# Patient Record
Sex: FEMALE | Race: BLACK OR AFRICAN AMERICAN | NOT HISPANIC OR LATINO | Employment: UNEMPLOYED | URBAN - METROPOLITAN AREA
[De-identification: names, ages, dates, MRNs, and addresses within clinical notes are randomized per-mention and may not be internally consistent; named-entity substitution may affect disease eponyms.]

---

## 2018-03-28 ENCOUNTER — TRANSCRIBE ORDERS (OUTPATIENT)
Dept: ADMINISTRATIVE | Facility: HOSPITAL | Age: 48
End: 2018-03-28

## 2018-03-28 DIAGNOSIS — E03.9 HYPOTHYROIDISM, ADULT: Primary | ICD-10-CM

## 2018-04-17 ENCOUNTER — HOSPITAL ENCOUNTER (OUTPATIENT)
Dept: GENERAL RADIOLOGY | Facility: HOSPITAL | Age: 48
End: 2018-04-17

## 2018-04-17 ENCOUNTER — TRANSCRIBE ORDERS (OUTPATIENT)
Dept: GENERAL RADIOLOGY | Facility: HOSPITAL | Age: 48
End: 2018-04-17

## 2018-04-17 ENCOUNTER — HOSPITAL ENCOUNTER (OUTPATIENT)
Dept: GENERAL RADIOLOGY | Facility: HOSPITAL | Age: 48
Discharge: HOME | End: 2018-04-17

## 2018-04-17 ENCOUNTER — HOSPITAL ENCOUNTER (OUTPATIENT)
Dept: GENERAL RADIOLOGY | Facility: HOSPITAL | Age: 48
Discharge: HOME | End: 2018-04-17
Admitting: NURSE PRACTITIONER

## 2018-04-17 DIAGNOSIS — R22.1 NECK NODULE: Primary | ICD-10-CM

## 2018-04-17 DIAGNOSIS — G89.29 CHRONIC RADICULAR PAIN OF LOWER BACK: Primary | ICD-10-CM

## 2018-04-17 DIAGNOSIS — M54.9 CHRONIC BILATERAL BACK PAIN, UNSPECIFIED BACK LOCATION: Primary | ICD-10-CM

## 2018-04-17 DIAGNOSIS — M25.562 BILATERAL CHRONIC KNEE PAIN: Primary | ICD-10-CM

## 2018-04-17 DIAGNOSIS — M25.561 BILATERAL CHRONIC KNEE PAIN: Primary | ICD-10-CM

## 2018-04-17 DIAGNOSIS — G89.29 CHRONIC BILATERAL BACK PAIN, UNSPECIFIED BACK LOCATION: Primary | ICD-10-CM

## 2018-04-17 DIAGNOSIS — G89.29 BILATERAL CHRONIC KNEE PAIN: Primary | ICD-10-CM

## 2018-04-17 DIAGNOSIS — M54.16 CHRONIC RADICULAR PAIN OF LOWER BACK: Primary | ICD-10-CM

## 2018-04-17 PROCEDURE — 72070 X-RAY EXAM THORAC SPINE 2VWS: CPT

## 2018-04-17 PROCEDURE — 72110 X-RAY EXAM L-2 SPINE 4/>VWS: CPT

## 2018-04-17 PROCEDURE — 73562 X-RAY EXAM OF KNEE 3: CPT

## 2018-04-18 ENCOUNTER — APPOINTMENT (OUTPATIENT)
Dept: ULTRASOUND IMAGING | Facility: HOSPITAL | Age: 48
End: 2018-04-18

## 2018-06-25 ENCOUNTER — HOSPITAL ENCOUNTER (EMERGENCY)
Facility: HOSPITAL | Age: 48
Discharge: HOME | End: 2018-06-25
Attending: EMERGENCY MEDICINE | Admitting: EMERGENCY MEDICINE

## 2018-06-25 ENCOUNTER — APPOINTMENT (OUTPATIENT)
Dept: CT IMAGING | Facility: HOSPITAL | Age: 48
End: 2018-06-25

## 2018-06-25 VITALS
TEMPERATURE: 98.9 F | OXYGEN SATURATION: 93 % | RESPIRATION RATE: 16 BRPM | HEIGHT: 64 IN | HEART RATE: 92 BPM | SYSTOLIC BLOOD PRESSURE: 128 MMHG | DIASTOLIC BLOOD PRESSURE: 93 MMHG | BODY MASS INDEX: 48.65 KG/M2 | WEIGHT: 285 LBS

## 2018-06-25 DIAGNOSIS — S09.90XA INJURY OF HEAD, INITIAL ENCOUNTER: ICD-10-CM

## 2018-06-25 DIAGNOSIS — M54.50 LOW BACK PAIN WITHOUT SCIATICA, UNSPECIFIED BACK PAIN LATERALITY, UNSPECIFIED CHRONICITY: ICD-10-CM

## 2018-06-25 DIAGNOSIS — M54.9 UPPER BACK PAIN: Primary | ICD-10-CM
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
## 2018-06-25 PROCEDURE — 96372 THER/PROPH/DIAG INJ SC/IM: CPT

## 2018-06-25 PROCEDURE — 72131 CT LUMBAR SPINE W/O DYE: CPT

## 2018-06-25 PROCEDURE — 72128 CT CHEST SPINE W/O DYE: CPT

## 2018-06-25 PROCEDURE — 70450 CT HEAD/BRAIN W/O DYE: CPT

## 2018-06-25 PROCEDURE — 99283 EMERGENCY DEPT VISIT LOW MDM: CPT

## 2018-06-25 PROCEDURE — 25010000002 KETOROLAC TROMETHAMINE PER 15 MG: Performed by: EMERGENCY MEDICINE

## 2018-06-25 RX ORDER — KETOROLAC TROMETHAMINE 30 MG/ML
30 INJECTION, SOLUTION INTRAMUSCULAR; INTRAVENOUS ONCE
Status: COMPLETED | OUTPATIENT
Start: 2018-06-25 | End: 2018-06-25

## 2018-06-25 RX ORDER — NAPROXEN 500 MG/1
500 TABLET ORAL 2 TIMES DAILY PRN
Qty: 14 TABLET | Refills: 0 | Status: SHIPPED | OUTPATIENT
Start: 2018-06-25 | End: 2019-04-19 | Stop reason: HOSPADM

## 2018-06-25 RX ORDER — LOSARTAN POTASSIUM AND HYDROCHLOROTHIAZIDE 12.5; 5 MG/1; MG/1
1 TABLET ORAL DAILY
COMMUNITY
End: 2019-04-19 | Stop reason: HOSPADM

## 2018-06-25 RX ORDER — LEVOTHYROXINE SODIUM 0.03 MG/1
25 TABLET ORAL DAILY
COMMUNITY
End: 2019-04-22

## 2018-06-25 RX ORDER — AMLODIPINE BESYLATE 10 MG/1
10 TABLET ORAL DAILY
COMMUNITY
End: 2019-07-24 | Stop reason: ALTCHOICE

## 2018-06-25 RX ORDER — ATORVASTATIN CALCIUM 40 MG/1
40 TABLET, FILM COATED ORAL DAILY
COMMUNITY
End: 2019-10-25 | Stop reason: SDUPTHER

## 2018-06-25 RX ORDER — ASPIRIN 325 MG
325 TABLET ORAL DAILY
COMMUNITY
End: 2020-07-10

## 2018-06-25 RX ORDER — CYCLOBENZAPRINE HCL 10 MG
10 TABLET ORAL 3 TIMES DAILY PRN
Qty: 15 TABLET | Refills: 0 | Status: SHIPPED | OUTPATIENT
Start: 2018-06-25 | End: 2019-04-19 | Stop reason: HOSPADM

## 2018-06-25 RX ADMIN — KETOROLAC TROMETHAMINE 30 MG: 30 INJECTION, SOLUTION INTRAMUSCULAR at 12:54

## 2018-08-09 ENCOUNTER — OFFICE VISIT (OUTPATIENT)
Dept: ORTHOPEDIC SURGERY | Facility: CLINIC | Age: 48
End: 2018-08-09

## 2018-08-09 VITALS — WEIGHT: 274 LBS | OXYGEN SATURATION: 98 % | HEIGHT: 64 IN | BODY MASS INDEX: 46.78 KG/M2 | HEART RATE: 105 BPM

## 2018-08-09 DIAGNOSIS — M22.2X1 PATELLOFEMORAL PAIN SYNDROME OF BOTH KNEES: ICD-10-CM

## 2018-08-09 DIAGNOSIS — M22.2X2 PATELLOFEMORAL PAIN SYNDROME OF BOTH KNEES: ICD-10-CM

## 2018-08-09 DIAGNOSIS — M17.0 PRIMARY OSTEOARTHRITIS OF BOTH KNEES: Primary | ICD-10-CM

## 2018-08-09 DIAGNOSIS — E66.01 MORBID OBESITY WITH BMI OF 45.0-49.9, ADULT (HCC): ICD-10-CM

## 2018-08-09 PROCEDURE — 20610 DRAIN/INJ JOINT/BURSA W/O US: CPT | Performed by: ORTHOPAEDIC SURGERY

## 2018-08-09 PROCEDURE — 99244 OFF/OP CNSLTJ NEW/EST MOD 40: CPT | Performed by: ORTHOPAEDIC SURGERY

## 2018-08-09 RX ORDER — BUPIVACAINE HYDROCHLORIDE 2.5 MG/ML
3 INJECTION, SOLUTION INFILTRATION; PERINEURAL
Status: COMPLETED | OUTPATIENT
Start: 2018-08-09 | End: 2018-08-09

## 2018-08-09 RX ORDER — TRIAMCINOLONE ACETONIDE 40 MG/ML
80 INJECTION, SUSPENSION INTRA-ARTICULAR; INTRAMUSCULAR
Status: COMPLETED | OUTPATIENT
Start: 2018-08-09 | End: 2018-08-09

## 2018-08-09 RX ORDER — LIDOCAINE HYDROCHLORIDE 10 MG/ML
3 INJECTION, SOLUTION INFILTRATION; PERINEURAL
Status: COMPLETED | OUTPATIENT
Start: 2018-08-09 | End: 2018-08-09

## 2018-08-09 RX ADMIN — BUPIVACAINE HYDROCHLORIDE 3 ML: 2.5 INJECTION, SOLUTION INFILTRATION; PERINEURAL at 10:39

## 2018-08-09 RX ADMIN — LIDOCAINE HYDROCHLORIDE 3 ML: 10 INJECTION, SOLUTION INFILTRATION; PERINEURAL at 10:39

## 2018-08-09 RX ADMIN — LIDOCAINE HYDROCHLORIDE 3 ML: 10 INJECTION, SOLUTION INFILTRATION; PERINEURAL at 10:38

## 2018-08-09 RX ADMIN — TRIAMCINOLONE ACETONIDE 80 MG: 40 INJECTION, SUSPENSION INTRA-ARTICULAR; INTRAMUSCULAR at 10:39

## 2018-08-09 RX ADMIN — TRIAMCINOLONE ACETONIDE 80 MG: 40 INJECTION, SUSPENSION INTRA-ARTICULAR; INTRAMUSCULAR at 10:38

## 2018-08-09 RX ADMIN — BUPIVACAINE HYDROCHLORIDE 3 ML: 2.5 INJECTION, SOLUTION INFILTRATION; PERINEURAL at 10:38

## 2018-08-09 NOTE — PROGRESS NOTES
Orthopaedic Clinic Note: Knee New Patient    Chief Complaint   Patient presents with   • Right Knee - Pain   • Left Knee - Pain        HPI  Consult from: LUCIA Morales JOSIANE Martinez is a 48 y.o. female who presents with bilateral knee pain for 7 month(s).  Her symptoms have actually been ongoing for longer than that but her pain has gotten more severe over the past 7 months.  Walking, sitting, lying down increase her pain.  Sitting and resting improves her pain.  She rates her pain 8/10 on the pain scale.  Previous treatments have included use of a cane and occasional anti-inflammatory.  Despite these interventions, her pain is persisting and limiting her daily activities.  She localizes her pain bilaterally to the anterior aspects of both knees.     Past Medical History:   Diagnosis Date   • Asthma    • Diabetes mellitus (CMS/HCC)    • Disease of thyroid gland    • Hypertension    • Sleep apnea       Past Surgical History:   Procedure Laterality Date   • TUBAL ABDOMINAL LIGATION        Family History   Problem Relation Age of Onset   • Cancer Mother    • Diabetes Father      Social History     Social History   • Marital status: Single     Spouse name: N/A   • Number of children: N/A   • Years of education: N/A     Occupational History   • Not on file.     Social History Main Topics   • Smoking status: Former Smoker     Packs/day: 1.00     Types: Cigarettes   • Smokeless tobacco: Never Used   • Alcohol use Yes      Comment: occ   • Drug use: No   • Sexual activity: Defer     Other Topics Concern   • Not on file     Social History Narrative   • No narrative on file      Current Outpatient Prescriptions on File Prior to Visit   Medication Sig Dispense Refill   • amLODIPine (NORVASC) 10 MG tablet Take 10 mg by mouth Daily.     • aspirin 325 MG tablet Take 325 mg by mouth Daily.     • atorvastatin (LIPITOR) 40 MG tablet Take 40 mg by mouth Daily.     • cyclobenzaprine (FLEXERIL) 10 MG tablet Take 1 tablet by  "mouth 3 (Three) Times a Day As Needed for Muscle Spasms. 15 tablet 0   • Ergocalciferol (VITAMIN D2 PO) Take 50,000 Units by mouth 1 (One) Time Per Week.     • GLIPIZIDE PO Take 2.5 mg by mouth Daily.     • levothyroxine (SYNTHROID, LEVOTHROID) 25 MCG tablet Take 25 mcg by mouth Daily.     • losartan-hydrochlorothiazide (HYZAAR) 50-12.5 MG per tablet Take 1 tablet by mouth Daily.     • naproxen (EC NAPROSYN) 500 MG EC tablet Take 1 tablet by mouth 2 (Two) Times a Day As Needed (PAIN). 14 tablet 0     No current facility-administered medications on file prior to visit.       Allergies   Allergen Reactions   • Lisinopril Swelling        Review of Systems   Constitutional: Positive for fatigue.   Eyes: Negative.    Respiratory: Positive for apnea, shortness of breath and wheezing.    Cardiovascular: Positive for palpitations.   Gastrointestinal: Negative.    Endocrine: Negative.    Genitourinary: Negative.    Musculoskeletal: Positive for back pain and joint swelling.   Skin: Negative.    Allergic/Immunologic: Negative.    Neurological: Positive for headaches.   Hematological: Negative.    Psychiatric/Behavioral: Positive for confusion, decreased concentration, sleep disturbance and suicidal ideas. The patient is nervous/anxious.         The following portions of the patient's history were reviewed and updated as appropriate: allergies, current medications, past family history, past medical history, past social history, past surgical history and problem list.    Physical Exam  Pulse 105, height 162.6 cm (64.02\"), weight 124 kg (274 lb), SpO2 98 %.    Body mass index is 47.01 kg/m².    GENERAL APPEARANCE: awake, alert & oriented x 3, in no acute distress, well developed, well nourished and obese  PSYCH: normal affect  LUNGS:  breathing nonlabored  EYES: sclera anicteric  CARDIOVASCULAR: palpable dorsalis pedis, palpable posterior tibial bilaterally. Capillary refill less than 2 seconds  EXTREMITIES: no clubbing, " cyanosis  GAIT:  Antalgic            Right Lower Extremity Exam:   ----------  Hip Exam  ----------  FLEXION CONTRACTURE: None  FLEXION: 110 degrees  INTERNAL ROTATION: 20 degrees at 90 degrees of flexion   EXTERNAL ROTATION: 40 degrees at 90 degrees of flexion    PAIN WITH HIP MOTION: no  ----------  Knee Exam  ----------  ALIGNMENT: neutral, no varus or valgus deformity     RANGE OF MOTION:  Normal (0-130 degrees) with no extensor lag or flexion contracture  LIGAMENTOUS STABILITY:   stable to varus and valgus stress at 0 and 30 degrees without any evidence of laxity     STRENGTH:  4/5 knee flexion, extension. 5/5 ankle dorsiflexion and plantarflexion.     PAIN WITH PALPATION: Tender to palpation about medial, lateral joint line, anterior knee, patellar tendon   KNEE EFFUSION: Trace  PAIN WITH KNEE ROM: no  PATELLAR CREPITUS: yes, painful and symptomatic  SPECIAL EXAM FINDINGS:  Painful patellar compression    REFLEXES:  PATELLAR 2+/4  ACHILLES 2+/4    CLONUS: negative  STRAIGHT LEG TEST:   negative    SENSATION TO LIGHT TOUCH:  DEEP PERONEAL/SUPERFICIAL PERONEAL/SURAL/SAPHENOUS/TIBIAL:   intact    EDEMA:  no  ERYTHEMA:  no  WOUNDS/INCISIONS: none, no overlying skin problems.      Left Lower Extremity Exam:   ----------  Hip Exam  ----------  FLEXION CONTRACTURE: None  FLEXION: 110 degrees  INTERNAL ROTATION: 20 degrees at 90 degrees of flexion   EXTERNAL ROTATION: 40 degrees at 90 degrees of flexion    PAIN WITH HIP MOTION: no  ----------  Knee Exam  ----------  ALIGNMENT: neutral, no varus or valgus deformity     RANGE OF MOTION:  Normal (0-130 degrees) with no extensor lag or flexion contracture  LIGAMENTOUS STABILITY:   stable to varus and valgus stress at 0 and 30 degrees without any evidence of laxity     STRENGTH:  4/5 knee flexion, extension. 5/5 ankle dorsiflexion and plantarflexion.     PAIN WITH PALPATION: Tender to palpation about medial, lateral joint line, anterior knee, patellar tendon   KNEE  EFFUSION: Trace  PAIN WITH KNEE ROM: no  PATELLAR CREPITUS: yes, painful and symptomatic  SPECIAL EXAM FINDINGS:  Painful patellar compression    REFLEXES:  PATELLAR 2+/4  ACHILLES 2+/4    CLONUS: negative  STRAIGHT LEG TEST:   negative    SENSATION TO LIGHT TOUCH:  DEEP PERONEAL/SUPERFICIAL PERONEAL/SURAL/SAPHENOUS/TIBIAL:   intact    EDEMA:  no  ERYTHEMA:  no  WOUNDS/INCISIONS: none, no overlying skin problems.      ______________________________________________________________________  ______________________________________________________________________    RADIOGRAPHIC FINDINGS:   Indication: Bilateral knee pain    Comparison: Todays xrays were compared to previous xrays from 4/17/18     Bilateral knee(s) 4 views: Radiographs demonstrate mild to moderate tricompartmental osteoarthritis with slight medial compartment joint space narrowing.  Small osteophytes are seen in the sunrise view.  Periarticular osteophytes are primarily seen in sunrise view.  No acute bony injury or fracture.  No significant changes compared to prior radiographs despite the most recent set being weightbearing versus nonweightbearing.      Assessment/Plan:   Diagnosis Plan   1. Primary osteoarthritis of both knees  XR Knee 4+ View Bilateral    Large Joint Arthrocentesis    Large Joint Arthrocentesis    Ambulatory Referral to Physical Therapy Evaluate and treat   2. Patellofemoral pain syndrome of both knees  Ambulatory Referral to Physical Therapy Evaluate and treat   3. Morbid obesity with BMI of 45.0-49.9, adult (CMS/HCC)       Patient's symptoms appear to be related to arthritis and patellofemoral pain syndrome.  I recommended a referral to physical therapy, cortisone injections, continued by mouth anti-inflammatories.  I'll place a referral for therapy as well as cortisone injections bilateral knees today.  She'll follow up in 3 months.    Procedure Note:  I discussed with the patient the potential benefits of performing a therapeutic  injections of the bilateral knees as well as potential risks including but not limited to infection, swelling, pain, bleeding, bruising, nerve/vessel damage, skin color changes, transient elevation in blood glucose levels, and fat atrophy. After informed consent and after the areas were prepped with alcohol, ethyl chloride was used to numb the skin. Via the superolateral approach, 3cc of 1% lidocaine, 3cc of 0.25% marcaine and 2 cc of 40mg/ml of Kenalog were each injected into the bilateral knees. The patient tolerated the procedures well. There were no complications. A sterile dressing was placed over each injection site.    Sanjeev Campbell MD  08/09/18  10:43 AM

## 2018-08-09 NOTE — PROGRESS NOTES
Procedure   Large Joint Arthrocentesis  Date/Time: 8/9/2018 10:38 AM  Consent given by: patient  Site marked: site marked  Timeout: Immediately prior to procedure a time out was called to verify the correct patient, procedure, equipment, support staff and site/side marked as required   Supporting Documentation  Indications: pain   Procedure Details  Location: knee - R knee  Preparation: Patient was prepped and draped in the usual sterile fashion  Needle size: 22 G  Approach: anterolateral  Medications administered: 3 mL lidocaine 1 %; 80 mg triamcinolone acetonide 40 MG/ML; 3 mL bupivacaine 0.25 %  Patient tolerance: patient tolerated the procedure well with no immediate complications    Large Joint Arthrocentesis  Date/Time: 8/9/2018 10:39 AM  Consent given by: patient  Site marked: site marked  Timeout: Immediately prior to procedure a time out was called to verify the correct patient, procedure, equipment, support staff and site/side marked as required   Supporting Documentation  Indications: pain   Procedure Details  Location: knee - L knee  Preparation: Patient was prepped and draped in the usual sterile fashion  Needle size: 22 G  Approach: anterolateral  Medications administered: 3 mL lidocaine 1 %; 80 mg triamcinolone acetonide 40 MG/ML; 3 mL bupivacaine 0.25 %  Patient tolerance: patient tolerated the procedure well with no immediate complications

## 2018-08-16 ENCOUNTER — HOSPITAL ENCOUNTER (OUTPATIENT)
Dept: PHYSICAL THERAPY | Facility: HOSPITAL | Age: 48
Setting detail: THERAPIES SERIES
Discharge: HOME | End: 2018-08-16

## 2018-08-16 DIAGNOSIS — G89.29 CHRONIC PAIN OF BOTH KNEES: Primary | ICD-10-CM

## 2018-08-16 DIAGNOSIS — M25.562 CHRONIC PAIN OF BOTH KNEES: Primary | ICD-10-CM

## 2018-08-16 DIAGNOSIS — M25.561 CHRONIC PAIN OF BOTH KNEES: Primary | ICD-10-CM

## 2018-08-16 PROCEDURE — 97161 PT EVAL LOW COMPLEX 20 MIN: CPT

## 2018-08-16 NOTE — THERAPY EVALUATION
Outpatient Physical Therapy Ortho Initial Evaluation  [facility]     Patient Name: Freida Martinez  : 1970  MRN: [id number]  Today's Date: 2018      Visit Date: 2018      Past Medical History:   Diagnosis Date   • Asthma    • Diabetes mellitus (CMS/HCC)    • Disease of thyroid gland    • Hypertension    • Sleep apnea         Past Surgical History:   Procedure Laterality Date   • TUBAL ABDOMINAL LIGATION         Visit Dx:     ICD-10-CM ICD-9-CM   1. Chronic pain of both knees M25.561 719.46    M25.562 338.29    G89.29              Patient History     Row Name 18 1351             History    Chief Complaint Difficulty Walking;Difficulty with daily activities;Joint stiffness;Joint swelling;Muscle tenderness;Muscle weakness;Pain  -MM      Type of Pain Knee pain   bilateral  -MM      Brief Description of Current Complaint client presents with severe bilateral knee pain. she has had severe pain for several years. She recently recieved a cortizone injection in both knees 18. Symptoms improved some after the injection and overall pain has been less severe. she was also able to move her knees better after the injection.  she reports that she also struggles with severe back pain at 9/10.   -MM      Patient/Caregiver Goals Relieve pain;Return to prior level of function;Improve mobility;Improve strength  -MM      What clinical tests have you had for this problem? X-ray  -MM      Results of Clinical Tests mild to moderate OA with small osteophytes  -MM      Are you or can you be pregnant No  -MM         Pain     Pain Location Knee  -MM      Pain at Present 0  -MM      Pain at Best 0  -MM      Pain at Worst 10  -MM      Pain Frequency Intermittent   daily  -MM      Pain Description Throbbing;Shooting;Stabbing  -MM      Pain Comments Pain improves sitting and resting. she has stiffness after sitting too long. she has pain with transfers and WB activity.  -MM      Tolerance Time- Standing 5 min   -MM      Tolerance Time- Sitting 20 min  -MM      Tolerance Time- Walking usually just walk around the house  -MM      Is your sleep disturbed? --   sometimes  -MM      Difficulties with ADL's? walking, standing, transfers, stairs, squatting  -MM         Fall Risk Assessment    Any falls in the past year: Yes  -MM      Number of falls reported in the last 12 months 1  -MM      Factors that contributed to the fall: Slippery surface  -MM      Does patient have a fear of falling Yes (comment)   Client walks with a cane now  -MM         Daily Activities    Primary Language English  -MM      Are you able to read Yes  -MM      Are you able to write Yes  -MM      How does patient learn best? Reading;Listening;Demonstration  -MM      Barriers to learning None  -MM      Pt Participated in POC and Goals Yes  -MM         Safety    Are you being hurt, hit, or frightened by anyone at home or in your life? No  -MM      Are you being neglected by a caregiver No  -MM        User Key  (r) = Recorded By, (t) = Taken By, (c) = Cosigned By    Initials Name Provider Type    MM Sanjeev Serrato, PT Physical Therapist                PT Ortho     Row Name 08/16/18 1400       Posture/Observations    Posture/Observations Comments client walks with a quad cane in R hand (corrected and patient started using in Left hand). marked difficulty with sit to stand transfers. bilateral lateral tilt of each patella. Client noted and demonstrated that her right patella subluxes very easily laterally. Palpation: moderate tenderness around each patella and medial/lateral tibiofemoral joint line bilateral. Crepitus noted at right lateral patella with passive knee flexion/extension.   -MM       Knee Special Tests    Anterior drawer (ACL lesion) Bilateral:;Negative  -MM    Lachman’s (ACL lesion) Bilateral:;Negative  -MM    Posterior drawer (PCL lesion) Bilateral:;Negative  -MM    Valgus stress (MCL lesion) Bilateral:;Negative  -MM    Varus stress (LCL  lesion) Bilateral:;Negative  -MM    Patellar grind test (chondromalacia patella) --   not tested. crepitus noted R > L.  -MM       Right Lower Ext    RT Lower Extremity Comments Knee AROM: flexion: 137°; extension: 0°  -MM       Left Lower Ext    LT Lower Extremity Comments Knee AROM: Flexion: 135° with pain; extension: 0°  -MM       Left Hip (Manual Muscle Testing)    MMT, Left Hip: Manual Muscle Testing (MMT) Flex: 4-/5; abduction: 4-/5; adduction: 4-/5.  -MM       Right Hip (Manual Muscle Testing)    MMT, Right Hip: Manual Muscle Testing (MMT) Flex: 4-/5; abduction: 4-/5; adduction: 4-/5.  -MM       Left Knee (Manual Muscle Testing)    Comment, Left Knee: Manual Muscle Testing (MMT) flexion: 4-/5; extension: 4-/5  -MM       Right Knee (Manual Muscle Testing)    Comment, Right Knee: Manual Muscle Testing (MMT) flexion: 4-/5; extension: 4-/5.  -MM       Transfers    Comment (Transfers) Marked difficulty with sit to stand transfers.  -MM       Gait/Stairs Assessment/Training    Comment (Gait/Stairs) Decreased maximus and mild antalgic gait due to right knee pain.  Client walks with quad cane.  Client demonstrated use of quad cane in left hand to reduce weightbearing stress on the right lower extremity.  -MM      User Key  (r) = Recorded By, (t) = Taken By, (c) = Cosigned By    Initials Name Provider Type    MM Sanjeev Serrato, PT Physical Therapist        Therapy Education  Education Details: Provided and reviewed home program.  Home exercises included: Very small range squats with support, straight leg raise flexion, straight leg raise abduction, straight leg raise extension, standing leg curl, hamstring stretch, single knee to chest.           PT OP Goals     Row Name 08/16/18 1400          PT Short Term Goals    STG Date to Achieve 09/06/18  -MM     STG 1 Client will report 50% improvement bilateral knee pain.  -MM     STG 1 Progress New  -MM     STG 2 Client will demonstrate full knee flexion without pain.   -MM     STG 2 Progress New  -MM     STG 3 Client will be independent with home program to minimize pain and improve overall strength and function.  -MM     STG 3 Progress New  -MM     STG 4 Bilateral knee strength will improve to 4+/5 throughout.  -MM     STG 4 Progress New  -MM        Long Term Goals    LTG Date to Achieve 09/13/18  -MM     LTG 1 LEF S score will improve to 60% or better.  -MM     LTG 1 Progress New  -MM     LTG 2 Client will demonstrate normal walking pattern while using a cane.  -MM     LTG 2 Progress New  -MM     LTG 3 Bilateral hip strength will improve to 4+/5 throughout.  -MM     LTG 3 Progress New  -MM     LTG 4 Client will improve to transferring sit to stand without difficulty.  -MM     LTG 4 Progress New  -MM        Time Calculation    PT Goal Re-Cert Due Date 11/14/18  -MM       User Key  (r) = Recorded By, (t) = Taken By, (c) = Cosigned By    Initials Name Provider Type    MM Sanjeev Serrato, PT Physical Therapist                PT Assessment/Plan     Row Name 08/16/18 1400          PT Assessment    Functional Limitations Impaired gait;Impaired locomotion;Limitation in home management;Limitations in community activities;Limitations in functional capacity and performance;Performance in leisure activities;Performance in self-care ADL  -MM     Impairments Pain;Muscle strength;Range of motion;Gait;Endurance  -MM     Assessment Comments Client presents with evolving symptoms of low complexity.  Signs and symptoms are consistent with bilateral knee pain related to osteoarthritis.  Client also demonstrates some instability of right patella with increased lateral gliding.  Skilled intervention is required to improve pain and strength.  -MM     Please refer to paper survey for additional self-reported information Yes  -MM     Rehab Potential Good  -MM     Patient/caregiver participated in establishment of treatment plan and goals Yes  -MM     Patient would benefit from skilled therapy  intervention Yes  -MM        PT Plan    PT Frequency 2x/week  -MM     Predicted Duration of Therapy Intervention (Therapy Eval) 8-10 visits  -MM     Planned CPT's? PT EVAL LOW COMPLEXITY: 79923;PT THER PROC EA 15 MIN: 33407;PT NEUROMUSC RE-EDUCATION EA 15 MIN: 69548;PT MANUAL THERAPY EA 15 MIN: 78046;PT GAIT TRAINING EA 15 MIN: 97004;PT ELECTRICAL STIM UNATTEND: ;PT HOT/COLD PACK WC NONMCARE: 32479;PT ULTRASOUND EA 15 MIN: 65553  -MM     PT Plan Comments Continue per plan of treatment with manual therapy and exercises.  Limit weightbearing activity as needed.  -MM       User Key  (r) = Recorded By, (t) = Taken By, (c) = Cosigned By    Initials Name Provider Type    Sanjeev Green, PT Physical Therapist           Outcome Measure Options: Lower Extremity Functional Scale (LEFS) (10%)  Lower Extremity Functional Index  Any of your usual work, housework or school activities: Extreme difficulty or unable to perform activity  Your usual hobbies, recreational or sporting activities: Extreme difficulty or unable to perform activity  Getting into or out of the bath: Quite a bit of difficulty  Walking between rooms: Quite a bit of difficulty  Putting on your shoes or socks: Quite a bit of difficulty  Squatting: Extreme difficulty or unable to perform activity  Lifting an object, like a bag of groceries from the floor: Extreme difficulty or unable to perform activity  Performing light activities around your home: Quite a bit of difficulty  Performing heavy activities around your home: Extreme difficulty or unable to perform activity  Getting into or out of a car: Quite a bit of difficulty  Walking 2 blocks: Extreme difficulty or unable to perform activity  Walking a mile: Extreme difficulty or unable to perform activity  Going up or down 10 stairs (about 1 flight of stairs): Quite a bit of difficulty  Standing for 1 hour: Extreme difficulty or unable to perform activity  Sitting for 1 hour: Extreme difficulty or  unable to perform activity  Running on even ground: Extreme difficulty or unable to perform activity  Running on uneven ground: Extreme difficulty or unable to perform activity  Making sharp turns while running fast: Extreme difficulty or unable to perform activity  Hopping: Extreme difficulty or unable to perform activity  Rolling over in bed: Moderate difficulty  Total: 8      Start Time: 1400     Therapy Charges for Today     Code Description Service Date Service Provider Modifiers Qty    95968358666 HC PT EVAL LOW COMPLEXITY 4 8/16/2018 Sanjeev Serrato, PT GP 1          PT G-Codes  Outcome Measure Options: Lower Extremity Functional Scale (LEFS) (10%)         Sanjeev Serrato, PT  8/16/2018

## 2018-08-22 ENCOUNTER — HOSPITAL ENCOUNTER (OUTPATIENT)
Dept: PHYSICAL THERAPY | Facility: HOSPITAL | Age: 48
Setting detail: THERAPIES SERIES
Discharge: HOME | End: 2018-08-22

## 2018-08-22 DIAGNOSIS — G89.29 CHRONIC PAIN OF BOTH KNEES: Primary | ICD-10-CM

## 2018-08-22 DIAGNOSIS — M25.562 CHRONIC PAIN OF BOTH KNEES: Primary | ICD-10-CM

## 2018-08-22 DIAGNOSIS — M25.561 CHRONIC PAIN OF BOTH KNEES: Primary | ICD-10-CM

## 2018-08-22 PROCEDURE — 97110 THERAPEUTIC EXERCISES: CPT

## 2018-08-22 PROCEDURE — 97140 MANUAL THERAPY 1/> REGIONS: CPT

## 2018-08-27 ENCOUNTER — HOSPITAL ENCOUNTER (OUTPATIENT)
Dept: PHYSICAL THERAPY | Facility: HOSPITAL | Age: 48
Setting detail: THERAPIES SERIES
Discharge: HOME | End: 2018-08-27

## 2018-08-27 DIAGNOSIS — M25.561 CHRONIC PAIN OF BOTH KNEES: Primary | ICD-10-CM

## 2018-08-27 DIAGNOSIS — G89.29 CHRONIC PAIN OF BOTH KNEES: Primary | ICD-10-CM

## 2018-08-27 DIAGNOSIS — M25.562 CHRONIC PAIN OF BOTH KNEES: Primary | ICD-10-CM

## 2018-08-27 PROCEDURE — 97140 MANUAL THERAPY 1/> REGIONS: CPT

## 2018-08-27 PROCEDURE — 97110 THERAPEUTIC EXERCISES: CPT

## 2018-08-27 NOTE — THERAPY TREATMENT NOTE
Outpatient Physical Therapy Ortho Treatment Note  [facility]     Patient Name: Freida Martinez  : 1970  MRN: [id number]  Today's Date: 2018      Visit Date: 2018    Visit Dx:    ICD-10-CM ICD-9-CM   1. Chronic pain of both knees M25.561 719.46    M25.562 338.29    G89.29         Past Medical History:   Diagnosis Date   • Asthma    • Diabetes mellitus (CMS/HCC)    • Disease of thyroid gland    • Hypertension    • Sleep apnea         Past Surgical History:   Procedure Laterality Date   • TUBAL ABDOMINAL LIGATION                               PT Assessment/Plan     Row Name 18 0900          PT Assessment    Assessment Comments Client had mild pain with exercise today. She reported difficulty transferring sit to stand and catching discomfort. She had difficulty performing SLR flexion for the right leg.   -MM        PT Plan    PT Plan Comments Continue per plan of treatment with exercises and manual therapy.   -MM       User Key  (r) = Recorded By, (t) = Taken By, (c) = Cosigned By    Initials Name Provider Type    Sanjeev Green, PT Physical Therapist                    Exercises     Row Name 18 0900             Subjective Comments    Subjective Comments Client reports no pain at rest, but pain at 2/10 with activity.   -MM         Subjective Pain    Able to rate subjective pain? yes  -MM      Pre-Treatment Pain Level 2  -MM      Post-Treatment Pain Level 2  -MM         Total Minutes    62186 - PT Therapeutic Exercise Minutes 15  -MM      94174 - PT Manual Therapy Minutes 15  -MM         Exercise 1    Exercise Name 1 Included exercises per flow sheet.   -MM      Cueing 1 Verbal  -MM      Time 1 15  -MM      Additional Comments ther ex  -MM        User Key  (r) = Recorded By, (t) = Taken By, (c) = Cosigned By    Initials Name Provider Type    Sanjeev Green, PT Physical Therapist                        Manual Rx (last 36 hours)      Manual Treatments     Row Name 18  0900             Total Minutes    20126 - PT Manual Therapy Minutes 15  -MM         Manual Rx 1    Manual Rx 1 Location anterior knees bilateral  -MM      Manual Rx 1 Type Provided soft tissue mobilization using ASTYM technique.  Client was positioned supine with knees bent.  Moderate pressure was used with ASTYM.  -MM      Manual Rx 1 Duration 15  -MM        User Key  (r) = Recorded By, (t) = Taken By, (c) = Cosigned By    Initials Name Provider Type    Sanjeev Green, PT Physical Therapist                             Time Calculation:   Start Time: 0900  Therapy Suggested Charges     Code   Minutes Charges    83723 (CPT®) Hc Pt Neuromusc Re Education Ea 15 Min      31296 (CPT®) Hc Pt Ther Proc Ea 15 Min 15 1    12318 (CPT®) Hc Gait Training Ea 15 Min      19094 (CPT®) Hc Pt Therapeutic Act Ea 15 Min      88113 (CPT®) Hc Pt Manual Therapy Ea 15 Min 15 1    66517 (CPT®) Hc Pt Ther Massage- Per 15 Min      06875 (CPT®) Hc Pt Iontophoresis Ea 15 Min      77917 (CPT®) Hc Pt Elec Stim Ea-Per 15 Min      67027 (CPT®) Hc Pt Ultrasound Ea 15 Min      63403 (CPT®) Hc Pt Self Care/Mgmt/Train Ea 15 Min      47648 (CPT®) Hc Pt Prosthetic (S) Train Initial Encounter, Each 15 Min      47425 (CPT®) Hc Orthotic(S) Mgmt/Train Initial Encounter, Each 15min      11726 (CPT®) Hc Pt Aquatic Therapy Ea 15 Min      67793 (CPT®) Hc Pt Orthotic(S)/Prosthetic(S) Encounter, Each 15 Min      Total  30 2        Therapy Charges for Today     Code Description Service Date Service Provider Modifiers Qty    72076338911 HC PT THER PROC EA 15 MIN 8/27/2018 Sanjeev Serrato, PT GP 1    75745105766 HC PT MANUAL THERAPY EA 15 MIN 8/27/2018 Sanjeev Serrato, PT GP 1                    Sanjeev Serrato, PT  8/27/2018

## 2018-08-30 ENCOUNTER — HOSPITAL ENCOUNTER (OUTPATIENT)
Dept: PHYSICAL THERAPY | Facility: HOSPITAL | Age: 48
Setting detail: THERAPIES SERIES
Discharge: HOME | End: 2018-08-30

## 2018-08-30 DIAGNOSIS — G89.29 CHRONIC PAIN OF BOTH KNEES: Primary | ICD-10-CM

## 2018-08-30 DIAGNOSIS — M25.562 CHRONIC PAIN OF BOTH KNEES: Primary | ICD-10-CM

## 2018-08-30 DIAGNOSIS — M25.561 CHRONIC PAIN OF BOTH KNEES: Primary | ICD-10-CM

## 2018-08-30 PROCEDURE — 97140 MANUAL THERAPY 1/> REGIONS: CPT

## 2018-09-04 ENCOUNTER — HOSPITAL ENCOUNTER (OUTPATIENT)
Dept: PHYSICAL THERAPY | Facility: HOSPITAL | Age: 48
Setting detail: THERAPIES SERIES
Discharge: HOME | End: 2018-09-04

## 2018-09-04 DIAGNOSIS — M25.561 CHRONIC PAIN OF BOTH KNEES: Primary | ICD-10-CM

## 2018-09-04 DIAGNOSIS — M25.562 CHRONIC PAIN OF BOTH KNEES: Primary | ICD-10-CM

## 2018-09-04 DIAGNOSIS — G89.29 CHRONIC PAIN OF BOTH KNEES: Primary | ICD-10-CM

## 2018-09-04 PROCEDURE — 97110 THERAPEUTIC EXERCISES: CPT

## 2018-09-04 PROCEDURE — 97140 MANUAL THERAPY 1/> REGIONS: CPT

## 2018-09-04 NOTE — THERAPY TREATMENT NOTE
Outpatient Physical Therapy Ortho Treatment Note  [facility]     Patient Name: Freida Martinez  : 1970  MRN: [id number]  Today's Date: 2018      Visit Date: 2018    Visit Dx:    ICD-10-CM ICD-9-CM   1. Chronic pain of both knees M25.561 719.46    M25.562 338.29    G89.29        There is no problem list on file for this patient.       Past Medical History:   Diagnosis Date   • Asthma    • Diabetes mellitus (CMS/HCC)    • Disease of thyroid gland    • Hypertension    • Sleep apnea         Past Surgical History:   Procedure Laterality Date   • TUBAL ABDOMINAL LIGATION                               PT Assessment/Plan     Row Name 18 1300          PT Assessment    Assessment Comments Pain has been mild but she notices quite a bit of weakness.  -MM        PT Plan    PT Plan Comments Continue per plan of treatment with exercises and manual therapy as needed.  -MM       User Key  (r) = Recorded By, (t) = Taken By, (c) = Cosigned By    Initials Name Provider Type    Sanjeev Green, PT Physical Therapist                    Exercises     Row Name 18 1300             Subjective Comments    Subjective Comments Client reports mild right knee pain today. She reports feeling a lot of LE weakness.   -MM         Subjective Pain    Able to rate subjective pain? yes  -MM      Pre-Treatment Pain Level 1  -MM      Post-Treatment Pain Level 1  -MM         Total Minutes    42891 - PT Therapeutic Exercise Minutes 20  -MM      33232 - PT Manual Therapy Minutes 10  -MM         Exercise 1    Exercise Name 1 Continued exercises in clinical setting per flow sheet.   -MM      Cueing 1 Verbal  -MM      Time 1 15  -MM      Additional Comments ther ex  -MM        User Key  (r) = Recorded By, (t) = Taken By, (c) = Cosigned By    Initials Name Provider Type    Sanjeev Green, PT Physical Therapist                        Manual Rx (last 36 hours)      Manual Treatments     Row Name 18 1300              Total Minutes    08273 - PT Manual Therapy Minutes 10  -MM         Manual Rx 1    Manual Rx 1 Location Right knee  -MM      Manual Rx 1 Type Provided soft tissue mobilization using ASTYM technique.  Client was positioned supine with knees bent.  Moderate pressure was used with ASTYM.  -MM      Manual Rx 1 Duration 10  -MM        User Key  (r) = Recorded By, (t) = Taken By, (c) = Cosigned By    Initials Name Provider Type    Sanjeev Green, PT Physical Therapist                             Time Calculation:   Start Time: 1300  Therapy Suggested Charges     Code   Minutes Charges    94293 (CPT®) Hc Pt Neuromusc Re Education Ea 15 Min      79391 (CPT®) Hc Pt Ther Proc Ea 15 Min 20 1    83091 (CPT®) Hc Gait Training Ea 15 Min      54571 (CPT®) Hc Pt Therapeutic Act Ea 15 Min      09106 (CPT®) Hc Pt Manual Therapy Ea 15 Min 10 1    17814 (CPT®) Hc Pt Ther Massage- Per 15 Min      88598 (CPT®) Hc Pt Iontophoresis Ea 15 Min      98653 (CPT®) Hc Pt Elec Stim Ea-Per 15 Min      49429 (CPT®) Hc Pt Ultrasound Ea 15 Min      08059 (CPT®) Hc Pt Self Care/Mgmt/Train Ea 15 Min      56118 (CPT®) Hc Pt Prosthetic (S) Train Initial Encounter, Each 15 Min      64899 (CPT®) Hc Orthotic(S) Mgmt/Train Initial Encounter, Each 15min      22015 (CPT®) Hc Pt Aquatic Therapy Ea 15 Min      48612 (CPT®) Hc Pt Orthotic(S)/Prosthetic(S) Encounter, Each 15 Min      Total  30 2        Therapy Charges for Today     Code Description Service Date Service Provider Modifiers Qty    75452054139 HC PT THER PROC EA 15 MIN 9/4/2018 Sanjeev Serrato, PT GP 1    25715025857 HC PT MANUAL THERAPY EA 15 MIN 9/4/2018 Sanjeev Serrato, PT GP 1                    aSnjeev Serrato, PT  9/4/2018

## 2018-09-17 ENCOUNTER — DOCUMENTATION (OUTPATIENT)
Dept: PHYSICAL THERAPY | Facility: HOSPITAL | Age: 48
End: 2018-09-17

## 2018-09-17 DIAGNOSIS — M25.561 CHRONIC PAIN OF BOTH KNEES: Primary | ICD-10-CM

## 2018-09-17 DIAGNOSIS — G89.29 CHRONIC PAIN OF BOTH KNEES: Primary | ICD-10-CM

## 2018-09-17 DIAGNOSIS — M25.562 CHRONIC PAIN OF BOTH KNEES: Primary | ICD-10-CM

## 2018-09-17 NOTE — THERAPY DISCHARGE NOTE
Outpatient Physical Therapy Discharge Summary         Patient Name: Freida Martinez  : 1970  MRN: [id number]    Today's Date: 2018    Visit Dx:    ICD-10-CM ICD-9-CM   1. Chronic pain of both knees M25.561 719.46    M25.562 338.29    G89.29              PT OP Goals     Row Name 18 1200          PT Short Term Goals    STG Date to Achieve 18  -MM     STG 1 Client will report 50% improvement bilateral knee pain.  -MM     STG 1 Progress Not Met  -MM     STG 2 Client will demonstrate full knee flexion without pain.  -MM     STG 2 Progress Not Met  -MM     STG 3 Client will be independent with home program to minimize pain and improve overall strength and function.  -MM     STG 3 Progress Not Met  -MM     STG 4 Bilateral knee strength will improve to 4+/5 throughout.  -MM     STG 4 Progress Not Met  -MM        Long Term Goals    LTG Date to Achieve 18  -MM     LTG 1 LEF S score will improve to 60% or better.  -MM     LTG 1 Progress Not Met  -MM     LTG 2 Client will demonstrate normal walking pattern while using a cane.  -MM     LTG 2 Progress Not Met  -MM     LTG 3 Bilateral hip strength will improve to 4+/5 throughout.  -MM     LTG 3 Progress Not Met  -MM     LTG 4 Client will improve to transferring sit to stand without difficulty.  -MM     LTG 4 Progress Not Met  -MM       User Key  (r) = Recorded By, (t) = Taken By, (c) = Cosigned By    Initials Name Provider Type    Sanjeev Green, PT Physical Therapist          OP PT Discharge Summary  Date of Discharge: 18  Reason for Discharge: Non-compliant (no show x3)  Outcomes Achieved: Refer to plan of care for updates on goals achieved  Discharge Destination: Home with home program              Sanjeev Serrato, PT  2018

## 2018-09-20 ENCOUNTER — APPOINTMENT (OUTPATIENT)
Dept: GENERAL RADIOLOGY | Facility: HOSPITAL | Age: 48
End: 2018-09-20

## 2018-09-20 ENCOUNTER — HOSPITAL ENCOUNTER (EMERGENCY)
Facility: HOSPITAL | Age: 48
Discharge: HOME | End: 2018-09-20
Attending: EMERGENCY MEDICINE | Admitting: EMERGENCY MEDICINE

## 2018-09-20 VITALS
RESPIRATION RATE: 20 BRPM | SYSTOLIC BLOOD PRESSURE: 122 MMHG | TEMPERATURE: 99.2 F | WEIGHT: 293 LBS | HEIGHT: 64 IN | HEART RATE: 101 BPM | OXYGEN SATURATION: 95 % | BODY MASS INDEX: 50.02 KG/M2 | DIASTOLIC BLOOD PRESSURE: 73 MMHG

## 2018-09-20 DIAGNOSIS — J45.901 MODERATE ASTHMA WITH EXACERBATION, UNSPECIFIED WHETHER PERSISTENT: ICD-10-CM

## 2018-09-20 DIAGNOSIS — J20.8 VIRAL BRONCHITIS: Primary | ICD-10-CM

## 2018-09-20 LAB
ALBUMIN SERPL-MCNC: 4.34 G/DL (ref 3.2–4.8)
ALBUMIN/GLOB SERPL: 1.6 G/DL (ref 1.5–2.5)
ALP SERPL-CCNC: 88 U/L (ref 25–100)
ALT SERPL W P-5'-P-CCNC: 21 U/L (ref 7–40)
ANION GAP SERPL CALCULATED.3IONS-SCNC: 7 MMOL/L (ref 3–11)
AST SERPL-CCNC: 34 U/L (ref 0–33)
BASOPHILS # BLD AUTO: 0.05 10*3/MM3 (ref 0–0.2)
BASOPHILS NFR BLD AUTO: 0.4 % (ref 0–1)
BILIRUB SERPL-MCNC: 0.8 MG/DL (ref 0.3–1.2)
BNP SERPL-MCNC: 25 PG/ML (ref 0–100)
BUN BLD-MCNC: 13 MG/DL (ref 9–23)
BUN/CREAT SERPL: 9.1 (ref 7–25)
CALCIUM SPEC-SCNC: 9.5 MG/DL (ref 8.7–10.4)
CHLORIDE SERPL-SCNC: 104 MMOL/L (ref 99–109)
CO2 SERPL-SCNC: 23 MMOL/L (ref 20–31)
CREAT BLD-MCNC: 1.43 MG/DL (ref 0.6–1.3)
DEPRECATED RDW RBC AUTO: 41.4 FL (ref 37–54)
EOSINOPHIL # BLD AUTO: 0.36 10*3/MM3 (ref 0–0.3)
EOSINOPHIL NFR BLD AUTO: 2.7 % (ref 0–3)
ERYTHROCYTE [DISTWIDTH] IN BLOOD BY AUTOMATED COUNT: 13.7 % (ref 11.3–14.5)
FLUAV AG NPH QL: NEGATIVE
FLUBV AG NPH QL IA: NEGATIVE
GFR SERPL CREATININE-BSD FRML MDRD: 39 ML/MIN/1.73
GLOBULIN UR ELPH-MCNC: 2.8 GM/DL
GLUCOSE BLD-MCNC: 169 MG/DL (ref 70–100)
HCT VFR BLD AUTO: 39.4 % (ref 34.5–44)
HGB BLD-MCNC: 12.8 G/DL (ref 11.5–15.5)
HOLD SPECIMEN: NORMAL
HOLD SPECIMEN: NORMAL
IMM GRANULOCYTES # BLD: 0.16 10*3/MM3 (ref 0–0.03)
IMM GRANULOCYTES NFR BLD: 1.2 % (ref 0–0.6)
LIPASE SERPL-CCNC: 27 U/L (ref 6–51)
LYMPHOCYTES # BLD AUTO: 2.89 10*3/MM3 (ref 0.6–4.8)
LYMPHOCYTES NFR BLD AUTO: 22.1 % (ref 24–44)
MCH RBC QN AUTO: 27.1 PG (ref 27–31)
MCHC RBC AUTO-ENTMCNC: 32.5 G/DL (ref 32–36)
MCV RBC AUTO: 83.3 FL (ref 80–99)
MONOCYTES # BLD AUTO: 0.58 10*3/MM3 (ref 0–1)
MONOCYTES NFR BLD AUTO: 4.4 % (ref 0–12)
NEUTROPHILS # BLD AUTO: 9.22 10*3/MM3 (ref 1.5–8.3)
NEUTROPHILS NFR BLD AUTO: 70.4 % (ref 41–71)
PLATELET # BLD AUTO: 348 10*3/MM3 (ref 150–450)
PMV BLD AUTO: 11.2 FL (ref 6–12)
POTASSIUM BLD-SCNC: 5.2 MMOL/L (ref 3.5–5.5)
PROT SERPL-MCNC: 7.1 G/DL (ref 5.7–8.2)
RBC # BLD AUTO: 4.73 10*6/MM3 (ref 3.89–5.14)
SODIUM BLD-SCNC: 134 MMOL/L (ref 132–146)
TROPONIN I SERPL-MCNC: 0 NG/ML (ref 0–0.07)
WBC NRBC COR # BLD: 13.1 10*3/MM3 (ref 3.5–10.8)
WHOLE BLOOD HOLD SPECIMEN: NORMAL
WHOLE BLOOD HOLD SPECIMEN: NORMAL

## 2018-09-20 PROCEDURE — 80053 COMPREHEN METABOLIC PANEL: CPT | Performed by: EMERGENCY MEDICINE

## 2018-09-20 PROCEDURE — 85025 COMPLETE CBC W/AUTO DIFF WBC: CPT | Performed by: EMERGENCY MEDICINE

## 2018-09-20 PROCEDURE — 83690 ASSAY OF LIPASE: CPT | Performed by: EMERGENCY MEDICINE

## 2018-09-20 PROCEDURE — 93005 ELECTROCARDIOGRAM TRACING: CPT | Performed by: EMERGENCY MEDICINE

## 2018-09-20 PROCEDURE — 94640 AIRWAY INHALATION TREATMENT: CPT

## 2018-09-20 PROCEDURE — 84484 ASSAY OF TROPONIN QUANT: CPT

## 2018-09-20 PROCEDURE — 71045 X-RAY EXAM CHEST 1 VIEW: CPT

## 2018-09-20 PROCEDURE — 96374 THER/PROPH/DIAG INJ IV PUSH: CPT

## 2018-09-20 PROCEDURE — 25010000002 METHYLPREDNISOLONE PER 125 MG: Performed by: PHYSICIAN ASSISTANT

## 2018-09-20 PROCEDURE — 94760 N-INVAS EAR/PLS OXIMETRY 1: CPT

## 2018-09-20 PROCEDURE — 83880 ASSAY OF NATRIURETIC PEPTIDE: CPT | Performed by: EMERGENCY MEDICINE

## 2018-09-20 PROCEDURE — 93005 ELECTROCARDIOGRAM TRACING: CPT

## 2018-09-20 PROCEDURE — 99284 EMERGENCY DEPT VISIT MOD MDM: CPT

## 2018-09-20 PROCEDURE — 87804 INFLUENZA ASSAY W/OPTIC: CPT | Performed by: PHYSICIAN ASSISTANT

## 2018-09-20 RX ORDER — METHYLPREDNISOLONE SODIUM SUCCINATE 125 MG/2ML
125 INJECTION, POWDER, LYOPHILIZED, FOR SOLUTION INTRAMUSCULAR; INTRAVENOUS ONCE
Status: COMPLETED | OUTPATIENT
Start: 2018-09-20 | End: 2018-09-20

## 2018-09-20 RX ORDER — SODIUM CHLORIDE 0.9 % (FLUSH) 0.9 %
10 SYRINGE (ML) INJECTION AS NEEDED
Status: DISCONTINUED | OUTPATIENT
Start: 2018-09-20 | End: 2018-09-20 | Stop reason: HOSPADM

## 2018-09-20 RX ORDER — PREDNISONE 10 MG/1
TABLET ORAL
Qty: 21 TABLET | Refills: 0 | Status: SHIPPED | OUTPATIENT
Start: 2018-09-20 | End: 2019-04-19 | Stop reason: HOSPADM

## 2018-09-20 RX ORDER — IPRATROPIUM BROMIDE AND ALBUTEROL SULFATE 2.5; .5 MG/3ML; MG/3ML
3 SOLUTION RESPIRATORY (INHALATION) ONCE
Status: COMPLETED | OUTPATIENT
Start: 2018-09-20 | End: 2018-09-20

## 2018-09-20 RX ORDER — ASPIRIN 81 MG/1
324 TABLET, CHEWABLE ORAL ONCE
Status: DISCONTINUED | OUTPATIENT
Start: 2018-09-20 | End: 2018-09-20

## 2018-09-20 RX ADMIN — IPRATROPIUM BROMIDE AND ALBUTEROL SULFATE 3 ML: 2.5; .5 SOLUTION RESPIRATORY (INHALATION) at 14:58

## 2018-09-20 RX ADMIN — METHYLPREDNISOLONE SODIUM SUCCINATE 125 MG: 125 INJECTION, POWDER, FOR SOLUTION INTRAMUSCULAR; INTRAVENOUS at 15:30

## 2018-09-20 NOTE — DISCHARGE INSTRUCTIONS
Rest.  Plenty of fluids.  Use your albuterol as prescribed.  Prednisone as prescribed.  Follow up with your PCP or return to ER if worse.

## 2018-09-20 NOTE — ED PROVIDER NOTES
Subjective   48-year-old female presents to the emergency department with complaints of cough, shortness of breath and muscle aches.  The symptoms of been present for about 4 days.  She states that the symptoms started with sore throat and head congestion and then went into her lungs.  The patient has a history of asthma.  She has had no relief with albuterol neb treatments at home.  The patient is a former smoker but quit 6 months ago.  No alcohol or drug use.  She also has a history of non-insulin-dependent diabetes type 2, chronic kidney disease stage III, hypothyroidism and arthritis.  Her PCP is Erica Estrella.            Review of Systems   Constitutional: Negative for chills and fever.   HENT: Negative for ear pain and sore throat.    Eyes: Negative for visual disturbance.   Respiratory: Positive for cough, shortness of breath and wheezing.    Cardiovascular: Negative for chest pain, palpitations and leg swelling.   Gastrointestinal: Negative for abdominal pain, diarrhea, nausea and vomiting.   Endocrine: Negative for polydipsia, polyphagia and polyuria.   Genitourinary: Negative for dysuria.   Musculoskeletal: Negative for back pain.   Skin: Negative for rash.   Allergic/Immunologic: Negative for immunocompromised state.   Neurological: Positive for weakness (generalized). Negative for light-headedness and headaches.   Hematological: Negative.    Psychiatric/Behavioral: Negative.        Past Medical History:   Diagnosis Date   • Asthma    • Diabetes mellitus (CMS/HCC)    • Disease of thyroid gland    • Hypertension    • Sleep apnea        Allergies   Allergen Reactions   • Lisinopril Swelling       Past Surgical History:   Procedure Laterality Date   • TUBAL ABDOMINAL LIGATION         Family History   Problem Relation Age of Onset   • Cancer Mother    • Diabetes Father        Social History     Social History   • Marital status: Single     Social History Main Topics   • Smoking status: Former Smoker      Packs/day: 1.00     Types: Cigarettes   • Smokeless tobacco: Never Used   • Alcohol use Yes      Comment: occ   • Drug use: No   • Sexual activity: Defer     Other Topics Concern   • Not on file           Objective   Physical Exam   Constitutional: She is oriented to person, place, and time. She appears well-developed and well-nourished. She appears distressed (mild to moderate distress with wheezing noted).   HENT:   Head: Normocephalic.   Nose: Nose normal.   Mouth/Throat: Oropharynx is clear and moist.   Eyes: Pupils are equal, round, and reactive to light. Conjunctivae are normal.   Neck: Normal range of motion. Neck supple.   Cardiovascular: Normal rate, regular rhythm, normal heart sounds and intact distal pulses.    No murmur heard.  Pulmonary/Chest:   Mild to moderate respiratory distress with mild wheezes on expiration   Musculoskeletal: Normal range of motion. She exhibits no edema or tenderness.   Neurological: She is alert and oriented to person, place, and time.   Skin: Skin is warm and dry.   Psychiatric: She has a normal mood and affect.       Procedures           ED Course      4:29 PM  Pt states she feels some better after steroids and neb tx.  CXR is neative.  No concerning labs other than her creatinine is up some at 1.43.  Influenza screen is negative.  Will d/c home to use her nebulizer and will give rx for prednisone taper.  Pt advised to drink plenty of fluids and f/u with her PCP.  Recent Results (from the past 24 hour(s))   Comprehensive Metabolic Panel    Collection Time: 09/20/18  2:41 PM   Result Value Ref Range    Glucose 169 (H) 70 - 100 mg/dL    BUN 13 9 - 23 mg/dL    Creatinine 1.43 (H) 0.60 - 1.30 mg/dL    Sodium 134 132 - 146 mmol/L    Potassium 5.2 3.5 - 5.5 mmol/L    Chloride 104 99 - 109 mmol/L    CO2 23.0 20.0 - 31.0 mmol/L    Calcium 9.5 8.7 - 10.4 mg/dL    Total Protein 7.1 5.7 - 8.2 g/dL    Albumin 4.34 3.20 - 4.80 g/dL    ALT (SGPT) 21 7 - 40 U/L    AST (SGOT) 34 (H) 0 - 33  U/L    Alkaline Phosphatase 88 25 - 100 U/L    Total Bilirubin 0.8 0.3 - 1.2 mg/dL    eGFR Non African Amer 39 (L) >60 mL/min/1.73    Globulin 2.8 gm/dL    A/G Ratio 1.6 1.5 - 2.5 g/dL    BUN/Creatinine Ratio 9.1 7.0 - 25.0    Anion Gap 7.0 3.0 - 11.0 mmol/L   Lipase    Collection Time: 09/20/18  2:41 PM   Result Value Ref Range    Lipase 27 6 - 51 U/L   BNP    Collection Time: 09/20/18  2:41 PM   Result Value Ref Range    BNP 25.0 0.0 - 100.0 pg/mL   Light Blue Top    Collection Time: 09/20/18  2:41 PM   Result Value Ref Range    Extra Tube hold for add-on    Green Top (Gel)    Collection Time: 09/20/18  2:41 PM   Result Value Ref Range    Extra Tube Hold for add-ons.    Lavender Top    Collection Time: 09/20/18  2:41 PM   Result Value Ref Range    Extra Tube hold for add-on    Gold Top - SST    Collection Time: 09/20/18  2:41 PM   Result Value Ref Range    Extra Tube Hold for add-ons.    CBC Auto Differential    Collection Time: 09/20/18  2:41 PM   Result Value Ref Range    WBC 13.10 (H) 3.50 - 10.80 10*3/mm3    RBC 4.73 3.89 - 5.14 10*6/mm3    Hemoglobin 12.8 11.5 - 15.5 g/dL    Hematocrit 39.4 34.5 - 44.0 %    MCV 83.3 80.0 - 99.0 fL    MCH 27.1 27.0 - 31.0 pg    MCHC 32.5 32.0 - 36.0 g/dL    RDW 13.7 11.3 - 14.5 %    RDW-SD 41.4 37.0 - 54.0 fl    MPV 11.2 6.0 - 12.0 fL    Platelets 348 150 - 450 10*3/mm3    Neutrophil % 70.4 41.0 - 71.0 %    Lymphocyte % 22.1 (L) 24.0 - 44.0 %    Monocyte % 4.4 0.0 - 12.0 %    Eosinophil % 2.7 0.0 - 3.0 %    Basophil % 0.4 0.0 - 1.0 %    Immature Grans % 1.2 (H) 0.0 - 0.6 %    Neutrophils, Absolute 9.22 (H) 1.50 - 8.30 10*3/mm3    Lymphocytes, Absolute 2.89 0.60 - 4.80 10*3/mm3    Monocytes, Absolute 0.58 0.00 - 1.00 10*3/mm3    Eosinophils, Absolute 0.36 (H) 0.00 - 0.30 10*3/mm3    Basophils, Absolute 0.05 0.00 - 0.20 10*3/mm3    Immature Grans, Absolute 0.16 (H) 0.00 - 0.03 10*3/mm3   POC Troponin, Rapid    Collection Time: 09/20/18  2:55 PM   Result Value Ref Range     "Troponin I 0.00 0.00 - 0.07 ng/mL   Influenza Antigen, Rapid - Swab, Nasopharynx    Collection Time: 09/20/18  3:34 PM   Result Value Ref Range    Influenza A Ag, EIA Negative Negative    Influenza B Ag, EIA Negative Negative     Note: In addition to lab results from this visit, the labs listed above may include labs taken at another facility or during a different encounter within the last 24 hours. Please correlate lab times with ED admission and discharge times for further clarification of the services performed during this visit.    XR Chest 1 View   Final Result   Chronic change; no acute disease.       D:  09/20/2018   E:  09/20/2018       This report was finalized on 9/20/2018 3:58 PM by Dr. Austin Louie MD.            Vitals:    09/20/18 1406 09/20/18 1407 09/20/18 1458 09/20/18 1518   BP:  163/89  121/85   BP Location:  Left arm     Patient Position:  Sitting     Pulse: 108  114 104   Resp: 20  20    Temp: 99.2 °F (37.3 °C)      TempSrc: Oral      SpO2: 91%  97% 96%   Weight: (!) 147 kg (325 lb)      Height: 162.6 cm (64\")        Medications   sodium chloride 0.9 % flush 10 mL (not administered)   ipratropium-albuterol (DUO-NEB) nebulizer solution 3 mL (3 mL Nebulization Given 9/20/18 1458)   methylPREDNISolone sodium succinate (SOLU-Medrol) injection 125 mg (125 mg Intravenous Given 9/20/18 1530)     ECG/EMG Results (last 24 hours)     Procedure Component Value Units Date/Time    ECG 12 Lead [[id number]] Collected:  09/20/18 1429     Updated:  09/20/18 1430                     MDM      Final diagnoses:   Viral bronchitis   Moderate asthma with exacerbation, unspecified whether persistent            Jaguar Ledbetter, PA  09/20/18 1629    "

## 2018-11-21 ENCOUNTER — APPOINTMENT (OUTPATIENT)
Dept: GENERAL RADIOLOGY | Facility: HOSPITAL | Age: 48
End: 2018-11-21

## 2018-11-21 ENCOUNTER — HOSPITAL ENCOUNTER (EMERGENCY)
Facility: HOSPITAL | Age: 48
Discharge: HOME | End: 2018-11-21
Attending: EMERGENCY MEDICINE | Admitting: EMERGENCY MEDICINE

## 2018-11-21 ENCOUNTER — APPOINTMENT (OUTPATIENT)
Dept: CT IMAGING | Facility: HOSPITAL | Age: 48
End: 2018-11-21

## 2018-11-21 VITALS
OXYGEN SATURATION: 96 % | DIASTOLIC BLOOD PRESSURE: 74 MMHG | BODY MASS INDEX: 48.65 KG/M2 | WEIGHT: 285 LBS | HEART RATE: 93 BPM | RESPIRATION RATE: 18 BRPM | TEMPERATURE: 98 F | SYSTOLIC BLOOD PRESSURE: 110 MMHG | HEIGHT: 64 IN

## 2018-11-21 DIAGNOSIS — R10.2 SUPRAPUBIC PAIN: ICD-10-CM

## 2018-11-21 DIAGNOSIS — N39.0 ACUTE UTI: Primary | ICD-10-CM

## 2018-11-21 LAB
ALBUMIN SERPL-MCNC: 4.23 G/DL (ref 3.2–4.8)
ALBUMIN/GLOB SERPL: 1.4 G/DL (ref 1.5–2.5)
ALP SERPL-CCNC: 97 U/L (ref 25–100)
ALT SERPL W P-5'-P-CCNC: 16 U/L (ref 7–40)
ANION GAP SERPL CALCULATED.3IONS-SCNC: 10 MMOL/L (ref 3–11)
AST SERPL-CCNC: 11 U/L (ref 0–33)
B-HCG UR QL: NEGATIVE
BACTERIA UR QL AUTO: ABNORMAL /HPF
BASOPHILS # BLD AUTO: 0.05 10*3/MM3 (ref 0–0.2)
BASOPHILS NFR BLD AUTO: 0.4 % (ref 0–1)
BILIRUB SERPL-MCNC: 0.6 MG/DL (ref 0.3–1.2)
BILIRUB UR QL STRIP: NEGATIVE
BUN BLD-MCNC: 22 MG/DL (ref 9–23)
BUN/CREAT SERPL: 12.6 (ref 7–25)
CALCIUM SPEC-SCNC: 9.9 MG/DL (ref 8.7–10.4)
CHLORIDE SERPL-SCNC: 101 MMOL/L (ref 99–109)
CLARITY UR: ABNORMAL
CO2 SERPL-SCNC: 26 MMOL/L (ref 20–31)
COLOR UR: ABNORMAL
CREAT BLD-MCNC: 1.75 MG/DL (ref 0.6–1.3)
DEPRECATED RDW RBC AUTO: 43.3 FL (ref 37–54)
EOSINOPHIL # BLD AUTO: 0.2 10*3/MM3 (ref 0–0.3)
EOSINOPHIL NFR BLD AUTO: 1.6 % (ref 0–3)
ERYTHROCYTE [DISTWIDTH] IN BLOOD BY AUTOMATED COUNT: 14.6 % (ref 11.3–14.5)
GFR SERPL CREATININE-BSD FRML MDRD: 31 ML/MIN/1.73
GLOBULIN UR ELPH-MCNC: 3 GM/DL
GLUCOSE BLD-MCNC: 212 MG/DL (ref 70–100)
GLUCOSE BLDC GLUCOMTR-MCNC: 230 MG/DL (ref 70–130)
GLUCOSE UR STRIP-MCNC: ABNORMAL MG/DL
HCT VFR BLD AUTO: 39.5 % (ref 34.5–44)
HGB BLD-MCNC: 13.2 G/DL (ref 11.5–15.5)
HGB UR QL STRIP.AUTO: NEGATIVE
HOLD SPECIMEN: NORMAL
HOLD SPECIMEN: NORMAL
HYALINE CASTS UR QL AUTO: ABNORMAL /LPF
IMM GRANULOCYTES # BLD: 0.16 10*3/MM3 (ref 0–0.03)
IMM GRANULOCYTES NFR BLD: 1.2 % (ref 0–0.6)
INTERNAL NEGATIVE CONTROL: NEGATIVE
INTERNAL POSITIVE CONTROL: POSITIVE
KETONES UR QL STRIP: ABNORMAL
LEUKOCYTE ESTERASE UR QL STRIP.AUTO: ABNORMAL
LIPASE SERPL-CCNC: 37 U/L (ref 6–51)
LYMPHOCYTES # BLD AUTO: 2.7 10*3/MM3 (ref 0.6–4.8)
LYMPHOCYTES NFR BLD AUTO: 21 % (ref 24–44)
Lab: NORMAL
MCH RBC QN AUTO: 27.4 PG (ref 27–31)
MCHC RBC AUTO-ENTMCNC: 33.4 G/DL (ref 32–36)
MCV RBC AUTO: 82.1 FL (ref 80–99)
MONOCYTES # BLD AUTO: 0.61 10*3/MM3 (ref 0–1)
MONOCYTES NFR BLD AUTO: 4.8 % (ref 0–12)
MUCOUS THREADS URNS QL MICRO: ABNORMAL /HPF
NEUTROPHILS # BLD AUTO: 9.12 10*3/MM3 (ref 1.5–8.3)
NEUTROPHILS NFR BLD AUTO: 71 % (ref 41–71)
NITRITE UR QL STRIP: NEGATIVE
PH UR STRIP.AUTO: <=5 [PH] (ref 5–8)
PLATELET # BLD AUTO: 330 10*3/MM3 (ref 150–450)
PMV BLD AUTO: 10.7 FL (ref 6–12)
POTASSIUM BLD-SCNC: 3.8 MMOL/L (ref 3.5–5.5)
PROT SERPL-MCNC: 7.2 G/DL (ref 5.7–8.2)
PROT UR QL STRIP: ABNORMAL
RBC # BLD AUTO: 4.81 10*6/MM3 (ref 3.89–5.14)
RBC # UR: ABNORMAL /HPF
REF LAB TEST METHOD: ABNORMAL
SODIUM BLD-SCNC: 137 MMOL/L (ref 132–146)
SP GR UR STRIP: 1.02 (ref 1–1.03)
SQUAMOUS #/AREA URNS HPF: ABNORMAL /HPF
UROBILINOGEN UR QL STRIP: ABNORMAL
WBC NRBC COR # BLD: 12.84 10*3/MM3 (ref 3.5–10.8)
WBC UR QL AUTO: ABNORMAL /HPF
WHOLE BLOOD HOLD SPECIMEN: NORMAL
WHOLE BLOOD HOLD SPECIMEN: NORMAL

## 2018-11-21 PROCEDURE — 82962 GLUCOSE BLOOD TEST: CPT

## 2018-11-21 PROCEDURE — 25010000002 CEFTRIAXONE PER 250 MG: Performed by: PHYSICIAN ASSISTANT

## 2018-11-21 PROCEDURE — 25010000002 ONDANSETRON PER 1 MG: Performed by: EMERGENCY MEDICINE

## 2018-11-21 PROCEDURE — 71045 X-RAY EXAM CHEST 1 VIEW: CPT

## 2018-11-21 PROCEDURE — 83690 ASSAY OF LIPASE: CPT | Performed by: EMERGENCY MEDICINE

## 2018-11-21 PROCEDURE — 81001 URINALYSIS AUTO W/SCOPE: CPT | Performed by: EMERGENCY MEDICINE

## 2018-11-21 PROCEDURE — 96375 TX/PRO/DX INJ NEW DRUG ADDON: CPT

## 2018-11-21 PROCEDURE — 96365 THER/PROPH/DIAG IV INF INIT: CPT

## 2018-11-21 PROCEDURE — 93005 ELECTROCARDIOGRAM TRACING: CPT | Performed by: EMERGENCY MEDICINE

## 2018-11-21 PROCEDURE — 87086 URINE CULTURE/COLONY COUNT: CPT | Performed by: PHYSICIAN ASSISTANT

## 2018-11-21 PROCEDURE — 99284 EMERGENCY DEPT VISIT MOD MDM: CPT

## 2018-11-21 PROCEDURE — 85025 COMPLETE CBC W/AUTO DIFF WBC: CPT | Performed by: EMERGENCY MEDICINE

## 2018-11-21 PROCEDURE — 25010000002 HYDROMORPHONE PER 4 MG: Performed by: EMERGENCY MEDICINE

## 2018-11-21 PROCEDURE — 81025 URINE PREGNANCY TEST: CPT | Performed by: EMERGENCY MEDICINE

## 2018-11-21 PROCEDURE — 80053 COMPREHEN METABOLIC PANEL: CPT | Performed by: EMERGENCY MEDICINE

## 2018-11-21 PROCEDURE — 74176 CT ABD & PELVIS W/O CONTRAST: CPT

## 2018-11-21 RX ORDER — CEFTRIAXONE SODIUM 1 G/50ML
1 INJECTION, SOLUTION INTRAVENOUS ONCE
Status: COMPLETED | OUTPATIENT
Start: 2018-11-21 | End: 2018-11-21

## 2018-11-21 RX ORDER — PHENAZOPYRIDINE HYDROCHLORIDE 200 MG/1
200 TABLET, FILM COATED ORAL 3 TIMES DAILY PRN
Qty: 6 TABLET | Refills: 0 | Status: SHIPPED | OUTPATIENT
Start: 2018-11-21 | End: 2019-04-22

## 2018-11-21 RX ORDER — HYDROMORPHONE HYDROCHLORIDE 1 MG/ML
0.5 INJECTION, SOLUTION INTRAMUSCULAR; INTRAVENOUS; SUBCUTANEOUS ONCE
Status: COMPLETED | OUTPATIENT
Start: 2018-11-21 | End: 2018-11-21

## 2018-11-21 RX ORDER — SODIUM CHLORIDE 0.9 % (FLUSH) 0.9 %
10 SYRINGE (ML) INJECTION AS NEEDED
Status: DISCONTINUED | OUTPATIENT
Start: 2018-11-21 | End: 2018-11-21 | Stop reason: HOSPADM

## 2018-11-21 RX ORDER — CEFDINIR 300 MG/1
300 CAPSULE ORAL 2 TIMES DAILY
Qty: 14 CAPSULE | Refills: 0 | Status: SHIPPED | OUTPATIENT
Start: 2018-11-21 | End: 2019-04-19 | Stop reason: HOSPADM

## 2018-11-21 RX ORDER — ONDANSETRON 2 MG/ML
4 INJECTION INTRAMUSCULAR; INTRAVENOUS ONCE
Status: COMPLETED | OUTPATIENT
Start: 2018-11-21 | End: 2018-11-21

## 2018-11-21 RX ADMIN — ONDANSETRON HYDROCHLORIDE 4 MG: 2 INJECTION, SOLUTION INTRAMUSCULAR; INTRAVENOUS at 15:23

## 2018-11-21 RX ADMIN — CEFTRIAXONE SODIUM 1 G: 1 INJECTION, SOLUTION INTRAVENOUS at 16:32

## 2018-11-21 RX ADMIN — HYDROMORPHONE HYDROCHLORIDE 0.5 MG: 1 INJECTION, SOLUTION INTRAMUSCULAR; INTRAVENOUS; SUBCUTANEOUS at 15:22

## 2018-11-21 NOTE — ED PROVIDER NOTES
Subjective   Patient comes to the emergency part today complaining of having an episode of hypoglycemia.  She takes glipizide did not eat a meal today.  Patient states that that she's been having abdominal discomfort since this episode.  She's had no dysuria frequency urgency or hematuria.  Denies melena hematochezia or hematemesis.  Patient's had no previous abdominal surgeries other than a tubal ligation.  Patient denies fevers chills or rigors.  Patient reports the pain is more colicky in nature.  Nothing seems to exacerbate or alleviate it.  Patient had normal bowel habits.  Patient did eat and drink orange juice to alleviate hypoglycemia feels much better.  Hypoglycemic episode involved agitation diaphoresis extreme hunger.        History provided by:  Patient   used: No    Abdominal Pain   Pain location:  RLQ and LLQ  Pain quality: cramping and dull    Pain radiates to:  Does not radiate  Pain severity:  Moderate  Onset quality:  Gradual  Timing:  Intermittent  Progression:  Waxing and waning  Chronicity:  New  Context: not awakening from sleep, not diet changes, not eating, not laxative use, not previous surgeries, not recent sexual activity, not recent travel, not sick contacts, not suspicious food intake and not trauma    Relieved by:  Nothing  Worsened by:  Nothing  Ineffective treatments:  None tried  Associated symptoms: no anorexia, no chest pain, no constipation, no cough, no diarrhea, no dysuria, no fatigue, no fever, no hematemesis, no hematochezia, no hematuria, no melena, no nausea, no shortness of breath and no vaginal bleeding    Risk factors: no alcohol abuse, has not had multiple surgeries, no NSAID use, not pregnant and no recent hospitalization        Review of Systems   Constitutional: Negative for fatigue and fever.   Respiratory: Negative for cough and shortness of breath.    Cardiovascular: Negative for chest pain.   Gastrointestinal: Positive for abdominal pain.  Negative for anorexia, constipation, diarrhea, hematemesis, hematochezia, melena and nausea.   Genitourinary: Negative for dysuria, frequency, hematuria and vaginal bleeding.   Musculoskeletal: Negative for back pain.   Skin: Negative for pallor and rash.   Neurological: Negative for dizziness, weakness and headaches.   Hematological: Negative for adenopathy.   Psychiatric/Behavioral: Negative.    All other systems reviewed and are negative.      Past Medical History:   Diagnosis Date   • Asthma    • Diabetes mellitus (CMS/HCC)    • Disease of thyroid gland    • Hypertension    • Sleep apnea        Allergies   Allergen Reactions   • Lisinopril Swelling       Past Surgical History:   Procedure Laterality Date   • TUBAL ABDOMINAL LIGATION         Family History   Problem Relation Age of Onset   • Cancer Mother    • Diabetes Father        Social History     Socioeconomic History   • Marital status: Single     Spouse name: Not on file   • Number of children: Not on file   • Years of education: Not on file   • Highest education level: Not on file   Tobacco Use   • Smoking status: Former Smoker     Packs/day: 1.00     Types: Cigarettes   • Smokeless tobacco: Never Used   Substance and Sexual Activity   • Alcohol use: Yes     Comment: occ   • Drug use: No   • Sexual activity: Defer           Objective   Physical Exam   Constitutional: She is oriented to person, place, and time. She appears well-developed and well-nourished.   HENT:   Head: Normocephalic and atraumatic.   Right Ear: External ear normal.   Left Ear: External ear normal.   Nose: Nose normal.   Mouth/Throat: Oropharynx is clear and moist.   Eyes: Conjunctivae and EOM are normal. Pupils are equal, round, and reactive to light. No scleral icterus.   Neck: Normal range of motion. No thyromegaly present.   Cardiovascular: Normal rate, regular rhythm and normal heart sounds. Exam reveals no friction rub.   No murmur heard.  Pulmonary/Chest: Effort normal and breath  sounds normal. No respiratory distress. She has no wheezes. She has no rales. She exhibits no tenderness.   Abdominal: Soft. Bowel sounds are normal. She exhibits no distension and no ascites. There is no hepatosplenomegaly, splenomegaly or hepatomegaly. There is tenderness in the suprapubic area. There is no rigidity, no rebound, no guarding, no CVA tenderness, no tenderness at McBurney's point and negative Cardona's sign. No hernia. Hernia confirmed negative in the ventral area, confirmed negative in the right inguinal area and confirmed negative in the left inguinal area.   Musculoskeletal: Normal range of motion.   Lymphadenopathy:     She has no cervical adenopathy.   Neurological: She is alert and oriented to person, place, and time. She has normal reflexes. She displays normal reflexes. No cranial nerve deficit. Coordination normal.   Skin: Skin is warm and dry. Capillary refill takes less than 2 seconds.   Psychiatric: She has a normal mood and affect. Her behavior is normal. Judgment and thought content normal.   Nursing note and vitals reviewed.      Procedures           ED Course  ED Course as of Nov 25 1104 Wed Nov 21, 2018   1641 Discussed findings with the patient and her family.  [YANDEL]      ED Course User Index  [YANDEL] Patricio Escoto PA        No results found for this or any previous visit (from the past 24 hour(s)).  Note: In addition to lab results from this visit, the labs listed above may include labs taken at another facility or during a different encounter within the last 24 hours. Please correlate lab times with ED admission and discharge times for further clarification of the services performed during this visit.    CT Abdomen Pelvis Without Contrast   Final Result   No acute finding in the abdomen or pelvis.       DICTATED:   11/21/2018   EDITED/ls :   11/21/2018        This report was finalized on 11/21/2018 4:25 PM by Edmund Zuñiga.          XR Chest 1 View   Final Result   Opacity at  "the left lung base might be artifactual. A   repeat PA and lateral chest radiograph is recommended as artifact is   favored.       D:  11/21/2018   E:  11/21/2018       This report was finalized on 11/21/2018 3:11 PM by Edmund Zuñiga.            Vitals:    11/21/18 1346 11/21/18 1430 11/21/18 1559 11/21/18 1718   BP: 118/91 114/79 103/84 110/74   BP Location:    Left arm   Patient Position:    Lying   Pulse: 111 104  93   Resp: 18   18   Temp: 98 °F (36.7 °C)      TempSrc: Oral      SpO2: 95% 92%  96%   Weight: 129 kg (285 lb)      Height: 162.6 cm (64\")        Medications   HYDROmorphone (DILAUDID) injection 0.5 mg (0.5 mg Intravenous Given 11/21/18 1522)   ondansetron (ZOFRAN) injection 4 mg (4 mg Intravenous Given 11/21/18 1523)   cefTRIAXone (ROCEPHIN) IVPB 1 g (0 g Intravenous Stopped 11/21/18 1700)     ECG/EMG Results (last 24 hours)     Procedure Component Value Units Date/Time    ECG 12 Lead [[id number]] Collected:  11/21/18 1356     Updated:  11/21/18 1429                  MDM  Number of Diagnoses or Management Options  Acute UTI: new and requires workup  Suprapubic pain: new and requires workup     Amount and/or Complexity of Data Reviewed  Clinical lab tests: reviewed and ordered  Tests in the radiology section of CPT®: ordered and reviewed  Tests in the medicine section of CPT®: ordered and reviewed  Discuss the patient with other providers: yes    Patient Progress  Patient progress: stable        Final diagnoses:   Acute UTI   Suprapubic pain            Patricio Escoto PA  11/25/18 1105    "

## 2018-11-23 LAB — BACTERIA SPEC AEROBE CULT: NORMAL

## 2018-12-30 ENCOUNTER — HOSPITAL ENCOUNTER (EMERGENCY)
Facility: HOSPITAL | Age: 48
Discharge: HOME | End: 2018-12-30
Attending: EMERGENCY MEDICINE | Admitting: EMERGENCY MEDICINE

## 2018-12-30 ENCOUNTER — APPOINTMENT (OUTPATIENT)
Dept: GENERAL RADIOLOGY | Facility: HOSPITAL | Age: 48
End: 2018-12-30

## 2018-12-30 ENCOUNTER — APPOINTMENT (OUTPATIENT)
Dept: CARDIOLOGY | Facility: HOSPITAL | Age: 48
End: 2018-12-30
Attending: EMERGENCY MEDICINE

## 2018-12-30 VITALS
HEIGHT: 64 IN | BODY MASS INDEX: 48.65 KG/M2 | HEART RATE: 88 BPM | TEMPERATURE: 98.5 F | WEIGHT: 285 LBS | OXYGEN SATURATION: 95 % | SYSTOLIC BLOOD PRESSURE: 132 MMHG | RESPIRATION RATE: 18 BRPM | DIASTOLIC BLOOD PRESSURE: 107 MMHG

## 2018-12-30 DIAGNOSIS — M79.602 PAIN OF LEFT UPPER EXTREMITY: Primary | ICD-10-CM

## 2018-12-30 DIAGNOSIS — R60.9 PERIPHERAL EDEMA: ICD-10-CM

## 2018-12-30 PROCEDURE — 93971 EXTREMITY STUDY: CPT

## 2018-12-30 PROCEDURE — 73030 X-RAY EXAM OF SHOULDER: CPT

## 2018-12-30 PROCEDURE — 93005 ELECTROCARDIOGRAM TRACING: CPT | Performed by: EMERGENCY MEDICINE

## 2018-12-30 PROCEDURE — 93971 EXTREMITY STUDY: CPT | Performed by: INTERNAL MEDICINE

## 2018-12-30 PROCEDURE — 73130 X-RAY EXAM OF HAND: CPT

## 2018-12-30 PROCEDURE — 99283 EMERGENCY DEPT VISIT LOW MDM: CPT

## 2018-12-30 RX ORDER — HYDROCODONE BITARTRATE AND ACETAMINOPHEN 5; 325 MG/1; MG/1
1 TABLET ORAL ONCE
Status: COMPLETED | OUTPATIENT
Start: 2018-12-30 | End: 2018-12-30

## 2018-12-30 RX ADMIN — HYDROCODONE BITARTRATE AND ACETAMINOPHEN 1 TABLET: 5; 325 TABLET ORAL at 17:56

## 2018-12-31 LAB
BH CV ECHO MEAS - BSA(HAYCOCK): 2.5 M^2
BH CV ECHO MEAS - BSA: 2.3 M^2
BH CV ECHO MEAS - BZI_BMI: 48.9 KILOGRAMS/M^2
BH CV ECHO MEAS - BZI_METRIC_HEIGHT: 162.6 CM
BH CV ECHO MEAS - BZI_METRIC_WEIGHT: 129.3 KG
BH CV UPPER VENOUS LEFT AXILLARY AUGMENT: NORMAL
BH CV UPPER VENOUS LEFT AXILLARY COMPETENT: NORMAL
BH CV UPPER VENOUS LEFT AXILLARY COMPRESS: NORMAL
BH CV UPPER VENOUS LEFT AXILLARY PHASIC: NORMAL
BH CV UPPER VENOUS LEFT AXILLARY SPONT: NORMAL
BH CV UPPER VENOUS LEFT BASILIC FOREARM COMPRESS: NORMAL
BH CV UPPER VENOUS LEFT BASILIC UPPER COMPRESS: NORMAL
BH CV UPPER VENOUS LEFT BRACHIAL COMPRESS: NORMAL
BH CV UPPER VENOUS LEFT CEPHALIC FOREARM COMPRESS: NORMAL
BH CV UPPER VENOUS LEFT CEPHALIC UPPER COMPRESS: NORMAL
BH CV UPPER VENOUS LEFT INTERNAL JUGULAR AUGMENT: NORMAL
BH CV UPPER VENOUS LEFT INTERNAL JUGULAR COMPETENT: NORMAL
BH CV UPPER VENOUS LEFT INTERNAL JUGULAR COMPRESS: NORMAL
BH CV UPPER VENOUS LEFT INTERNAL JUGULAR PHASIC: NORMAL
BH CV UPPER VENOUS LEFT INTERNAL JUGULAR SPONT: NORMAL
BH CV UPPER VENOUS LEFT RADIAL COMPRESS: NORMAL
BH CV UPPER VENOUS LEFT SUBCLAVIAN AUGMENT: NORMAL
BH CV UPPER VENOUS LEFT SUBCLAVIAN COMPETENT: NORMAL
BH CV UPPER VENOUS LEFT SUBCLAVIAN COMPRESS: NORMAL
BH CV UPPER VENOUS LEFT SUBCLAVIAN PHASIC: NORMAL
BH CV UPPER VENOUS LEFT SUBCLAVIAN SPONT: NORMAL
BH CV UPPER VENOUS LEFT ULNAR COMPRESS: NORMAL
BH CV UPPER VENOUS RIGHT INTERNAL JUGULAR AUGMENT: NORMAL
BH CV UPPER VENOUS RIGHT INTERNAL JUGULAR COMPETENT: NORMAL
BH CV UPPER VENOUS RIGHT INTERNAL JUGULAR COMPRESS: NORMAL
BH CV UPPER VENOUS RIGHT INTERNAL JUGULAR PHASIC: NORMAL
BH CV UPPER VENOUS RIGHT INTERNAL JUGULAR SPONT: NORMAL

## 2019-04-05 ENCOUNTER — HOSPITAL ENCOUNTER (EMERGENCY)
Facility: HOSPITAL | Age: 49
Discharge: HOME | End: 2019-04-05
Attending: EMERGENCY MEDICINE | Admitting: EMERGENCY MEDICINE

## 2019-04-05 ENCOUNTER — APPOINTMENT (OUTPATIENT)
Dept: CT IMAGING | Facility: HOSPITAL | Age: 49
End: 2019-04-05

## 2019-04-05 ENCOUNTER — APPOINTMENT (OUTPATIENT)
Dept: GENERAL RADIOLOGY | Facility: HOSPITAL | Age: 49
End: 2019-04-05

## 2019-04-05 VITALS
HEART RATE: 97 BPM | SYSTOLIC BLOOD PRESSURE: 156 MMHG | BODY MASS INDEX: 48.65 KG/M2 | OXYGEN SATURATION: 93 % | WEIGHT: 285 LBS | DIASTOLIC BLOOD PRESSURE: 80 MMHG | TEMPERATURE: 98.4 F | RESPIRATION RATE: 20 BRPM | HEIGHT: 64 IN

## 2019-04-05 DIAGNOSIS — B34.9 VIRAL SYNDROME: ICD-10-CM

## 2019-04-05 DIAGNOSIS — R53.1 GENERALIZED WEAKNESS: Primary | ICD-10-CM

## 2019-04-05 DIAGNOSIS — N28.9 RENAL INSUFFICIENCY: ICD-10-CM

## 2019-04-05 LAB
ALBUMIN SERPL-MCNC: 4.22 G/DL (ref 3.2–4.8)
ALBUMIN/GLOB SERPL: 1.5 G/DL (ref 1.5–2.5)
ALP SERPL-CCNC: 87 U/L (ref 25–100)
ALT SERPL W P-5'-P-CCNC: 16 U/L (ref 7–40)
ANION GAP SERPL CALCULATED.3IONS-SCNC: 9 MMOL/L (ref 3–11)
AST SERPL-CCNC: 13 U/L (ref 0–33)
B-HCG UR QL: NEGATIVE
BACTERIA UR QL AUTO: ABNORMAL /HPF
BASOPHILS # BLD AUTO: 0.03 10*3/MM3 (ref 0–0.2)
BASOPHILS NFR BLD AUTO: 0.3 % (ref 0–1)
BILIRUB SERPL-MCNC: 0.4 MG/DL (ref 0.3–1.2)
BILIRUB UR QL STRIP: NEGATIVE
BUN BLD-MCNC: 20 MG/DL (ref 9–23)
BUN/CREAT SERPL: 13 (ref 7–25)
CALCIUM SPEC-SCNC: 9.8 MG/DL (ref 8.7–10.4)
CHLORIDE SERPL-SCNC: 104 MMOL/L (ref 99–109)
CLARITY UR: CLEAR
CO2 SERPL-SCNC: 30 MMOL/L (ref 20–31)
COLOR UR: YELLOW
CREAT BLD-MCNC: 1.54 MG/DL (ref 0.6–1.3)
DEPRECATED RDW RBC AUTO: 47.9 FL (ref 37–54)
EOSINOPHIL # BLD AUTO: 0.19 10*3/MM3 (ref 0–0.3)
EOSINOPHIL NFR BLD AUTO: 1.8 % (ref 0–3)
ERYTHROCYTE [DISTWIDTH] IN BLOOD BY AUTOMATED COUNT: 15.6 % (ref 11.3–14.5)
FLUAV AG NPH QL: NEGATIVE
FLUBV AG NPH QL IA: NEGATIVE
GFR SERPL CREATININE-BSD FRML MDRD: 36 ML/MIN/1.73
GLOBULIN UR ELPH-MCNC: 2.8 GM/DL
GLUCOSE BLD-MCNC: 95 MG/DL (ref 70–100)
GLUCOSE UR STRIP-MCNC: ABNORMAL MG/DL
HCT VFR BLD AUTO: 43.8 % (ref 34.5–44)
HGB BLD-MCNC: 13.9 G/DL (ref 11.5–15.5)
HGB UR QL STRIP.AUTO: ABNORMAL
HYALINE CASTS UR QL AUTO: ABNORMAL /LPF
IMM GRANULOCYTES # BLD AUTO: 0.09 10*3/MM3 (ref 0–0.05)
IMM GRANULOCYTES NFR BLD AUTO: 0.9 % (ref 0–0.6)
KETONES UR QL STRIP: NEGATIVE
LEUKOCYTE ESTERASE UR QL STRIP.AUTO: NEGATIVE
LYMPHOCYTES # BLD AUTO: 2.24 10*3/MM3 (ref 0.6–4.8)
LYMPHOCYTES NFR BLD AUTO: 21.6 % (ref 24–44)
MAGNESIUM SERPL-MCNC: 2.1 MG/DL (ref 1.3–2.7)
MCH RBC QN AUTO: 27 PG (ref 27–31)
MCHC RBC AUTO-ENTMCNC: 31.7 G/DL (ref 32–36)
MCV RBC AUTO: 85.2 FL (ref 80–99)
MONOCYTES # BLD AUTO: 0.58 10*3/MM3 (ref 0–1)
MONOCYTES NFR BLD AUTO: 5.6 % (ref 0–12)
NEUTROPHILS # BLD AUTO: 7.32 10*3/MM3 (ref 1.5–8.3)
NEUTROPHILS NFR BLD AUTO: 70.7 % (ref 41–71)
NITRITE UR QL STRIP: NEGATIVE
PH UR STRIP.AUTO: 6.5 [PH] (ref 5–8)
PLATELET # BLD AUTO: 282 10*3/MM3 (ref 150–450)
PMV BLD AUTO: 11.5 FL (ref 6–12)
POTASSIUM BLD-SCNC: 3.6 MMOL/L (ref 3.5–5.5)
PROT SERPL-MCNC: 7 G/DL (ref 5.7–8.2)
PROT UR QL STRIP: ABNORMAL
RBC # BLD AUTO: 5.14 10*6/MM3 (ref 3.89–5.14)
RBC # UR: ABNORMAL /HPF
REF LAB TEST METHOD: ABNORMAL
SODIUM BLD-SCNC: 143 MMOL/L (ref 132–146)
SP GR UR STRIP: 1.01 (ref 1–1.03)
SQUAMOUS #/AREA URNS HPF: ABNORMAL /HPF
TROPONIN I SERPL-MCNC: 0 NG/ML (ref 0–0.07)
TROPONIN I SERPL-MCNC: 0 NG/ML (ref 0–0.07)
UROBILINOGEN UR QL STRIP: ABNORMAL
WBC NRBC COR # BLD: 10.36 10*3/MM3 (ref 3.5–10.8)
WBC UR QL AUTO: ABNORMAL /HPF

## 2019-04-05 PROCEDURE — 80053 COMPREHEN METABOLIC PANEL: CPT | Performed by: EMERGENCY MEDICINE

## 2019-04-05 PROCEDURE — 85025 COMPLETE CBC W/AUTO DIFF WBC: CPT | Performed by: EMERGENCY MEDICINE

## 2019-04-05 PROCEDURE — 87804 INFLUENZA ASSAY W/OPTIC: CPT | Performed by: EMERGENCY MEDICINE

## 2019-04-05 PROCEDURE — 74176 CT ABD & PELVIS W/O CONTRAST: CPT

## 2019-04-05 PROCEDURE — 83735 ASSAY OF MAGNESIUM: CPT | Performed by: EMERGENCY MEDICINE

## 2019-04-05 PROCEDURE — 84484 ASSAY OF TROPONIN QUANT: CPT

## 2019-04-05 PROCEDURE — 93005 ELECTROCARDIOGRAM TRACING: CPT | Performed by: EMERGENCY MEDICINE

## 2019-04-05 PROCEDURE — 81025 URINE PREGNANCY TEST: CPT | Performed by: EMERGENCY MEDICINE

## 2019-04-05 PROCEDURE — 99284 EMERGENCY DEPT VISIT MOD MDM: CPT

## 2019-04-05 PROCEDURE — 71045 X-RAY EXAM CHEST 1 VIEW: CPT

## 2019-04-05 PROCEDURE — 81001 URINALYSIS AUTO W/SCOPE: CPT | Performed by: EMERGENCY MEDICINE

## 2019-04-05 RX ORDER — FLUOXETINE HYDROCHLORIDE 40 MG/1
40 CAPSULE ORAL DAILY
COMMUNITY
End: 2019-10-25 | Stop reason: SDUPTHER

## 2019-04-05 RX ADMIN — SODIUM CHLORIDE 1000 ML: 9 INJECTION, SOLUTION INTRAVENOUS at 13:01

## 2019-04-05 NOTE — ED PROVIDER NOTES
"Subjective   49-year-old female presents with multiple complaints.  The patient states that she felt well during the day yesterday but yesterday evening began experiencing a myriad of symptoms that have persisted since that time.  She states that she has been experiencing generalized weakness when compared to baseline as well as fatigue, chills, abdominal pain, cough, congestion, and nausea.  She states that she feels \"weak all over.\"  She denies any fevers.  No recent foreign travel.  She works at [employer].         History provided by:  Patient  Illness   Location:  Generalized  Quality:  Weakness, fatigue, chills  Severity:  Moderate  Onset quality:  Sudden  Duration:  1 day  Timing:  Constant  Progression:  Worsening  Chronicity:  New  Associated symptoms: abdominal pain, congestion, cough, fatigue and nausea        Review of Systems   Constitutional: Positive for chills and fatigue.   HENT: Positive for congestion.    Respiratory: Positive for cough.    Gastrointestinal: Positive for abdominal pain and nausea.   Genitourinary: Negative for difficulty urinating and dysuria.   Neurological: Positive for weakness.   All other systems reviewed and are negative.      Past Medical History:   Diagnosis Date   • Asthma    • Diabetes mellitus (CMS/HCC)    • Disease of thyroid gland    • Hypertension    • Sleep apnea        Allergies   Allergen Reactions   • Lisinopril Swelling       Past Surgical History:   Procedure Laterality Date   • TUBAL ABDOMINAL LIGATION         Family History   Problem Relation Age of Onset   • Cancer Mother    • Diabetes Father        Social History     Socioeconomic History   • Marital status: Single     Spouse name: Not on file   • Number of children: Not on file   • Years of education: Not on file   • Highest education level: Not on file   Tobacco Use   • Smoking status: Former Smoker     Packs/day: 1.00     Types: Cigarettes   • Smokeless tobacco: Never Used   Substance and Sexual Activity   • " Alcohol use: Yes     Comment: occ   • Drug use: No   • Sexual activity: Defer         Objective   Physical Exam   Constitutional: She is oriented to person, place, and time. She appears well-developed and well-nourished. No distress.   Well-appearing female in no acute distress   HENT:   Head: Normocephalic and atraumatic.   Mouth/Throat: Oropharynx is clear and moist.   Neck: Normal range of motion. Neck supple.   Cardiovascular: Normal rate, regular rhythm and normal heart sounds. Exam reveals no gallop and no friction rub.   No murmur heard.  Pulmonary/Chest: Effort normal and breath sounds normal. No respiratory distress. She has no wheezes. She has no rales.   Abdominal: Soft. Bowel sounds are normal. She exhibits no distension and no mass. There is tenderness. There is no rebound and no guarding.   Mild, nonfocal abdominal tenderness without peritoneal signs   Musculoskeletal: Normal range of motion.   Neurological: She is alert and oriented to person, place, and time.   Skin: Skin is warm and dry. No rash noted. She is not diaphoretic. No erythema.   Psychiatric: She has a normal mood and affect. Judgment and thought content normal.   Nursing note and vitals reviewed.      Procedures         ED Course  ED Course as of Apr 05 2204 Fri Apr 05, 2019   1246 49-year-old female presents with a myriad of symptoms since last night including generalized weakness, fatigue, nausea, cough, abdominal pain, and an overall sensation of not feeling well.  She was at work at [employer] today when her symptoms became bothersome to the point that she called EMS and was brought to our facility for further evaluation and treatment.  On arrival, patient nontoxic-appearing.  Unremarkable exam.  Initial EKG revealed normal sinus rhythm with a heart rate of 93 and no ST segments suggestive of are concerning for ischemia.  We will obtain labs and imaging and will reassess following initial interventions.  [DD]   1343 Labs unrevealing.   "[DD]   1355 Chest x-ray negative for acute emergent cardiothoracic process.  [DD]   1506 CT of abdomen/pelvis negative.  [DD]   1532 Labs unrevealing.  Repeat troponin/EKG negative/unchanged.  Upon reevaluation, patient felt improved.  Doubt emergent process at this time.  Reassured and counseled regarding symptomatic management of likely viral process.  The patient will follow up with her primary care physician within 1 week.  Agreeable with plan and given appropriate return precautions.  [DD]      ED Course User Index  [DD] Jason Emmanuel MD         No results found for this or any previous visit (from the past 24 hour(s)).  Note: In addition to lab results from this visit, the labs listed above may include labs taken at another facility or during a different encounter within the last 24 hours. Please correlate lab times with ED admission and discharge times for further clarification of the services performed during this visit.    No orders to display     Vitals:    04/05/19 1223   BP: (!) 174/119   Pulse: 94   Resp: 20   Temp: 98.4 °F (36.9 °C)   SpO2: 93%   Weight: 129 kg (285 lb)   Height: 162.6 cm (64\")     Medications - No data to display  ECG/EMG Results (last 24 hours)     ** No results found for the last 24 hours. **        No orders to display               Recent Results (from the past 24 hour(s))   Comprehensive Metabolic Panel    Collection Time: 04/05/19  1:00 PM   Result Value Ref Range    Glucose 95 70 - 100 mg/dL    BUN 20 9 - 23 mg/dL    Creatinine 1.54 (H) 0.60 - 1.30 mg/dL    Sodium 143 132 - 146 mmol/L    Potassium 3.6 3.5 - 5.5 mmol/L    Chloride 104 99 - 109 mmol/L    CO2 30.0 20.0 - 31.0 mmol/L    Calcium 9.8 8.7 - 10.4 mg/dL    Total Protein 7.0 5.7 - 8.2 g/dL    Albumin 4.22 3.20 - 4.80 g/dL    ALT (SGPT) 16 7 - 40 U/L    AST (SGOT) 13 0 - 33 U/L    Alkaline Phosphatase 87 25 - 100 U/L    Total Bilirubin 0.4 0.3 - 1.2 mg/dL    eGFR Non African Amer 36 (L) >60 mL/min/1.73    Globulin " 2.8 gm/dL    A/G Ratio 1.5 1.5 - 2.5 g/dL    BUN/Creatinine Ratio 13.0 7.0 - 25.0    Anion Gap 9.0 3.0 - 11.0 mmol/L   Magnesium    Collection Time: 04/05/19  1:00 PM   Result Value Ref Range    Magnesium 2.1 1.3 - 2.7 mg/dL   CBC Auto Differential    Collection Time: 04/05/19  1:00 PM   Result Value Ref Range    WBC 10.36 3.50 - 10.80 10*3/mm3    RBC 5.14 3.89 - 5.14 10*6/mm3    Hemoglobin 13.9 11.5 - 15.5 g/dL    Hematocrit 43.8 34.5 - 44.0 %    MCV 85.2 80.0 - 99.0 fL    MCH 27.0 27.0 - 31.0 pg    MCHC 31.7 (L) 32.0 - 36.0 g/dL    RDW 15.6 (H) 11.3 - 14.5 %    RDW-SD 47.9 37.0 - 54.0 fl    MPV 11.5 6.0 - 12.0 fL    Platelets 282 150 - 450 10*3/mm3    Neutrophil % 70.7 41.0 - 71.0 %    Lymphocyte % 21.6 (L) 24.0 - 44.0 %    Monocyte % 5.6 0.0 - 12.0 %    Eosinophil % 1.8 0.0 - 3.0 %    Basophil % 0.3 0.0 - 1.0 %    Immature Grans % 0.9 (H) 0.0 - 0.6 %    Neutrophils, Absolute 7.32 1.50 - 8.30 10*3/mm3    Lymphocytes, Absolute 2.24 0.60 - 4.80 10*3/mm3    Monocytes, Absolute 0.58 0.00 - 1.00 10*3/mm3    Eosinophils, Absolute 0.19 0.00 - 0.30 10*3/mm3    Basophils, Absolute 0.03 0.00 - 0.20 10*3/mm3    Immature Grans, Absolute 0.09 (H) 0.00 - 0.05 10*3/mm3   Influenza Antigen, Rapid - Swab, Nasopharynx    Collection Time: 04/05/19  1:01 PM   Result Value Ref Range    Influenza A Ag, EIA Negative Negative    Influenza B Ag, EIA Negative Negative   POC Troponin, Rapid    Collection Time: 04/05/19  1:05 PM   Result Value Ref Range    Troponin I 0.00 0.00 - 0.07 ng/mL   Pregnancy, Urine - Urine, Clean Catch    Collection Time: 04/05/19  2:51 PM   Result Value Ref Range    HCG, Urine QL Negative Negative   Urinalysis With Microscopic If Indicated (No Culture) - Urine, Clean Catch    Collection Time: 04/05/19  2:51 PM   Result Value Ref Range    Color, UA Yellow Yellow, Straw    Appearance, UA Clear Clear    pH, UA 6.5 5.0 - 8.0    Specific Gravity, UA 1.009 1.001 - 1.030    Glucose,  mg/dL (Trace) (A) Negative     "Ketones, UA Negative Negative    Bilirubin, UA Negative Negative    Blood, UA Moderate (2+) (A) Negative    Protein, UA Trace (A) Negative    Leuk Esterase, UA Negative Negative    Nitrite, UA Negative Negative    Urobilinogen, UA 0.2 E.U./dL 0.2 - 1.0 E.U./dL   Urinalysis, Microscopic Only - Urine, Clean Catch    Collection Time: 04/05/19  2:51 PM   Result Value Ref Range    RBC, UA 3-6 (A) None Seen, 0-2 /HPF    WBC, UA 0-2 None Seen, 0-2 /HPF    Bacteria, UA None Seen None Seen, Trace /HPF    Squamous Epithelial Cells, UA 3-6 (A) None Seen, 0-2 /HPF    Hyaline Casts, UA 0-6 0 - 6 /LPF    Methodology Automated Microscopy    POC Troponin, Rapid    Collection Time: 04/05/19  3:19 PM   Result Value Ref Range    Troponin I 0.00 0.00 - 0.07 ng/mL     Note: In addition to lab results from this visit, the labs listed above may include labs taken at another facility or during a different encounter within the last 24 hours. Please correlate lab times with ED admission and discharge times for further clarification of the services performed during this visit.    CT Abdomen Pelvis Without Contrast   Final Result   There are no acute findings. Diverticulosis is noted without   diverticulitis.       D:  04/05/2019   E:  04/05/2019               This report was finalized on 4/5/2019 3:09 PM by Dr. Austin Louie MD.          XR Chest 1 View   Final Result   Mild cardiomegaly, compensated.       D:  04/05/2019   E:  04/05/2019       This report was finalized on 4/5/2019 2:11 PM by Dr. Austin Louie MD.            Vitals:    04/05/19 1223 04/05/19 1329 04/05/19 1429 04/05/19 1544   BP: (!) 174/119   156/80   BP Location:    Right arm   Pulse: 94 87  97   Resp: 20      Temp: 98.4 °F (36.9 °C)      SpO2: 93% 97% 95% 93%   Weight: 129 kg (285 lb)      Height: 162.6 cm (64\")        Medications   sodium chloride 0.9 % bolus 1,000 mL (0 mL Intravenous Stopped 4/5/19 1447)     ECG/EMG Results (last 24 hours)     Procedure Component Value " Units Date/Time    ECG 12 Lead [[id number]] Collected:  04/05/19 1241     Updated:  04/05/19 1253        ECG 12 Lead         ECG 12 Lead                 MDM    Final diagnoses:   Generalized weakness   Renal insufficiency   Viral syndrome       Documentation assistance provided by lachelle Batres.  Information recorded by the scribe was done at my direction and has been verified and validated by me.     Vinny Batres  04/05/19 1229       Jason Emmanuel MD  04/05/19 5915

## 2019-04-05 NOTE — DISCHARGE INSTRUCTIONS
Follow up with your PCP, Erica Beatty, in one week for reevaluation.     Immediately return to the ED for worsening or new concerning symptoms.

## 2019-04-12 ENCOUNTER — APPOINTMENT (OUTPATIENT)
Dept: CT IMAGING | Facility: HOSPITAL | Age: 49
End: 2019-04-12

## 2019-04-12 ENCOUNTER — HOSPITAL ENCOUNTER (OUTPATIENT)
Facility: HOSPITAL | Age: 49
Discharge: HOME | End: 2019-04-19
Attending: EMERGENCY MEDICINE | Admitting: INTERNAL MEDICINE

## 2019-04-12 ENCOUNTER — APPOINTMENT (OUTPATIENT)
Dept: GENERAL RADIOLOGY | Facility: HOSPITAL | Age: 49
End: 2019-04-12

## 2019-04-12 DIAGNOSIS — R10.13 EPIGASTRIC PAIN: ICD-10-CM

## 2019-04-12 DIAGNOSIS — E86.9 VOLUME DEPLETION: Primary | ICD-10-CM

## 2019-04-12 DIAGNOSIS — D72.829 LEUKOCYTOSIS, UNSPECIFIED TYPE: ICD-10-CM

## 2019-04-12 DIAGNOSIS — N17.9 AKI (ACUTE KIDNEY INJURY) (HCC): ICD-10-CM

## 2019-04-12 PROBLEM — Z72.0 TOBACCO ABUSE: Status: ACTIVE | Noted: 2019-04-12

## 2019-04-12 PROBLEM — E03.9 HYPOTHYROIDISM: Status: ACTIVE | Noted: 2019-04-12

## 2019-04-12 PROBLEM — E11.9 TYPE 2 DIABETES MELLITUS: Status: ACTIVE | Noted: 2019-04-12

## 2019-04-12 PROBLEM — N18.30 CKD (CHRONIC KIDNEY DISEASE) STAGE 3, GFR 30-59 ML/MIN (HCC): Status: ACTIVE | Noted: 2019-04-12

## 2019-04-12 PROBLEM — I10 ESSENTIAL HYPERTENSION: Status: ACTIVE | Noted: 2019-04-12

## 2019-04-12 PROBLEM — R10.9 ABDOMINAL PAIN: Status: ACTIVE | Noted: 2019-04-12

## 2019-04-12 LAB
ALBUMIN SERPL-MCNC: 4.1 G/DL (ref 3.5–5.2)
ALBUMIN/GLOB SERPL: 1 G/DL
ALP SERPL-CCNC: 81 U/L (ref 39–117)
ALT SERPL W P-5'-P-CCNC: 9 U/L (ref 1–33)
ANION GAP SERPL CALCULATED.3IONS-SCNC: 15 MMOL/L
AST SERPL-CCNC: 11 U/L (ref 1–32)
B-HCG UR QL: NEGATIVE
BACTERIA UR QL AUTO: ABNORMAL /HPF
BASOPHILS # BLD AUTO: 0.05 10*3/MM3 (ref 0–0.2)
BASOPHILS NFR BLD AUTO: 0.3 % (ref 0–1.5)
BILIRUB SERPL-MCNC: 0.4 MG/DL (ref 0.2–1.2)
BILIRUB UR QL STRIP: NEGATIVE
BUN BLD-MCNC: 27 MG/DL (ref 6–20)
BUN/CREAT SERPL: 13.2 (ref 7–25)
CALCIUM SPEC-SCNC: 9.8 MG/DL (ref 8.6–10.5)
CHLORIDE SERPL-SCNC: 97 MMOL/L (ref 98–107)
CK SERPL-CCNC: 96 U/L (ref 20–180)
CLARITY UR: CLEAR
CO2 SERPL-SCNC: 26 MMOL/L (ref 22–29)
COLOR UR: YELLOW
CREAT BLD-MCNC: 2.04 MG/DL (ref 0.57–1)
CRP SERPL-MCNC: 2.34 MG/DL (ref 0–0.5)
DEPRECATED RDW RBC AUTO: 46.5 FL (ref 37–54)
EOSINOPHIL # BLD AUTO: 0.25 10*3/MM3 (ref 0–0.4)
EOSINOPHIL NFR BLD AUTO: 1.7 % (ref 0.3–6.2)
ERYTHROCYTE [DISTWIDTH] IN BLOOD BY AUTOMATED COUNT: 15.1 % (ref 12.3–15.4)
GFR SERPL CREATININE-BSD FRML MDRD: 26 ML/MIN/1.73
GLOBULIN UR ELPH-MCNC: 4 GM/DL
GLUCOSE BLD-MCNC: 78 MG/DL (ref 65–99)
GLUCOSE BLDC GLUCOMTR-MCNC: 69 MG/DL (ref 70–130)
GLUCOSE UR STRIP-MCNC: NEGATIVE MG/DL
HCT VFR BLD AUTO: 44.9 % (ref 34–46.6)
HGB BLD-MCNC: 14.4 G/DL (ref 12–15.9)
HGB UR QL STRIP.AUTO: ABNORMAL
HOLD SPECIMEN: NORMAL
HOLD SPECIMEN: NORMAL
HYALINE CASTS UR QL AUTO: ABNORMAL /LPF
IMM GRANULOCYTES # BLD AUTO: 0.1 10*3/MM3 (ref 0–0.05)
IMM GRANULOCYTES NFR BLD AUTO: 0.7 % (ref 0–0.5)
INTERNAL NEGATIVE CONTROL: NEGATIVE
INTERNAL POSITIVE CONTROL: POSITIVE
KETONES UR QL STRIP: NEGATIVE
LEUKOCYTE ESTERASE UR QL STRIP.AUTO: NEGATIVE
LYMPHOCYTES # BLD AUTO: 5.03 10*3/MM3 (ref 0.7–3.1)
LYMPHOCYTES NFR BLD AUTO: 34.1 % (ref 19.6–45.3)
Lab: NORMAL
MAGNESIUM SERPL-MCNC: 2.2 MG/DL (ref 1.6–2.6)
MCH RBC QN AUTO: 27.2 PG (ref 26.6–33)
MCHC RBC AUTO-ENTMCNC: 32.1 G/DL (ref 31.5–35.7)
MCV RBC AUTO: 84.9 FL (ref 79–97)
MONOCYTES # BLD AUTO: 0.86 10*3/MM3 (ref 0.1–0.9)
MONOCYTES NFR BLD AUTO: 5.8 % (ref 5–12)
NEUTROPHILS # BLD AUTO: 8.57 10*3/MM3 (ref 1.4–7)
NEUTROPHILS NFR BLD AUTO: 58.1 % (ref 42.7–76)
NITRITE UR QL STRIP: NEGATIVE
PH UR STRIP.AUTO: 6 [PH] (ref 5–8)
PLATELET # BLD AUTO: 370 10*3/MM3 (ref 140–450)
PMV BLD AUTO: 11.1 FL (ref 6–12)
POTASSIUM BLD-SCNC: 3.9 MMOL/L (ref 3.5–5.2)
PROT SERPL-MCNC: 8.1 G/DL (ref 6–8.5)
PROT UR QL STRIP: ABNORMAL
RBC # BLD AUTO: 5.29 10*6/MM3 (ref 3.77–5.28)
RBC # UR: ABNORMAL /HPF
REF LAB TEST METHOD: ABNORMAL
SODIUM BLD-SCNC: 138 MMOL/L (ref 136–145)
SP GR UR STRIP: 1.01 (ref 1–1.03)
SQUAMOUS #/AREA URNS HPF: ABNORMAL /HPF
TROPONIN I SERPL-MCNC: 0 NG/ML (ref 0–0.07)
UROBILINOGEN UR QL STRIP: ABNORMAL
WBC NRBC COR # BLD: 14.76 10*3/MM3 (ref 3.4–10.8)
WBC UR QL AUTO: ABNORMAL /HPF
WHOLE BLOOD HOLD SPECIMEN: NORMAL
WHOLE BLOOD HOLD SPECIMEN: NORMAL

## 2019-04-12 PROCEDURE — 99220 PR INITIAL OBSERVATION CARE/DAY 70 MINUTES: CPT | Performed by: INTERNAL MEDICINE

## 2019-04-12 PROCEDURE — 82550 ASSAY OF CK (CPK): CPT | Performed by: NURSE PRACTITIONER

## 2019-04-12 PROCEDURE — 81001 URINALYSIS AUTO W/SCOPE: CPT | Performed by: EMERGENCY MEDICINE

## 2019-04-12 PROCEDURE — 71250 CT THORAX DX C-: CPT

## 2019-04-12 PROCEDURE — G0378 HOSPITAL OBSERVATION PER HR: HCPCS

## 2019-04-12 PROCEDURE — 96374 THER/PROPH/DIAG INJ IV PUSH: CPT

## 2019-04-12 PROCEDURE — 80053 COMPREHEN METABOLIC PANEL: CPT | Performed by: EMERGENCY MEDICINE

## 2019-04-12 PROCEDURE — 82962 GLUCOSE BLOOD TEST: CPT

## 2019-04-12 PROCEDURE — 99285 EMERGENCY DEPT VISIT HI MDM: CPT

## 2019-04-12 PROCEDURE — 71045 X-RAY EXAM CHEST 1 VIEW: CPT

## 2019-04-12 PROCEDURE — 86140 C-REACTIVE PROTEIN: CPT | Performed by: INTERNAL MEDICINE

## 2019-04-12 PROCEDURE — 25010000002 ONDANSETRON PER 1 MG: Performed by: NURSE PRACTITIONER

## 2019-04-12 PROCEDURE — 81025 URINE PREGNANCY TEST: CPT | Performed by: EMERGENCY MEDICINE

## 2019-04-12 PROCEDURE — 93005 ELECTROCARDIOGRAM TRACING: CPT | Performed by: EMERGENCY MEDICINE

## 2019-04-12 PROCEDURE — 85025 COMPLETE CBC W/AUTO DIFF WBC: CPT | Performed by: EMERGENCY MEDICINE

## 2019-04-12 PROCEDURE — 74176 CT ABD & PELVIS W/O CONTRAST: CPT

## 2019-04-12 PROCEDURE — 94640 AIRWAY INHALATION TREATMENT: CPT

## 2019-04-12 PROCEDURE — 96361 HYDRATE IV INFUSION ADD-ON: CPT

## 2019-04-12 PROCEDURE — 84484 ASSAY OF TROPONIN QUANT: CPT

## 2019-04-12 PROCEDURE — 83735 ASSAY OF MAGNESIUM: CPT | Performed by: EMERGENCY MEDICINE

## 2019-04-12 RX ORDER — SODIUM CHLORIDE 0.9 % (FLUSH) 0.9 %
3 SYRINGE (ML) INJECTION EVERY 12 HOURS SCHEDULED
Status: DISCONTINUED | OUTPATIENT
Start: 2019-04-12 | End: 2019-04-19 | Stop reason: HOSPADM

## 2019-04-12 RX ORDER — SODIUM CHLORIDE 0.9 % (FLUSH) 0.9 %
3-10 SYRINGE (ML) INJECTION AS NEEDED
Status: DISCONTINUED | OUTPATIENT
Start: 2019-04-12 | End: 2019-04-19 | Stop reason: HOSPADM

## 2019-04-12 RX ORDER — ONDANSETRON 2 MG/ML
4 INJECTION INTRAMUSCULAR; INTRAVENOUS ONCE
Status: COMPLETED | OUTPATIENT
Start: 2019-04-12 | End: 2019-04-12

## 2019-04-12 RX ORDER — ONDANSETRON 4 MG/1
4 TABLET, FILM COATED ORAL EVERY 6 HOURS PRN
Status: DISCONTINUED | OUTPATIENT
Start: 2019-04-12 | End: 2019-04-19 | Stop reason: HOSPADM

## 2019-04-12 RX ORDER — SODIUM CHLORIDE 9 MG/ML
100 INJECTION, SOLUTION INTRAVENOUS CONTINUOUS
Status: DISCONTINUED | OUTPATIENT
Start: 2019-04-12 | End: 2019-04-15

## 2019-04-12 RX ORDER — ACETAMINOPHEN 325 MG/1
650 TABLET ORAL EVERY 4 HOURS PRN
Status: DISCONTINUED | OUTPATIENT
Start: 2019-04-12 | End: 2019-04-19 | Stop reason: HOSPADM

## 2019-04-12 RX ORDER — LEVOTHYROXINE SODIUM 0.03 MG/1
25 TABLET ORAL DAILY
Status: DISCONTINUED | OUTPATIENT
Start: 2019-04-13 | End: 2019-04-19 | Stop reason: HOSPADM

## 2019-04-12 RX ORDER — CHOLECALCIFEROL (VITAMIN D3) 125 MCG
5 CAPSULE ORAL NIGHTLY PRN
Status: DISCONTINUED | OUTPATIENT
Start: 2019-04-12 | End: 2019-04-19 | Stop reason: HOSPADM

## 2019-04-12 RX ORDER — FLUOXETINE HYDROCHLORIDE 20 MG/1
40 CAPSULE ORAL DAILY
Status: DISCONTINUED | OUTPATIENT
Start: 2019-04-13 | End: 2019-04-19 | Stop reason: HOSPADM

## 2019-04-12 RX ORDER — AMLODIPINE BESYLATE 10 MG/1
10 TABLET ORAL DAILY
Status: DISCONTINUED | OUTPATIENT
Start: 2019-04-13 | End: 2019-04-19 | Stop reason: HOSPADM

## 2019-04-12 RX ORDER — SODIUM CHLORIDE 0.9 % (FLUSH) 0.9 %
10 SYRINGE (ML) INJECTION AS NEEDED
Status: DISCONTINUED | OUTPATIENT
Start: 2019-04-12 | End: 2019-04-19 | Stop reason: HOSPADM

## 2019-04-12 RX ORDER — HEPARIN SODIUM 5000 [USP'U]/ML
5000 INJECTION, SOLUTION INTRAVENOUS; SUBCUTANEOUS EVERY 8 HOURS SCHEDULED
Status: DISCONTINUED | OUTPATIENT
Start: 2019-04-12 | End: 2019-04-19 | Stop reason: HOSPADM

## 2019-04-12 RX ORDER — ATORVASTATIN CALCIUM 40 MG/1
40 TABLET, FILM COATED ORAL DAILY
Status: DISCONTINUED | OUTPATIENT
Start: 2019-04-13 | End: 2019-04-19 | Stop reason: HOSPADM

## 2019-04-12 RX ORDER — IPRATROPIUM BROMIDE AND ALBUTEROL SULFATE 2.5; .5 MG/3ML; MG/3ML
3 SOLUTION RESPIRATORY (INHALATION) ONCE
Status: COMPLETED | OUTPATIENT
Start: 2019-04-12 | End: 2019-04-12

## 2019-04-12 RX ADMIN — MELATONIN TAB 5 MG 5 MG: 5 TAB at 22:46

## 2019-04-12 RX ADMIN — ONDANSETRON HYDROCHLORIDE 4 MG: 4 TABLET, FILM COATED ORAL at 22:46

## 2019-04-12 RX ADMIN — SODIUM CHLORIDE 1000 ML: 9 INJECTION, SOLUTION INTRAVENOUS at 20:03

## 2019-04-12 RX ADMIN — SODIUM CHLORIDE, PRESERVATIVE FREE 3 ML: 5 INJECTION INTRAVENOUS at 22:46

## 2019-04-12 RX ADMIN — IPRATROPIUM BROMIDE AND ALBUTEROL SULFATE 3 ML: 2.5; .5 SOLUTION RESPIRATORY (INHALATION) at 19:06

## 2019-04-12 RX ADMIN — SODIUM CHLORIDE 1000 ML: 9 INJECTION, SOLUTION INTRAVENOUS at 18:57

## 2019-04-12 RX ADMIN — ONDANSETRON 4 MG: 2 INJECTION INTRAMUSCULAR; INTRAVENOUS at 18:58

## 2019-04-12 RX ADMIN — SODIUM CHLORIDE 100 ML/HR: 9 INJECTION, SOLUTION INTRAVENOUS at 22:48

## 2019-04-12 NOTE — ED PROVIDER NOTES
Subjective   Freida Martinez is a 49 y.o.female who presents to the ED with complaints of generalized weakness. The patient reports her generalized weakness has been constant for the past week. She was evaluated in the emergency department for her presenting symptoms one week ago. During this time, she was diagnosed with renal insufficiency and discharged home. She followed up with her primary care provider today. After examination, her primary care provider sent her here for another evaluation because her condition has not improved over the past week. She also complains of nausea, rhinorrhea, chills, decreased appetite, cough, abdominal pain, and a fever, but she denies any vomiting or shortness of breath. Moreover, she has a history of tobacco abuse. There are no other complaints at this time.         History provided by:  Patient  Weakness - Generalized   Severity:  Moderate  Onset quality:  Sudden  Duration:  1 week  Timing:  Constant  Progression:  Unchanged  Chronicity:  New  Relieved by:  None tried  Worsened by:  Nothing  Ineffective treatments:  None tried  Associated symptoms: abdominal pain, cough, fever and nausea    Associated symptoms: no shortness of breath and no vomiting        Review of Systems   Constitutional: Positive for appetite change, chills and fever.   HENT: Positive for rhinorrhea.    Respiratory: Positive for cough. Negative for shortness of breath.    Gastrointestinal: Positive for abdominal pain and nausea. Negative for vomiting.   Neurological: Positive for weakness (generalized).   All other systems reviewed and are negative.      Past Medical History:   Diagnosis Date   • Asthma    • Diabetes mellitus (CMS/HCC)    • Disease of thyroid gland    • Hypertension    • Sleep apnea        Allergies   Allergen Reactions   • Lisinopril Swelling       Past Surgical History:   Procedure Laterality Date   • TUBAL ABDOMINAL LIGATION         Family History   Problem Relation Age of Onset   •  Cancer Mother    • Diabetes Father        Social History     Socioeconomic History   • Marital status: Single     Spouse name: Not on file   • Number of children: Not on file   • Years of education: Not on file   • Highest education level: Not on file   Tobacco Use   • Smoking status: Former Smoker     Packs/day: 1.00     Types: Cigarettes   • Smokeless tobacco: Never Used   Substance and Sexual Activity   • Alcohol use: Yes     Comment: occ   • Drug use: No   • Sexual activity: Defer         Objective   Physical Exam   Constitutional: She is oriented to person, place, and time. She appears well-developed and well-nourished. No distress.   HENT:   Head: Normocephalic and atraumatic.   Nose: Nose normal.   Mouth/Throat: Oropharynx is clear and moist.   Maxillary sinus tenderness.    Eyes: Conjunctivae are normal. No scleral icterus.   Neck: Normal range of motion. Neck supple.   Cardiovascular: Normal rate, regular rhythm, normal heart sounds and intact distal pulses.   No murmur heard.  Pulmonary/Chest: Effort normal and breath sounds normal. No respiratory distress.   Abdominal: Soft. Bowel sounds are normal. There is generalized tenderness.   Musculoskeletal: Normal range of motion. She exhibits no edema.   Neurological: She is alert and oriented to person, place, and time.   Skin: Skin is warm and dry.   Psychiatric: She has a normal mood and affect. Her behavior is normal.   Nursing note and vitals reviewed.      Procedures         ED Course  ED Course as of Apr 12 2033 Fri Apr 12, 2019   2006 Hospitalist paged to discuss admission.  Her CAT scans are reassuring.  Her white blood cell count is elevated at 14 and her creatinine is 2.04 which is about the highest it has been.  Patient also reports decreased oral intake.  And has been sick for several weeks.  Was sent here by primary care for evaluation.  We will be giving her a second liter of fluid and I have added on a CK.  And have paged the hospitalist to  discuss admission to see if there is any clear benefit on admission.  [JM]   2032 Renato with Dr. Iniguez and she will admit.  Patient reports she has been taking all of her medications including her blood pressure medication but has not been taking any NSAIDs or Motrin.  [JM]      ED Course User Index  [JM] Patricio Flores, LUCIA     Recent Results (from the past 24 hour(s))   Comprehensive Metabolic Panel    Collection Time: 04/12/19  5:58 PM   Result Value Ref Range    Glucose 78 65 - 99 mg/dL    BUN 27 (H) 6 - 20 mg/dL    Creatinine 2.04 (H) 0.57 - 1.00 mg/dL    Sodium 138 136 - 145 mmol/L    Potassium 3.9 3.5 - 5.2 mmol/L    Chloride 97 (L) 98 - 107 mmol/L    CO2 26.0 22.0 - 29.0 mmol/L    Calcium 9.8 8.6 - 10.5 mg/dL    Total Protein 8.1 6.0 - 8.5 g/dL    Albumin 4.10 3.50 - 5.20 g/dL    ALT (SGPT) 9 1 - 33 U/L    AST (SGOT) 11 1 - 32 U/L    Alkaline Phosphatase 81 39 - 117 U/L    Total Bilirubin 0.4 0.2 - 1.2 mg/dL    eGFR Non African Amer 26 (L) >60 mL/min/1.73    Globulin 4.0 gm/dL    A/G Ratio 1.0 g/dL    BUN/Creatinine Ratio 13.2 7.0 - 25.0    Anion Gap 15.0 mmol/L   Magnesium    Collection Time: 04/12/19  5:58 PM   Result Value Ref Range    Magnesium 2.2 1.6 - 2.6 mg/dL   Light Blue Top    Collection Time: 04/12/19  5:58 PM   Result Value Ref Range    Extra Tube hold for add-on    Green Top (Gel)    Collection Time: 04/12/19  5:58 PM   Result Value Ref Range    Extra Tube Hold for add-ons.    Lavender Top    Collection Time: 04/12/19  5:58 PM   Result Value Ref Range    Extra Tube hold for add-on    Gold Top - SST    Collection Time: 04/12/19  5:58 PM   Result Value Ref Range    Extra Tube Hold for add-ons.    CBC Auto Differential    Collection Time: 04/12/19  5:58 PM   Result Value Ref Range    WBC 14.76 (H) 3.40 - 10.80 10*3/mm3    RBC 5.29 (H) 3.77 - 5.28 10*6/mm3    Hemoglobin 14.4 12.0 - 15.9 g/dL    Hematocrit 44.9 34.0 - 46.6 %    MCV 84.9 79.0 - 97.0 fL    MCH 27.2 26.6 - 33.0 pg    MCHC 32.1 31.5 -  35.7 g/dL    RDW 15.1 12.3 - 15.4 %    RDW-SD 46.5 37.0 - 54.0 fl    MPV 11.1 6.0 - 12.0 fL    Platelets 370 140 - 450 10*3/mm3    Neutrophil % 58.1 42.7 - 76.0 %    Lymphocyte % 34.1 19.6 - 45.3 %    Monocyte % 5.8 5.0 - 12.0 %    Eosinophil % 1.7 0.3 - 6.2 %    Basophil % 0.3 0.0 - 1.5 %    Immature Grans % 0.7 (H) 0.0 - 0.5 %    Neutrophils, Absolute 8.57 (H) 1.40 - 7.00 10*3/mm3    Lymphocytes, Absolute 5.03 (H) 0.70 - 3.10 10*3/mm3    Monocytes, Absolute 0.86 0.10 - 0.90 10*3/mm3    Eosinophils, Absolute 0.25 0.00 - 0.40 10*3/mm3    Basophils, Absolute 0.05 0.00 - 0.20 10*3/mm3    Immature Grans, Absolute 0.10 (H) 0.00 - 0.05 10*3/mm3   CK    Collection Time: 04/12/19  5:58 PM   Result Value Ref Range    Creatine Kinase 96 20 - 180 U/L   Urinalysis With Microscopic If Indicated (No Culture) - Urine, Clean Catch    Collection Time: 04/12/19  6:04 PM   Result Value Ref Range    Color, UA Yellow Yellow, Straw    Appearance, UA Clear Clear    pH, UA 6.0 5.0 - 8.0    Specific Gravity, UA 1.013 1.001 - 1.030    Glucose, UA Negative Negative    Ketones, UA Negative Negative    Bilirubin, UA Negative Negative    Blood, UA Trace (A) Negative    Protein, UA Trace (A) Negative    Leuk Esterase, UA Negative Negative    Nitrite, UA Negative Negative    Urobilinogen, UA 1.0 E.U./dL 0.2 - 1.0 E.U./dL   Urinalysis, Microscopic Only - Urine, Clean Catch    Collection Time: 04/12/19  6:04 PM   Result Value Ref Range    RBC, UA 0-2 None Seen, 0-2 /HPF    WBC, UA 0-2 None Seen, 0-2 /HPF    Bacteria, UA Trace None Seen, Trace /HPF    Squamous Epithelial Cells, UA 3-6 (A) None Seen, 0-2 /HPF    Hyaline Casts, UA 0-6 0 - 6 /LPF    Methodology Automated Microscopy    POCT pregnancy, urine    Collection Time: 04/12/19  6:07 PM   Result Value Ref Range    HCG, Urine, QL Negative Negative    Lot Number [id number]     Internal Positive Control Positive     Internal Negative Control Negative    POC Troponin, Rapid    Collection Time:  "04/12/19  6:07 PM   Result Value Ref Range    Troponin I 0.00 0.00 - 0.07 ng/mL     Note: In addition to lab results from this visit, the labs listed above may include labs taken at another facility or during a different encounter within the last 24 hours. Please correlate lab times with ED admission and discharge times for further clarification of the services performed during this visit.    XR Chest 1 View   Preliminary Result   Mild increased markings left lung base with small left   pleural effusion.       DICTATED:   04/12/2019   EDITED/ls :   04/12/2019               CT Abdomen Pelvis Without Contrast    (Results Pending)   CT Chest Without Contrast    (Results Pending)     Vitals:    04/12/19 1715 04/12/19 1739 04/12/19 1906   BP: 107/74 102/78    BP Location: Left arm Right arm    Patient Position: Sitting Lying    Pulse: 93 89 81   Resp: 18 18 18   Temp: 98.5 °F (36.9 °C) 98.4 °F (36.9 °C)    TempSrc: Oral Oral    SpO2: 94% 93% 97%   Weight: 113 kg (249 lb)     Height: 162.6 cm (64\")       Medications   sodium chloride 0.9 % flush 10 mL (not administered)   sodium chloride 0.9 % bolus 1,000 mL (0 mL Intravenous Stopped 4/12/19 2000)   ondansetron (ZOFRAN) injection 4 mg (4 mg Intravenous Given 4/12/19 1858)   ipratropium-albuterol (DUO-NEB) nebulizer solution 3 mL (3 mL Nebulization Given 4/12/19 1906)   sodium chloride 0.9 % bolus 1,000 mL (1,000 mL Intravenous New Bag 4/12/19 2003)     ECG/EMG Results (last 24 hours)     ** No results found for the last 24 hours. **        ECG 12 Lead                 XR Chest 1 View   Preliminary Result   Mild increased markings left lung base with small left   pleural effusion.       DICTATED:   04/12/2019   EDITED/ls :   04/12/2019               CT Abdomen Pelvis Without Contrast    (Results Pending)   CT Chest Without Contrast    (Results Pending)                 MDM    Final diagnoses:   Volume depletion   JACKIE (acute kidney injury) (CMS/HCC)   Leukocytosis, " unspecified type       Documentation assistance provided by lachelle Parks.  Information recorded by the scribe was done at my direction and has been verified and validated by me.     Shyam Parks  04/12/19 1824       Patricio Flores APRN  04/12/19 2033

## 2019-04-13 ENCOUNTER — APPOINTMENT (OUTPATIENT)
Dept: ULTRASOUND IMAGING | Facility: HOSPITAL | Age: 49
End: 2019-04-13

## 2019-04-13 PROBLEM — R94.31 LONG QT INTERVAL: Status: ACTIVE | Noted: 2019-04-13

## 2019-04-13 LAB
ANION GAP SERPL CALCULATED.3IONS-SCNC: 11 MMOL/L
BASOPHILS # BLD AUTO: 0.05 10*3/MM3 (ref 0–0.2)
BASOPHILS NFR BLD AUTO: 0.5 % (ref 0–1.5)
BUN BLD-MCNC: 27 MG/DL (ref 6–20)
BUN/CREAT SERPL: 13.6 (ref 7–25)
CALCIUM SPEC-SCNC: 8.9 MG/DL (ref 8.6–10.5)
CHLORIDE SERPL-SCNC: 101 MMOL/L (ref 98–107)
CO2 SERPL-SCNC: 26 MMOL/L (ref 22–29)
CREAT BLD-MCNC: 1.99 MG/DL (ref 0.57–1)
CREAT UR-MCNC: 70.1 MG/DL
D-LACTATE SERPL-SCNC: 0.9 MMOL/L (ref 0.5–2)
DEPRECATED RDW RBC AUTO: 47.6 FL (ref 37–54)
EOSINOPHIL # BLD AUTO: 0.16 10*3/MM3 (ref 0–0.4)
EOSINOPHIL NFR BLD AUTO: 1.5 % (ref 0.3–6.2)
ERYTHROCYTE [DISTWIDTH] IN BLOOD BY AUTOMATED COUNT: 15.1 % (ref 12.3–15.4)
ERYTHROCYTE [SEDIMENTATION RATE] IN BLOOD: 34 MM/HR (ref 0–20)
GFR SERPL CREATININE-BSD FRML MDRD: 27 ML/MIN/1.73
GLUCOSE BLD-MCNC: 96 MG/DL (ref 65–99)
GLUCOSE BLDC GLUCOMTR-MCNC: 104 MG/DL (ref 70–130)
GLUCOSE BLDC GLUCOMTR-MCNC: 112 MG/DL (ref 70–130)
GLUCOSE BLDC GLUCOMTR-MCNC: 131 MG/DL (ref 70–130)
GLUCOSE BLDC GLUCOMTR-MCNC: 132 MG/DL (ref 70–130)
HBA1C MFR BLD: 9.2 % (ref 4.8–5.6)
HCT VFR BLD AUTO: 40.4 % (ref 34–46.6)
HGB BLD-MCNC: 12.6 G/DL (ref 12–15.9)
IMM GRANULOCYTES # BLD AUTO: 0.05 10*3/MM3 (ref 0–0.05)
IMM GRANULOCYTES NFR BLD AUTO: 0.5 % (ref 0–0.5)
LIPASE SERPL-CCNC: 25 U/L (ref 13–60)
LYMPHOCYTES # BLD AUTO: 2.12 10*3/MM3 (ref 0.7–3.1)
LYMPHOCYTES NFR BLD AUTO: 20.4 % (ref 19.6–45.3)
MCH RBC QN AUTO: 26.8 PG (ref 26.6–33)
MCHC RBC AUTO-ENTMCNC: 31.2 G/DL (ref 31.5–35.7)
MCV RBC AUTO: 85.8 FL (ref 79–97)
MONOCYTES # BLD AUTO: 0.54 10*3/MM3 (ref 0.1–0.9)
MONOCYTES NFR BLD AUTO: 5.2 % (ref 5–12)
NEUTROPHILS # BLD AUTO: 7.5 10*3/MM3 (ref 1.4–7)
NEUTROPHILS NFR BLD AUTO: 72.4 % (ref 42.7–76)
PLATELET # BLD AUTO: 329 10*3/MM3 (ref 140–450)
PMV BLD AUTO: 11.7 FL (ref 6–12)
POTASSIUM BLD-SCNC: 4 MMOL/L (ref 3.5–5.2)
PROT UR-MCNC: 16.8 MG/DL
RBC # BLD AUTO: 4.71 10*6/MM3 (ref 3.77–5.28)
SODIUM BLD-SCNC: 138 MMOL/L (ref 136–145)
SODIUM UR-SCNC: 104 MMOL/L
WBC NRBC COR # BLD: 10.37 10*3/MM3 (ref 3.4–10.8)

## 2019-04-13 PROCEDURE — 96376 TX/PRO/DX INJ SAME DRUG ADON: CPT

## 2019-04-13 PROCEDURE — 83690 ASSAY OF LIPASE: CPT | Performed by: HOSPITALIST

## 2019-04-13 PROCEDURE — 96375 TX/PRO/DX INJ NEW DRUG ADDON: CPT

## 2019-04-13 PROCEDURE — 63710000001 INSULIN LISPRO (HUMAN) PER 5 UNITS: Performed by: INTERNAL MEDICINE

## 2019-04-13 PROCEDURE — 82962 GLUCOSE BLOOD TEST: CPT

## 2019-04-13 PROCEDURE — 84300 ASSAY OF URINE SODIUM: CPT | Performed by: INTERNAL MEDICINE

## 2019-04-13 PROCEDURE — 84156 ASSAY OF PROTEIN URINE: CPT | Performed by: INTERNAL MEDICINE

## 2019-04-13 PROCEDURE — 96361 HYDRATE IV INFUSION ADD-ON: CPT

## 2019-04-13 PROCEDURE — 76775 US EXAM ABDO BACK WALL LIM: CPT

## 2019-04-13 PROCEDURE — 99226 PR SBSQ OBSERVATION CARE/DAY 35 MINUTES: CPT | Performed by: HOSPITALIST

## 2019-04-13 PROCEDURE — G0378 HOSPITAL OBSERVATION PER HR: HCPCS

## 2019-04-13 PROCEDURE — 82570 ASSAY OF URINE CREATININE: CPT | Performed by: INTERNAL MEDICINE

## 2019-04-13 PROCEDURE — 83605 ASSAY OF LACTIC ACID: CPT | Performed by: INTERNAL MEDICINE

## 2019-04-13 PROCEDURE — 83036 HEMOGLOBIN GLYCOSYLATED A1C: CPT | Performed by: NURSE PRACTITIONER

## 2019-04-13 PROCEDURE — 99254 IP/OBS CNSLTJ NEW/EST MOD 60: CPT | Performed by: INTERNAL MEDICINE

## 2019-04-13 PROCEDURE — 85025 COMPLETE CBC W/AUTO DIFF WBC: CPT | Performed by: NURSE PRACTITIONER

## 2019-04-13 PROCEDURE — 80048 BASIC METABOLIC PNL TOTAL CA: CPT | Performed by: NURSE PRACTITIONER

## 2019-04-13 RX ORDER — NICOTINE POLACRILEX 4 MG
15 LOZENGE BUCCAL
Status: DISCONTINUED | OUTPATIENT
Start: 2019-04-13 | End: 2019-04-19 | Stop reason: HOSPADM

## 2019-04-13 RX ORDER — SODIUM CHLORIDE 9 MG/ML
100 INJECTION, SOLUTION INTRAVENOUS CONTINUOUS
Status: ACTIVE | OUTPATIENT
Start: 2019-04-13 | End: 2019-04-14

## 2019-04-13 RX ORDER — LOSARTAN POTASSIUM 50 MG/1
50 TABLET ORAL
Status: DISCONTINUED | OUTPATIENT
Start: 2019-04-13 | End: 2019-04-13

## 2019-04-13 RX ORDER — DEXTROSE MONOHYDRATE 25 G/50ML
25 INJECTION, SOLUTION INTRAVENOUS
Status: DISCONTINUED | OUTPATIENT
Start: 2019-04-13 | End: 2019-04-19 | Stop reason: HOSPADM

## 2019-04-13 RX ORDER — NICOTINE 21 MG/24HR
1 PATCH, TRANSDERMAL 24 HOURS TRANSDERMAL
Status: DISCONTINUED | OUTPATIENT
Start: 2019-04-13 | End: 2019-04-17

## 2019-04-13 RX ORDER — PANTOPRAZOLE SODIUM 40 MG/10ML
40 INJECTION, POWDER, LYOPHILIZED, FOR SOLUTION INTRAVENOUS EVERY 12 HOURS SCHEDULED
Status: DISCONTINUED | OUTPATIENT
Start: 2019-04-13 | End: 2019-04-16

## 2019-04-13 RX ADMIN — ACETAMINOPHEN 650 MG: 325 TABLET, FILM COATED ORAL at 04:52

## 2019-04-13 RX ADMIN — MELATONIN TAB 5 MG 5 MG: 5 TAB at 20:37

## 2019-04-13 RX ADMIN — SODIUM CHLORIDE 100 ML/HR: 9 INJECTION, SOLUTION INTRAVENOUS at 08:35

## 2019-04-13 RX ADMIN — PANTOPRAZOLE SODIUM 40 MG: 40 INJECTION, POWDER, FOR SOLUTION INTRAVENOUS at 20:37

## 2019-04-13 RX ADMIN — FLUOXETINE HYDROCHLORIDE 40 MG: 20 CAPSULE ORAL at 08:35

## 2019-04-13 RX ADMIN — SODIUM CHLORIDE, PRESERVATIVE FREE 3 ML: 5 INJECTION INTRAVENOUS at 08:36

## 2019-04-13 RX ADMIN — ATORVASTATIN CALCIUM 40 MG: 40 TABLET, FILM COATED ORAL at 08:35

## 2019-04-13 RX ADMIN — PANTOPRAZOLE SODIUM 40 MG: 40 INJECTION, POWDER, FOR SOLUTION INTRAVENOUS at 08:35

## 2019-04-13 RX ADMIN — AMLODIPINE BESYLATE 10 MG: 10 TABLET ORAL at 08:35

## 2019-04-13 RX ADMIN — LEVOTHYROXINE SODIUM 25 MCG: 25 TABLET ORAL at 04:52

## 2019-04-13 NOTE — PLAN OF CARE
Problem: Patient Care Overview  Goal: Plan of Care Review  Outcome: Ongoing (interventions implemented as appropriate)   04/13/19 0409   Coping/Psychosocial   Plan of Care Reviewed With patient   Plan of Care Review   Progress no change   OTHER   Outcome Summary Pt with no acute distress overnight. Refuses to wear o2 at night for sleep apnea.        Problem: Renal Failure/Kidney Injury, Acute (Adult)  Goal: Signs and Symptoms of Listed Potential Problems Will be Absent, Minimized or Managed (Renal Failure/Kidney Injury, Acute)  Outcome: Ongoing (interventions implemented as appropriate)   04/13/19 0409   Goal/Outcome Evaluation   Problems Assessed (Acute Renal Failure/Kidney Injury) all   Problems Present (ARF/Kidney Injury) other (see comments)  (lab values)

## 2019-04-13 NOTE — CONSULTS
Referring Provider: Anel Iniguez  Reason for Consultation: Acute on chronic renal failure    Subjective     Chief complaint nausea poor oral intake, acute renal failure    History of present illness:    49-year-old morbidly obese -American female with history of CKD stage III diagnosed a couple years ago has not followed in the office.  History of diabetes, hypertension.  Patient presented with generalized weakness, nausea, poor oral intake, abdominal pain and chills for a week.  Patient was seen a couple weeks ago in the ER where she was discharged.  At that time creatinine around 1.54.  Patient return to the hospital with a creatinine 2.04 with poor oral intake.  And has been started on IV fluids at this time creatinine down to 1.  9 9.  UA is positive for protein.  Ultrasound of the kidney shows normal size kidney.  CT of the abdomen was negative.  Patient denies any epistaxis, hemoptysis, recent rash, kidney stones, or nonsteroidal use but her medication list does have naproxen as listed.  He has a strong family history of paternal kidney disease with her grandfather was on dialysis.  She has been consulted at this time.  She continues to have some nausea at this time, with generalized weakness    History  Past Medical History:   Diagnosis Date   • Asthma    • Diabetes mellitus (CMS/HCC)    • Disease of thyroid gland    • Hypertension    • Sleep apnea    ,   Past Surgical History:   Procedure Laterality Date   • TUBAL ABDOMINAL LIGATION     ,   Family History   Problem Relation Age of Onset   • Cancer Mother    • Diabetes Father    ,   Social History     Tobacco Use   • Smoking status: Current Every Day Smoker     Packs/day: 1.00     Types: Cigarettes   • Smokeless tobacco: Never Used   Substance Use Topics   • Alcohol use: No     Frequency: Never   • Drug use: No   ,   Medications Prior to Admission   Medication Sig Dispense Refill Last Dose   • amLODIPine (NORVASC) 10 MG tablet Take 10 mg by mouth  Daily.      • atorvastatin (LIPITOR) 40 MG tablet Take 40 mg by mouth Daily.      • FLUoxetine (PROZAC) 40 MG capsule Take 40 mg by mouth Daily.      • GLIPIZIDE PO Take 5 mg by mouth Daily.      • levothyroxine (SYNTHROID, LEVOTHROID) 25 MCG tablet Take 25 mcg by mouth Daily.      • losartan-hydrochlorothiazide (HYZAAR) 50-12.5 MG per tablet Take 1 tablet by mouth Daily.      • metFORMIN (GLUCOPHAGE) 500 MG tablet Take 500 mg by mouth 2 (Two) Times a Day With Meals.      • aspirin 325 MG tablet Take 325 mg by mouth Daily.      • cefdinir (OMNICEF) 300 MG capsule Take 1 capsule by mouth 2 (Two) Times a Day. (Patient not taking: Reported on 4/12/2019) 14 capsule 0 Not Taking at Unknown time   • cyclobenzaprine (FLEXERIL) 10 MG tablet Take 1 tablet by mouth 3 (Three) Times a Day As Needed for Muscle Spasms. (Patient not taking: Reported on 4/12/2019) 15 tablet 0 Not Taking at Unknown time   • Ergocalciferol (VITAMIN D2 PO) Take 50,000 Units by mouth 1 (One) Time Per Week.      • naproxen (EC NAPROSYN) 500 MG EC tablet Take 1 tablet by mouth 2 (Two) Times a Day As Needed (PAIN). (Patient not taking: Reported on 4/12/2019) 14 tablet 0 Not Taking at Unknown time   • phenazopyridine (PYRIDIUM) 200 MG tablet Take 1 tablet by mouth 3 (Three) Times a Day As Needed for bladder spasms. (Patient not taking: Reported on 4/12/2019) 6 tablet 0 Not Taking at Unknown time   • PredniSONE (DELTASONE) 10 MG (21) tablet pack Use as directed on package (Patient not taking: Reported on 4/12/2019) 21 tablet 0 Not Taking at Unknown time   , Scheduled Meds:    amLODIPine 10 mg Oral Daily   atorvastatin 40 mg Oral Daily   FLUoxetine 40 mg Oral Daily   heparin (porcine) 5,000 Units Subcutaneous Q8H   insulin lispro 0-7 Units Subcutaneous 4x Daily With Meals & Nightly   levothyroxine 25 mcg Oral Daily   nicotine 1 patch Transdermal Q24H   pantoprazole 40 mg Intravenous Q12H   sodium chloride 3 mL Intravenous Q12H   , Continuous Infusions:     sodium chloride 100 mL/hr Last Rate: 100 mL/hr (04/13/19 0835)   , PRN Meds:  •  acetaminophen  •  dextrose  •  dextrose  •  glucagon (human recombinant)  •  melatonin  •  ondansetron  •  sodium chloride  •  sodium chloride and Allergies:  Lisinopril    Review of Systems  Pertinent items are noted in HPI, all other systems reviewed and negative    Objective     Vital Signs  Temp:  [98 °F (36.7 °C)-98.5 °F (36.9 °C)] 98.4 °F (36.9 °C)  Heart Rate:  [] 80  Resp:  [16-20] 20  BP: ()/(57-87) 124/87    I/O this shift:  In: 1046 [P.O.:60; I.V.:986]  Out: -   I/O last 3 completed shifts:  In: 1120 [P.O.:120; IV Piggyback:1000]  Out: 1700 [Urine:1700]    Physical Exam:     General Appearance:    Alert, cooperative, in no acute distress morbidly obese   Head:    Normocephalic, without obvious abnormality, atraumatic   Eyes:            Conjunctivae and sclerae normal, no   icterus, no pallor, corneas clear, PERRLA   Ears:    Ears appear intact with no abnormalities noted   Throat:    oral mucosa moist   Neck:   No adenopathy, supple, trachea midline, no thyromegaly, no     carotid bruit, no JVD   Back:     No kyphosis present, no scoliosis present,    Lungs:     Clear to auscultation,respirations regular, even and                   unlabored    Heart:    Regular rhythm and normal rate, normal S1 and S2, no            murmur, no gallop, no rub, no click   Breast Exam:    Deferred   Abdomen:     Normal bowel sounds, no masses, no organomegaly, soft        non-tender, non-distended, no guarding, no rebound                 tenderness, obesity   Genitalia:    Deferred   Extremities:   Moves all extremities well, no edema, no cyanosis, no              redness   Pulses:   Pulses palpable and equal bilaterally   Skin:   No bleeding, bruising or rash   Lymph nodes:   No palpable adenopathy   Neurologic:  Awake alert oriented no focal deficit.       Results Review:   I reviewed the patient's new clinical results.    WBC  WBC   Date Value Ref Range Status   04/13/2019 10.37 3.40 - 10.80 10*3/mm3 Final   04/12/2019 14.76 (H) 3.40 - 10.80 10*3/mm3 Final      HGB Hemoglobin   Date Value Ref Range Status   04/13/2019 12.6 12.0 - 15.9 g/dL Final   04/12/2019 14.4 12.0 - 15.9 g/dL Final      HCT Hematocrit   Date Value Ref Range Status   04/13/2019 40.4 34.0 - 46.6 % Final   04/12/2019 44.9 34.0 - 46.6 % Final      Platlets No results found for: LABPLAT   MCV MCV   Date Value Ref Range Status   04/13/2019 85.8 79.0 - 97.0 fL Final   04/12/2019 84.9 79.0 - 97.0 fL Final          Sodium Sodium   Date Value Ref Range Status   04/13/2019 138 136 - 145 mmol/L Final   04/12/2019 138 136 - 145 mmol/L Final      Potassium Potassium   Date Value Ref Range Status   04/13/2019 4.0 3.5 - 5.2 mmol/L Final   04/12/2019 3.9 3.5 - 5.2 mmol/L Final      Chloride Chloride   Date Value Ref Range Status   04/13/2019 101 98 - 107 mmol/L Final   04/12/2019 97 (L) 98 - 107 mmol/L Final      CO2 CO2   Date Value Ref Range Status   04/13/2019 26.0 22.0 - 29.0 mmol/L Final   04/12/2019 26.0 22.0 - 29.0 mmol/L Final      BUN BUN   Date Value Ref Range Status   04/13/2019 27 (H) 6 - 20 mg/dL Final   04/12/2019 27 (H) 6 - 20 mg/dL Final      Creatinine Creatinine   Date Value Ref Range Status   04/13/2019 1.99 (H) 0.57 - 1.00 mg/dL Final   04/12/2019 2.04 (H) 0.57 - 1.00 mg/dL Final      Calcium Calcium   Date Value Ref Range Status   04/13/2019 8.9 8.6 - 10.5 mg/dL Final   04/12/2019 9.8 8.6 - 10.5 mg/dL Final      PO4 No results found for: CAPO4   Albumin Albumin   Date Value Ref Range Status   04/12/2019 4.10 3.50 - 5.20 g/dL Final      Magnesium Magnesium   Date Value Ref Range Status   04/12/2019 2.2 1.6 - 2.6 mg/dL Final      Uric Acid No results found for: URICACID           amLODIPine 10 mg Oral Daily   atorvastatin 40 mg Oral Daily   FLUoxetine 40 mg Oral Daily   heparin (porcine) 5,000 Units Subcutaneous Q8H   insulin lispro 0-7 Units Subcutaneous 4x Daily  With Meals & Nightly   levothyroxine 25 mcg Oral Daily   nicotine 1 patch Transdermal Q24H   pantoprazole 40 mg Intravenous Q12H   sodium chloride 3 mL Intravenous Q12H       sodium chloride 100 mL/hr Last Rate: 100 mL/hr (04/13/19 0835)       Assessment/Plan       JACKIE (acute kidney injury) (CMS/[facility])    Essential hypertension    Hypothyroidism    Type 2 diabetes mellitus (CMS/[facility])    Tobacco abuse    CKD (chronic kidney disease) stage 3, GFR 30-59 ml/min (CMS/[facility])    Abdominal pain    Long QT interval    1.  Acute on chronic renal failure with a creatinine on presentation of 2.04.  Previous creatinine generally 1.54.  Patient has history of CKD stage III diagnosed couple years ago but declined to follow-up with the nephrology.  Recent increase in creatinine most likely due to poor poor oral intake with nausea.  The sound of the kidney normal size kidney of 11.6 cm right small 2 cm cyst, left kidney 12.2 cm.  No hydronephrosis or nephrolithiasis.  Bladder was grossly normal.  UA has shown some proteinuria.  2.  CKD stage III   3.  Proteinuria: Likely related to diabetes, hypertension and morbid obesity.  Plan:  IV normal saline ordered.  Agree with holding diuretics for now.  Check protein creatinine ratio.  Avoid nephrotoxic medications.  Keep systolic blood pressure greater than 100.  Check volume status.  Check labs in the morning.  Daily evaluation for renal replacement therapy will be done.  Adjust medication for the new GFR.  Case discussed with the medical staff taking care of the patient.    I discussed the patients findings and my recommendations with patient    Shree Acosta MD  04/13/19  @NOW

## 2019-04-13 NOTE — PROGRESS NOTES
[facility] Medicine Services  PROGRESS NOTE    Patient Name: Freida Martinez  : 1970  MRN: [id number]    Date of Admission: 2019  Length of Stay: 0  Primary Care Physician: Erica Beatty APRN    Subjective   Subjective     CC:  Nausea/abdominal pain    HPI:  Continues with abdominal pain. Nauseated, no emesis. Hurts when she eats or drinks. Denies dysphagia/odynophagia. Unsure if increased bloating. Denies diarrhea or change in bowel habits. Denies dysuria. No f/c. No dyspnea.    Review of Systems    Otherwise ROS is negative except as mentioned in the HPI.    Objective   Objective     Vital Signs:   Temp:  [98 °F (36.7 °C)-98.5 °F (36.9 °C)] 98.4 °F (36.9 °C)  Heart Rate:  [] 90  Resp:  [16-20] 18  BP: ()/(57-82) 132/82        Physical Exam:  NAD, alert and oriented  OP clear, dry MM  Neck supple  No LAD  Tachy  CTAB  +BS, TTP epigastric area, mild distention/bloating  No c/c/e  No rashes  Normal affect  PAIGE    Results Reviewed:  I have personally reviewed current lab, radiology, and data and agree.    Results from last 7 days   Lab Units 19  0525 19  1758   WBC 10*3/mm3 10.37 14.76*   HEMOGLOBIN g/dL 12.6 14.4   HEMATOCRIT % 40.4 44.9   PLATELETS 10*3/mm3 329 370     Results from last 7 days   Lab Units 19  0403 19  1807 19  1758   SODIUM mmol/L 138  --  138   POTASSIUM mmol/L 4.0  --  3.9   CHLORIDE mmol/L 101  --  97*   CO2 mmol/L 26.0  --  26.0   BUN mg/dL 27*  --  27*   CREATININE mg/dL 1.99*  --  2.04*   GLUCOSE mg/dL 96  --  78   CALCIUM mg/dL 8.9  --  9.8   ALT (SGPT) U/L  --   --  9   AST (SGOT) U/L  --   --  11   TROPONIN I ng/mL  --  0.00  --      Estimated Creatinine Clearance: 40.4 mL/min (A) (by C-G formula based on SCr of 1.99 mg/dL (H)).    No results found for: BNP    Microbiology Results Abnormal     None          Imaging Results (last 24 hours)     Procedure Component Value Units Date/Time    CT Abdomen Pelvis  Without Contrast [[id number]] Collected:  04/12/19 2110     Updated:  04/12/19 2110    Narrative:       EXAMINATION: CT ABDOMEN/PELVIS WO CONTRAST, CT CHEST WO CONTRAST -  04/12/2019      INDICATION: Abdominal pain.     TECHNIQUE: Imaging is obtained of the chest, abdomen and pelvis without  the administration of oral and intravenous contrast.     The radiation dose reduction device was turned on for each scan per the  ALARA (As Low as Reasonably Achievable) protocol.     COMPARISON: NONE     FINDINGS:      CHEST: The thyroid is homogeneous in appearance. No mediastinal mass or  adenopathy. The cardiac chambers are within normal limits. There is no  pericardial effusion. No bulky hilar or axillary lymphadenopathy. The  lung parenchyma is grossly clear. No parenchymal consolidation,  pulmonary mass or nodule identified. Minimal scarring identified at the  right lung base.     ABDOMEN: The liver is homogeneous. Spleen is unremarkable. Kidneys and  adrenal glands are within normal limits. There are no stones seen in the  gallbladder. Pancreas is homogeneous. No abdominal or retroperitoneal  lymphadenopathy. Diverticulosis of the colon without evidence of  diverticulitis. There is a cyst seen within the right kidney.     PELVIS: The pelvic organs are unremarkable. The pelvic portion of the  gastrointestinal tract is within normal limits. Small periumbilical  hernia containing mesenteric fat only. Diverticulosis with no evidence  of diverticulitis. No free fluid or free air. No pelvic adenopathy. The  pelvic organs are unremarkable. No abnormal mass or fluid collections  identified. The bony structures reveal no evidence of osseous  abnormality. Degenerative change is seen within the spine and pelvis.       Impression:       No CT evidence of acute intrathoracic abnormality. No acute  intra-abdominal or pelvic abnormality.     DICTATED:   04/12/2019  EDITED/ls :   04/12/2019           CT Chest Without Contrast [[id number]]  Collected:  04/12/19 2110     Updated:  04/12/19 2110    Narrative:       EXAMINATION: CT ABDOMEN/PELVIS WO CONTRAST, CT CHEST WO CONTRAST -  04/12/2019      INDICATION: Abdominal pain.     TECHNIQUE: Imaging is obtained of the chest, abdomen and pelvis without  the administration of oral and intravenous contrast.     The radiation dose reduction device was turned on for each scan per the  ALARA (As Low as Reasonably Achievable) protocol.     COMPARISON: NONE     FINDINGS:      CHEST: The thyroid is homogeneous in appearance. No mediastinal mass or  adenopathy. The cardiac chambers are within normal limits. There is no  pericardial effusion. No bulky hilar or axillary lymphadenopathy. The  lung parenchyma is grossly clear. No parenchymal consolidation,  pulmonary mass or nodule identified. Minimal scarring identified at the  right lung base.     ABDOMEN: The liver is homogeneous. Spleen is unremarkable. Kidneys and  adrenal glands are within normal limits. There are no stones seen in the  gallbladder. Pancreas is homogeneous. No abdominal or retroperitoneal  lymphadenopathy. Diverticulosis of the colon without evidence of  diverticulitis. There is a cyst seen within the right kidney.     PELVIS: The pelvic organs are unremarkable. The pelvic portion of the  gastrointestinal tract is within normal limits. Small periumbilical  hernia containing mesenteric fat only. Diverticulosis with no evidence  of diverticulitis. No free fluid or free air. No pelvic adenopathy. The  pelvic organs are unremarkable. No abnormal mass or fluid collections  identified. The bony structures reveal no evidence of osseous  abnormality. Degenerative change is seen within the spine and pelvis.       Impression:       No CT evidence of acute intrathoracic abnormality. No acute  intra-abdominal or pelvic abnormality.     DICTATED:   04/12/2019  EDITED/ls :   04/12/2019           XR Chest 1 View [[id number]] Collected:  04/12/19 2031     Updated:   04/12/19 2031    Narrative:          EXAMINATION: XR CHEST 1 VW - 04/12/2019     INDICATION: Weakness, dizziness, altered mental status.     COMPARISON: 04/05/2019     FINDINGS: Portable chest reveals the heart to be borderline enlarged.  Ill-defined opacification at the left lung base with small left pleural  effusion. Lung fields are grossly clear. Degenerative change is seen  within the spine. Pulmonary vascularity is within normal limits.           Impression:       Mild increased markings left lung base with small left  pleural effusion.     DICTATED:   04/12/2019  EDITED/ls :   04/12/2019                      I have reviewed the medications:    Current Facility-Administered Medications:   •  acetaminophen (TYLENOL) tablet 650 mg, 650 mg, Oral, Q4H PRN, Nancy Talley, APRN, 650 mg at 04/13/19 0452  •  amLODIPine (NORVASC) tablet 10 mg, 10 mg, Oral, Daily, Nancy Talley, APRN, 10 mg at 04/13/19 0835  •  atorvastatin (LIPITOR) tablet 40 mg, 40 mg, Oral, Daily, Nancy Talley, APRN, 40 mg at 04/13/19 0835  •  dextrose (D50W) 25 g/ 50mL Intravenous Solution 25 g, 25 g, Intravenous, Q15 Min PRN, Anel Burrows G, DO  •  dextrose (GLUTOSE) oral gel 15 g, 15 g, Oral, Q15 Min PRN, Anel Burrows G, DO  •  FLUoxetine (PROzac) capsule 40 mg, 40 mg, Oral, Daily, Nancy Talley, APRN, 40 mg at 04/13/19 0835  •  glucagon (human recombinant) (GLUCAGEN DIAGNOSTIC) injection 1 mg, 1 mg, Subcutaneous, PRN, Anel Burrows G, DO  •  heparin (porcine) 5000 UNIT/ML injection 5,000 Units, 5,000 Units, Subcutaneous, Q8H, Nancy Talley, APRN  •  insulin lispro (humaLOG) injection 0-7 Units, 0-7 Units, Subcutaneous, 4x Daily With Meals & Nightly, Anel Burrows G, DO  •  levothyroxine (SYNTHROID, LEVOTHROID) tablet 25 mcg, 25 mcg, Oral, Daily, Nancy Talley, APRN, 25 mcg at 04/13/19 0452  •  melatonin tablet 5 mg, 5 mg, Oral, Nightly PRN, Nancy Talley, APRN, 5 mg at 04/12/19 0112  •  nicotine (NICODERM CQ) 21 MG/24HR patch 1  patch, 1 patch, Transdermal, Q24H, Stormy, Anel G, DO  •  ondansetron (ZOFRAN) tablet 4 mg, 4 mg, Oral, Q6H PRN, Nancy Talley, APRN, 4 mg at 04/12/19 2246  •  pantoprazole (PROTONIX) injection 40 mg, 40 mg, Intravenous, Q12H, StormyNolaAnel G, DO, 40 mg at 04/13/19 0835  •  sodium chloride 0.9 % flush 10 mL, 10 mL, Intravenous, PRN, Erick Dupont MD  •  sodium chloride 0.9 % flush 3 mL, 3 mL, Intravenous, Q12H, Nancy Talley, APRN, 3 mL at 04/13/19 0836  •  sodium chloride 0.9 % flush 3-10 mL, 3-10 mL, Intravenous, PRN, Nancy Talley, APRN  •  sodium chloride 0.9 % infusion, 100 mL/hr, Intravenous, Continuous, Nancy Talley, APRN, Last Rate: 100 mL/hr at 04/13/19 0835, 100 mL/hr at 04/13/19 0835      Assessment/Plan   Assessment / Plan     Active Hospital Problems    Diagnosis POA   • **JACKIE (acute kidney injury) (CMS/[facility]) [N17.9] Yes   • Long QT interval [R94.31] Unknown   • Essential hypertension [I10] Unknown   • Hypothyroidism [E03.9] Unknown   • Type 2 diabetes mellitus (CMS/[facility]) [E11.9] Unknown   • Tobacco abuse [Z72.0] Unknown   • CKD (chronic kidney disease) stage 3, GFR 30-59 ml/min (CMS/[facility]) [N18.3] Unknown   • Abdominal pain [R10.9] Unknown          Brief Hospital Course to date:  Freida Martinez is a 49 y.o. female here with nausea and abdominal pain, with anorexia, and JACKIE.    Nausea/abdominal pain  --CT with no pancreatitis/cholecystitis/enteritis/diverticulitis, normal lactic acid  --check lipase  --unremarkable LFTs  --no change in stool, no diarrhea  --no dysphagia/odynophagia  --consult GI, may benefit from EGD  JACKIE  --s/p 2 L IVF, with little improvement  --consult nephrology  Hx of HTN  Hypothyroidism  DM  Tobacco abuse    DVT Prophylaxis:  BARTOLO    Disposition: I expect the patient to be discharged in 1-2 days.    CODE STATUS:   Code Status and Medical Interventions:   Ordered at: 04/12/19 2114     Level Of Support Discussed With:    Patient     Code Status:    CPR     Medical  Interventions (Level of Support Prior to Arrest):    Full         Electronically signed by Helio Santamaria MD, 04/13/19, 8:45 AM.

## 2019-04-13 NOTE — CONSULTS
[facility]    Referring Provider: No ref. provider found    Primary Care Provider: Erica Beatty APRN    Reason for Consultation: Abdominal pain nausea vomiting    Chief complaint abdominal pain nausea vomiting    History of present illness:  Freida Martinez is a 49 y.o. female who is admitted with abdominal pain nausea and dehydration.  She was in ER last week with complaints of generalized weakness.  She states since that time she is increased epigastric abdominal pain.  Worse after eating.  Has had some nausea but no vomiting.  She denies aspirins and NSAIDs that she has renal insufficiency.  No diarrhea constipation.  No prior history peptic ulcer disease.  No prior colonoscopy.  She still has her gallbladder.  She has CT scan abdomen pelvis last week which was unrevealing and repeat yesterday which was normal  Allergies:  Lisinopril    Scheduled Meds:    amLODIPine 10 mg Oral Daily   atorvastatin 40 mg Oral Daily   FLUoxetine 40 mg Oral Daily   heparin (porcine) 5,000 Units Subcutaneous Q8H   insulin lispro 0-7 Units Subcutaneous 4x Daily With Meals & Nightly   levothyroxine 25 mcg Oral Daily   nicotine 1 patch Transdermal Q24H   pantoprazole 40 mg Intravenous Q12H   sodium chloride 3 mL Intravenous Q12H        Infusions:    sodium chloride 100 mL/hr Last Rate: 100 mL/hr (04/13/19 0595)       PRN Meds:  •  acetaminophen  •  dextrose  •  dextrose  •  glucagon (human recombinant)  •  melatonin  •  ondansetron  •  sodium chloride  •  sodium chloride    Home Meds:  Medications Prior to Admission   Medication Sig Dispense Refill Last Dose   • amLODIPine (NORVASC) 10 MG tablet Take 10 mg by mouth Daily.      • atorvastatin (LIPITOR) 40 MG tablet Take 40 mg by mouth Daily.      • FLUoxetine (PROZAC) 40 MG capsule Take 40 mg by mouth Daily.      • GLIPIZIDE PO Take 5 mg by mouth Daily.      • levothyroxine (SYNTHROID, LEVOTHROID) 25 MCG tablet Take 25 mcg by mouth Daily.      •  losartan-hydrochlorothiazide (HYZAAR) 50-12.5 MG per tablet Take 1 tablet by mouth Daily.      • metFORMIN (GLUCOPHAGE) 500 MG tablet Take 500 mg by mouth 2 (Two) Times a Day With Meals.      • aspirin 325 MG tablet Take 325 mg by mouth Daily.      • cefdinir (OMNICEF) 300 MG capsule Take 1 capsule by mouth 2 (Two) Times a Day. (Patient not taking: Reported on 4/12/2019) 14 capsule 0 Not Taking at Unknown time   • cyclobenzaprine (FLEXERIL) 10 MG tablet Take 1 tablet by mouth 3 (Three) Times a Day As Needed for Muscle Spasms. (Patient not taking: Reported on 4/12/2019) 15 tablet 0 Not Taking at Unknown time   • Ergocalciferol (VITAMIN D2 PO) Take 50,000 Units by mouth 1 (One) Time Per Week.      • naproxen (EC NAPROSYN) 500 MG EC tablet Take 1 tablet by mouth 2 (Two) Times a Day As Needed (PAIN). (Patient not taking: Reported on 4/12/2019) 14 tablet 0 Not Taking at Unknown time   • phenazopyridine (PYRIDIUM) 200 MG tablet Take 1 tablet by mouth 3 (Three) Times a Day As Needed for bladder spasms. (Patient not taking: Reported on 4/12/2019) 6 tablet 0 Not Taking at Unknown time   • PredniSONE (DELTASONE) 10 MG (21) tablet pack Use as directed on package (Patient not taking: Reported on 4/12/2019) 21 tablet 0 Not Taking at Unknown time       ROS: Review of Systems   Constitutional: Positive for unexpected weight change.   HENT: Negative for trouble swallowing.    Respiratory: Positive for shortness of breath.    Cardiovascular: Negative for chest pain.   Gastrointestinal: Positive for abdominal pain. Negative for constipation and diarrhea.   Genitourinary: Negative for difficulty urinating and dysuria.   Neurological: Positive for weakness.   All other systems reviewed and are negative.      PAST MED HX: Pt  has a past medical history of Asthma, Diabetes mellitus (CMS/HCC), Disease of thyroid gland, Hypertension, and Sleep apnea.  PAST SURG HX: Pt  has a past surgical history that includes Tubal ligation.  FAM HX:  "family history includes Cancer in her mother; Diabetes in her father.  SOC HX: Pt  reports that she has been smoking cigarettes.  She has been smoking about 1.00 pack per day. She has never used smokeless tobacco. She reports that she does not drink alcohol or use drugs.    /82 (BP Location: Left arm, Patient Position: Lying)   Pulse 90   Temp 98.4 °F (36.9 °C) (Oral)   Resp 18   Ht 162.6 cm (64.02\")   Wt 105 kg (232 lb 6.4 oz)   SpO2 93%   BMI 39.87 kg/m²     Physical Exam  Wt Readings from Last 3 Encounters:   04/12/19 105 kg (232 lb 6.4 oz)   04/05/19 129 kg (285 lb)   12/30/18 129 kg (285 lb)   ,body mass index is 39.87 kg/m².    General Well developed; well nourished; no acute distress.   ENT Good dentition.  Oral mucosa pink & moist without thrush or lesions.    Neck Neck supple; trachea midline. No thyromegaly  Resp CTA; no rhonchi, rales, or wheezes.  Respiration effort normal  CV RRR; ; no M/R/G. No lower extremity edema  GI Abd soft, NT, ND, normal active bowel sounds.  No HSM.  No abd hernia  Skin No rash; no lesions; no bruises.  Skin turgor normal  Musc No clubbing; no cyanosis.    Psych Oriented to time, place, and person.  Appropriate affect      Results Review:   I reviewed the patient's new clinical results.  I reviewed the patient's new imaging results and agree with the interpretation.    Lab Results   Component Value Date    WBC 10.37 04/13/2019    HGB 12.6 04/13/2019    HCT 40.4 04/13/2019    MCV 85.8 04/13/2019     04/13/2019       Lab Results   Component Value Date    GLUCOSE 96 04/13/2019    BUN 27 (H) 04/13/2019    CREATININE 1.99 (H) 04/13/2019    EGFRIFNONA 27 (L) 04/13/2019    BCR 13.6 04/13/2019    CO2 26.0 04/13/2019    CALCIUM 8.9 04/13/2019    ALBUMIN 4.10 04/12/2019    AST 11 04/12/2019    ALT 9 04/12/2019       ASSESSMENTS/PLANS      #1 epigastric abdominal pain  2.  Nausea vomiting    Symptoms sound peptic in nature.  She denies NSAIDs or aspirin.  Continue IV " PPI.  Will plan for EGD on Monday.    LFTs normal.  The pain does not sound biliary in nature.    Suggested that she undergo screening colonoscopy after discharge.    Also suggested that she stop smoking again.  She did not seem receptive to that idea      I discussed the patients findings and my recommendations with patient    Jude Clinton MD  04/13/19  11:03 AM

## 2019-04-13 NOTE — H&P
[facility] Medicine Services  HISTORY AND PHYSICAL    Patient Name: Freida Martinez  : 1970  MRN: [id number]  Primary Care Physician: Erica Beatty APRN  Date of admission: 2019      Subjective   Subjective     Chief Complaint:      HPI:  Freida Martinez is a 49 y.o. female with past medical history significant for type 2 diabetes, hypothyroidism, sleep apnea, and hypertension.  She presents to ED with complaints of generalized weakness, nausea, abdominal pain, and chills for the past week.  She was evaluated in the ED for her presenting symptoms about a week ago.  She was diagnosed with renal insufficiency and discharged home.  She had follow-up with her primary care provider today.  After examination, her primary care provider sent her here for another evaluation because her symptoms had not improved.  Patient states that she has had decreased appetite and p.o. intake due to increased abdominal pain with oral intake.  She reports persistent nausea.  She denies vomiting, chest pain, shortness of breath, diarrhea, constipation, or other acute symptoms.  She denies alcohol or NSAID use.  In ED, WBC 14.76, BUN 27, creatinine 2.04 (creatinine was 1.54 a week ago).  CT of abdomen/pelvis and chest were unremarkable.  Patient will be admitted to hospital medicine service for further evaluation and management.    Review of Systems   Constitutional: Positive for appetite change and chills. Negative for fever.   HENT: Negative.    Respiratory: Negative for cough, shortness of breath and wheezing.    Cardiovascular: Negative for chest pain, palpitations and leg swelling.   Gastrointestinal: Positive for abdominal pain and nausea. Negative for constipation, diarrhea and vomiting.   Genitourinary: Negative for difficulty urinating and dysuria.   Musculoskeletal: Negative.    Skin: Negative.    Neurological: Positive for dizziness and weakness.   Psychiatric/Behavioral: Negative.        Otherwise complete ROS reviewed and is negative except as mentioned in the HPI.    Personal History     Past Medical History:   Diagnosis Date   • Asthma    • Diabetes mellitus (CMS/HCC)    • Disease of thyroid gland    • Hypertension    • Sleep apnea        Past Surgical History:   Procedure Laterality Date   • TUBAL ABDOMINAL LIGATION         Family History: family history includes Cancer in her mother; Diabetes in her father. Otherwise pertinent FHx was reviewed and unremarkable.     Social History:  reports that she has been smoking cigarettes.  She has been smoking about 1.00 pack per day. She has never used smokeless tobacco. She reports that she does not drink alcohol or use drugs.  Social History     Social History Narrative   • Not on file       Medications:    Available home medication information reviewed.  Medications Prior to Admission   Medication Sig Dispense Refill Last Dose   • amLODIPine (NORVASC) 10 MG tablet Take 10 mg by mouth Daily.      • atorvastatin (LIPITOR) 40 MG tablet Take 40 mg by mouth Daily.      • FLUoxetine (PROZAC) 40 MG capsule Take 40 mg by mouth Daily.      • GLIPIZIDE PO Take 5 mg by mouth Daily.      • levothyroxine (SYNTHROID, LEVOTHROID) 25 MCG tablet Take 25 mcg by mouth Daily.      • losartan-hydrochlorothiazide (HYZAAR) 50-12.5 MG per tablet Take 1 tablet by mouth Daily.      • metFORMIN (GLUCOPHAGE) 500 MG tablet Take 500 mg by mouth 2 (Two) Times a Day With Meals.      • aspirin 325 MG tablet Take 325 mg by mouth Daily.      • cefdinir (OMNICEF) 300 MG capsule Take 1 capsule by mouth 2 (Two) Times a Day. (Patient not taking: Reported on 4/12/2019) 14 capsule 0 Not Taking at Unknown time   • cyclobenzaprine (FLEXERIL) 10 MG tablet Take 1 tablet by mouth 3 (Three) Times a Day As Needed for Muscle Spasms. (Patient not taking: Reported on 4/12/2019) 15 tablet 0 Not Taking at Unknown time   • Ergocalciferol (VITAMIN D2 PO) Take 50,000 Units by mouth 1 (One) Time Per Week.       • naproxen (EC NAPROSYN) 500 MG EC tablet Take 1 tablet by mouth 2 (Two) Times a Day As Needed (PAIN). (Patient not taking: Reported on 4/12/2019) 14 tablet 0 Not Taking at Unknown time   • phenazopyridine (PYRIDIUM) 200 MG tablet Take 1 tablet by mouth 3 (Three) Times a Day As Needed for bladder spasms. (Patient not taking: Reported on 4/12/2019) 6 tablet 0 Not Taking at Unknown time   • PredniSONE (DELTASONE) 10 MG (21) tablet pack Use as directed on package (Patient not taking: Reported on 4/12/2019) 21 tablet 0 Not Taking at Unknown time       Allergies   Allergen Reactions   • Lisinopril Swelling       Objective   Objective     Vital Signs:   Temp:  [98.4 °F (36.9 °C)-98.5 °F (36.9 °C)] 98.4 °F (36.9 °C)  Heart Rate:  [81-93] 81  Resp:  [18] 18  BP: (102-107)/(74-78) 102/78        Physical Exam   Constitutional: Awake, alert, morbidly obese  Eyes: Sclerae anicteric, no conjunctival injection  HENT: NCAT, mucous membranes dry  Neck: Supple, no thyromegaly, no lymphadenopathy, trachea midline  Respiratory: Clear to auscultation bilaterally, nonlabored respirations on room air  Cardiovascular: RRR, no murmurs, rubs, or gallops, palpable pedal pulses bilaterally  Gastrointestinal: Positive bowel sounds, soft, generalized tenderness to palpation  Musculoskeletal: No bilateral ankle edema, no clubbing or cyanosis to extremities  Psychiatric: Appropriate affect, cooperative  Neurologic: Oriented x 3, strength symmetric in all extremities, Cranial Nerves grossly intact to confrontation, speech clear  Skin: No rashes    Results Reviewed:  I have personally reviewed current lab, radiology, and data and agree.    Results from last 7 days   Lab Units 04/12/19  1758   WBC 10*3/mm3 14.76*   HEMOGLOBIN g/dL 14.4   HEMATOCRIT % 44.9   PLATELETS 10*3/mm3 370     Results from last 7 days   Lab Units 04/12/19  1807 04/12/19  1758   SODIUM mmol/L  --  138   POTASSIUM mmol/L  --  3.9   CHLORIDE mmol/L  --  97*   CO2 mmol/L  --   26.0   BUN mg/dL  --  27*   CREATININE mg/dL  --  2.04*   GLUCOSE mg/dL  --  78   CALCIUM mg/dL  --  9.8   ALT (SGPT) U/L  --  9   AST (SGOT) U/L  --  11   TROPONIN I ng/mL 0.00  --      Estimated Creatinine Clearance: 41.1 mL/min (A) (by C-G formula based on SCr of 2.04 mg/dL (H)).  Brief Urine Lab Results  (Last result in the past 365 days)      Color   Clarity   Blood   Leuk Est   Nitrite   Protein   CREAT   Urine HCG        04/12/19 1807               Negative         No results found for: BNP  Imaging Results (last 24 hours)     Procedure Component Value Units Date/Time    CT Abdomen Pelvis Without Contrast [[id number]] Collected:  04/12/19 2110     Updated:  04/12/19 2110    Narrative:       EXAMINATION: CT ABDOMEN/PELVIS WO CONTRAST, CT CHEST WO CONTRAST -  04/12/2019      INDICATION: Abdominal pain.     TECHNIQUE: Imaging is obtained of the chest, abdomen and pelvis without  the administration of oral and intravenous contrast.     The radiation dose reduction device was turned on for each scan per the  ALARA (As Low as Reasonably Achievable) protocol.     COMPARISON: NONE     FINDINGS:      CHEST: The thyroid is homogeneous in appearance. No mediastinal mass or  adenopathy. The cardiac chambers are within normal limits. There is no  pericardial effusion. No bulky hilar or axillary lymphadenopathy. The  lung parenchyma is grossly clear. No parenchymal consolidation,  pulmonary mass or nodule identified. Minimal scarring identified at the  right lung base.     ABDOMEN: The liver is homogeneous. Spleen is unremarkable. Kidneys and  adrenal glands are within normal limits. There are no stones seen in the  gallbladder. Pancreas is homogeneous. No abdominal or retroperitoneal  lymphadenopathy. Diverticulosis of the colon without evidence of  diverticulitis. There is a cyst seen within the right kidney.     PELVIS: The pelvic organs are unremarkable. The pelvic portion of the  gastrointestinal tract is within normal  limits. Small periumbilical  hernia containing mesenteric fat only. Diverticulosis with no evidence  of diverticulitis. No free fluid or free air. No pelvic adenopathy. The  pelvic organs are unremarkable. No abnormal mass or fluid collections  identified. The bony structures reveal no evidence of osseous  abnormality. Degenerative change is seen within the spine and pelvis.       Impression:       No CT evidence of acute intrathoracic abnormality. No acute  intra-abdominal or pelvic abnormality.     DICTATED:   04/12/2019  EDITED/ls :   04/12/2019           CT Chest Without Contrast [[id number]] Collected:  04/12/19 2110     Updated:  04/12/19 2110    Narrative:       EXAMINATION: CT ABDOMEN/PELVIS WO CONTRAST, CT CHEST WO CONTRAST -  04/12/2019      INDICATION: Abdominal pain.     TECHNIQUE: Imaging is obtained of the chest, abdomen and pelvis without  the administration of oral and intravenous contrast.     The radiation dose reduction device was turned on for each scan per the  ALARA (As Low as Reasonably Achievable) protocol.     COMPARISON: NONE     FINDINGS:      CHEST: The thyroid is homogeneous in appearance. No mediastinal mass or  adenopathy. The cardiac chambers are within normal limits. There is no  pericardial effusion. No bulky hilar or axillary lymphadenopathy. The  lung parenchyma is grossly clear. No parenchymal consolidation,  pulmonary mass or nodule identified. Minimal scarring identified at the  right lung base.     ABDOMEN: The liver is homogeneous. Spleen is unremarkable. Kidneys and  adrenal glands are within normal limits. There are no stones seen in the  gallbladder. Pancreas is homogeneous. No abdominal or retroperitoneal  lymphadenopathy. Diverticulosis of the colon without evidence of  diverticulitis. There is a cyst seen within the right kidney.     PELVIS: The pelvic organs are unremarkable. The pelvic portion of the  gastrointestinal tract is within normal limits. Small  periumbilical  hernia containing mesenteric fat only. Diverticulosis with no evidence  of diverticulitis. No free fluid or free air. No pelvic adenopathy. The  pelvic organs are unremarkable. No abnormal mass or fluid collections  identified. The bony structures reveal no evidence of osseous  abnormality. Degenerative change is seen within the spine and pelvis.       Impression:       No CT evidence of acute intrathoracic abnormality. No acute  intra-abdominal or pelvic abnormality.     DICTATED:   04/12/2019  EDITED/ls :   04/12/2019           XR Chest 1 View [[id number]] Collected:  04/12/19 2031     Updated:  04/12/19 2031    Narrative:          EXAMINATION: XR CHEST 1 VW - 04/12/2019     INDICATION: Weakness, dizziness, altered mental status.     COMPARISON: 04/05/2019     FINDINGS: Portable chest reveals the heart to be borderline enlarged.  Ill-defined opacification at the left lung base with small left pleural  effusion. Lung fields are grossly clear. Degenerative change is seen  within the spine. Pulmonary vascularity is within normal limits.           Impression:       Mild increased markings left lung base with small left  pleural effusion.     DICTATED:   04/12/2019  EDITED/ls :   04/12/2019                    Assessment/Plan   Assessment / Plan     Active Hospital Problems    Diagnosis POA   • **JACKIE (acute kidney injury) (CMS/[facility]) [N17.9] Yes   • Essential hypertension [I10] Unknown   • Hypothyroidism [E03.9] Unknown   • Type 2 diabetes mellitus (CMS/[facility]) [E11.9] Unknown   • Tobacco abuse [Z72.0] Unknown   • CKD (chronic kidney disease) stage 3, GFR 30-59 ml/min (CMS/[facility]) [N18.3] Unknown   • Abdominal pain [R10.9] Unknown         JACKIE on CKD stage III  --creatinine 2.04  --received 2 liters NS in ED  --IV fluids overnight  --avoid nephrotoxic agents and hold home lisinopril, metformin, HCTZ NSAIDs  --check labs in AM  --Creatinine 1.540 1/5/19  --Renal ultrasound  --Consider nephrology consult.  Was seen  outpatient by nephrology around 1 year ago and did not keep her follow-up appointment.    Abdominal pain with leukocytosis  --WBC 14.76  --no source of infection noted   --Sed rate, CRP, lactic acid pending.    --Contrast CT WNL.  Consider CT angiogram to rule out mesenteric ischemia  --Protonix 40 mg IV twice daily.  No history of NSAIDs or PUD.  -Consider gastroenterology consult    Hypertension  --continue home meds   --hold HCTZ    Hypothyroidism  --continue levothyroxine    Type 2 diabetes, uncontrolled  --noted A1c 9.0 on record from PCP, repeat in AM  --diabetes educator consulted  --hold metformin and glipizide  --FSBG w/ssi    Tobacco abuse  --smokes 1 PPD, declines nicotine patch  --smoking cessation education at discharge    DVT prophylaxis:  heparin    CODE STATUS:    Code Status and Medical Interventions:   Ordered at: 04/12/19 2114     Level Of Support Discussed With:    Patient     Code Status:    CPR     Medical Interventions (Level of Support Prior to Arrest):    Full       Admission Status:  I believe this patient meets OBSERVATION status, however if further evaluation or treatment plans warrant, status may change.  Based upon current information, I predict patient's care encounter to be less than or equal to 2 midnights.      Electronically signed by LUCIA Quinteros, 04/12/19, 8:58 PM.      Brief Attending Admission Attestation     I have seen and examined the patient, performing an independent face-to-face diagnostic evaluation with plan of care reviewed and developed with the advanced practice clinician (APC).      Brief Summary Statement:   Freida Martinez is a 49 y.o. female with a past medical history significant for CKD stage III, hypertension, diabetes mellitus who presents to the ED due to abdominal pain and nausea.  Patient reports onset of abdominal pain 1 week ago.  She was initially seen in the ED with a negative workup and discharged home.  She reports continued abdominal pain  which is worse with oral intake.  She reports persistent nausea and intermittent chills over the past 1 week.  Was seen by her PCP today and encouraged to return to the ED for further evaluation and care.    Remainder of detailed HPI is as noted above and has been reviewed and/or edited by me for completeness.      Attending Physical Exam:  Constitutional: Awake, alert  Eyes: PERRLA, sclerae anicteric, no conjunctival injection  HENT: NCAT, mucous membranes moist  Neck: Supple, no thyromegaly, no lymphadenopathy, trachea midline  Respiratory: Clear to auscultation bilaterally, nonlabored respirations   Cardiovascular: RRR, no murmurs, rubs, or gallops, palpable pedal pulses bilaterally  Gastrointestinal: Positive bowel sounds, soft, diffuse abdominal tenderness, nondistended  Musculoskeletal: No bilateral ankle edema, no clubbing or cyanosis to extremities  Psychiatric: Appropriate affect, cooperative  Neurologic: Oriented x 3, strength symmetric in all extremities, Cranial Nerves grossly intact to confrontation, speech clear  Skin: No rashes        Brief Assessment/Plan :  See above for further detailed assessment and plan developed with APC which I have reviewed and/or edited for completeness.      Electronically signed by Anel Burrows DO, 04/12/19, 10:44 PM.

## 2019-04-14 PROBLEM — R10.13 EPIGASTRIC PAIN: Status: ACTIVE | Noted: 2019-04-12

## 2019-04-14 PROBLEM — E86.9 VOLUME DEPLETION: Status: ACTIVE | Noted: 2019-04-14

## 2019-04-14 LAB
ALBUMIN SERPL-MCNC: 3.5 G/DL (ref 3.5–5.2)
ALBUMIN/GLOB SERPL: 1 G/DL
ALP SERPL-CCNC: 70 U/L (ref 39–117)
ALT SERPL W P-5'-P-CCNC: 7 U/L (ref 1–33)
ANION GAP SERPL CALCULATED.3IONS-SCNC: 11 MMOL/L
AST SERPL-CCNC: 9 U/L (ref 1–32)
BILIRUB SERPL-MCNC: 0.5 MG/DL (ref 0.2–1.2)
BUN BLD-MCNC: 20 MG/DL (ref 6–20)
BUN/CREAT SERPL: 10.8 (ref 7–25)
CALCIUM SPEC-SCNC: 9.2 MG/DL (ref 8.6–10.5)
CHLORIDE SERPL-SCNC: 102 MMOL/L (ref 98–107)
CO2 SERPL-SCNC: 25 MMOL/L (ref 22–29)
CREAT BLD-MCNC: 1.85 MG/DL (ref 0.57–1)
DEPRECATED RDW RBC AUTO: 47.7 FL (ref 37–54)
ERYTHROCYTE [DISTWIDTH] IN BLOOD BY AUTOMATED COUNT: 15 % (ref 12.3–15.4)
GFR SERPL CREATININE-BSD FRML MDRD: 29 ML/MIN/1.73
GLOBULIN UR ELPH-MCNC: 3.6 GM/DL
GLUCOSE BLD-MCNC: 131 MG/DL (ref 65–99)
GLUCOSE BLDC GLUCOMTR-MCNC: 111 MG/DL (ref 70–130)
GLUCOSE BLDC GLUCOMTR-MCNC: 115 MG/DL (ref 70–130)
GLUCOSE BLDC GLUCOMTR-MCNC: 116 MG/DL (ref 70–130)
GLUCOSE BLDC GLUCOMTR-MCNC: 131 MG/DL (ref 70–130)
GLUCOSE BLDC GLUCOMTR-MCNC: 139 MG/DL (ref 70–130)
HCT VFR BLD AUTO: 39.6 % (ref 34–46.6)
HGB BLD-MCNC: 12.5 G/DL (ref 12–15.9)
MCH RBC QN AUTO: 27.2 PG (ref 26.6–33)
MCHC RBC AUTO-ENTMCNC: 31.6 G/DL (ref 31.5–35.7)
MCV RBC AUTO: 86.1 FL (ref 79–97)
PHOSPHATE SERPL-MCNC: 2.8 MG/DL (ref 2.5–4.5)
PLATELET # BLD AUTO: 314 10*3/MM3 (ref 140–450)
PMV BLD AUTO: 11.3 FL (ref 6–12)
POTASSIUM BLD-SCNC: 3.8 MMOL/L (ref 3.5–5.2)
PROT SERPL-MCNC: 7.1 G/DL (ref 6–8.5)
RBC # BLD AUTO: 4.6 10*6/MM3 (ref 3.77–5.28)
SODIUM BLD-SCNC: 138 MMOL/L (ref 136–145)
WBC NRBC COR # BLD: 9.89 10*3/MM3 (ref 3.4–10.8)

## 2019-04-14 PROCEDURE — 99226 PR SBSQ OBSERVATION CARE/DAY 35 MINUTES: CPT | Performed by: HOSPITALIST

## 2019-04-14 PROCEDURE — 96376 TX/PRO/DX INJ SAME DRUG ADON: CPT

## 2019-04-14 PROCEDURE — 84100 ASSAY OF PHOSPHORUS: CPT | Performed by: INTERNAL MEDICINE

## 2019-04-14 PROCEDURE — 82962 GLUCOSE BLOOD TEST: CPT

## 2019-04-14 PROCEDURE — 80053 COMPREHEN METABOLIC PANEL: CPT | Performed by: HOSPITALIST

## 2019-04-14 PROCEDURE — G0378 HOSPITAL OBSERVATION PER HR: HCPCS

## 2019-04-14 PROCEDURE — 99232 SBSQ HOSP IP/OBS MODERATE 35: CPT | Performed by: INTERNAL MEDICINE

## 2019-04-14 PROCEDURE — 85027 COMPLETE CBC AUTOMATED: CPT | Performed by: HOSPITALIST

## 2019-04-14 RX ORDER — TRAMADOL HYDROCHLORIDE 50 MG/1
50 TABLET ORAL EVERY 6 HOURS PRN
Status: DISCONTINUED | OUTPATIENT
Start: 2019-04-14 | End: 2019-04-19 | Stop reason: HOSPADM

## 2019-04-14 RX ADMIN — ACETAMINOPHEN 650 MG: 325 TABLET, FILM COATED ORAL at 20:56

## 2019-04-14 RX ADMIN — PANTOPRAZOLE SODIUM 40 MG: 40 INJECTION, POWDER, FOR SOLUTION INTRAVENOUS at 20:49

## 2019-04-14 RX ADMIN — PANTOPRAZOLE SODIUM 40 MG: 40 INJECTION, POWDER, FOR SOLUTION INTRAVENOUS at 08:19

## 2019-04-14 RX ADMIN — MELATONIN TAB 5 MG 5 MG: 5 TAB at 20:49

## 2019-04-14 RX ADMIN — ACETAMINOPHEN 650 MG: 325 TABLET, FILM COATED ORAL at 06:34

## 2019-04-14 RX ADMIN — ATORVASTATIN CALCIUM 40 MG: 40 TABLET, FILM COATED ORAL at 08:19

## 2019-04-14 RX ADMIN — FLUOXETINE HYDROCHLORIDE 40 MG: 20 CAPSULE ORAL at 08:19

## 2019-04-14 RX ADMIN — SODIUM CHLORIDE, PRESERVATIVE FREE 3 ML: 5 INJECTION INTRAVENOUS at 20:50

## 2019-04-14 RX ADMIN — AMLODIPINE BESYLATE 10 MG: 10 TABLET ORAL at 08:19

## 2019-04-14 RX ADMIN — LEVOTHYROXINE SODIUM 25 MCG: 25 TABLET ORAL at 06:34

## 2019-04-14 NOTE — PROGRESS NOTES
"   LOS: 2 days    Patient Care Team:  Erica Beatty APRN as PCP - General (Obstetrics and Gynecology)  Provider, No Known as PCP - Family Medicine  History of present illness:    49-year-old morbidly obese -American female with history of CKD stage III diagnosed a couple years ago has not followed in the office.  History of diabetes, hypertension.  Patient presented with generalized weakness, nausea, poor oral intake, abdominal pain and chills for a week.  Patient was seen a couple weeks ago in the ER where she was discharged.  At that time creatinine around 1.54.  Patient return to the hospital with a creatinine 2.04 with poor oral intake.  And has been started on IV fluids at this time creatinine down to 1.  9 9.  UA is positive for protein.  Ultrasound of the kidney shows normal size kidney.  CT of the abdomen was negative.     Subjective   New events, no obvious distress.  Renal function improved.     Review of Systems:   Patient denies shortness of breath, chest pain, dysuria, hematuria, nausea, vomiting.      Objective     Vital Sign Min/Max for last 24 hours  Temp  Min: 97.7 °F (36.5 °C)  Max: 98.8 °F (37.1 °C)   BP  Min: 119/81  Max: 137/100   Pulse  Min: 84  Max: 89   Resp  Min: 16  Max: 20   SpO2  Min: 93 %  Max: 94 %   No Data Recorded   No Data Recorded     Flowsheet Rows      First Filed Value   Admission Height  162.6 cm (64\") Documented at 04/12/2019 1715   Admission Weight  113 kg (249 lb) Documented at 04/12/2019 1715          I/O this shift:  In: 240 [P.O.:240]  Out: 900 [Urine:900]  I/O last 3 completed shifts:  In: 2646 [P.O.:660; I.V.:986; IV Piggyback:1000]  Out: 4200 [Urine:4200]    Physical Exam:  General Appearance: Alert, oriented, no obvious distress.  Orbitally obese -American female   eyes: PER, EOMI.  Neck: Supple no JVD.  Lungs: Clear auscultation, no rales rhonchi's, equal chest movement, nonlabored.  Heart: No gallop, murmur, rub, RRR.  Abdomen: Soft, nontender, " positive bowel sounds, no organomegaly.  Obese  Extremities: No edema, no cyanosis.  Neuro: No focal deficit, moving all extremities, alert oriented X 3      WBC WBC   Date Value Ref Range Status   04/14/2019 9.89 3.40 - 10.80 10*3/mm3 Final   04/13/2019 10.37 3.40 - 10.80 10*3/mm3 Final   04/12/2019 14.76 (H) 3.40 - 10.80 10*3/mm3 Final      HGB Hemoglobin   Date Value Ref Range Status   04/14/2019 12.5 12.0 - 15.9 g/dL Final   04/13/2019 12.6 12.0 - 15.9 g/dL Final   04/12/2019 14.4 12.0 - 15.9 g/dL Final      HCT Hematocrit   Date Value Ref Range Status   04/14/2019 39.6 34.0 - 46.6 % Final   04/13/2019 40.4 34.0 - 46.6 % Final   04/12/2019 44.9 34.0 - 46.6 % Final      Platlets No results found for: LABPLAT   MCV MCV   Date Value Ref Range Status   04/14/2019 86.1 79.0 - 97.0 fL Final   04/13/2019 85.8 79.0 - 97.0 fL Final   04/12/2019 84.9 79.0 - 97.0 fL Final          Sodium Sodium   Date Value Ref Range Status   04/14/2019 138 136 - 145 mmol/L Final   04/13/2019 138 136 - 145 mmol/L Final   04/12/2019 138 136 - 145 mmol/L Final      Potassium Potassium   Date Value Ref Range Status   04/14/2019 3.8 3.5 - 5.2 mmol/L Final   04/13/2019 4.0 3.5 - 5.2 mmol/L Final   04/12/2019 3.9 3.5 - 5.2 mmol/L Final      Chloride Chloride   Date Value Ref Range Status   04/14/2019 102 98 - 107 mmol/L Final   04/13/2019 101 98 - 107 mmol/L Final   04/12/2019 97 (L) 98 - 107 mmol/L Final      CO2 CO2   Date Value Ref Range Status   04/14/2019 25.0 22.0 - 29.0 mmol/L Final   04/13/2019 26.0 22.0 - 29.0 mmol/L Final   04/12/2019 26.0 22.0 - 29.0 mmol/L Final      BUN BUN   Date Value Ref Range Status   04/14/2019 20 6 - 20 mg/dL Final   04/13/2019 27 (H) 6 - 20 mg/dL Final   04/12/2019 27 (H) 6 - 20 mg/dL Final      Creatinine Creatinine   Date Value Ref Range Status   04/14/2019 1.85 (H) 0.57 - 1.00 mg/dL Final   04/13/2019 1.99 (H) 0.57 - 1.00 mg/dL Final   04/12/2019 2.04 (H) 0.57 - 1.00 mg/dL Final      Calcium Calcium   Date  Value Ref Range Status   04/14/2019 9.2 8.6 - 10.5 mg/dL Final   04/13/2019 8.9 8.6 - 10.5 mg/dL Final   04/12/2019 9.8 8.6 - 10.5 mg/dL Final      PO4 No results found for: CAPO4   Albumin Albumin   Date Value Ref Range Status   04/14/2019 3.50 3.50 - 5.20 g/dL Final   04/12/2019 4.10 3.50 - 5.20 g/dL Final      Magnesium Magnesium   Date Value Ref Range Status   04/12/2019 2.2 1.6 - 2.6 mg/dL Final      Uric Acid No results found for: URICACID        Results Review:     I reviewed the patient's new clinical results.      amLODIPine 10 mg Oral Daily   atorvastatin 40 mg Oral Daily   FLUoxetine 40 mg Oral Daily   heparin (porcine) 5,000 Units Subcutaneous Q8H   insulin lispro 0-7 Units Subcutaneous 4x Daily With Meals & Nightly   levothyroxine 25 mcg Oral Daily   nicotine 1 patch Transdermal Q24H   pantoprazole 40 mg Intravenous Q12H   sodium chloride 3 mL Intravenous Q12H       sodium chloride 100 mL/hr Last Rate: 100 mL/hr (04/13/19 2017)   sodium chloride 100 mL/hr Last Rate: 100 mL/hr (04/13/19 2017)       Medication Review: As above  Assessment/Plan       JACKIE (acute kidney injury) (CMS/[facility])    Essential hypertension    Hypothyroidism    Type 2 diabetes mellitus (CMS/[facility])    Tobacco abuse    CKD (chronic kidney disease) stage 3, GFR 30-59 ml/min (CMS/[facility])    Abdominal pain    Long QT interval    Volume depletion    1.  Acute on chronic renal failure with a creatinine on presentation of 2.04.  Previous creatinine generally 1.54.  Patient has history of CKD stage III diagnosed couple years ago but declined to follow-up with the nephrology.  Recent increase in creatinine most likely due to poor poor oral intake with nausea.  The sound of the kidney normal size kidney of 11.6 cm right small 2 cm cyst, left kidney 12.2 cm.  No hydronephrosis or nephrolithiasis.  Bladder was grossly normal.  UA has shown some proteinuria.  Urine protein 17, urine creatinine 70, urine sodium 104.  2.  CKD stage III proteinuria 0.24  g  3.  Proteinuria: Proteinuria 0.24 g likely related to diabetes, hypertension and morbid obesity.  Plan:   Agree with holding diuretics for now.  Avoid nephrotoxic medications.  Check volume status.  Check labs in the morning.  Adjust medication for the new GFR.  Case discussed with the medical staff taking care of the patient.       Shree Acosta MD  04/14/19  12:21 PM

## 2019-04-14 NOTE — PROGRESS NOTES
"GI Daily Progress Note  Subjective     Freida Slade is a 49 y.o. female who was admitted with JACKIE (acute kidney injury) (CMS/HCC).   Pain some better ,  Eating some but doesn't like the food  N/V better    Chief Complaint:  abd pain, N/V    Objective     /85 (BP Location: Right arm, Patient Position: Lying)   Pulse 84   Temp 97.7 °F (36.5 °C) (Oral)   Resp 16   Ht 162.6 cm (64.02\")   Wt 105 kg (232 lb 6.4 oz)   SpO2 94%   BMI 39.87 kg/m²     Intake/Output last 3 shifts:  I/O last 3 completed shifts:  In: 2646 [P.O.:660; I.V.:986; IV Piggyback:1000]  Out: 4200 [Urine:4200]  Intake/Output this shift:  I/O this shift:  In: 240 [P.O.:240]  Out: 900 [Urine:900]      Physical Exam  Wt Readings from Last 3 Encounters:   04/12/19 105 kg (232 lb 6.4 oz)   04/05/19 129 kg (285 lb)   12/30/18 129 kg (285 lb)   ,body mass index is 39.87 kg/m².,@FLOWAMB(6)@,@FLOWAMB(5)@,@FLOWAMB(8)@   CONSTITUTIONAL:  Resp CTA; no rhonchi, rales, or wheezes.  Respiration effort normal  CV RRR; no M/R/G. No lower extremity edema  GI Abd soft, NT, ND, normal active bowel sounds.    Psych: Awake and alert    DATA:Results for FREIDA SLADE (MRN [id number]) as of 4/14/2019 11:57   Ref. Range 4/14/2019 03:39   Sodium Latest Ref Range: 136 - 145 mmol/L 138   Potassium Latest Ref Range: 3.5 - 5.2 mmol/L 3.8   CO2 Latest Ref Range: 22.0 - 29.0 mmol/L 25.0   Chloride Latest Ref Range: 98 - 107 mmol/L 102   Anion Gap Latest Units: mmol/L 11.0   Creatinine Latest Ref Range: 0.57 - 1.00 mg/dL 1.85 (H)   BUN Latest Ref Range: 6 - 20 mg/dL 20   BUN/Creatinine Ratio Latest Ref Range: 7.0 - 25.0  10.8   Calcium Latest Ref Range: 8.6 - 10.5 mg/dL 9.2   eGFR Non  Am Latest Ref Range: >60 mL/min/1.73 29 (L)   Alkaline Phosphatase Latest Ref Range: 39 - 117 U/L 70   Total Protein Latest Ref Range: 6.0 - 8.5 g/dL 7.1   ALT (SGPT) Latest Ref Range: 1 - 33 U/L 7   AST (SGOT) Latest Ref Range: 1 - 32 U/L 9   Total Bilirubin Latest Ref Range: " 0.2 - 1.2 mg/dL 0.5   Albumin Latest Ref Range: 3.50 - 5.20 g/dL 3.50   Results for STEF SLADE (MRN [id number]) as of 4/14/2019 11:57   Ref. Range 4/14/2019 03:39   WBC Latest Ref Range: 3.40 - 10.80 10*3/mm3 9.89   RBC Latest Ref Range: 3.77 - 5.28 10*6/mm3 4.60   Hemoglobin Latest Ref Range: 12.0 - 15.9 g/dL 12.5   Hematocrit Latest Ref Range: 34.0 - 46.6 % 39.6       Assessment/Plan       #1 epigastric abdominal pain  2.  Nausea vomiting    Will plan EGD for am            JACKIE (acute kidney injury) (CMS/HCC)    Essential hypertension    Hypothyroidism    Type 2 diabetes mellitus (CMS/HCC)    Tobacco abuse    CKD (chronic kidney disease) stage 3, GFR 30-59 ml/min (CMS/HCC)    Abdominal pain    Long QT interval    Volume depletion       LOS: 2 days     Jude Clinton MD  04/14/19  12:07 PM

## 2019-04-14 NOTE — PROGRESS NOTES
[facility] Medicine Services  PROGRESS NOTE    Patient Name: Freida Martinez  : 1970  MRN: [id number]    Date of Admission: 2019  Length of Stay: 0  Primary Care Physician: Erica Beatty APRN    Subjective   Subjective     CC:  Nausea/abdominal pain    HPI:    Continues with abdominal pain. Has appetite today. Denies f/c. Nausea, no emesis. Still bloated. Mild HA today.    Review of Systems    Otherwise ROS is negative except as mentioned in the HPI.    Objective   Objective     Vital Signs:   Temp:  [98.4 °F (36.9 °C)-98.8 °F (37.1 °C)] 98.8 °F (37.1 °C)  Heart Rate:  [80-89] 84  Resp:  [18-20] 18  BP: (119-137)/() 120/85        Physical Exam:  NAD, alert and oriented  OP clear, MMM  Neck supple  No LAD  RRR  CTAB  +BS, TTP epigastric area, mild distention/bloating  No c/c/e  No rashes  Normal affect  PAIGE    Results Reviewed:  I have personally reviewed current lab, radiology, and data and agree.    Results from last 7 days   Lab Units 19  0339 19  0525 19  1758   WBC 10*3/mm3 9.89 10.37 14.76*   HEMOGLOBIN g/dL 12.5 12.6 14.4   HEMATOCRIT % 39.6 40.4 44.9   PLATELETS 10*3/mm3 314 329 370     Results from last 7 days   Lab Units 19  0339 19  0403 19  1807 19  1758   SODIUM mmol/L 138 138  --  138   POTASSIUM mmol/L 3.8 4.0  --  3.9   CHLORIDE mmol/L 102 101  --  97*   CO2 mmol/L 25.0 26.0  --  26.0   BUN mg/dL 20 27*  --  27*   CREATININE mg/dL 1.85* 1.99*  --  2.04*   GLUCOSE mg/dL 131* 96  --  78   CALCIUM mg/dL 9.2 8.9  --  9.8   ALT (SGPT) U/L 7  --   --  9   AST (SGOT) U/L 9  --   --  11   TROPONIN I ng/mL  --   --  0.00  --      Estimated Creatinine Clearance: 43.4 mL/min (A) (by C-G formula based on SCr of 1.85 mg/dL (H)).    No results found for: BNP    Microbiology Results Abnormal     None          Imaging Results (last 24 hours)     Procedure Component Value Units Date/Time    US Renal Bilateral [[id number]]  Collected:  04/13/19 1204     Updated:  04/13/19 1713    Narrative:       EXAMINATION: US RENAL BILATERAL - 04/13/2019     INDICATION:  E86.9-Volume depletion, unspecified; N17.9-Acute kidney  failure, unspecified; D72.829-Elevated white blood cell count,  unspecified. Renal insufficiency.     TECHNIQUE: Sonographic imaging is obtained of the kidneys in both the  sagittal and transverse planes.     COMPARISON: NONE     FINDINGS: The right kidney measures in length from portable 11.6 cm.  Small hypoechoic area suggesting a cyst measuring 2 cm. No solid mass.  No hydronephrosis. The left kidney measures in length from [id number]  12.2 cm. No solid cortical mass or renal cortical cysts seen within left  kidney. No hydronephrosis or nephrolithiasis. Bladder is grossly  unremarkable in appearance.       Impression:       Small cyst on the right kidney; otherwise, unremarkable  bilateral renal ultrasound.     DICTATED:   04/13/2019  EDITED/ls :   04/13/2019          This report was finalized on 4/13/2019 5:10 PM by Dr. Marisol Kulkarni MD.       CT Abdomen Pelvis Without Contrast [[id number]] Collected:  04/12/19 2110     Updated:  04/13/19 1036    Narrative:       EXAMINATION: CT ABDOMEN/PELVIS WO CONTRAST, CT CHEST WO CONTRAST -  04/12/2019      INDICATION: Abdominal pain.     TECHNIQUE: Imaging is obtained of the chest, abdomen and pelvis without  the administration of oral and intravenous contrast.     The radiation dose reduction device was turned on for each scan per the  ALARA (As Low as Reasonably Achievable) protocol.     COMPARISON: NONE     FINDINGS:      CHEST: The thyroid is homogeneous in appearance. No mediastinal mass or  adenopathy. The cardiac chambers are within normal limits. There is no  pericardial effusion. No bulky hilar or axillary lymphadenopathy. The  lung parenchyma is grossly clear. No parenchymal consolidation,  pulmonary mass or nodule identified. Minimal scarring identified at  the  right lung base.     ABDOMEN: The liver is homogeneous. Spleen is unremarkable. Kidneys and  adrenal glands are within normal limits. There are no stones seen in the  gallbladder. Pancreas is homogeneous. No abdominal or retroperitoneal  lymphadenopathy. Diverticulosis of the colon without evidence of  diverticulitis. There is a cyst seen within the right kidney.     PELVIS: The pelvic organs are unremarkable. The pelvic portion of the  gastrointestinal tract is within normal limits. Small periumbilical  hernia containing mesenteric fat only. Diverticulosis with no evidence  of diverticulitis. No free fluid or free air. No pelvic adenopathy. The  pelvic organs are unremarkable. No abnormal mass or fluid collections  identified. The bony structures reveal no evidence of osseous  abnormality. Degenerative change is seen within the spine and pelvis.       Impression:       No CT evidence of acute intrathoracic abnormality. No acute  intra-abdominal or pelvic abnormality.     DICTATED:   04/12/2019  EDITED/ls :   04/12/2019         This report was finalized on 4/13/2019 10:33 AM by Dr. Marisol Kulkarni MD.       CT Chest Without Contrast [[id number]] Collected:  04/12/19 2110     Updated:  04/13/19 1036    Narrative:       EXAMINATION: CT ABDOMEN/PELVIS WO CONTRAST, CT CHEST WO CONTRAST -  04/12/2019      INDICATION: Abdominal pain.     TECHNIQUE: Imaging is obtained of the chest, abdomen and pelvis without  the administration of oral and intravenous contrast.     The radiation dose reduction device was turned on for each scan per the  ALARA (As Low as Reasonably Achievable) protocol.     COMPARISON: NONE     FINDINGS:      CHEST: The thyroid is homogeneous in appearance. No mediastinal mass or  adenopathy. The cardiac chambers are within normal limits. There is no  pericardial effusion. No bulky hilar or axillary lymphadenopathy. The  lung parenchyma is grossly clear. No parenchymal consolidation,  pulmonary  mass or nodule identified. Minimal scarring identified at the  right lung base.     ABDOMEN: The liver is homogeneous. Spleen is unremarkable. Kidneys and  adrenal glands are within normal limits. There are no stones seen in the  gallbladder. Pancreas is homogeneous. No abdominal or retroperitoneal  lymphadenopathy. Diverticulosis of the colon without evidence of  diverticulitis. There is a cyst seen within the right kidney.     PELVIS: The pelvic organs are unremarkable. The pelvic portion of the  gastrointestinal tract is within normal limits. Small periumbilical  hernia containing mesenteric fat only. Diverticulosis with no evidence  of diverticulitis. No free fluid or free air. No pelvic adenopathy. The  pelvic organs are unremarkable. No abnormal mass or fluid collections  identified. The bony structures reveal no evidence of osseous  abnormality. Degenerative change is seen within the spine and pelvis.       Impression:       No CT evidence of acute intrathoracic abnormality. No acute  intra-abdominal or pelvic abnormality.     DICTATED:   04/12/2019  EDITED/ls :   04/12/2019         This report was finalized on 4/13/2019 10:33 AM by Dr. Marisol Kulkarni MD.       XR Chest 1 View [[id number]] Collected:  04/12/19 2031     Updated:  04/13/19 1036    Narrative:          EXAMINATION: XR CHEST 1 VW - 04/12/2019     INDICATION: Weakness, dizziness, altered mental status.     COMPARISON: 04/05/2019     FINDINGS: Portable chest reveals the heart to be borderline enlarged.  Ill-defined opacification at the left lung base with small left pleural  effusion. Lung fields are grossly clear. Degenerative change is seen  within the spine. Pulmonary vascularity is within normal limits.           Impression:       Mild increased markings left lung base with small left  pleural effusion.     DICTATED:   04/12/2019  EDITED/ls :   04/12/2019      This report was finalized on 4/13/2019 10:33 AM by Dr. Marisol Kulkarni MD.                   I have reviewed the medications:    Current Facility-Administered Medications:   •  acetaminophen (TYLENOL) tablet 650 mg, 650 mg, Oral, Q4H PRN, Nancy Talley, APRN, 650 mg at 04/14/19 0634  •  amLODIPine (NORVASC) tablet 10 mg, 10 mg, Oral, Daily, Nancy Talley, APRN, 10 mg at 04/14/19 0819  •  atorvastatin (LIPITOR) tablet 40 mg, 40 mg, Oral, Daily, Nancy Talley, APRN, 40 mg at 04/14/19 0819  •  dextrose (D50W) 25 g/ 50mL Intravenous Solution 25 g, 25 g, Intravenous, Q15 Min PRN, Anel Burrows G, DO  •  dextrose (GLUTOSE) oral gel 15 g, 15 g, Oral, Q15 Min PRN, Anle Burrows G, DO  •  FLUoxetine (PROzac) capsule 40 mg, 40 mg, Oral, Daily, Nancy Talley, APRN, 40 mg at 04/14/19 0819  •  glucagon (human recombinant) (GLUCAGEN DIAGNOSTIC) injection 1 mg, 1 mg, Subcutaneous, PRN, Anel Burrows G, DO  •  heparin (porcine) 5000 UNIT/ML injection 5,000 Units, 5,000 Units, Subcutaneous, Q8H, Nancy Talley, APRN  •  insulin lispro (humaLOG) injection 0-7 Units, 0-7 Units, Subcutaneous, 4x Daily With Meals & Nightly, Anel Burrows, DO  •  levothyroxine (SYNTHROID, LEVOTHROID) tablet 25 mcg, 25 mcg, Oral, Daily, Nancy Talley, APRN, 25 mcg at 04/14/19 0634  •  melatonin tablet 5 mg, 5 mg, Oral, Nightly PRN, Nancy Talley, APRN, 5 mg at 04/13/19 2037  •  nicotine (NICODERM CQ) 21 MG/24HR patch 1 patch, 1 patch, Transdermal, Q24H, Anel Burrows, DO  •  ondansetron (ZOFRAN) tablet 4 mg, 4 mg, Oral, Q6H PRN, Nancy Talley, APRN, 4 mg at 04/12/19 2246  •  pantoprazole (PROTONIX) injection 40 mg, 40 mg, Intravenous, Q12H, Anel Burrows DO, 40 mg at 04/14/19 0819  •  sodium chloride 0.9 % flush 10 mL, 10 mL, Intravenous, PRN, Erick Dupont MD  •  sodium chloride 0.9 % flush 3 mL, 3 mL, Intravenous, Q12H, Nancy Talley, APRN, 3 mL at 04/13/19 0836  •  sodium chloride 0.9 % flush 3-10 mL, 3-10 mL, Intravenous, PRN, Nancy Talley, APRN  •  sodium chloride 0.9 % infusion, 100 mL/hr,  Intravenous, Continuous, Nancy Talley, APRN, Last Rate: 100 mL/hr at 04/13/19 2017, 100 mL/hr at 04/13/19 2017  •  sodium chloride 0.9 % infusion, 100 mL/hr, Intravenous, Continuous, Shere Acosta MD, Last Rate: 100 mL/hr at 04/13/19 2017, 100 mL/hr at 04/13/19 2017  •  traMADol (ULTRAM) tablet 50 mg, 50 mg, Oral, Q6H PRN, Helio Santamaria MD      Assessment/Plan   Assessment / Plan     Active Hospital Problems    Diagnosis POA   • **JACKIE (acute kidney injury) (CMS/[facility]) [N17.9] Yes   • Long QT interval [R94.31] Unknown   • Essential hypertension [I10] Unknown   • Hypothyroidism [E03.9] Unknown   • Type 2 diabetes mellitus (CMS/[facility]) [E11.9] Unknown   • Tobacco abuse [Z72.0] Unknown   • CKD (chronic kidney disease) stage 3, GFR 30-59 ml/min (CMS/[facility]) [N18.3] Unknown   • Abdominal pain [R10.9] Unknown          Brief Hospital Course to date:  Freida Martinez is a 49 y.o. female here with nausea and abdominal pain, with anorexia, and JACKIE.    Nausea/abdominal pain  --CT with no pancreatitis/cholecystitis/enteritis/diverticulitis, normal lactic acid  --checked lipase, normal  --unremarkable LFTs  --no change in stool, no diarrhea  --no dysphagia/odynophagia  --consulted GI, continue PPI, EGD 4/15  JACKIE  --slowly improved, has CKD at baseline, likely DM induced  Hx of HTN  Hypothyroidism  DM  --controlled  Tobacco abuse    DVT Prophylaxis:  BARTOLO    Disposition: I expect the patient to be discharged in 1-2 days.    CODE STATUS:   Code Status and Medical Interventions:   Ordered at: 04/12/19 2114     Level Of Support Discussed With:    Patient     Code Status:    CPR     Medical Interventions (Level of Support Prior to Arrest):    Full         Electronically signed by Helio Santamaria MD, 04/14/19, 9:09 AM.

## 2019-04-14 NOTE — PAYOR COMM NOTE
"Jocelyne Pacheco RN   Phone [phone]  Fax [phone]      Freida Slade (49 y.o. Female)     Date of Birth Social Security Number Address Home Phone MRN    1970  [id number]  [facility] [address] [phone] [id number]    Gnosticist Marital Status          None Single       Admission Date Admission Type Admitting Provider Attending Provider Department, Room/Bed    19 Emergency Helio Santamaria MD Russell, Marc P, MD [facility] 4G, S445/1    Discharge Date Discharge Disposition Discharge Destination                       Attending Provider:  Helio Santamaria MD    Allergies:  Lisinopril    Isolation:  None   Infection:  None   Code Status:  CPR    Ht:  162.6 cm (64.02\")   Wt:  105 kg (232 lb 6.4 oz)    Admission Cmt:  None   Principal Problem:  JACKIE (acute kidney injury) (CMS/[facility]) [N17.9]                 Active Insurance as of 2019     Primary Coverage     Payor Plan Insurance Group Employer/Plan Group    [employer] [place] [employer] [place]      Payor Plan Address Payor Plan Phone Number Payor Plan Fax Number Effective Dates    [address] [address] [phone]  10/28/2016 - None Entered    [facility] [address]       Subscriber Name Subscriber Birth Date Member ID       FREIDA SLADE 1970 [id number]                 Emergency Contacts      (Rel.) Home Phone Work Phone Mobile Phone    Maribel Rico (Daughter) [phone] -- --               History & Physical      Anel Burrows DO at 2019  8:58 PM              [facility] Medicine Services  HISTORY AND PHYSICAL    Patient Name: Freida Slade  : 1970  MRN: [id number]  Primary Care Physician: Erica Beatty, APRVICKI  Date of admission: 2019      Subjective   Subjective     Chief Complaint:      HPI:  Freida Slade is a 49 y.o. female with past medical history significant for type 2 diabetes, hypothyroidism, sleep apnea, and hypertension.  She presents to ED with " complaints of generalized weakness, nausea, abdominal pain, and chills for the past week.  She was evaluated in the ED for her presenting symptoms about a week ago.  She was diagnosed with renal insufficiency and discharged home.  She had follow-up with her primary care provider today.  After examination, her primary care provider sent her here for another evaluation because her symptoms had not improved.  Patient states that she has had decreased appetite and p.o. intake due to increased abdominal pain with oral intake.  She reports persistent nausea.  She denies vomiting, chest pain, shortness of breath, diarrhea, constipation, or other acute symptoms.  She denies alcohol or NSAID use.  In ED, WBC 14.76, BUN 27, creatinine 2.04 (creatinine was 1.54 a week ago).  CT of abdomen/pelvis and chest were unremarkable.  Patient will be admitted to hospital medicine service for further evaluation and management.    Review of Systems   Constitutional: Positive for appetite change and chills. Negative for fever.   HENT: Negative.    Respiratory: Negative for cough, shortness of breath and wheezing.    Cardiovascular: Negative for chest pain, palpitations and leg swelling.   Gastrointestinal: Positive for abdominal pain and nausea. Negative for constipation, diarrhea and vomiting.   Genitourinary: Negative for difficulty urinating and dysuria.   Musculoskeletal: Negative.    Skin: Negative.    Neurological: Positive for dizziness and weakness.   Psychiatric/Behavioral: Negative.       Otherwise complete ROS reviewed and is negative except as mentioned in the HPI.    Personal History     Past Medical History:   Diagnosis Date   • Asthma    • Diabetes mellitus (CMS/HCC)    • Disease of thyroid gland    • Hypertension    • Sleep apnea        Past Surgical History:   Procedure Laterality Date   • TUBAL ABDOMINAL LIGATION         Family History: family history includes Cancer in her mother; Diabetes in her father. Otherwise  pertinent FHx was reviewed and unremarkable.     Social History:  reports that she has been smoking cigarettes.  She has been smoking about 1.00 pack per day. She has never used smokeless tobacco. She reports that she does not drink alcohol or use drugs.  Social History     Social History Narrative   • Not on file       Medications:    Available home medication information reviewed.  Medications Prior to Admission   Medication Sig Dispense Refill Last Dose   • amLODIPine (NORVASC) 10 MG tablet Take 10 mg by mouth Daily.      • atorvastatin (LIPITOR) 40 MG tablet Take 40 mg by mouth Daily.      • FLUoxetine (PROZAC) 40 MG capsule Take 40 mg by mouth Daily.      • GLIPIZIDE PO Take 5 mg by mouth Daily.      • levothyroxine (SYNTHROID, LEVOTHROID) 25 MCG tablet Take 25 mcg by mouth Daily.      • losartan-hydrochlorothiazide (HYZAAR) 50-12.5 MG per tablet Take 1 tablet by mouth Daily.      • metFORMIN (GLUCOPHAGE) 500 MG tablet Take 500 mg by mouth 2 (Two) Times a Day With Meals.      • aspirin 325 MG tablet Take 325 mg by mouth Daily.      • cefdinir (OMNICEF) 300 MG capsule Take 1 capsule by mouth 2 (Two) Times a Day. (Patient not taking: Reported on 4/12/2019) 14 capsule 0 Not Taking at Unknown time   • cyclobenzaprine (FLEXERIL) 10 MG tablet Take 1 tablet by mouth 3 (Three) Times a Day As Needed for Muscle Spasms. (Patient not taking: Reported on 4/12/2019) 15 tablet 0 Not Taking at Unknown time   • Ergocalciferol (VITAMIN D2 PO) Take 50,000 Units by mouth 1 (One) Time Per Week.      • naproxen (EC NAPROSYN) 500 MG EC tablet Take 1 tablet by mouth 2 (Two) Times a Day As Needed (PAIN). (Patient not taking: Reported on 4/12/2019) 14 tablet 0 Not Taking at Unknown time   • phenazopyridine (PYRIDIUM) 200 MG tablet Take 1 tablet by mouth 3 (Three) Times a Day As Needed for bladder spasms. (Patient not taking: Reported on 4/12/2019) 6 tablet 0 Not Taking at Unknown time   • PredniSONE (DELTASONE) 10 MG (21) tablet pack  Use as directed on package (Patient not taking: Reported on 4/12/2019) 21 tablet 0 Not Taking at Unknown time       Allergies   Allergen Reactions   • Lisinopril Swelling       Objective   Objective     Vital Signs:   Temp:  [98.4 °F (36.9 °C)-98.5 °F (36.9 °C)] 98.4 °F (36.9 °C)  Heart Rate:  [81-93] 81  Resp:  [18] 18  BP: (102-107)/(74-78) 102/78        Physical Exam   Constitutional: Awake, alert, morbidly obese  Eyes: Sclerae anicteric, no conjunctival injection  HENT: NCAT, mucous membranes dry  Neck: Supple, no thyromegaly, no lymphadenopathy, trachea midline  Respiratory: Clear to auscultation bilaterally, nonlabored respirations on room air  Cardiovascular: RRR, no murmurs, rubs, or gallops, palpable pedal pulses bilaterally  Gastrointestinal: Positive bowel sounds, soft, generalized tenderness to palpation  Musculoskeletal: No bilateral ankle edema, no clubbing or cyanosis to extremities  Psychiatric: Appropriate affect, cooperative  Neurologic: Oriented x 3, strength symmetric in all extremities, Cranial Nerves grossly intact to confrontation, speech clear  Skin: No rashes    Results Reviewed:  I have personally reviewed current lab, radiology, and data and agree.    Results from last 7 days   Lab Units 04/12/19  1758   WBC 10*3/mm3 14.76*   HEMOGLOBIN g/dL 14.4   HEMATOCRIT % 44.9   PLATELETS 10*3/mm3 370     Results from last 7 days   Lab Units 04/12/19  1807 04/12/19  1758   SODIUM mmol/L  --  138   POTASSIUM mmol/L  --  3.9   CHLORIDE mmol/L  --  97*   CO2 mmol/L  --  26.0   BUN mg/dL  --  27*   CREATININE mg/dL  --  2.04*   GLUCOSE mg/dL  --  78   CALCIUM mg/dL  --  9.8   ALT (SGPT) U/L  --  9   AST (SGOT) U/L  --  11   TROPONIN I ng/mL 0.00  --      Estimated Creatinine Clearance: 41.1 mL/min (A) (by C-G formula based on SCr of 2.04 mg/dL (H)).  Brief Urine Lab Results  (Last result in the past 365 days)      Color   Clarity   Blood   Leuk Est   Nitrite   Protein   CREAT   Urine HCG        04/12/19  1807               Negative         No results found for: BNP  Imaging Results (last 24 hours)     Procedure Component Value Units Date/Time    CT Abdomen Pelvis Without Contrast [[id number]] Collected:  04/12/19 2110     Updated:  04/12/19 2110    Narrative:       EXAMINATION: CT ABDOMEN/PELVIS WO CONTRAST, CT CHEST WO CONTRAST -  04/12/2019      INDICATION: Abdominal pain.     TECHNIQUE: Imaging is obtained of the chest, abdomen and pelvis without  the administration of oral and intravenous contrast.     The radiation dose reduction device was turned on for each scan per the  ALARA (As Low as Reasonably Achievable) protocol.     COMPARISON: NONE     FINDINGS:      CHEST: The thyroid is homogeneous in appearance. No mediastinal mass or  adenopathy. The cardiac chambers are within normal limits. There is no  pericardial effusion. No bulky hilar or axillary lymphadenopathy. The  lung parenchyma is grossly clear. No parenchymal consolidation,  pulmonary mass or nodule identified. Minimal scarring identified at the  right lung base.     ABDOMEN: The liver is homogeneous. Spleen is unremarkable. Kidneys and  adrenal glands are within normal limits. There are no stones seen in the  gallbladder. Pancreas is homogeneous. No abdominal or retroperitoneal  lymphadenopathy. Diverticulosis of the colon without evidence of  diverticulitis. There is a cyst seen within the right kidney.     PELVIS: The pelvic organs are unremarkable. The pelvic portion of the  gastrointestinal tract is within normal limits. Small periumbilical  hernia containing mesenteric fat only. Diverticulosis with no evidence  of diverticulitis. No free fluid or free air. No pelvic adenopathy. The  pelvic organs are unremarkable. No abnormal mass or fluid collections  identified. The bony structures reveal no evidence of osseous  abnormality. Degenerative change is seen within the spine and pelvis.       Impression:       No CT evidence of acute intrathoracic  abnormality. No acute  intra-abdominal or pelvic abnormality.     DICTATED:   04/12/2019  EDITED/ls :   04/12/2019           CT Chest Without Contrast [[id number]] Collected:  04/12/19 2110     Updated:  04/12/19 2110    Narrative:       EXAMINATION: CT ABDOMEN/PELVIS WO CONTRAST, CT CHEST WO CONTRAST -  04/12/2019      INDICATION: Abdominal pain.     TECHNIQUE: Imaging is obtained of the chest, abdomen and pelvis without  the administration of oral and intravenous contrast.     The radiation dose reduction device was turned on for each scan per the  ALARA (As Low as Reasonably Achievable) protocol.     COMPARISON: NONE     FINDINGS:      CHEST: The thyroid is homogeneous in appearance. No mediastinal mass or  adenopathy. The cardiac chambers are within normal limits. There is no  pericardial effusion. No bulky hilar or axillary lymphadenopathy. The  lung parenchyma is grossly clear. No parenchymal consolidation,  pulmonary mass or nodule identified. Minimal scarring identified at the  right lung base.     ABDOMEN: The liver is homogeneous. Spleen is unremarkable. Kidneys and  adrenal glands are within normal limits. There are no stones seen in the  gallbladder. Pancreas is homogeneous. No abdominal or retroperitoneal  lymphadenopathy. Diverticulosis of the colon without evidence of  diverticulitis. There is a cyst seen within the right kidney.     PELVIS: The pelvic organs are unremarkable. The pelvic portion of the  gastrointestinal tract is within normal limits. Small periumbilical  hernia containing mesenteric fat only. Diverticulosis with no evidence  of diverticulitis. No free fluid or free air. No pelvic adenopathy. The  pelvic organs are unremarkable. No abnormal mass or fluid collections  identified. The bony structures reveal no evidence of osseous  abnormality. Degenerative change is seen within the spine and pelvis.       Impression:       No CT evidence of acute intrathoracic abnormality. No  acute  intra-abdominal or pelvic abnormality.     DICTATED:   04/12/2019  EDITED/ls :   04/12/2019           XR Chest 1 View [[id number]] Collected:  04/12/19 2031     Updated:  04/12/19 2031    Narrative:          EXAMINATION: XR CHEST 1 VW - 04/12/2019     INDICATION: Weakness, dizziness, altered mental status.     COMPARISON: 04/05/2019     FINDINGS: Portable chest reveals the heart to be borderline enlarged.  Ill-defined opacification at the left lung base with small left pleural  effusion. Lung fields are grossly clear. Degenerative change is seen  within the spine. Pulmonary vascularity is within normal limits.           Impression:       Mild increased markings left lung base with small left  pleural effusion.     DICTATED:   04/12/2019  EDITED/ls :   04/12/2019                    Assessment/Plan   Assessment / Plan     Active Hospital Problems    Diagnosis POA   • **JACKIE (acute kidney injury) (CMS/[facility]) [N17.9] Yes   • Essential hypertension [I10] Unknown   • Hypothyroidism [E03.9] Unknown   • Type 2 diabetes mellitus (CMS/[facility]) [E11.9] Unknown   • Tobacco abuse [Z72.0] Unknown   • CKD (chronic kidney disease) stage 3, GFR 30-59 ml/min (CMS/[facility]) [N18.3] Unknown   • Abdominal pain [R10.9] Unknown         JACKIE on CKD stage III  --creatinine 2.04  --received 2 liters NS in ED  --IV fluids overnight  --avoid nephrotoxic agents and hold home lisinopril, metformin, HCTZ NSAIDs  --check labs in AM  --Creatinine 1.540 1/5/19  --Renal ultrasound  --Consider nephrology consult.  Was seen outpatient by nephrology around 1 year ago and did not keep her follow-up appointment.    Abdominal pain with leukocytosis  --WBC 14.76  --no source of infection noted   --Sed rate, CRP, lactic acid pending.    --Contrast CT WNL.  Consider CT angiogram to rule out mesenteric ischemia  --Protonix 40 mg IV twice daily.  No history of NSAIDs or PUD.  -Consider gastroenterology consult    Hypertension  --continue home meds   --hold  HCTZ    Hypothyroidism  --continue levothyroxine    Type 2 diabetes, uncontrolled  --noted A1c 9.0 on record from PCP, repeat in AM  --diabetes educator consulted  --hold metformin and glipizide  --FSBG w/ssi    Tobacco abuse  --smokes 1 PPD, declines nicotine patch  --smoking cessation education at discharge    DVT prophylaxis:  heparin    CODE STATUS:    Code Status and Medical Interventions:   Ordered at: 04/12/19 2114     Level Of Support Discussed With:    Patient     Code Status:    CPR     Medical Interventions (Level of Support Prior to Arrest):    Full       Admission Status:  I believe this patient meets OBSERVATION status, however if further evaluation or treatment plans warrant, status may change.  Based upon current information, I predict patient's care encounter to be less than or equal to 2 midnights.      Electronically signed by LUCIA Quinteros, 04/12/19, 8:58 PM.      Brief Attending Admission Attestation     I have seen and examined the patient, performing an independent face-to-face diagnostic evaluation with plan of care reviewed and developed with the advanced practice clinician (APC).      Brief Summary Statement:   Freida Martinez is a 49 y.o. female with a past medical history significant for CKD stage III, hypertension, diabetes mellitus who presents to the ED due to abdominal pain and nausea.  Patient reports onset of abdominal pain 1 week ago.  She was initially seen in the ED with a negative workup and discharged home.  She reports continued abdominal pain which is worse with oral intake.  She reports persistent nausea and intermittent chills over the past 1 week.  Was seen by her PCP today and encouraged to return to the ED for further evaluation and care.    Remainder of detailed HPI is as noted above and has been reviewed and/or edited by me for completeness.      Attending Physical Exam:  Constitutional: Awake, alert  Eyes: PERRLA, sclerae anicteric, no conjunctival  injection  HENT: NCAT, mucous membranes moist  Neck: Supple, no thyromegaly, no lymphadenopathy, trachea midline  Respiratory: Clear to auscultation bilaterally, nonlabored respirations   Cardiovascular: RRR, no murmurs, rubs, or gallops, palpable pedal pulses bilaterally  Gastrointestinal: Positive bowel sounds, soft, diffuse abdominal tenderness, nondistended  Musculoskeletal: No bilateral ankle edema, no clubbing or cyanosis to extremities  Psychiatric: Appropriate affect, cooperative  Neurologic: Oriented x 3, strength symmetric in all extremities, Cranial Nerves grossly intact to confrontation, speech clear  Skin: No rashes        Brief Assessment/Plan :  See above for further detailed assessment and plan developed with APC which I have reviewed and/or edited for completeness.      Electronically signed by Anel Burrows DO, 04/12/19, 10:44 PM.             Electronically signed by Anel Burrows DO at 4/13/2019  2:15 AM          Emergency Department Notes      Patricio Flores APRN at 4/12/2019  5:34 PM     Attestation signed by Erick Dupont MD at 4/12/2019 11:27 PM          For this patient encounter, I reviewed the NP or PA documentation, treatment plan, and medical decision making. Erick Dupont MD 4/12/2019 11:27 PM                  Subjective   Freida Martinez is a 49 y.o.female who presents to the ED with complaints of generalized weakness. The patient reports her generalized weakness has been constant for the past week. She was evaluated in the emergency department for her presenting symptoms one week ago. During this time, she was diagnosed with renal insufficiency and discharged home. She followed up with her primary care provider today. After examination, her primary care provider sent her here for another evaluation because her condition has not improved over the past week. She also complains of nausea, rhinorrhea, chills, decreased appetite, cough, abdominal pain, and a fever, but  she denies any vomiting or shortness of breath. Moreover, she has a history of tobacco abuse. There are no other complaints at this time.         History provided by:  Patient  Weakness - Generalized   Severity:  Moderate  Onset quality:  Sudden  Duration:  1 week  Timing:  Constant  Progression:  Unchanged  Chronicity:  New  Relieved by:  None tried  Worsened by:  Nothing  Ineffective treatments:  None tried  Associated symptoms: abdominal pain, cough, fever and nausea    Associated symptoms: no shortness of breath and no vomiting        Review of Systems   Constitutional: Positive for appetite change, chills and fever.   HENT: Positive for rhinorrhea.    Respiratory: Positive for cough. Negative for shortness of breath.    Gastrointestinal: Positive for abdominal pain and nausea. Negative for vomiting.   Neurological: Positive for weakness (generalized).   All other systems reviewed and are negative.      Past Medical History:   Diagnosis Date   • Asthma    • Diabetes mellitus (CMS/HCC)    • Disease of thyroid gland    • Hypertension    • Sleep apnea        Allergies   Allergen Reactions   • Lisinopril Swelling       Past Surgical History:   Procedure Laterality Date   • TUBAL ABDOMINAL LIGATION         Family History   Problem Relation Age of Onset   • Cancer Mother    • Diabetes Father        Social History     Socioeconomic History   • Marital status: Single     Spouse name: Not on file   • Number of children: Not on file   • Years of education: Not on file   • Highest education level: Not on file   Tobacco Use   • Smoking status: Former Smoker     Packs/day: 1.00     Types: Cigarettes   • Smokeless tobacco: Never Used   Substance and Sexual Activity   • Alcohol use: Yes     Comment: occ   • Drug use: No   • Sexual activity: Defer         Objective   Physical Exam   Constitutional: She is oriented to person, place, and time. She appears well-developed and well-nourished. No distress.   HENT:   Head:  Normocephalic and atraumatic.   Nose: Nose normal.   Mouth/Throat: Oropharynx is clear and moist.   Maxillary sinus tenderness.    Eyes: Conjunctivae are normal. No scleral icterus.   Neck: Normal range of motion. Neck supple.   Cardiovascular: Normal rate, regular rhythm, normal heart sounds and intact distal pulses.   No murmur heard.  Pulmonary/Chest: Effort normal and breath sounds normal. No respiratory distress.   Abdominal: Soft. Bowel sounds are normal. There is generalized tenderness.   Musculoskeletal: Normal range of motion. She exhibits no edema.   Neurological: She is alert and oriented to person, place, and time.   Skin: Skin is warm and dry.   Psychiatric: She has a normal mood and affect. Her behavior is normal.   Nursing note and vitals reviewed.      Procedures        ED Course  ED Course as of Apr 12 2033 Fri Apr 12, 2019   2006 Hospitalist paged to discuss admission.  Her CAT scans are reassuring.  Her white blood cell count is elevated at 14 and her creatinine is 2.04 which is about the highest it has been.  Patient also reports decreased oral intake.  And has been sick for several weeks.  Was sent here by primary care for evaluation.  We will be giving her a second liter of fluid and I have added on a CK.  And have paged the hospitalist to discuss admission to see if there is any clear benefit on admission.  [JM]   2032 Gonzales with Dr. Iniguez and she will admit.  Patient reports she has been taking all of her medications including her blood pressure medication but has not been taking any NSAIDs or Motrin.  [JM]      ED Course User Index  [JM] Patricio Flores, APRN     Recent Results (from the past 24 hour(s))   Comprehensive Metabolic Panel    Collection Time: 04/12/19  5:58 PM   Result Value Ref Range    Glucose 78 65 - 99 mg/dL    BUN 27 (H) 6 - 20 mg/dL    Creatinine 2.04 (H) 0.57 - 1.00 mg/dL    Sodium 138 136 - 145 mmol/L    Potassium 3.9 3.5 - 5.2 mmol/L    Chloride 97 (L) 98 - 107 mmol/L     CO2 26.0 22.0 - 29.0 mmol/L    Calcium 9.8 8.6 - 10.5 mg/dL    Total Protein 8.1 6.0 - 8.5 g/dL    Albumin 4.10 3.50 - 5.20 g/dL    ALT (SGPT) 9 1 - 33 U/L    AST (SGOT) 11 1 - 32 U/L    Alkaline Phosphatase 81 39 - 117 U/L    Total Bilirubin 0.4 0.2 - 1.2 mg/dL    eGFR Non African Amer 26 (L) >60 mL/min/1.73    Globulin 4.0 gm/dL    A/G Ratio 1.0 g/dL    BUN/Creatinine Ratio 13.2 7.0 - 25.0    Anion Gap 15.0 mmol/L   Magnesium    Collection Time: 04/12/19  5:58 PM   Result Value Ref Range    Magnesium 2.2 1.6 - 2.6 mg/dL   Light Blue Top    Collection Time: 04/12/19  5:58 PM   Result Value Ref Range    Extra Tube hold for add-on    Green Top (Gel)    Collection Time: 04/12/19  5:58 PM   Result Value Ref Range    Extra Tube Hold for add-ons.    Lavender Top    Collection Time: 04/12/19  5:58 PM   Result Value Ref Range    Extra Tube hold for add-on    Gold Top - SST    Collection Time: 04/12/19  5:58 PM   Result Value Ref Range    Extra Tube Hold for add-ons.    CBC Auto Differential    Collection Time: 04/12/19  5:58 PM   Result Value Ref Range    WBC 14.76 (H) 3.40 - 10.80 10*3/mm3    RBC 5.29 (H) 3.77 - 5.28 10*6/mm3    Hemoglobin 14.4 12.0 - 15.9 g/dL    Hematocrit 44.9 34.0 - 46.6 %    MCV 84.9 79.0 - 97.0 fL    MCH 27.2 26.6 - 33.0 pg    MCHC 32.1 31.5 - 35.7 g/dL    RDW 15.1 12.3 - 15.4 %    RDW-SD 46.5 37.0 - 54.0 fl    MPV 11.1 6.0 - 12.0 fL    Platelets 370 140 - 450 10*3/mm3    Neutrophil % 58.1 42.7 - 76.0 %    Lymphocyte % 34.1 19.6 - 45.3 %    Monocyte % 5.8 5.0 - 12.0 %    Eosinophil % 1.7 0.3 - 6.2 %    Basophil % 0.3 0.0 - 1.5 %    Immature Grans % 0.7 (H) 0.0 - 0.5 %    Neutrophils, Absolute 8.57 (H) 1.40 - 7.00 10*3/mm3    Lymphocytes, Absolute 5.03 (H) 0.70 - 3.10 10*3/mm3    Monocytes, Absolute 0.86 0.10 - 0.90 10*3/mm3    Eosinophils, Absolute 0.25 0.00 - 0.40 10*3/mm3    Basophils, Absolute 0.05 0.00 - 0.20 10*3/mm3    Immature Grans, Absolute 0.10 (H) 0.00 - 0.05 10*3/mm3   CK    Collection  Time: 04/12/19  5:58 PM   Result Value Ref Range    Creatine Kinase 96 20 - 180 U/L   Urinalysis With Microscopic If Indicated (No Culture) - Urine, Clean Catch    Collection Time: 04/12/19  6:04 PM   Result Value Ref Range    Color, UA Yellow Yellow, Straw    Appearance, UA Clear Clear    pH, UA 6.0 5.0 - 8.0    Specific Gravity, UA 1.013 1.001 - 1.030    Glucose, UA Negative Negative    Ketones, UA Negative Negative    Bilirubin, UA Negative Negative    Blood, UA Trace (A) Negative    Protein, UA Trace (A) Negative    Leuk Esterase, UA Negative Negative    Nitrite, UA Negative Negative    Urobilinogen, UA 1.0 E.U./dL 0.2 - 1.0 E.U./dL   Urinalysis, Microscopic Only - Urine, Clean Catch    Collection Time: 04/12/19  6:04 PM   Result Value Ref Range    RBC, UA 0-2 None Seen, 0-2 /HPF    WBC, UA 0-2 None Seen, 0-2 /HPF    Bacteria, UA Trace None Seen, Trace /HPF    Squamous Epithelial Cells, UA 3-6 (A) None Seen, 0-2 /HPF    Hyaline Casts, UA 0-6 0 - 6 /LPF    Methodology Automated Microscopy    POCT pregnancy, urine    Collection Time: 04/12/19  6:07 PM   Result Value Ref Range    HCG, Urine, QL Negative Negative    Lot Number [id number]     Internal Positive Control Positive     Internal Negative Control Negative    POC Troponin, Rapid    Collection Time: 04/12/19  6:07 PM   Result Value Ref Range    Troponin I 0.00 0.00 - 0.07 ng/mL     Note: In addition to lab results from this visit, the labs listed above may include labs taken at another facility or during a different encounter within the last 24 hours. Please correlate lab times with ED admission and discharge times for further clarification of the services performed during this visit.    XR Chest 1 View   Preliminary Result   Mild increased markings left lung base with small left   pleural effusion.       DICTATED:   04/12/2019   EDITED/ls :   04/12/2019               CT Abdomen Pelvis Without Contrast    (Results Pending)   CT Chest Without Contrast     "(Results Pending)     Vitals:    04/12/19 1715 04/12/19 1739 04/12/19 1906   BP: 107/74 102/78    BP Location: Left arm Right arm    Patient Position: Sitting Lying    Pulse: 93 89 81   Resp: 18 18 18   Temp: 98.5 °F (36.9 °C) 98.4 °F (36.9 °C)    TempSrc: Oral Oral    SpO2: 94% 93% 97%   Weight: 113 kg (249 lb)     Height: 162.6 cm (64\")       Medications   sodium chloride 0.9 % flush 10 mL (not administered)   sodium chloride 0.9 % bolus 1,000 mL (0 mL Intravenous Stopped 4/12/19 2000)   ondansetron (ZOFRAN) injection 4 mg (4 mg Intravenous Given 4/12/19 1858)   ipratropium-albuterol (DUO-NEB) nebulizer solution 3 mL (3 mL Nebulization Given 4/12/19 1906)   sodium chloride 0.9 % bolus 1,000 mL (1,000 mL Intravenous New Bag 4/12/19 2003)     ECG/EMG Results (last 24 hours)     ** No results found for the last 24 hours. **        ECG 12 Lead                 XR Chest 1 View   Preliminary Result   Mild increased markings left lung base with small left   pleural effusion.       DICTATED:   04/12/2019   EDITED/ls :   04/12/2019               CT Abdomen Pelvis Without Contrast    (Results Pending)   CT Chest Without Contrast    (Results Pending)                 MDM    Final diagnoses:   Volume depletion   JACKIE (acute kidney injury) (CMS/[facility])   Leukocytosis, unspecified type       Documentation assistance provided by lachelel Parks.  Information recorded by the scrchristiane was done at my direction and has been verified and validated by me.     Shyam Parks  04/12/19 1824       Patricio Flroes APRN  04/12/19 2033      Electronically signed by Erick Dupont MD at 4/12/2019 11:27 PM       ICU Vital Signs     Row Name 04/14/19 1000 04/14/19 0819 04/14/19 0800 04/14/19 0636 04/14/19 0633       Vitals    Temp  --  97.7 °F (36.5 °C)  --  --  98.8 °F (37.1 °C)    Temp src  --  Oral  --  --  Oral    Pulse  --  84  --  87  85    Heart Rate Source  --  Monitor  --  --  Monitor    Resp  --  16  --  --  18    Resp Rate " Source  --  Visual  --  --  Visual    BP  --  120/85  --  133/97  137/100    Noninvasive MAP (mmHg)  --  --  --  102  110    BP Location  --  Right arm  --  --  Right arm    BP Method  --  Automatic  --  --  Automatic    Patient Position  --  Lying  --  --  Lying       Oxygen Therapy    SpO2  --  94 %  --  --  93 %    Pulse Oximetry Type  --  Intermittent  --  --  Intermittent    Device (Oxygen Therapy)  room air  room air  room air  --  room air    Row Name 04/14/19 0400 04/14/19 0200 04/14/19 0000 04/13/19 2200 04/13/19 2000       Oxygen Therapy    Device (Oxygen Therapy)  room air  room air  room air  room air  room air    Row Name 04/13/19 1758 04/13/19 1630 04/13/19 1600 04/13/19 1400 04/13/19 1200       Vitals    Temp  --  98.8 °F (37.1 °C)  --  --  --    Temp src  --  Oral  --  --  --    Pulse  --  89  --  --  --    Heart Rate Source  --  Monitor  --  --  --    Resp  --  20  --  --  --    Resp Rate Source  --  Visual  --  --  --    BP  --  119/81  --  --  --    BP Location  --  Left arm  --  --  --    BP Method  --  Automatic  --  --  --    Patient Position  --  Lying  --  --  --       Oxygen Therapy    Device (Oxygen Therapy)  room air  --  room air  room air  room air    Row Name 04/13/19 1147 04/13/19 1000 04/13/19 0835 04/13/19 0800 04/13/19 0600       Vitals    Temp  98.4 °F (36.9 °C)  --  98.4 °F (36.9 °C)  --  --    Temp src  Oral  --  Oral  --  --    Pulse  80  --  90  --  --    Heart Rate Source  Monitor  --  Monitor  --  --    Resp  20  --  18  --  --    Resp Rate Source  Visual  --  Visual  --  --    BP  124/87  --  132/82  --  --    BP Location  Left arm  --  Left arm  --  --    BP Method  Automatic  --  Automatic  --  --    Patient Position  Lying  --  Lying  --  --       Oxygen Therapy    SpO2  --  --  93 %  --  --    Pulse Oximetry Type  --  --  Intermittent  --  --    Device (Oxygen Therapy)  --  room air  room air  room air  room air    Row Name 04/13/19 0447 04/13/19 0400 04/13/19 0203  "04/13/19 0141 04/13/19 0049       Vitals    Temp  98 °F (36.7 °C)  --  --  --  --    Temp src  Oral  --  --  --  --    Pulse  78  --  --  --  --    Heart Rate Source  Monitor  --  --  --  --    Resp  16  --  --  --  --    Resp Rate Source  Visual  --  --  --  --    BP  123/80  --  --  --  --    Noninvasive MAP (mmHg)  92  --  --  --  --    BP Location  Left arm  --  --  --  --    BP Method  Automatic  --  --  --  --    Patient Position  Lying  --  --  --  --       Oxygen Therapy    SpO2  95 %  --  --  --  --    Device (Oxygen Therapy)  --  room air  nasal cannula  room air  nasal cannula    Flow (L/min)  --  --  2  --  2    Row Name 04/13/19 0032 04/13/19 0000 04/12/19 2115 04/12/19 2104 04/12/19 1906       Height and Weight    Height  --  --  --  162.6 cm (64.02\")  --    Height Method  --  --  --  Stated  --    Weight  --  --  --  105 kg (232 lb 6.4 oz)  --    Ideal Body Weight (IBW) (kg)  --  --  --  55.04  --    BSA (Calculated - sq m)  --  --  --  2.09 sq meters  --    BMI (Calculated)  --  --  --  39.9  --    Weight in (lb) to have BMI = 25  --  --  --  145.4  --       Vitals    Temp  98.2 °F (36.8 °C)  --  --  98.2 °F (36.8 °C)  --    Temp src  Oral  --  --  Oral  --    Pulse  88  --  --  115  81    Heart Rate Source  Monitor  --  --  Monitor  Monitor    Resp  16  --  --  20  18    Resp Rate Source  Visual  --  --  Visual  Visual    BP  96/57  --  --  98/72  --    Noninvasive MAP (mmHg)  69  --  --  80  --    BP Location  Left arm  --  --  Left arm  --    BP Method  Automatic  --  --  Automatic  --    Patient Position  Lying  --  --  Sitting  --       Oxygen Therapy    SpO2  90 %  --  --  92 %  97 %    Pulse Oximetry Type  --  --  --  --  Continuous    Device (Oxygen Therapy)  --  room air  room air  --  room air    Row Name 04/12/19 17:39:51 04/12/19 1715                Height and Weight    Height  --  162.6 cm (64\")       Height Method  --  Stated       Weight  --  113 kg (249 lb)       Weight Method  --  " Stated       Ideal Body Weight (IBW) (kg)  --  55       BSA (Calculated - sq m)  --  2.15 sq meters       BMI (Calculated)  --  42.7       Weight in (lb) to have BMI = 25  --  145.3          Vitals    Temp  98.4 °F (36.9 °C)  98.5 °F (36.9 °C)       Temp src  Oral  Oral       Pulse  89  93       Heart Rate Source  Monitor  Monitor       Resp  18  18       Resp Rate Source  Visual  Visual       BP  102/78  107/74       Noninvasive MAP (mmHg)  88  --       BP Location  Right arm  Left arm       BP Method  Automatic  Automatic       Patient Position  Lying  Sitting          Oxygen Therapy    SpO2  93 %  94 %       Pulse Oximetry Type  Continuous  --       Device (Oxygen Therapy)  room air  --           Hospital Medications (all)       Dose Frequency Start End    acetaminophen (TYLENOL) tablet 650 mg 650 mg Every 4 Hours PRN 4/12/2019     Sig - Route: Take 2 tablets by mouth Every 4 (Four) Hours As Needed for Mild Pain . - Oral    amLODIPine (NORVASC) tablet 10 mg 10 mg Daily 4/13/2019     Sig - Route: Take 1 tablet by mouth Daily. - Oral    atorvastatin (LIPITOR) tablet 40 mg 40 mg Daily 4/13/2019     Sig - Route: Take 1 tablet by mouth Daily. - Oral    dextrose (D50W) 25 g/ 50mL Intravenous Solution 25 g 25 g Every 15 Minutes PRN 4/13/2019     Sig - Route: Infuse 50 mL into a venous catheter Every 15 (Fifteen) Minutes As Needed for Low Blood Sugar (Blood Sugar Less Than 70). - Intravenous    dextrose (GLUTOSE) oral gel 15 g 15 g Every 15 Minutes PRN 4/13/2019     Sig - Route: Take 15 g by mouth Every 15 (Fifteen) Minutes As Needed for Low Blood Sugar (Blood sugar less than 70). - Oral    FLUoxetine (PROzac) capsule 40 mg 40 mg Daily 4/13/2019     Sig - Route: Take 2 capsules by mouth Daily. - Oral    glucagon (human recombinant) (GLUCAGEN DIAGNOSTIC) injection 1 mg 1 mg As Needed 4/13/2019     Sig - Route: Inject 1 mg under the skin into the appropriate area as directed As Needed (Blood Glucose Less Than 70). -  Subcutaneous    heparin (porcine) 5000 UNIT/ML injection 5,000 Units 5,000 Units Every 8 Hours Scheduled 4/12/2019     Sig - Route: Inject 1 mL under the skin into the appropriate area as directed Every 8 (Eight) Hours. - Subcutaneous    insulin lispro (humaLOG) injection 0-7 Units 0-7 Units 4 Times Daily With Meals & Nightly 4/13/2019     Sig - Route: Inject 0-7 Units under the skin into the appropriate area as directed 4 (Four) Times a Day With Meals & at Bedtime. - Subcutaneous    ipratropium-albuterol (DUO-NEB) nebulizer solution 3 mL 3 mL Once 4/12/2019 4/12/2019    Sig - Route: Take 3 mL by nebulization 1 (One) Time. - Nebulization    levothyroxine (SYNTHROID, LEVOTHROID) tablet 25 mcg 25 mcg Daily 4/13/2019     Sig - Route: Take 1 tablet by mouth Daily. - Oral    melatonin tablet 5 mg 5 mg Nightly PRN 4/12/2019     Sig - Route: Take 1 tablet by mouth At Night As Needed for Sleep. - Oral    nicotine (NICODERM CQ) 21 MG/24HR patch 1 patch 1 patch Every 24 Hours Scheduled 4/13/2019     Sig - Route: Place 1 patch on the skin as directed by provider Daily. - Transdermal    ondansetron (ZOFRAN) injection 4 mg 4 mg Once 4/12/2019 4/12/2019    Sig - Route: Infuse 2 mL into a venous catheter 1 (One) Time. - Intravenous    ondansetron (ZOFRAN) tablet 4 mg 4 mg Every 6 Hours PRN 4/12/2019     Sig - Route: Take 1 tablet by mouth Every 6 (Six) Hours As Needed for Nausea or Vomiting. - Oral    pantoprazole (PROTONIX) injection 40 mg 40 mg Every 12 Hours Scheduled 4/13/2019     Sig - Route: Infuse 10 mL into a venous catheter Every 12 (Twelve) Hours. - Intravenous    sodium chloride 0.9 % bolus 1,000 mL 1,000 mL Once 4/12/2019 4/12/2019    Sig - Route: Infuse 1,000 mL into a venous catheter 1 (One) Time. - Intravenous    sodium chloride 0.9 % bolus 1,000 mL 1,000 mL Once 4/12/2019 4/12/2019    Sig - Route: Infuse 1,000 mL into a venous catheter 1 (One) Time. - Intravenous    sodium chloride 0.9 % flush 10 mL 10 mL As Needed  4/12/2019     Sig - Route: Infuse 10 mL into a venous catheter As Needed for Line Care. - Intravenous    Cosign for Ordering: Accepted by Erick Dupont MD on 4/13/2019  1:39 AM    sodium chloride 0.9 % flush 3 mL 3 mL Every 12 Hours Scheduled 4/12/2019     Sig - Route: Infuse 3 mL into a venous catheter Every 12 (Twelve) Hours. - Intravenous    sodium chloride 0.9 % flush 3-10 mL 3-10 mL As Needed 4/12/2019     Sig - Route: Infuse 3-10 mL into a venous catheter As Needed for Line Care. - Intravenous    sodium chloride 0.9 % infusion 100 mL/hr Continuous 4/12/2019     Sig - Route: Infuse 100 mL/hr into a venous catheter Continuous. - Intravenous    sodium chloride 0.9 % infusion 100 mL/hr Continuous 4/13/2019 4/14/2019    Sig - Route: Infuse 100 mL/hr into a venous catheter Continuous. - Intravenous    traMADol (ULTRAM) tablet 50 mg 50 mg Every 6 Hours PRN 4/14/2019 4/24/2019    Sig - Route: Take 1 tablet by mouth Every 6 (Six) Hours As Needed for Moderate Pain . - Oral    losartan (COZAAR) tablet 50 mg (Discontinued) 50 mg Every 24 Hours Scheduled 4/13/2019 4/13/2019    Sig - Route: Take 1 tablet by mouth Daily. - Oral          Lab Results (last 72 hours)     Procedure Component Value Units Date/Time    POC Glucose Once [[id number]]  (Abnormal) Collected:  04/14/19 0829    Specimen:  Blood Updated:  04/14/19 0830     Glucose 139 mg/dL     POC Glucose Once [[id number]]  (Abnormal) Collected:  04/14/19 0659    Specimen:  Blood Updated:  04/14/19 0700     Glucose 131 mg/dL     Comprehensive Metabolic Panel [[id number]]  (Abnormal) Collected:  04/14/19 0339    Specimen:  Blood Updated:  04/14/19 0513     Glucose 131 mg/dL      BUN 20 mg/dL      Creatinine 1.85 mg/dL      Sodium 138 mmol/L      Potassium 3.8 mmol/L      Chloride 102 mmol/L      CO2 25.0 mmol/L      Calcium 9.2 mg/dL      Total Protein 7.1 g/dL      Albumin 3.50 g/dL      ALT (SGPT) 7 U/L      AST (SGOT) 9 U/L      Alkaline Phosphatase 70 U/L       Total Bilirubin 0.5 mg/dL      eGFR Non African Amer 29 mL/min/1.73      Globulin 3.6 gm/dL      A/G Ratio 1.0 g/dL      BUN/Creatinine Ratio 10.8     Anion Gap 11.0 mmol/L     Narrative:       GFR Normal >60  Chronic Kidney Disease <60  Kidney Failure <15    Phosphorus [[id number]]  (Normal) Collected:  04/14/19 0339    Specimen:  Blood Updated:  04/14/19 0513     Phosphorus 2.8 mg/dL     CBC (No Diff) [[id number]]  (Normal) Collected:  04/14/19 0339    Specimen:  Blood Updated:  04/14/19 0443     WBC 9.89 10*3/mm3      RBC 4.60 10*6/mm3      Hemoglobin 12.5 g/dL      Hematocrit 39.6 %      MCV 86.1 fL      MCH 27.2 pg      MCHC 31.6 g/dL      RDW 15.0 %      RDW-SD 47.7 fl      MPV 11.3 fL      Platelets 314 10*3/mm3     Creatinine, Urine, Random - Urine, Clean Catch [[id number]] Collected:  04/13/19 1457    Specimen:  Urine, Clean Catch Updated:  04/13/19 2058     Creatinine, Urine 70.1 mg/dL     Narrative:       Reference intervals for random urine have not been established.  Clinical usage is dependent upon physician's interpretation in combination with other laboratory tests.     POC Glucose Once [[id number]]  (Abnormal) Collected:  04/13/19 1942    Specimen:  Blood Updated:  04/13/19 2011     Glucose 131 mg/dL     Protein, Urine, Random - Urine, Clean Catch [[id number]] Collected:  04/13/19 1457    Specimen:  Urine, Clean Catch Updated:  04/13/19 1902     Total Protein, Urine 16.8 mg/dL     Narrative:       Reference intervals for random urine have not been established.  Clinical usage is dependent upon physician's interpretation in combination with other laboratory tests.     Sodium, Urine, Random - Urine, Clean Catch [[id number]] Collected:  04/13/19 1457    Specimen:  Urine, Clean Catch Updated:  04/13/19 1902     Sodium, Urine 104 mmol/L     POC Glucose Once [[id number]]  (Abnormal) Collected:  04/13/19 1629    Specimen:  Blood Updated:  04/13/19 1630     Glucose 132 mg/dL     POC Glucose Once [[id number]]   (Normal) Collected:  04/13/19 1148    Specimen:  Blood Updated:  04/13/19 1150     Glucose 104 mg/dL     Lipase [[id number]]  (Normal) Collected:  04/13/19 0403    Specimen:  Blood Updated:  04/13/19 1128     Lipase 25 U/L     POC Glucose Once [[id number]]  (Normal) Collected:  04/13/19 0728    Specimen:  Blood Updated:  04/13/19 0729     Glucose 112 mg/dL     Hemoglobin A1c [[id number]]  (Abnormal) Collected:  04/13/19 0403    Specimen:  Blood Updated:  04/13/19 0603     Hemoglobin A1C 9.20 %     Narrative:       Hemoglobin A1C Ranges:    Increased Risk for Diabetes  5.7% to 6.4%  Diabetes                     >= 6.5%  Diabetic Goal                < 7.0%    CBC Auto Differential [[id number]]  (Abnormal) Collected:  04/13/19 0525    Specimen:  Blood Updated:  04/13/19 0540     WBC 10.37 10*3/mm3      RBC 4.71 10*6/mm3      Hemoglobin 12.6 g/dL      Hematocrit 40.4 %      MCV 85.8 fL      MCH 26.8 pg      MCHC 31.2 g/dL      RDW 15.1 %      RDW-SD 47.6 fl      MPV 11.7 fL      Platelets 329 10*3/mm3      Neutrophil % 72.4 %      Lymphocyte % 20.4 %      Monocyte % 5.2 %      Eosinophil % 1.5 %      Basophil % 0.5 %      Immature Grans % 0.5 %      Neutrophils, Absolute 7.50 10*3/mm3      Lymphocytes, Absolute 2.12 10*3/mm3      Monocytes, Absolute 0.54 10*3/mm3      Eosinophils, Absolute 0.16 10*3/mm3      Basophils, Absolute 0.05 10*3/mm3      Immature Grans, Absolute 0.05 10*3/mm3     Basic Metabolic Panel [[id number]]  (Abnormal) Collected:  04/13/19 0403    Specimen:  Blood Updated:  04/13/19 0535     Glucose 96 mg/dL      BUN 27 mg/dL      Creatinine 1.99 mg/dL      Sodium 138 mmol/L      Potassium 4.0 mmol/L      Chloride 101 mmol/L      CO2 26.0 mmol/L      Calcium 8.9 mg/dL      eGFR Non African Amer 27 mL/min/1.73      BUN/Creatinine Ratio 13.6     Anion Gap 11.0 mmol/L     Narrative:       GFR Normal >60  Chronic Kidney Disease <60  Kidney Failure <15    Lactic Acid, Plasma [[id number]]  (Normal) Collected:   04/13/19 0200    Specimen:  Blood Updated:  04/13/19 0254     Lactate 0.9 mmol/L      Comment: Falsely depressed results may occur on samples drawn from patients receiving N-Acetylcysteine (NAC) or Metamizole.       Sedimentation Rate [[id number]]  (Abnormal) Collected:  04/13/19 0200    Specimen:  Blood Updated:  04/13/19 0225     Sed Rate 34 mm/hr     C-reactive Protein [[id number]]  (Abnormal) Collected:  04/12/19 1758    Specimen:  Blood Updated:  04/12/19 2327     C-Reactive Protein 2.34 mg/dL     POC Glucose Once [[id number]]  (Abnormal) Collected:  04/12/19 2058    Specimen:  Blood Updated:  04/12/19 2118     Glucose 69 mg/dL     CK [[id number]]  (Normal) Collected:  04/12/19 1758    Specimen:  Blood Updated:  04/12/19 2027     Creatine Kinase 96 U/L     [place] [facility] [[id number]] Collected:  04/12/19 1758    Specimen:  Blood Updated:  04/12/19 1902    Narrative:       The following orders were created for panel order [place] [facility].  Procedure                               Abnormality         Status                     ---------                               -----------         ------                     Light Blue Top[[id number]]                                   Final result               Green Top (Gel)[[id number]]                                  Final result               Lavender Top[[id number]]                                     Final result               Gold Top - SST[[id number]]                                   Final result               Green Top (No Gel)[[id number]]                                                            Please view results for these tests on the individual orders.    Light Blue Top [[id number]] Collected:  04/12/19 1758    Specimen:  Blood Updated:  04/12/19 1902     Extra Tube hold for add-on     Comment: Auto resulted       Lavender Top [[id number]] Collected:  04/12/19 1758    Specimen:  Blood Updated:  04/12/19 1902     Extra Tube hold for add-on     Comment: Auto resulted       Green  Top (Gel) [[id number]] Collected:  04/12/19 1758    Specimen:  Blood Updated:  04/12/19 1901     Extra Tube Hold for add-ons.     Comment: Auto resulted.       Gold Top - SST [[id number]] Collected:  04/12/19 1758    Specimen:  Blood Updated:  04/12/19 1901     Extra Tube Hold for add-ons.     Comment: Auto resulted.       Comprehensive Metabolic Panel [[id number]]  (Abnormal) Collected:  04/12/19 1758    Specimen:  Blood Updated:  04/12/19 1833     Glucose 78 mg/dL      BUN 27 mg/dL      Creatinine 2.04 mg/dL      Sodium 138 mmol/L      Potassium 3.9 mmol/L      Chloride 97 mmol/L      CO2 26.0 mmol/L      Calcium 9.8 mg/dL      Total Protein 8.1 g/dL      Albumin 4.10 g/dL      ALT (SGPT) 9 U/L      AST (SGOT) 11 U/L      Alkaline Phosphatase 81 U/L      Total Bilirubin 0.4 mg/dL      eGFR Non African Amer 26 mL/min/1.73      Globulin 4.0 gm/dL      A/G Ratio 1.0 g/dL      BUN/Creatinine Ratio 13.2     Anion Gap 15.0 mmol/L     Narrative:       GFR Normal >60  Chronic Kidney Disease <60  Kidney Failure <15    Magnesium [[id number]]  (Normal) Collected:  04/12/19 1758    Specimen:  Blood Updated:  04/12/19 1833     Magnesium 2.2 mg/dL     POC Troponin, Rapid [[id number]]  (Normal) Collected:  04/12/19 1807    Specimen:  Blood Updated:  04/12/19 1818     Troponin I 0.00 ng/mL      Comment: Serial Number: [id number]:  [id number]       Urinalysis With Microscopic If Indicated (No Culture) - Urine, Clean Catch [[id number]]  (Abnormal) Collected:  04/12/19 1804    Specimen:  Urine, Clean Catch Updated:  04/12/19 1814     Color, UA Yellow     Appearance, UA Clear     pH, UA 6.0     Specific Gravity, UA 1.013     Glucose, UA Negative     Ketones, UA Negative     Bilirubin, UA Negative     Blood, UA Trace     Protein, UA Trace     Leuk Esterase, UA Negative     Nitrite, UA Negative     Urobilinogen, UA 1.0 E.U./dL    Urinalysis, Microscopic Only - Urine, Clean Catch [[id number]]  (Abnormal) Collected:  04/12/19 1802     Specimen:  Urine, Clean Catch Updated:  04/12/19 1814     RBC, UA 0-2 /HPF      WBC, UA 0-2 /HPF      Bacteria, UA Trace /HPF      Squamous Epithelial Cells, UA 3-6 /HPF      Hyaline Casts, UA 0-6 /LPF      Methodology Automated Microscopy    CBC & Differential [[id number]] Collected:  04/12/19 1758    Specimen:  Blood Updated:  04/12/19 1811    Narrative:       The following orders were created for panel order CBC & Differential.  Procedure                               Abnormality         Status                     ---------                               -----------         ------                     CBC Auto Differential[[id number]]        Abnormal            Final result                 Please view results for these tests on the individual orders.    CBC Auto Differential [[id number]]  (Abnormal) Collected:  04/12/19 1758    Specimen:  Blood Updated:  04/12/19 1811     WBC 14.76 10*3/mm3      RBC 5.29 10*6/mm3      Hemoglobin 14.4 g/dL      Hematocrit 44.9 %      MCV 84.9 fL      MCH 27.2 pg      MCHC 32.1 g/dL      RDW 15.1 %      RDW-SD 46.5 fl      MPV 11.1 fL      Platelets 370 10*3/mm3      Neutrophil % 58.1 %      Lymphocyte % 34.1 %      Monocyte % 5.8 %      Eosinophil % 1.7 %      Basophil % 0.3 %      Immature Grans % 0.7 %      Neutrophils, Absolute 8.57 10*3/mm3      Lymphocytes, Absolute 5.03 10*3/mm3      Monocytes, Absolute 0.86 10*3/mm3      Eosinophils, Absolute 0.25 10*3/mm3      Basophils, Absolute 0.05 10*3/mm3      Immature Grans, Absolute 0.10 10*3/mm3     POCT pregnancy, urine [[id number]]  (Normal) Collected:  04/12/19 1807    Specimen:  Urine Updated:  04/12/19 1808     HCG, Urine, QL Negative     Lot Number [id number]     Internal Positive Control Positive     Internal Negative Control Negative          Imaging Results (last 72 hours)     Procedure Component Value Units Date/Time    US Renal Bilateral [[id number]] Collected:  04/13/19 1204     Updated:  04/13/19 1713    Narrative:        EXAMINATION: US RENAL BILATERAL - 04/13/2019     INDICATION:  E86.9-Volume depletion, unspecified; N17.9-Acute kidney  failure, unspecified; D72.829-Elevated white blood cell count,  unspecified. Renal insufficiency.     TECHNIQUE: Sonographic imaging is obtained of the kidneys in both the  sagittal and transverse planes.     COMPARISON: NONE     FINDINGS: The right kidney measures in length from portable 11.6 cm.  Small hypoechoic area suggesting a cyst measuring 2 cm. No solid mass.  No hydronephrosis. The left kidney measures in length from [id number]  12.2 cm. No solid cortical mass or renal cortical cysts seen within left  kidney. No hydronephrosis or nephrolithiasis. Bladder is grossly  unremarkable in appearance.       Impression:       Small cyst on the right kidney; otherwise, unremarkable  bilateral renal ultrasound.     DICTATED:   04/13/2019  EDITED/ls :   04/13/2019          This report was finalized on 4/13/2019 5:10 PM by Dr. Marisol Kulkarni MD.       CT Abdomen Pelvis Without Contrast [[id number]] Collected:  04/12/19 2110     Updated:  04/13/19 1036    Narrative:       EXAMINATION: CT ABDOMEN/PELVIS WO CONTRAST, CT CHEST WO CONTRAST -  04/12/2019      INDICATION: Abdominal pain.     TECHNIQUE: Imaging is obtained of the chest, abdomen and pelvis without  the administration of oral and intravenous contrast.     The radiation dose reduction device was turned on for each scan per the  ALARA (As Low as Reasonably Achievable) protocol.     COMPARISON: NONE     FINDINGS:      CHEST: The thyroid is homogeneous in appearance. No mediastinal mass or  adenopathy. The cardiac chambers are within normal limits. There is no  pericardial effusion. No bulky hilar or axillary lymphadenopathy. The  lung parenchyma is grossly clear. No parenchymal consolidation,  pulmonary mass or nodule identified. Minimal scarring identified at the  right lung base.     ABDOMEN: The liver is homogeneous. Spleen is  unremarkable. Kidneys and  adrenal glands are within normal limits. There are no stones seen in the  gallbladder. Pancreas is homogeneous. No abdominal or retroperitoneal  lymphadenopathy. Diverticulosis of the colon without evidence of  diverticulitis. There is a cyst seen within the right kidney.     PELVIS: The pelvic organs are unremarkable. The pelvic portion of the  gastrointestinal tract is within normal limits. Small periumbilical  hernia containing mesenteric fat only. Diverticulosis with no evidence  of diverticulitis. No free fluid or free air. No pelvic adenopathy. The  pelvic organs are unremarkable. No abnormal mass or fluid collections  identified. The bony structures reveal no evidence of osseous  abnormality. Degenerative change is seen within the spine and pelvis.       Impression:       No CT evidence of acute intrathoracic abnormality. No acute  intra-abdominal or pelvic abnormality.     DICTATED:   04/12/2019  EDITED/ls :   04/12/2019         This report was finalized on 4/13/2019 10:33 AM by Dr. Marisol Kulkarni MD.       CT Chest Without Contrast [[id number]] Collected:  04/12/19 2110     Updated:  04/13/19 1036    Narrative:       EXAMINATION: CT ABDOMEN/PELVIS WO CONTRAST, CT CHEST WO CONTRAST -  04/12/2019      INDICATION: Abdominal pain.     TECHNIQUE: Imaging is obtained of the chest, abdomen and pelvis without  the administration of oral and intravenous contrast.     The radiation dose reduction device was turned on for each scan per the  ALARA (As Low as Reasonably Achievable) protocol.     COMPARISON: NONE     FINDINGS:      CHEST: The thyroid is homogeneous in appearance. No mediastinal mass or  adenopathy. The cardiac chambers are within normal limits. There is no  pericardial effusion. No bulky hilar or axillary lymphadenopathy. The  lung parenchyma is grossly clear. No parenchymal consolidation,  pulmonary mass or nodule identified. Minimal scarring identified at the  right lung  base.     ABDOMEN: The liver is homogeneous. Spleen is unremarkable. Kidneys and  adrenal glands are within normal limits. There are no stones seen in the  gallbladder. Pancreas is homogeneous. No abdominal or retroperitoneal  lymphadenopathy. Diverticulosis of the colon without evidence of  diverticulitis. There is a cyst seen within the right kidney.     PELVIS: The pelvic organs are unremarkable. The pelvic portion of the  gastrointestinal tract is within normal limits. Small periumbilical  hernia containing mesenteric fat only. Diverticulosis with no evidence  of diverticulitis. No free fluid or free air. No pelvic adenopathy. The  pelvic organs are unremarkable. No abnormal mass or fluid collections  identified. The bony structures reveal no evidence of osseous  abnormality. Degenerative change is seen within the spine and pelvis.       Impression:       No CT evidence of acute intrathoracic abnormality. No acute  intra-abdominal or pelvic abnormality.     DICTATED:   04/12/2019  EDITED/ls :   04/12/2019         This report was finalized on 4/13/2019 10:33 AM by Dr. Marisol Kulkarni MD.       XR Chest 1 View [[id number]] Collected:  04/12/19 2031     Updated:  04/13/19 1036    Narrative:          EXAMINATION: XR CHEST 1 VW - 04/12/2019     INDICATION: Weakness, dizziness, altered mental status.     COMPARISON: 04/05/2019     FINDINGS: Portable chest reveals the heart to be borderline enlarged.  Ill-defined opacification at the left lung base with small left pleural  effusion. Lung fields are grossly clear. Degenerative change is seen  within the spine. Pulmonary vascularity is within normal limits.           Impression:       Mild increased markings left lung base with small left  pleural effusion.     DICTATED:   04/12/2019  EDITED/ls :   04/12/2019      This report was finalized on 4/13/2019 10:33 AM by Dr. Marisol Kulkarni MD.                Physician Progress Notes (last 72 hours) (Notes from 4/11/2019  10:13 AM through 2019 10:13 AM)      Helio Santamaria MD at 2019  9:09 AM              [facility] Medicine Services  PROGRESS NOTE    Patient Name: Freida Martinez  : 1970  MRN: [id number]    Date of Admission: 2019  Length of Stay: 0  Primary Care Physician: Erica Beatty, LUCIA    Subjective   Subjective     CC:  Nausea/abdominal pain    HPI:    Continues with abdominal pain. Has appetite today. Denies f/c. Nausea, no emesis. Still bloated. Mild HA today.    Review of Systems    Otherwise ROS is negative except as mentioned in the HPI.    Objective   Objective     Vital Signs:   Temp:  [98.4 °F (36.9 °C)-98.8 °F (37.1 °C)] 98.8 °F (37.1 °C)  Heart Rate:  [80-89] 84  Resp:  [18-20] 18  BP: (119-137)/() 120/85        Physical Exam:  NAD, alert and oriented  OP clear, MMM  Neck supple  No LAD  RRR  CTAB  +BS, TTP epigastric area, mild distention/bloating  No c/c/e  No rashes  Normal affect  PAIGE    Results Reviewed:  I have personally reviewed current lab, radiology, and data and agree.    Results from last 7 days   Lab Units 19  03319  0525 19  1758   WBC 10*3/mm3 9.89 10.37 14.76*   HEMOGLOBIN g/dL 12.5 12.6 14.4   HEMATOCRIT % 39.6 40.4 44.9   PLATELETS 10*3/mm3 314 329 370     Results from last 7 days   Lab Units 19  0339 19  0403 19  1807 19  1758   SODIUM mmol/L 138 138  --  138   POTASSIUM mmol/L 3.8 4.0  --  3.9   CHLORIDE mmol/L 102 101  --  97*   CO2 mmol/L 25.0 26.0  --  26.0   BUN mg/dL 20 27*  --  27*   CREATININE mg/dL 1.85* 1.99*  --  2.04*   GLUCOSE mg/dL 131* 96  --  78   CALCIUM mg/dL 9.2 8.9  --  9.8   ALT (SGPT) U/L 7  --   --  9   AST (SGOT) U/L 9  --   --  11   TROPONIN I ng/mL  --   --  0.00  --      Estimated Creatinine Clearance: 43.4 mL/min (A) (by C-G formula based on SCr of 1.85 mg/dL (H)).    No results found for: BNP    Microbiology Results Abnormal     None          Imaging Results (last 24  hours)     Procedure Component Value Units Date/Time    US Renal Bilateral [[id number]] Collected:  04/13/19 1204     Updated:  04/13/19 1713    Narrative:       EXAMINATION: US RENAL BILATERAL - 04/13/2019     INDICATION:  E86.9-Volume depletion, unspecified; N17.9-Acute kidney  failure, unspecified; D72.829-Elevated white blood cell count,  unspecified. Renal insufficiency.     TECHNIQUE: Sonographic imaging is obtained of the kidneys in both the  sagittal and transverse planes.     COMPARISON: NONE     FINDINGS: The right kidney measures in length from portable 11.6 cm.  Small hypoechoic area suggesting a cyst measuring 2 cm. No solid mass.  No hydronephrosis. The left kidney measures in length from [id number]  12.2 cm. No solid cortical mass or renal cortical cysts seen within left  kidney. No hydronephrosis or nephrolithiasis. Bladder is grossly  unremarkable in appearance.       Impression:       Small cyst on the right kidney; otherwise, unremarkable  bilateral renal ultrasound.     DICTATED:   04/13/2019  EDITED/ls :   04/13/2019          This report was finalized on 4/13/2019 5:10 PM by Dr. Marisol Kulkarni MD.       CT Abdomen Pelvis Without Contrast [[id number]] Collected:  04/12/19 2110     Updated:  04/13/19 1036    Narrative:       EXAMINATION: CT ABDOMEN/PELVIS WO CONTRAST, CT CHEST WO CONTRAST -  04/12/2019      INDICATION: Abdominal pain.     TECHNIQUE: Imaging is obtained of the chest, abdomen and pelvis without  the administration of oral and intravenous contrast.     The radiation dose reduction device was turned on for each scan per the  ALARA (As Low as Reasonably Achievable) protocol.     COMPARISON: NONE     FINDINGS:      CHEST: The thyroid is homogeneous in appearance. No mediastinal mass or  adenopathy. The cardiac chambers are within normal limits. There is no  pericardial effusion. No bulky hilar or axillary lymphadenopathy. The  lung parenchyma is grossly clear. No parenchymal  consolidation,  pulmonary mass or nodule identified. Minimal scarring identified at the  right lung base.     ABDOMEN: The liver is homogeneous. Spleen is unremarkable. Kidneys and  adrenal glands are within normal limits. There are no stones seen in the  gallbladder. Pancreas is homogeneous. No abdominal or retroperitoneal  lymphadenopathy. Diverticulosis of the colon without evidence of  diverticulitis. There is a cyst seen within the right kidney.     PELVIS: The pelvic organs are unremarkable. The pelvic portion of the  gastrointestinal tract is within normal limits. Small periumbilical  hernia containing mesenteric fat only. Diverticulosis with no evidence  of diverticulitis. No free fluid or free air. No pelvic adenopathy. The  pelvic organs are unremarkable. No abnormal mass or fluid collections  identified. The bony structures reveal no evidence of osseous  abnormality. Degenerative change is seen within the spine and pelvis.       Impression:       No CT evidence of acute intrathoracic abnormality. No acute  intra-abdominal or pelvic abnormality.     DICTATED:   04/12/2019  EDITED/ls :   04/12/2019         This report was finalized on 4/13/2019 10:33 AM by Dr. Marisol Kulkarni MD.       CT Chest Without Contrast [[id number]] Collected:  04/12/19 2110     Updated:  04/13/19 1036    Narrative:       EXAMINATION: CT ABDOMEN/PELVIS WO CONTRAST, CT CHEST WO CONTRAST -  04/12/2019      INDICATION: Abdominal pain.     TECHNIQUE: Imaging is obtained of the chest, abdomen and pelvis without  the administration of oral and intravenous contrast.     The radiation dose reduction device was turned on for each scan per the  ALARA (As Low as Reasonably Achievable) protocol.     COMPARISON: NONE     FINDINGS:      CHEST: The thyroid is homogeneous in appearance. No mediastinal mass or  adenopathy. The cardiac chambers are within normal limits. There is no  pericardial effusion. No bulky hilar or axillary lymphadenopathy.  The  lung parenchyma is grossly clear. No parenchymal consolidation,  pulmonary mass or nodule identified. Minimal scarring identified at the  right lung base.     ABDOMEN: The liver is homogeneous. Spleen is unremarkable. Kidneys and  adrenal glands are within normal limits. There are no stones seen in the  gallbladder. Pancreas is homogeneous. No abdominal or retroperitoneal  lymphadenopathy. Diverticulosis of the colon without evidence of  diverticulitis. There is a cyst seen within the right kidney.     PELVIS: The pelvic organs are unremarkable. The pelvic portion of the  gastrointestinal tract is within normal limits. Small periumbilical  hernia containing mesenteric fat only. Diverticulosis with no evidence  of diverticulitis. No free fluid or free air. No pelvic adenopathy. The  pelvic organs are unremarkable. No abnormal mass or fluid collections  identified. The bony structures reveal no evidence of osseous  abnormality. Degenerative change is seen within the spine and pelvis.       Impression:       No CT evidence of acute intrathoracic abnormality. No acute  intra-abdominal or pelvic abnormality.     DICTATED:   04/12/2019  EDITED/ls :   04/12/2019         This report was finalized on 4/13/2019 10:33 AM by Dr. Marisol Kulkarni MD.       XR Chest 1 View [[id number]] Collected:  04/12/19 2031     Updated:  04/13/19 1036    Narrative:          EXAMINATION: XR CHEST 1 VW - 04/12/2019     INDICATION: Weakness, dizziness, altered mental status.     COMPARISON: 04/05/2019     FINDINGS: Portable chest reveals the heart to be borderline enlarged.  Ill-defined opacification at the left lung base with small left pleural  effusion. Lung fields are grossly clear. Degenerative change is seen  within the spine. Pulmonary vascularity is within normal limits.           Impression:       Mild increased markings left lung base with small left  pleural effusion.     DICTATED:   04/12/2019  EDITED/ls :   04/12/2019       This report was finalized on 4/13/2019 10:33 AM by Dr. Marisol Kulkarni MD.                  I have reviewed the medications:    Current Facility-Administered Medications:   •  acetaminophen (TYLENOL) tablet 650 mg, 650 mg, Oral, Q4H PRN, Nancy Talley, APRN, 650 mg at 04/14/19 0634  •  amLODIPine (NORVASC) tablet 10 mg, 10 mg, Oral, Daily, Nancy Talley, APRN, 10 mg at 04/14/19 0819  •  atorvastatin (LIPITOR) tablet 40 mg, 40 mg, Oral, Daily, Nancy Talley, APRN, 40 mg at 04/14/19 0819  •  dextrose (D50W) 25 g/ 50mL Intravenous Solution 25 g, 25 g, Intravenous, Q15 Min PRN, Anel Burrows G, DO  •  dextrose (GLUTOSE) oral gel 15 g, 15 g, Oral, Q15 Min PRN, StormyAnel vigil G, DO  •  FLUoxetine (PROzac) capsule 40 mg, 40 mg, Oral, Daily, Nancy Talley, APRN, 40 mg at 04/14/19 0819  •  glucagon (human recombinant) (GLUCAGEN DIAGNOSTIC) injection 1 mg, 1 mg, Subcutaneous, PRN, Anel Burrows, DO  •  heparin (porcine) 5000 UNIT/ML injection 5,000 Units, 5,000 Units, Subcutaneous, Q8H, Nancy Talley, APRN  •  insulin lispro (humaLOG) injection 0-7 Units, 0-7 Units, Subcutaneous, 4x Daily With Meals & Nightly, Anel Burrows G, DO  •  levothyroxine (SYNTHROID, LEVOTHROID) tablet 25 mcg, 25 mcg, Oral, Daily, Nancy Talley, APRN, 25 mcg at 04/14/19 0634  •  melatonin tablet 5 mg, 5 mg, Oral, Nightly PRN, Nancy Talley, APRN, 5 mg at 04/13/19 2037  •  nicotine (NICODERM CQ) 21 MG/24HR patch 1 patch, 1 patch, Transdermal, Q24H, Anel Burrows, DO  •  ondansetron (ZOFRAN) tablet 4 mg, 4 mg, Oral, Q6H PRN, Nancy Talley, LUCIA, 4 mg at 04/12/19 2246  •  pantoprazole (PROTONIX) injection 40 mg, 40 mg, Intravenous, Q12H, Anel Burrows DO, 40 mg at 04/14/19 0819  •  sodium chloride 0.9 % flush 10 mL, 10 mL, Intravenous, PRN, Erick Dupont MD  •  sodium chloride 0.9 % flush 3 mL, 3 mL, Intravenous, Q12H, Nancy Talley APRN, 3 mL at 04/13/19 0836  •  sodium chloride 0.9 % flush 3-10 mL, 3-10 mL,  Intravenous, PRN, Nancy Talley APRN  •  sodium chloride 0.9 % infusion, 100 mL/hr, Intravenous, Continuous, Nancy Talley APRN, Last Rate: 100 mL/hr at 04/13/19 2017, 100 mL/hr at 04/13/19 2017  •  sodium chloride 0.9 % infusion, 100 mL/hr, Intravenous, Continuous, Shree cAosta MD, Last Rate: 100 mL/hr at 04/13/19 2017, 100 mL/hr at 04/13/19 2017  •  traMADol (ULTRAM) tablet 50 mg, 50 mg, Oral, Q6H PRN, Helio Santamaria MD      Assessment/Plan   Assessment / Plan     Active Hospital Problems    Diagnosis POA   • **JACKIE (acute kidney injury) (CMS/[facility]) [N17.9] Yes   • Long QT interval [R94.31] Unknown   • Essential hypertension [I10] Unknown   • Hypothyroidism [E03.9] Unknown   • Type 2 diabetes mellitus (CMS/[facility]) [E11.9] Unknown   • Tobacco abuse [Z72.0] Unknown   • CKD (chronic kidney disease) stage 3, GFR 30-59 ml/min (CMS/[facility]) [N18.3] Unknown   • Abdominal pain [R10.9] Unknown          Brief Hospital Course to date:  Freida Martinez is a 49 y.o. female here with nausea and abdominal pain, with anorexia, and JACKIE.    Nausea/abdominal pain  --CT with no pancreatitis/cholecystitis/enteritis/diverticulitis, normal lactic acid  --checked lipase, normal  --unremarkable LFTs  --no change in stool, no diarrhea  --no dysphagia/odynophagia  --consulted GI, continue PPI, EGD 4/15  JACKIE  --slowly improved, has CKD at baseline, likely DM induced  Hx of HTN  Hypothyroidism  DM  --controlled  Tobacco abuse    DVT Prophylaxis:  BARTOLO    Disposition: I expect the patient to be discharged in 1-2 days.    CODE STATUS:   Code Status and Medical Interventions:   Ordered at: 04/12/19 2114     Level Of Support Discussed With:    Patient     Code Status:    CPR     Medical Interventions (Level of Support Prior to Arrest):    Full         Electronically signed by Helio Santamaria MD, 04/14/19, 9:09 AM.        Electronically signed by Helio Santamaria MD at 4/14/2019  9:14 AM     Helio Santamaria MD at 4/13/2019  8:45 AM               [facility] Medicine Services  PROGRESS NOTE    Patient Name: Freida Martinez  : 1970  MRN: [id number]    Date of Admission: 2019  Length of Stay: 0  Primary Care Physician: Erica Beatty APRN    Subjective   Subjective     CC:  Nausea/abdominal pain    HPI:  Continues with abdominal pain. Nauseated, no emesis. Hurts when she eats or drinks. Denies dysphagia/odynophagia. Unsure if increased bloating. Denies diarrhea or change in bowel habits. Denies dysuria. No f/c. No dyspnea.    Review of Systems    Otherwise ROS is negative except as mentioned in the HPI.    Objective   Objective     Vital Signs:   Temp:  [98 °F (36.7 °C)-98.5 °F (36.9 °C)] 98.4 °F (36.9 °C)  Heart Rate:  [] 90  Resp:  [16-20] 18  BP: ()/(57-82) 132/82        Physical Exam:  NAD, alert and oriented  OP clear, dry MM  Neck supple  No LAD  Tachy  CTAB  +BS, TTP epigastric area, mild distention/bloating  No c/c/e  No rashes  Normal affect  PAIGE    Results Reviewed:  I have personally reviewed current lab, radiology, and data and agree.    Results from last 7 days   Lab Units 19  0525 19  1758   WBC 10*3/mm3 10.37 14.76*   HEMOGLOBIN g/dL 12.6 14.4   HEMATOCRIT % 40.4 44.9   PLATELETS 10*3/mm3 329 370     Results from last 7 days   Lab Units 19  0403 19  1807 19  1758   SODIUM mmol/L 138  --  138   POTASSIUM mmol/L 4.0  --  3.9   CHLORIDE mmol/L 101  --  97*   CO2 mmol/L 26.0  --  26.0   BUN mg/dL 27*  --  27*   CREATININE mg/dL 1.99*  --  2.04*   GLUCOSE mg/dL 96  --  78   CALCIUM mg/dL 8.9  --  9.8   ALT (SGPT) U/L  --   --  9   AST (SGOT) U/L  --   --  11   TROPONIN I ng/mL  --  0.00  --      Estimated Creatinine Clearance: 40.4 mL/min (A) (by C-G formula based on SCr of 1.99 mg/dL (H)).    No results found for: BNP    Microbiology Results Abnormal     None          Imaging Results (last 24 hours)     Procedure Component Value Units Date/Time    CT Abdomen  Pelvis Without Contrast [[id number]] Collected:  04/12/19 2110     Updated:  04/12/19 2110    Narrative:       EXAMINATION: CT ABDOMEN/PELVIS WO CONTRAST, CT CHEST WO CONTRAST -  04/12/2019      INDICATION: Abdominal pain.     TECHNIQUE: Imaging is obtained of the chest, abdomen and pelvis without  the administration of oral and intravenous contrast.     The radiation dose reduction device was turned on for each scan per the  ALARA (As Low as Reasonably Achievable) protocol.     COMPARISON: NONE     FINDINGS:      CHEST: The thyroid is homogeneous in appearance. No mediastinal mass or  adenopathy. The cardiac chambers are within normal limits. There is no  pericardial effusion. No bulky hilar or axillary lymphadenopathy. The  lung parenchyma is grossly clear. No parenchymal consolidation,  pulmonary mass or nodule identified. Minimal scarring identified at the  right lung base.     ABDOMEN: The liver is homogeneous. Spleen is unremarkable. Kidneys and  adrenal glands are within normal limits. There are no stones seen in the  gallbladder. Pancreas is homogeneous. No abdominal or retroperitoneal  lymphadenopathy. Diverticulosis of the colon without evidence of  diverticulitis. There is a cyst seen within the right kidney.     PELVIS: The pelvic organs are unremarkable. The pelvic portion of the  gastrointestinal tract is within normal limits. Small periumbilical  hernia containing mesenteric fat only. Diverticulosis with no evidence  of diverticulitis. No free fluid or free air. No pelvic adenopathy. The  pelvic organs are unremarkable. No abnormal mass or fluid collections  identified. The bony structures reveal no evidence of osseous  abnormality. Degenerative change is seen within the spine and pelvis.       Impression:       No CT evidence of acute intrathoracic abnormality. No acute  intra-abdominal or pelvic abnormality.     DICTATED:   04/12/2019  EDITED/ls :   04/12/2019           CT Chest Without Contrast  [[id number]] Collected:  04/12/19 2110     Updated:  04/12/19 2110    Narrative:       EXAMINATION: CT ABDOMEN/PELVIS WO CONTRAST, CT CHEST WO CONTRAST -  04/12/2019      INDICATION: Abdominal pain.     TECHNIQUE: Imaging is obtained of the chest, abdomen and pelvis without  the administration of oral and intravenous contrast.     The radiation dose reduction device was turned on for each scan per the  ALARA (As Low as Reasonably Achievable) protocol.     COMPARISON: NONE     FINDINGS:      CHEST: The thyroid is homogeneous in appearance. No mediastinal mass or  adenopathy. The cardiac chambers are within normal limits. There is no  pericardial effusion. No bulky hilar or axillary lymphadenopathy. The  lung parenchyma is grossly clear. No parenchymal consolidation,  pulmonary mass or nodule identified. Minimal scarring identified at the  right lung base.     ABDOMEN: The liver is homogeneous. Spleen is unremarkable. Kidneys and  adrenal glands are within normal limits. There are no stones seen in the  gallbladder. Pancreas is homogeneous. No abdominal or retroperitoneal  lymphadenopathy. Diverticulosis of the colon without evidence of  diverticulitis. There is a cyst seen within the right kidney.     PELVIS: The pelvic organs are unremarkable. The pelvic portion of the  gastrointestinal tract is within normal limits. Small periumbilical  hernia containing mesenteric fat only. Diverticulosis with no evidence  of diverticulitis. No free fluid or free air. No pelvic adenopathy. The  pelvic organs are unremarkable. No abnormal mass or fluid collections  identified. The bony structures reveal no evidence of osseous  abnormality. Degenerative change is seen within the spine and pelvis.       Impression:       No CT evidence of acute intrathoracic abnormality. No acute  intra-abdominal or pelvic abnormality.     DICTATED:   04/12/2019  EDITED/ls :   04/12/2019           XR Chest 1 View [[id number]] Collected:  04/12/19 2031      Updated:  04/12/19 2031    Narrative:          EXAMINATION: XR CHEST 1 VW - 04/12/2019     INDICATION: Weakness, dizziness, altered mental status.     COMPARISON: 04/05/2019     FINDINGS: Portable chest reveals the heart to be borderline enlarged.  Ill-defined opacification at the left lung base with small left pleural  effusion. Lung fields are grossly clear. Degenerative change is seen  within the spine. Pulmonary vascularity is within normal limits.           Impression:       Mild increased markings left lung base with small left  pleural effusion.     DICTATED:   04/12/2019  EDITED/ls :   04/12/2019                      I have reviewed the medications:    Current Facility-Administered Medications:   •  acetaminophen (TYLENOL) tablet 650 mg, 650 mg, Oral, Q4H PRN, Nancy Talley, APRN, 650 mg at 04/13/19 0452  •  amLODIPine (NORVASC) tablet 10 mg, 10 mg, Oral, Daily, Nancy Talley, APRN, 10 mg at 04/13/19 0835  •  atorvastatin (LIPITOR) tablet 40 mg, 40 mg, Oral, Daily, Nancy Talley, APRN, 40 mg at 04/13/19 0835  •  dextrose (D50W) 25 g/ 50mL Intravenous Solution 25 g, 25 g, Intravenous, Q15 Min PRN, Anel Burrows G, DO  •  dextrose (GLUTOSE) oral gel 15 g, 15 g, Oral, Q15 Min PRN, StormyAnel vigil G, DO  •  FLUoxetine (PROzac) capsule 40 mg, 40 mg, Oral, Daily, Nancy Talley, APRN, 40 mg at 04/13/19 0835  •  glucagon (human recombinant) (GLUCAGEN DIAGNOSTIC) injection 1 mg, 1 mg, Subcutaneous, PRN, StormyAnel vigil G, DO  •  heparin (porcine) 5000 UNIT/ML injection 5,000 Units, 5,000 Units, Subcutaneous, Q8H, Nancy Talley, APRN  •  insulin lispro (humaLOG) injection 0-7 Units, 0-7 Units, Subcutaneous, 4x Daily With Meals & Nightly, Anel Burrows G, DO  •  levothyroxine (SYNTHROID, LEVOTHROID) tablet 25 mcg, 25 mcg, Oral, Daily, Nancy Talley, APRN, 25 mcg at 04/13/19 0452  •  melatonin tablet 5 mg, 5 mg, Oral, Nightly PRN, Nancy Talley, APRN, 5 mg at 04/12/19 0721  •  nicotine (NICODERM CQ) 21  MG/24HR patch 1 patch, 1 patch, Transdermal, Q24H, Stormy, Anel G, DO  •  ondansetron (ZOFRAN) tablet 4 mg, 4 mg, Oral, Q6H PRN, Nancy Talley APRN, 4 mg at 04/12/19 2246  •  pantoprazole (PROTONIX) injection 40 mg, 40 mg, Intravenous, Q12H, StormyAnel vigil, DO, 40 mg at 04/13/19 0835  •  sodium chloride 0.9 % flush 10 mL, 10 mL, Intravenous, PRN, Erick Dupont MD  •  sodium chloride 0.9 % flush 3 mL, 3 mL, Intravenous, Q12H, Nancy Talley APRN, 3 mL at 04/13/19 0836  •  sodium chloride 0.9 % flush 3-10 mL, 3-10 mL, Intravenous, PRN, Nancy Talley, APRN  •  sodium chloride 0.9 % infusion, 100 mL/hr, Intravenous, Continuous, Nancy Talley APRN, Last Rate: 100 mL/hr at 04/13/19 0835, 100 mL/hr at 04/13/19 0835      Assessment/Plan   Assessment / Plan     Active Hospital Problems    Diagnosis POA   • **JACKIE (acute kidney injury) (CMS/Formerly [facility]) [N17.9] Yes   • Long QT interval [R94.31] Unknown   • Essential hypertension [I10] Unknown   • Hypothyroidism [E03.9] Unknown   • Type 2 diabetes mellitus (CMS/Formerly [facility]) [E11.9] Unknown   • Tobacco abuse [Z72.0] Unknown   • CKD (chronic kidney disease) stage 3, GFR 30-59 ml/min (CMS/Formerly [facility]) [N18.3] Unknown   • Abdominal pain [R10.9] Unknown          Brief Hospital Course to date:  Freida Martinez is a 49 y.o. female here with nausea and abdominal pain, with anorexia, and JACKIE.    Nausea/abdominal pain  --CT with no pancreatitis/cholecystitis/enteritis/diverticulitis, normal lactic acid  --check lipase  --unremarkable LFTs  --no change in stool, no diarrhea  --no dysphagia/odynophagia  --consult GI, may benefit from EGD  JACKIE  --s/p 2 L IVF, with little improvement  --consult nephrology  Hx of HTN  Hypothyroidism  DM  Tobacco abuse    DVT Prophylaxis:  BARTOLO    Disposition: I expect the patient to be discharged in 1-2 days.    CODE STATUS:   Code Status and Medical Interventions:   Ordered at: 04/12/19 2114     Level Of Support Discussed With:    Patient     Code Status:    CPR      Medical Interventions (Level of Support Prior to Arrest):    Full         Electronically signed by Helio Santamaria MD, 04/13/19, 8:45 AM.        Electronically signed by Helio Santamaria MD at 4/13/2019  8:49 AM          Consult Notes (last 72 hours) (Notes from 4/11/2019 10:13 AM through 4/14/2019 10:13 AM)      Shree Acosta MD at 4/13/2019  1:09 PM      Consult Orders    1. Inpatient Nephrology Consult [[id number]] ordered by Helio Santamaria MD at 04/13/19 0845                  Referring Provider: Anel Iniguez  Reason for Consultation: Acute on chronic renal failure    Subjective     Chief complaint nausea poor oral intake, acute renal failure    History of present illness:    49-year-old morbidly obese -American female with history of CKD stage III diagnosed a couple years ago has not followed in the office.  History of diabetes, hypertension.  Patient presented with generalized weakness, nausea, poor oral intake, abdominal pain and chills for a week.  Patient was seen a couple weeks ago in the ER where she was discharged.  At that time creatinine around 1.54.  Patient return to the hospital with a creatinine 2.04 with poor oral intake.  And has been started on IV fluids at this time creatinine down to 1.  9 9.  UA is positive for protein.  Ultrasound of the kidney shows normal size kidney.  CT of the abdomen was negative.  Patient denies any epistaxis, hemoptysis, recent rash, kidney stones, or nonsteroidal use but her medication list does have naproxen as listed.  He has a strong family history of paternal kidney disease with her grandfather was on dialysis.  She has been consulted at this time.  She continues to have some nausea at this time, with generalized weakness    History  Past Medical History:   Diagnosis Date   • Asthma    • Diabetes mellitus (CMS/Formerly [facility])    • Disease of thyroid gland    • Hypertension    • Sleep apnea    ,   Past Surgical History:   Procedure Laterality Date   • TUBAL  ABDOMINAL LIGATION     ,   Family History   Problem Relation Age of Onset   • Cancer Mother    • Diabetes Father    ,   Social History     Tobacco Use   • Smoking status: Current Every Day Smoker     Packs/day: 1.00     Types: Cigarettes   • Smokeless tobacco: Never Used   Substance Use Topics   • Alcohol use: No     Frequency: Never   • Drug use: No   ,   Medications Prior to Admission   Medication Sig Dispense Refill Last Dose   • amLODIPine (NORVASC) 10 MG tablet Take 10 mg by mouth Daily.      • atorvastatin (LIPITOR) 40 MG tablet Take 40 mg by mouth Daily.      • FLUoxetine (PROZAC) 40 MG capsule Take 40 mg by mouth Daily.      • GLIPIZIDE PO Take 5 mg by mouth Daily.      • levothyroxine (SYNTHROID, LEVOTHROID) 25 MCG tablet Take 25 mcg by mouth Daily.      • losartan-hydrochlorothiazide (HYZAAR) 50-12.5 MG per tablet Take 1 tablet by mouth Daily.      • metFORMIN (GLUCOPHAGE) 500 MG tablet Take 500 mg by mouth 2 (Two) Times a Day With Meals.      • aspirin 325 MG tablet Take 325 mg by mouth Daily.      • cefdinir (OMNICEF) 300 MG capsule Take 1 capsule by mouth 2 (Two) Times a Day. (Patient not taking: Reported on 4/12/2019) 14 capsule 0 Not Taking at Unknown time   • cyclobenzaprine (FLEXERIL) 10 MG tablet Take 1 tablet by mouth 3 (Three) Times a Day As Needed for Muscle Spasms. (Patient not taking: Reported on 4/12/2019) 15 tablet 0 Not Taking at Unknown time   • Ergocalciferol (VITAMIN D2 PO) Take 50,000 Units by mouth 1 (One) Time Per Week.      • naproxen (EC NAPROSYN) 500 MG EC tablet Take 1 tablet by mouth 2 (Two) Times a Day As Needed (PAIN). (Patient not taking: Reported on 4/12/2019) 14 tablet 0 Not Taking at Unknown time   • phenazopyridine (PYRIDIUM) 200 MG tablet Take 1 tablet by mouth 3 (Three) Times a Day As Needed for bladder spasms. (Patient not taking: Reported on 4/12/2019) 6 tablet 0 Not Taking at Unknown time   • PredniSONE (DELTASONE) 10 MG (21) tablet pack Use as directed on package  (Patient not taking: Reported on 4/12/2019) 21 tablet 0 Not Taking at Unknown time   , Scheduled Meds:    amLODIPine 10 mg Oral Daily   atorvastatin 40 mg Oral Daily   FLUoxetine 40 mg Oral Daily   heparin (porcine) 5,000 Units Subcutaneous Q8H   insulin lispro 0-7 Units Subcutaneous 4x Daily With Meals & Nightly   levothyroxine 25 mcg Oral Daily   nicotine 1 patch Transdermal Q24H   pantoprazole 40 mg Intravenous Q12H   sodium chloride 3 mL Intravenous Q12H   , Continuous Infusions:    sodium chloride 100 mL/hr Last Rate: 100 mL/hr (04/13/19 0835)   , PRN Meds:  •  acetaminophen  •  dextrose  •  dextrose  •  glucagon (human recombinant)  •  melatonin  •  ondansetron  •  sodium chloride  •  sodium chloride and Allergies:  Lisinopril    Review of Systems  Pertinent items are noted in HPI, all other systems reviewed and negative    Objective     Vital Signs  Temp:  [98 °F (36.7 °C)-98.5 °F (36.9 °C)] 98.4 °F (36.9 °C)  Heart Rate:  [] 80  Resp:  [16-20] 20  BP: ()/(57-87) 124/87    I/O this shift:  In: 1046 [P.O.:60; I.V.:986]  Out: -   I/O last 3 completed shifts:  In: 1120 [P.O.:120; IV Piggyback:1000]  Out: 1700 [Urine:1700]    Physical Exam:     General Appearance:    Alert, cooperative, in no acute distress morbidly obese   Head:    Normocephalic, without obvious abnormality, atraumatic   Eyes:            Conjunctivae and sclerae normal, no   icterus, no pallor, corneas clear, PERRLA   Ears:    Ears appear intact with no abnormalities noted   Throat:    oral mucosa moist   Neck:   No adenopathy, supple, trachea midline, no thyromegaly, no     carotid bruit, no JVD   Back:     No kyphosis present, no scoliosis present,    Lungs:     Clear to auscultation,respirations regular, even and                   unlabored    Heart:    Regular rhythm and normal rate, normal S1 and S2, no            murmur, no gallop, no rub, no click   Breast Exam:    Deferred   Abdomen:     Normal bowel sounds, no masses, no  organomegaly, soft        non-tender, non-distended, no guarding, no rebound                 tenderness, obesity   Genitalia:    Deferred   Extremities:   Moves all extremities well, no edema, no cyanosis, no              redness   Pulses:   Pulses palpable and equal bilaterally   Skin:   No bleeding, bruising or rash   Lymph nodes:   No palpable adenopathy   Neurologic:  Awake alert oriented no focal deficit.       Results Review:   I reviewed the patient's new clinical results.    WBC WBC   Date Value Ref Range Status   04/13/2019 10.37 3.40 - 10.80 10*3/mm3 Final   04/12/2019 14.76 (H) 3.40 - 10.80 10*3/mm3 Final      HGB Hemoglobin   Date Value Ref Range Status   04/13/2019 12.6 12.0 - 15.9 g/dL Final   04/12/2019 14.4 12.0 - 15.9 g/dL Final      HCT Hematocrit   Date Value Ref Range Status   04/13/2019 40.4 34.0 - 46.6 % Final   04/12/2019 44.9 34.0 - 46.6 % Final      Platlets No results found for: LABPLAT   MCV MCV   Date Value Ref Range Status   04/13/2019 85.8 79.0 - 97.0 fL Final   04/12/2019 84.9 79.0 - 97.0 fL Final          Sodium Sodium   Date Value Ref Range Status   04/13/2019 138 136 - 145 mmol/L Final   04/12/2019 138 136 - 145 mmol/L Final      Potassium Potassium   Date Value Ref Range Status   04/13/2019 4.0 3.5 - 5.2 mmol/L Final   04/12/2019 3.9 3.5 - 5.2 mmol/L Final      Chloride Chloride   Date Value Ref Range Status   04/13/2019 101 98 - 107 mmol/L Final   04/12/2019 97 (L) 98 - 107 mmol/L Final      CO2 CO2   Date Value Ref Range Status   04/13/2019 26.0 22.0 - 29.0 mmol/L Final   04/12/2019 26.0 22.0 - 29.0 mmol/L Final      BUN BUN   Date Value Ref Range Status   04/13/2019 27 (H) 6 - 20 mg/dL Final   04/12/2019 27 (H) 6 - 20 mg/dL Final      Creatinine Creatinine   Date Value Ref Range Status   04/13/2019 1.99 (H) 0.57 - 1.00 mg/dL Final   04/12/2019 2.04 (H) 0.57 - 1.00 mg/dL Final      Calcium Calcium   Date Value Ref Range Status   04/13/2019 8.9 8.6 - 10.5 mg/dL Final   04/12/2019  9.8 8.6 - 10.5 mg/dL Final      PO4 No results found for: CAPO4   Albumin Albumin   Date Value Ref Range Status   04/12/2019 4.10 3.50 - 5.20 g/dL Final      Magnesium Magnesium   Date Value Ref Range Status   04/12/2019 2.2 1.6 - 2.6 mg/dL Final      Uric Acid No results found for: URICACID           amLODIPine 10 mg Oral Daily   atorvastatin 40 mg Oral Daily   FLUoxetine 40 mg Oral Daily   heparin (porcine) 5,000 Units Subcutaneous Q8H   insulin lispro 0-7 Units Subcutaneous 4x Daily With Meals & Nightly   levothyroxine 25 mcg Oral Daily   nicotine 1 patch Transdermal Q24H   pantoprazole 40 mg Intravenous Q12H   sodium chloride 3 mL Intravenous Q12H       sodium chloride 100 mL/hr Last Rate: 100 mL/hr (04/13/19 0835)       Assessment/Plan       JACKIE (acute kidney injury) (CMS/[facility])    Essential hypertension    Hypothyroidism    Type 2 diabetes mellitus (CMS/[facility])    Tobacco abuse    CKD (chronic kidney disease) stage 3, GFR 30-59 ml/min (CMS/[facility])    Abdominal pain    Long QT interval    1.  Acute on chronic renal failure with a creatinine on presentation of 2.04.  Previous creatinine generally 1.54.  Patient has history of CKD stage III diagnosed couple years ago but declined to follow-up with the nephrology.  Recent increase in creatinine most likely due to poor poor oral intake with nausea.  The sound of the kidney normal size kidney of 11.6 cm right small 2 cm cyst, left kidney 12.2 cm.  No hydronephrosis or nephrolithiasis.  Bladder was grossly normal.  UA has shown some proteinuria.  2.  CKD stage III   3.  Proteinuria: Likely related to diabetes, hypertension and morbid obesity.  Plan:  IV normal saline ordered.  Agree with holding diuretics for now.  Check protein creatinine ratio.  Avoid nephrotoxic medications.  Keep systolic blood pressure greater than 100.  Check volume status.  Check labs in the morning.  Daily evaluation for renal replacement therapy will be done.  Adjust medication for the new  GFR.  Case discussed with the medical staff taking care of the patient.    I discussed the patients findings and my recommendations with patient    Shree Acosta MD  04/13/19  @NOW           Electronically signed by Shree Acosta MD at 4/13/2019  1:22 PM     Jude Clinton MD at 4/13/2019 11:03 AM      Consult Orders    1. Inpatient Gastroenterology Consult [[id number]] ordered by Helio Santamaria MD at 04/13/19 0844                [facility]    Referring Provider: No ref. provider found    Primary Care Provider: Erica Beatty APRN    Reason for Consultation: Abdominal pain nausea vomiting    Chief complaint abdominal pain nausea vomiting    History of present illness:  Freida Martinez is a 49 y.o. female who is admitted with abdominal pain nausea and dehydration.  She was in ER last week with complaints of generalized weakness.  She states since that time she is increased epigastric abdominal pain.  Worse after eating.  Has had some nausea but no vomiting.  She denies aspirins and NSAIDs that she has renal insufficiency.  No diarrhea constipation.  No prior history peptic ulcer disease.  No prior colonoscopy.  She still has her gallbladder.  She has CT scan abdomen pelvis last week which was unrevealing and repeat yesterday which was normal  Allergies:  Lisinopril    Scheduled Meds:    amLODIPine 10 mg Oral Daily   atorvastatin 40 mg Oral Daily   FLUoxetine 40 mg Oral Daily   heparin (porcine) 5,000 Units Subcutaneous Q8H   insulin lispro 0-7 Units Subcutaneous 4x Daily With Meals & Nightly   levothyroxine 25 mcg Oral Daily   nicotine 1 patch Transdermal Q24H   pantoprazole 40 mg Intravenous Q12H   sodium chloride 3 mL Intravenous Q12H        Infusions:    sodium chloride 100 mL/hr Last Rate: 100 mL/hr (04/13/19 0835)       PRN Meds:  •  acetaminophen  •  dextrose  •  dextrose  •  glucagon (human recombinant)  •  melatonin  •  ondansetron  •  sodium chloride  •  sodium chloride    Home  Meds:  Medications Prior to Admission   Medication Sig Dispense Refill Last Dose   • amLODIPine (NORVASC) 10 MG tablet Take 10 mg by mouth Daily.      • atorvastatin (LIPITOR) 40 MG tablet Take 40 mg by mouth Daily.      • FLUoxetine (PROZAC) 40 MG capsule Take 40 mg by mouth Daily.      • GLIPIZIDE PO Take 5 mg by mouth Daily.      • levothyroxine (SYNTHROID, LEVOTHROID) 25 MCG tablet Take 25 mcg by mouth Daily.      • losartan-hydrochlorothiazide (HYZAAR) 50-12.5 MG per tablet Take 1 tablet by mouth Daily.      • metFORMIN (GLUCOPHAGE) 500 MG tablet Take 500 mg by mouth 2 (Two) Times a Day With Meals.      • aspirin 325 MG tablet Take 325 mg by mouth Daily.      • cefdinir (OMNICEF) 300 MG capsule Take 1 capsule by mouth 2 (Two) Times a Day. (Patient not taking: Reported on 4/12/2019) 14 capsule 0 Not Taking at Unknown time   • cyclobenzaprine (FLEXERIL) 10 MG tablet Take 1 tablet by mouth 3 (Three) Times a Day As Needed for Muscle Spasms. (Patient not taking: Reported on 4/12/2019) 15 tablet 0 Not Taking at Unknown time   • Ergocalciferol (VITAMIN D2 PO) Take 50,000 Units by mouth 1 (One) Time Per Week.      • naproxen (EC NAPROSYN) 500 MG EC tablet Take 1 tablet by mouth 2 (Two) Times a Day As Needed (PAIN). (Patient not taking: Reported on 4/12/2019) 14 tablet 0 Not Taking at Unknown time   • phenazopyridine (PYRIDIUM) 200 MG tablet Take 1 tablet by mouth 3 (Three) Times a Day As Needed for bladder spasms. (Patient not taking: Reported on 4/12/2019) 6 tablet 0 Not Taking at Unknown time   • PredniSONE (DELTASONE) 10 MG (21) tablet pack Use as directed on package (Patient not taking: Reported on 4/12/2019) 21 tablet 0 Not Taking at Unknown time       ROS: Review of Systems   Constitutional: Positive for unexpected weight change.   HENT: Negative for trouble swallowing.    Respiratory: Positive for shortness of breath.    Cardiovascular: Negative for chest pain.   Gastrointestinal: Positive for abdominal pain.  "Negative for constipation and diarrhea.   Genitourinary: Negative for difficulty urinating and dysuria.   Neurological: Positive for weakness.   All other systems reviewed and are negative.      PAST MED HX: Pt  has a past medical history of Asthma, Diabetes mellitus (CMS/HCC), Disease of thyroid gland, Hypertension, and Sleep apnea.  PAST SURG HX: Pt  has a past surgical history that includes Tubal ligation.  FAM HX: family history includes Cancer in her mother; Diabetes in her father.  SOC HX: Pt  reports that she has been smoking cigarettes.  She has been smoking about 1.00 pack per day. She has never used smokeless tobacco. She reports that she does not drink alcohol or use drugs.    /82 (BP Location: Left arm, Patient Position: Lying)   Pulse 90   Temp 98.4 °F (36.9 °C) (Oral)   Resp 18   Ht 162.6 cm (64.02\")   Wt 105 kg (232 lb 6.4 oz)   SpO2 93%   BMI 39.87 kg/m²      Physical Exam  Wt Readings from Last 3 Encounters:   04/12/19 105 kg (232 lb 6.4 oz)   04/05/19 129 kg (285 lb)   12/30/18 129 kg (285 lb)   ,body mass index is 39.87 kg/m².    General Well developed; well nourished; no acute distress.   ENT Good dentition.  Oral mucosa pink & moist without thrush or lesions.    Neck Neck supple; trachea midline. No thyromegaly  Resp CTA; no rhonchi, rales, or wheezes.  Respiration effort normal  CV RRR; ; no M/R/G. No lower extremity edema  GI Abd soft, NT, ND, normal active bowel sounds.  No HSM.  No abd hernia  Skin No rash; no lesions; no bruises.  Skin turgor normal  Musc No clubbing; no cyanosis.    Psych Oriented to time, place, and person.  Appropriate affect      Results Review:   I reviewed the patient's new clinical results.  I reviewed the patient's new imaging results and agree with the interpretation.    Lab Results   Component Value Date    WBC 10.37 04/13/2019    HGB 12.6 04/13/2019    HCT 40.4 04/13/2019    MCV 85.8 04/13/2019     04/13/2019       Lab Results   Component " Value Date    GLUCOSE 96 04/13/2019    BUN 27 (H) 04/13/2019    CREATININE 1.99 (H) 04/13/2019    EGFRIFNONA 27 (L) 04/13/2019    BCR 13.6 04/13/2019    CO2 26.0 04/13/2019    CALCIUM 8.9 04/13/2019    ALBUMIN 4.10 04/12/2019    AST 11 04/12/2019    ALT 9 04/12/2019       ASSESSMENTS/PLANS      #1 epigastric abdominal pain  2.  Nausea vomiting    Symptoms sound peptic in nature.  She denies NSAIDs or aspirin.  Continue IV PPI.  Will plan for EGD on Monday.    LFTs normal.  The pain does not sound biliary in nature.    Suggested that she undergo screening colonoscopy after discharge.    Also suggested that she stop smoking again.  She did not seem receptive to that idea      I discussed the patients findings and my recommendations with patient    Jude Clinton MD  04/13/19  11:03 AM    Electronically signed by Jude Clinton MD at 4/13/2019 11:08 AM

## 2019-04-14 NOTE — PLAN OF CARE
Problem: Patient Care Overview  Goal: Plan of Care Review  Outcome: Ongoing (interventions implemented as appropriate)   04/14/19 0233   Coping/Psychosocial   Plan of Care Reviewed With patient   Plan of Care Review   Progress no change   OTHER   Outcome Summary Pt with no acute distress overnight. Plan for EGD Monday.        Problem: Renal Failure/Kidney Injury, Acute (Adult)  Goal: Signs and Symptoms of Listed Potential Problems Will be Absent, Minimized or Managed (Renal Failure/Kidney Injury, Acute)  Outcome: Ongoing (interventions implemented as appropriate)   04/14/19 0233   Goal/Outcome Evaluation   Problems Assessed (Acute Renal Failure/Kidney Injury) all   Problems Present (ARF/Kidney Injury) other (see comments)  (lab values)

## 2019-04-15 ENCOUNTER — ANESTHESIA (OUTPATIENT)
Dept: GASTROENTEROLOGY | Facility: HOSPITAL | Age: 49
End: 2019-04-15

## 2019-04-15 ENCOUNTER — ANESTHESIA EVENT (OUTPATIENT)
Dept: GASTROENTEROLOGY | Facility: HOSPITAL | Age: 49
End: 2019-04-15

## 2019-04-15 LAB
ALBUMIN SERPL-MCNC: 3.8 G/DL (ref 3.5–5.2)
ALBUMIN/GLOB SERPL: 1 G/DL
ALP SERPL-CCNC: 76 U/L (ref 39–117)
ALT SERPL W P-5'-P-CCNC: 8 U/L (ref 1–33)
ANION GAP SERPL CALCULATED.3IONS-SCNC: 11 MMOL/L
AST SERPL-CCNC: 10 U/L (ref 1–32)
B-HCG UR QL: NEGATIVE
BILIRUB SERPL-MCNC: 0.4 MG/DL (ref 0.2–1.2)
BUN BLD-MCNC: 14 MG/DL (ref 6–20)
BUN/CREAT SERPL: 11.8 (ref 7–25)
CALCIUM SPEC-SCNC: 9.6 MG/DL (ref 8.6–10.5)
CHLORIDE SERPL-SCNC: 104 MMOL/L (ref 98–107)
CO2 SERPL-SCNC: 25 MMOL/L (ref 22–29)
CREAT BLD-MCNC: 1.19 MG/DL (ref 0.57–1)
DEPRECATED RDW RBC AUTO: 44.8 FL (ref 37–54)
ERYTHROCYTE [DISTWIDTH] IN BLOOD BY AUTOMATED COUNT: 14.5 % (ref 12.3–15.4)
GFR SERPL CREATININE-BSD FRML MDRD: 48 ML/MIN/1.73
GLOBULIN UR ELPH-MCNC: 3.7 GM/DL
GLUCOSE BLD-MCNC: 130 MG/DL (ref 65–99)
GLUCOSE BLDC GLUCOMTR-MCNC: 123 MG/DL (ref 70–130)
GLUCOSE BLDC GLUCOMTR-MCNC: 142 MG/DL (ref 70–130)
GLUCOSE BLDC GLUCOMTR-MCNC: 88 MG/DL (ref 70–130)
GLUCOSE BLDC GLUCOMTR-MCNC: 93 MG/DL (ref 70–130)
GLUCOSE BLDC GLUCOMTR-MCNC: 97 MG/DL (ref 70–130)
HCT VFR BLD AUTO: 41.5 % (ref 34–46.6)
HGB BLD-MCNC: 13.3 G/DL (ref 12–15.9)
MCH RBC QN AUTO: 27 PG (ref 26.6–33)
MCHC RBC AUTO-ENTMCNC: 32 G/DL (ref 31.5–35.7)
MCV RBC AUTO: 84.2 FL (ref 79–97)
PLATELET # BLD AUTO: 302 10*3/MM3 (ref 140–450)
PMV BLD AUTO: 10.9 FL (ref 6–12)
POTASSIUM BLD-SCNC: 4.1 MMOL/L (ref 3.5–5.2)
PROT SERPL-MCNC: 7.5 G/DL (ref 6–8.5)
RBC # BLD AUTO: 4.93 10*6/MM3 (ref 3.77–5.28)
SODIUM BLD-SCNC: 140 MMOL/L (ref 136–145)
WBC NRBC COR # BLD: 11.08 10*3/MM3 (ref 3.4–10.8)

## 2019-04-15 PROCEDURE — G0378 HOSPITAL OBSERVATION PER HR: HCPCS

## 2019-04-15 PROCEDURE — 99225 PR SBSQ OBSERVATION CARE/DAY 25 MINUTES: CPT | Performed by: INTERNAL MEDICINE

## 2019-04-15 PROCEDURE — 82962 GLUCOSE BLOOD TEST: CPT

## 2019-04-15 PROCEDURE — 96376 TX/PRO/DX INJ SAME DRUG ADON: CPT

## 2019-04-15 PROCEDURE — 81025 URINE PREGNANCY TEST: CPT | Performed by: ANESTHESIOLOGY

## 2019-04-15 PROCEDURE — 80053 COMPREHEN METABOLIC PANEL: CPT | Performed by: HOSPITALIST

## 2019-04-15 PROCEDURE — 85027 COMPLETE CBC AUTOMATED: CPT | Performed by: HOSPITALIST

## 2019-04-15 PROCEDURE — 88305 TISSUE EXAM BY PATHOLOGIST: CPT | Performed by: INTERNAL MEDICINE

## 2019-04-15 PROCEDURE — 88342 IMHCHEM/IMCYTCHM 1ST ANTB: CPT | Performed by: INTERNAL MEDICINE

## 2019-04-15 PROCEDURE — G0108 DIAB MANAGE TRN  PER INDIV: HCPCS

## 2019-04-15 PROCEDURE — 25010000002 PROPOFOL 1000 MG/ML EMULSION: Performed by: NURSE ANESTHETIST, CERTIFIED REGISTERED

## 2019-04-15 RX ORDER — PROMETHAZINE HYDROCHLORIDE 25 MG/1
25 SUPPOSITORY RECTAL ONCE AS NEEDED
Status: DISCONTINUED | OUTPATIENT
Start: 2019-04-15 | End: 2019-04-15 | Stop reason: HOSPADM

## 2019-04-15 RX ORDER — ONDANSETRON 2 MG/ML
4 INJECTION INTRAMUSCULAR; INTRAVENOUS ONCE AS NEEDED
Status: DISCONTINUED | OUTPATIENT
Start: 2019-04-15 | End: 2019-04-15 | Stop reason: HOSPADM

## 2019-04-15 RX ORDER — PROMETHAZINE HYDROCHLORIDE 25 MG/ML
6.25 INJECTION, SOLUTION INTRAMUSCULAR; INTRAVENOUS ONCE AS NEEDED
Status: DISCONTINUED | OUTPATIENT
Start: 2019-04-15 | End: 2019-04-15 | Stop reason: HOSPADM

## 2019-04-15 RX ORDER — SODIUM CHLORIDE 9 MG/ML
50 INJECTION, SOLUTION INTRAVENOUS CONTINUOUS
Status: DISCONTINUED | OUTPATIENT
Start: 2019-04-15 | End: 2019-04-19 | Stop reason: HOSPADM

## 2019-04-15 RX ORDER — HYDRALAZINE HYDROCHLORIDE 20 MG/ML
5 INJECTION INTRAMUSCULAR; INTRAVENOUS
Status: DISCONTINUED | OUTPATIENT
Start: 2019-04-15 | End: 2019-04-15 | Stop reason: HOSPADM

## 2019-04-15 RX ORDER — LABETALOL HYDROCHLORIDE 5 MG/ML
5 INJECTION, SOLUTION INTRAVENOUS
Status: DISCONTINUED | OUTPATIENT
Start: 2019-04-15 | End: 2019-04-15 | Stop reason: HOSPADM

## 2019-04-15 RX ORDER — PROMETHAZINE HYDROCHLORIDE 25 MG/1
25 TABLET ORAL ONCE AS NEEDED
Status: DISCONTINUED | OUTPATIENT
Start: 2019-04-15 | End: 2019-04-15 | Stop reason: HOSPADM

## 2019-04-15 RX ORDER — IPRATROPIUM BROMIDE AND ALBUTEROL SULFATE 2.5; .5 MG/3ML; MG/3ML
3 SOLUTION RESPIRATORY (INHALATION) ONCE AS NEEDED
Status: DISCONTINUED | OUTPATIENT
Start: 2019-04-15 | End: 2019-04-15 | Stop reason: HOSPADM

## 2019-04-15 RX ADMIN — FLUOXETINE HYDROCHLORIDE 40 MG: 20 CAPSULE ORAL at 08:52

## 2019-04-15 RX ADMIN — PANTOPRAZOLE SODIUM 40 MG: 40 INJECTION, POWDER, FOR SOLUTION INTRAVENOUS at 20:41

## 2019-04-15 RX ADMIN — PROPOFOL 200 MCG/KG/MIN: 10 INJECTION, EMULSION INTRAVENOUS at 15:04

## 2019-04-15 RX ADMIN — SODIUM CHLORIDE, PRESERVATIVE FREE 3 ML: 5 INJECTION INTRAVENOUS at 08:53

## 2019-04-15 RX ADMIN — MELATONIN TAB 5 MG 5 MG: 5 TAB at 20:49

## 2019-04-15 RX ADMIN — SODIUM CHLORIDE 100 ML/HR: 9 INJECTION, SOLUTION INTRAVENOUS at 12:50

## 2019-04-15 RX ADMIN — TOPICAL ANESTHETIC 1 SPRAY: 200 SPRAY DENTAL; PERIODONTAL at 14:55

## 2019-04-15 RX ADMIN — AMLODIPINE BESYLATE 10 MG: 10 TABLET ORAL at 08:51

## 2019-04-15 RX ADMIN — LEVOTHYROXINE SODIUM 25 MCG: 25 TABLET ORAL at 06:16

## 2019-04-15 RX ADMIN — ACETAMINOPHEN 650 MG: 325 TABLET, FILM COATED ORAL at 06:15

## 2019-04-15 RX ADMIN — PANTOPRAZOLE SODIUM 40 MG: 40 INJECTION, POWDER, FOR SOLUTION INTRAVENOUS at 08:52

## 2019-04-15 RX ADMIN — ATORVASTATIN CALCIUM 40 MG: 40 TABLET, FILM COATED ORAL at 08:51

## 2019-04-15 NOTE — PLAN OF CARE
Problem: Patient Care Overview  Goal: Plan of Care Review  Outcome: Ongoing (interventions implemented as appropriate)   04/15/19 6127   Coping/Psychosocial   Plan of Care Reviewed With patient   Plan of Care Review   Progress improving   OTHER   Outcome Summary Pt denies any pain, nausea, or vomiting currently however reported some abdominal tenderness in RUQ/LUQ pain. Pt looking forward to resuming diet. BP elevated after ENDO. IVF at 50mL/HR. Will continue to monitor.        Problem: GI Endoscopy (Adult)  Goal: Signs and Symptoms of Listed Potential Problems Will be Absent, Minimized or Managed (GI Endoscopy)  Outcome: Ongoing (interventions implemented as appropriate)

## 2019-04-15 NOTE — PROGRESS NOTES
"   LOS: 3 days    Patient Care Team:  Erica Beatty APRN as PCP - General (Obstetrics and Gynecology)  Provider, No Known as PCP - Family Medicine  History of present illness:    49-year-old morbidly obese with history of CKD stage III diagnosed a couple years ago   History of diabetes, hypertension.  Patient presented with generalized weakness, nausea, poor oral intake, abdominal pain and chills for a week.    Few weeks back creatinine around 1.54.  Patient return to the hospital with a creatinine 2.04 with poor oral intake.  Subjective   New events, no obvious distress.  Renal function improved.     Review of Systems:   Denies any nausea vomiting, chest pain shortness of breath, dysuria or hematuria.      Objective     Vital Sign Min/Max for last 24 hours  Temp  Min: 98.7 °F (37.1 °C)  Max: 99 °F (37.2 °C)   BP  Min: 119/77  Max: 139/113   Pulse  Min: 81  Max: 82   Resp  Min: 16  Max: 18   No Data Recorded   No Data Recorded   No Data Recorded     Flowsheet Rows      First Filed Value   Admission Height  162.6 cm (64\") Documented at 04/12/2019 1715   Admission Weight  113 kg (249 lb) Documented at 04/12/2019 1715          I/O this shift:  In: 60 [P.O.:60]  Out: 50 [Urine:50]  I/O last 3 completed shifts:  In: 1560 [P.O.:1560]  Out: 4200 [Urine:4200]    Physical Exam:  General Appearance: Morbidly obese -American female in no obvious distress  eyes: PER, EOMI.  Neck: Supple no JVD.  Lungs: Clear auscultation, no rales rhonchi's, equal chest movement, nonlabored.  Heart: No gallop, murmur, rub, RRR.  Abdomen: Soft, nontender, positive bowel sounds, no organomegaly.  Obese  Extremities: No edema, no cyanosis.  Neuro: No focal deficit, moving all extremities, alert oriented X 3      WBC WBC   Date Value Ref Range Status   04/15/2019 11.08 (H) 3.40 - 10.80 10*3/mm3 Final   04/14/2019 9.89 3.40 - 10.80 10*3/mm3 Final   04/13/2019 10.37 3.40 - 10.80 10*3/mm3 Final   04/12/2019 14.76 (H) 3.40 - 10.80 10*3/mm3 " Final      HGB Hemoglobin   Date Value Ref Range Status   04/15/2019 13.3 12.0 - 15.9 g/dL Final   04/14/2019 12.5 12.0 - 15.9 g/dL Final   04/13/2019 12.6 12.0 - 15.9 g/dL Final   04/12/2019 14.4 12.0 - 15.9 g/dL Final      HCT Hematocrit   Date Value Ref Range Status   04/15/2019 41.5 34.0 - 46.6 % Final   04/14/2019 39.6 34.0 - 46.6 % Final   04/13/2019 40.4 34.0 - 46.6 % Final   04/12/2019 44.9 34.0 - 46.6 % Final      Platlets No results found for: LABPLAT   MCV MCV   Date Value Ref Range Status   04/15/2019 84.2 79.0 - 97.0 fL Final   04/14/2019 86.1 79.0 - 97.0 fL Final   04/13/2019 85.8 79.0 - 97.0 fL Final   04/12/2019 84.9 79.0 - 97.0 fL Final          Sodium Sodium   Date Value Ref Range Status   04/15/2019 140 136 - 145 mmol/L Final   04/14/2019 138 136 - 145 mmol/L Final   04/13/2019 138 136 - 145 mmol/L Final   04/12/2019 138 136 - 145 mmol/L Final      Potassium Potassium   Date Value Ref Range Status   04/15/2019 4.1 3.5 - 5.2 mmol/L Final   04/14/2019 3.8 3.5 - 5.2 mmol/L Final   04/13/2019 4.0 3.5 - 5.2 mmol/L Final   04/12/2019 3.9 3.5 - 5.2 mmol/L Final      Chloride Chloride   Date Value Ref Range Status   04/15/2019 104 98 - 107 mmol/L Final   04/14/2019 102 98 - 107 mmol/L Final   04/13/2019 101 98 - 107 mmol/L Final   04/12/2019 97 (L) 98 - 107 mmol/L Final      CO2 CO2   Date Value Ref Range Status   04/15/2019 25.0 22.0 - 29.0 mmol/L Final   04/14/2019 25.0 22.0 - 29.0 mmol/L Final   04/13/2019 26.0 22.0 - 29.0 mmol/L Final   04/12/2019 26.0 22.0 - 29.0 mmol/L Final      BUN BUN   Date Value Ref Range Status   04/15/2019 14 6 - 20 mg/dL Final   04/14/2019 20 6 - 20 mg/dL Final   04/13/2019 27 (H) 6 - 20 mg/dL Final   04/12/2019 27 (H) 6 - 20 mg/dL Final      Creatinine Creatinine   Date Value Ref Range Status   04/15/2019 1.19 (H) 0.57 - 1.00 mg/dL Final   04/14/2019 1.85 (H) 0.57 - 1.00 mg/dL Final   04/13/2019 1.99 (H) 0.57 - 1.00 mg/dL Final   04/12/2019 2.04 (H) 0.57 - 1.00 mg/dL Final       Calcium Calcium   Date Value Ref Range Status   04/15/2019 9.6 8.6 - 10.5 mg/dL Final   04/14/2019 9.2 8.6 - 10.5 mg/dL Final   04/13/2019 8.9 8.6 - 10.5 mg/dL Final   04/12/2019 9.8 8.6 - 10.5 mg/dL Final      PO4 No results found for: CAPO4   Albumin Albumin   Date Value Ref Range Status   04/15/2019 3.80 3.50 - 5.20 g/dL Final   04/14/2019 3.50 3.50 - 5.20 g/dL Final   04/12/2019 4.10 3.50 - 5.20 g/dL Final      Magnesium Magnesium   Date Value Ref Range Status   04/12/2019 2.2 1.6 - 2.6 mg/dL Final      Uric Acid No results found for: URICACID        Results Review:     I reviewed the patient's new clinical results.      amLODIPine 10 mg Oral Daily   atorvastatin 40 mg Oral Daily   FLUoxetine 40 mg Oral Daily   heparin (porcine) 5,000 Units Subcutaneous Q8H   insulin lispro 0-7 Units Subcutaneous 4x Daily With Meals & Nightly   levothyroxine 25 mcg Oral Daily   nicotine 1 patch Transdermal Q24H   pantoprazole 40 mg Intravenous Q12H   sodium chloride 3 mL Intravenous Q12H       sodium chloride 100 mL/hr Last Rate: 100 mL/hr (04/13/19 2017)       Medication Review: As above  Assessment/Plan    1.  Acute on chronic renal failure with a creatinine on presentation of 2.04.  Previous creatinine generally 1.54.  Patient has history of CKD stage III diagnosed couple years ago but declined to follow-up with the nephrology, kidney normal size kidney of 11.6 cm right small 2 cm cyst, left kidney 12.2 cm.  No hydronephrosis or nephrolithiasis.  Bladder was grossly normal.  UA has shown some proteinuria.  Urine protein 17, urine creatinine 70, urine sodium 104.  2.  CKD stage III proteinuria 0.24 g  3.  Proteinuria: Proteinuria 0.24 g likely related to diabetes, hypertension and morbid obesity.  Plan:   Agree with holding diuretics for now.  Avoid nephrotoxic medications.  Okay to discharge from renal point.  Return to office in 1 month time.       Shree Acosta MD  04/15/19  11:31 AM

## 2019-04-15 NOTE — ANESTHESIA POSTPROCEDURE EVALUATION
Patient: Freida Martinez    Procedure Summary     Date:  04/15/19 Room / Location:  [facility] ENDOSCOPY 1 /  [facility]    Anesthesia Start:  1458 Anesthesia Stop:  1520    Procedure:  ESOPHAGOGASTRODUODENOSCOPY (N/A ) Diagnosis:       Epigastric pain      (Epigastric pain [R10.13])    Surgeon:  Jude Clinton MD Provider:  Raymond Schmidt MD    Anesthesia Type:  MAC ASA Status:  3          Anesthesia Type: MAC  Last vitals  BP   132/96 (04/15/19 1545)   Temp   97.4 °F (36.3 °C) (04/15/19 1545)   Pulse   85 (04/15/19 1545)   Resp   16 (04/15/19 1545)     SpO2   96 % (04/15/19 1545)     Post Anesthesia Care and Evaluation    Patient location during evaluation: PACU  Patient participation: complete - patient participated  Level of consciousness: sleepy but conscious  Pain score: 0  Pain management: adequate  Airway patency: patent  Anesthetic complications: No anesthetic complications  PONV Status: none  Cardiovascular status: hemodynamically stable and acceptable  Respiratory status: nonlabored ventilation, acceptable and nasal cannula  Hydration status: acceptable

## 2019-04-15 NOTE — PROGRESS NOTES
[facility] Medicine Services  PROGRESS NOTE    Patient Name: Freida Martinez  : 1970  MRN: [id number]    Date of Admission: 2019  Length of Stay: 3  Primary Care Physician: Erica Beatty APRN    Subjective   Subjective     CC:  Nausea/abdominal pain    HPI:    Resting in bed in no acute distress but still complains of nausea and abdominal pain.  Patient is n.p.o. for EGD today.  No fever or chills.  No chest pain, palpitation, shortness of breath at rest.    Review of Systems    Otherwise ROS is negative except as mentioned in the HPI.    Objective   Objective     Vital Signs:   Temp:  [98.7 °F (37.1 °C)-99 °F (37.2 °C)] 98.7 °F (37.1 °C)  Heart Rate:  [78-82] 78  Resp:  [16-18] 18  BP: (119-153)/() 153/116        Constitutional: Awake, alert  Eyes: PERRLA, sclerae anicteric, no conjunctival injection  HENT: NCAT, mucous membranes moist  Neck: Supple, no thyromegaly, no lymphadenopathy, trachea midline  Respiratory: Clear to auscultation bilaterally, nonlabored respirations   Cardiovascular: RRR, no murmurs, rubs, or gallops.  Gastrointestinal: Very obese abdomen.  Positive bowel sounds, soft, mild epigastric tenderness, not distended.  Musculoskeletal: Morbidly obese.   bilateral ankle edema, no clubbing or cyanosis to extremities  Psychiatric: Appropriate affect, cooperative  Neurologic: Oriented x 3, strength symmetric in all extremities, Cranial Nerves grossly intact to confrontation, speech clear  Skin: No rashes    Results Reviewed:  I have personally reviewed current lab, radiology, and data and agree.    Results from last 7 days   Lab Units 04/15/19  0455 19  0339 19  0525   WBC 10*3/mm3 11.08* 9.89 10.37   HEMOGLOBIN g/dL 13.3 12.5 12.6   HEMATOCRIT % 41.5 39.6 40.4   PLATELETS 10*3/mm3 302 314 329     Results from last 7 days   Lab Units 04/15/19  0455 19  0339 19  0403 19  1807 19  1758   SODIUM mmol/L 140 138 138  --   138   POTASSIUM mmol/L 4.1 3.8 4.0  --  3.9   CHLORIDE mmol/L 104 102 101  --  97*   CO2 mmol/L 25.0 25.0 26.0  --  26.0   BUN mg/dL 14 20 27*  --  27*   CREATININE mg/dL 1.19* 1.85* 1.99*  --  2.04*   GLUCOSE mg/dL 130* 131* 96  --  78   CALCIUM mg/dL 9.6 9.2 8.9  --  9.8   ALT (SGPT) U/L 8 7  --   --  9   AST (SGOT) U/L 10 9  --   --  11   TROPONIN I ng/mL  --   --   --  0.00  --      Estimated Creatinine Clearance: 67.5 mL/min (A) (by C-G formula based on SCr of 1.19 mg/dL (H)).    No results found for: BNP    Microbiology Results Abnormal     None          Imaging Results (last 24 hours)     ** No results found for the last 24 hours. **               I have reviewed the medications:    Current Facility-Administered Medications:   •  acetaminophen (TYLENOL) tablet 650 mg, 650 mg, Oral, Q4H PRN, Nancy Talley, APRN, 650 mg at 04/15/19 0615  •  amLODIPine (NORVASC) tablet 10 mg, 10 mg, Oral, Daily, Nancy Talley, APRN, 10 mg at 04/15/19 0851  •  atorvastatin (LIPITOR) tablet 40 mg, 40 mg, Oral, Daily, Nancy Talley, APRN, 40 mg at 04/15/19 0851  •  dextrose (D50W) 25 g/ 50mL Intravenous Solution 25 g, 25 g, Intravenous, Q15 Min PRN, StormyKira vigile G, DO  •  dextrose (GLUTOSE) oral gel 15 g, 15 g, Oral, Q15 Min PRN, StormyNola vigilsie G, DO  •  FLUoxetine (PROzac) capsule 40 mg, 40 mg, Oral, Daily, Nancy Talley M, APRN, 40 mg at 04/15/19 0852  •  glucagon (human recombinant) (GLUCAGEN DIAGNOSTIC) injection 1 mg, 1 mg, Subcutaneous, PRN, StormyKira vigile G, DO  •  heparin (porcine) 5000 UNIT/ML injection 5,000 Units, 5,000 Units, Subcutaneous, Q8H, Nancy Talley, APRN  •  insulin lispro (humaLOG) injection 0-7 Units, 0-7 Units, Subcutaneous, 4x Daily With Meals & Nightly, Anel Burrows DO  •  levothyroxine (SYNTHROID, LEVOTHROID) tablet 25 mcg, 25 mcg, Oral, Daily, Nancy Talley, APRN, 25 mcg at 04/15/19 0616  •  melatonin tablet 5 mg, 5 mg, Oral, Nightly PRN, Nancy Talley, APRN, 5 mg at 04/14/19 2049  •   nicotine (NICODERM CQ) 21 MG/24HR patch 1 patch, 1 patch, Transdermal, Q24H, Stormy, Anel G, DO  •  ondansetron (ZOFRAN) tablet 4 mg, 4 mg, Oral, Q6H PRN, Nancy Talley, APRN, 4 mg at 04/12/19 2246  •  pantoprazole (PROTONIX) injection 40 mg, 40 mg, Intravenous, Q12H, StormyAnel vigil, DO, 40 mg at 04/15/19 0852  •  sodium chloride 0.9 % flush 10 mL, 10 mL, Intravenous, PRN, Erick Dupont MD  •  sodium chloride 0.9 % flush 3 mL, 3 mL, Intravenous, Q12H, Nancy Talley, APRN, 3 mL at 04/15/19 0853  •  sodium chloride 0.9 % flush 3-10 mL, 3-10 mL, Intravenous, PRN, Nancy Talley, APRN  •  sodium chloride 0.9 % infusion, 100 mL/hr, Intravenous, Continuous, Nancy Talley, APRN, Last Rate: 100 mL/hr at 04/15/19 1250, 100 mL/hr at 04/15/19 1250  •  traMADol (ULTRAM) tablet 50 mg, 50 mg, Oral, Q6H PRN, Helio Santamaria MD      Assessment/Plan   Assessment / Plan     Active Hospital Problems    Diagnosis POA   • **JACKIE (acute kidney injury) (CMS/[facility]) [N17.9] Yes   • Volume depletion [E86.9] Yes   • Long QT interval [R94.31] Unknown   • Essential hypertension [I10] Unknown   • Hypothyroidism [E03.9] Unknown   • Type 2 diabetes mellitus (CMS/HCC) [E11.9] Unknown   • Tobacco abuse [Z72.0] Unknown   • CKD (chronic kidney disease) stage 3, GFR 30-59 ml/min (CMS/HCC) [N18.3] Unknown   • Abdominal pain [R10.9] Unknown   • Epigastric pain [R10.13] Unknown     Added automatically from request for surgery [id number]            Brief Hospital Course to date:  Freida Martinez is a 49 y.o. female here with nausea and abdominal pain, with anorexia, and JACKIE.    Nausea/abdominal pain.  Etiology is uncertain.  CT scan is nonrevealing and patient is scheduled to have EGD today.    JACKIE  --slowly improved, has CKD at baseline, likely DM induced    Hx of HTN    Hypothyroidism    DM  --controlled    Tobacco abuse    Morbid obesity  PLAN:    - cont current care  - EGD today by GI.    DVT Prophylaxis:  BARTOLO    Disposition: I expect the  patient to be discharged in 1-2 days.    CODE STATUS:   Code Status and Medical Interventions:   Ordered at: 04/12/19 2111     Level Of Support Discussed With:    Patient     Code Status:    CPR     Medical Interventions (Level of Support Prior to Arrest):    Full         Electronically signed by William Ayala MD, 04/15/19, 1:34 PM.

## 2019-04-15 NOTE — PLAN OF CARE
Problem: Patient Care Overview  Goal: Plan of Care Review  Outcome: Ongoing (interventions implemented as appropriate)   04/15/19 0143   Coping/Psychosocial   Plan of Care Reviewed With patient   Plan of Care Review   Progress improving   OTHER   Outcome Summary Pt. VSS. No reports of pain or discomfort. Resting well. Will continue to monitor. EGD tmrw.      Goal: Individualization and Mutuality  Outcome: Ongoing (interventions implemented as appropriate)    Goal: Discharge Needs Assessment  Outcome: Ongoing (interventions implemented as appropriate)    Goal: Interprofessional Rounds/Family Conf  Outcome: Ongoing (interventions implemented as appropriate)

## 2019-04-15 NOTE — PROGRESS NOTES
Discharge Planning Assessment  [facility]     Patient Name: Freida Martinez  MRN: [id number]  Today's Date: 4/15/2019    Admit Date: 4/12/2019    Discharge Needs Assessment     Row Name 04/15/19 1447       Living Environment    Lives With  child(scotty), dependent;child(scotty), adult;parent(s)    Current Living Arrangements  home/apartment/condo    Primary Care Provided by  self    Provides Primary Care For  child(scotty)    Family Caregiver if Needed  child(scotty), adult;parent(s)    Quality of Family Relationships  supportive;involved;helpful    Able to Return to Prior Arrangements  yes       Resource/Environmental Concerns    Transportation Concerns  car, none       Transition Planning    Patient/Family Anticipates Transition to  home with family    Patient/Family Anticipated Services at Transition      Transportation Anticipated  family or friend will provide       Discharge Needs Assessment    Readmission Within the Last 30 Days  no previous admission in last 30 days    Concerns to be Addressed  no discharge needs identified;denies needs/concerns at this time    Equipment Currently Used at Home  nebulizer;cane, straight;rollator    Anticipated Changes Related to Illness  none    Equipment Needed After Discharge  none        Discharge Plan     Row Name 04/15/19 3013       Plan    Plan  HOME    Patient/Family in Agreement with Plan  yes    Plan Comments  Met with pt at bedside prior to EGD today.  She is independent with ADLs.  Uses a cane or rollator with ambulation.  No current  services.  Confirmed she has [facility] Medicaid.  Goal is to return home upon DC.  No immediate needs identified/voiced.  CM will cont to follow.    Final Discharge Disposition Code  01 - home or self-care        Destination      No service coordination in this encounter.      Durable Medical Equipment      No service coordination in this encounter.      Dialysis/Infusion      No service coordination in this encounter.      Home  Medical Care      No service coordination in this encounter.      Therapy      No service coordination in this encounter.      Community Resources      No service coordination in this encounter.          Demographic Summary     Row Name 04/15/19 1447       General Information    Admission Type  inpatient    Referral Source  admission list    Reason for Consult  discharge planning    Preferred Language  English       Contact Information    Permission Granted to Share Info With      Contact Information Obtained for          Functional Status     Row Name 04/15/19 1447       Functional Status    Usual Activity Tolerance  good       Functional Status, IADL    Medications  independent    Meal Preparation  independent    Housekeeping  independent    Laundry  independent    Shopping  independent        Psychosocial    No documentation.       Abuse/Neglect    No documentation.       Legal    No documentation.       Substance Abuse    No documentation.       Patient Forms    No documentation.           Cara Bates

## 2019-04-15 NOTE — CONSULTS
Discussed and taught patient about type 2 diabetes self-management, risk factors, and importance of blood glucose control to reduce complications. Target blood glucose readings and A1c goals per ADA were reviewed. Reviewed with patient current A1c 9.1 and discussed its significance. Specifically discussed possible renal complications associated with poorly controlled diabetes.  Signs, symptoms and treatment of hyperglycemia and hypoglycemia were discussed. Ms. Martinez states she doesn't have a working meter.  She was provided a One Touch and demonstrated use.  She performed teach back.  Lifestyle changes such as physical activity with MD approval and healthy eating were encouraged. Stressed the importance of strict blood sugar control to prevent complications.  Instructed to  check blood sugar 2 times per day and to call PCP if  Blood glucose is trending higher than 180. We discussed increasing water and activity. We discussed weight loss. Patient was encouraged to keep record of blood glucose readings to take to follow up appointment with PCP.  Pt was offered a free op follow up appointment however declined at this time.

## 2019-04-15 NOTE — ANESTHESIA PREPROCEDURE EVALUATION
Anesthesia Evaluation     Patient summary reviewed and Nursing notes reviewed   NPO Solid Status: > 8 hours  NPO Liquid Status: > 8 hours           Airway   Mallampati: III  TM distance: >3 FB  Neck ROM: limited  Possible difficult intubation  Dental          Pulmonary    (+) a smoker Current, asthma (PRN MDI ), sleep apnea,   (-) shortness of breath    ROS comment: Home O2 at night   Cardiovascular     ECG reviewed    (+) hypertension,   (-) past MI, CAD, dysrhythmias, angina    ROS comment: EKG     Neuro/Psych  (-) seizures, CVA    ROS Comment: Somnolent   GI/Hepatic/Renal/Endo    (+)   renal disease ARF and CRI, diabetes mellitus type 2, hypothyroidism,     Musculoskeletal     Abdominal    Substance History      OB/GYN          Other        ROS/Med Hx Other: PREDNISONE                   Anesthesia Plan    ASA 3     MAC   (Topical POM )  intravenous induction   Anesthetic plan, all risks, benefits, and alternatives have been provided, discussed and informed consent has been obtained with: patient.    Plan discussed with CRNA.

## 2019-04-16 ENCOUNTER — APPOINTMENT (OUTPATIENT)
Dept: NUCLEAR MEDICINE | Facility: HOSPITAL | Age: 49
End: 2019-04-16

## 2019-04-16 LAB
GLUCOSE BLDC GLUCOMTR-MCNC: 114 MG/DL (ref 70–130)
GLUCOSE BLDC GLUCOMTR-MCNC: 117 MG/DL (ref 70–130)
GLUCOSE BLDC GLUCOMTR-MCNC: 154 MG/DL (ref 70–130)
GLUCOSE BLDC GLUCOMTR-MCNC: 179 MG/DL (ref 70–130)

## 2019-04-16 PROCEDURE — 82962 GLUCOSE BLOOD TEST: CPT

## 2019-04-16 PROCEDURE — A9537 TC99M MEBROFENIN: HCPCS | Performed by: INTERNAL MEDICINE

## 2019-04-16 PROCEDURE — 99225 PR SBSQ OBSERVATION CARE/DAY 25 MINUTES: CPT | Performed by: INTERNAL MEDICINE

## 2019-04-16 PROCEDURE — 78227 HEPATOBIL SYST IMAGE W/DRUG: CPT

## 2019-04-16 PROCEDURE — 25010000002 SINCALIDE PER 5 MCG: Performed by: PHYSICIAN ASSISTANT

## 2019-04-16 PROCEDURE — 0 TECHNETIUM TC 99M MEBROFENIN KIT: Performed by: INTERNAL MEDICINE

## 2019-04-16 PROCEDURE — 99232 SBSQ HOSP IP/OBS MODERATE 35: CPT | Performed by: INTERNAL MEDICINE

## 2019-04-16 RX ORDER — PANTOPRAZOLE SODIUM 40 MG/1
40 TABLET, DELAYED RELEASE ORAL
Status: DISCONTINUED | OUTPATIENT
Start: 2019-04-16 | End: 2019-04-19 | Stop reason: HOSPADM

## 2019-04-16 RX ORDER — KIT FOR THE PREPARATION OF TECHNETIUM TC 99M MEBROFENIN 45 MG/10ML
1 INJECTION, POWDER, LYOPHILIZED, FOR SOLUTION INTRAVENOUS
Status: COMPLETED | OUTPATIENT
Start: 2019-04-16 | End: 2019-04-16

## 2019-04-16 RX ADMIN — PANTOPRAZOLE SODIUM 40 MG: 40 TABLET, DELAYED RELEASE ORAL at 18:24

## 2019-04-16 RX ADMIN — AMLODIPINE BESYLATE 10 MG: 10 TABLET ORAL at 09:26

## 2019-04-16 RX ADMIN — FLUOXETINE HYDROCHLORIDE 40 MG: 20 CAPSULE ORAL at 09:26

## 2019-04-16 RX ADMIN — MEBROFENIN 1 DOSE: 45 INJECTION, POWDER, LYOPHILIZED, FOR SOLUTION INTRAVENOUS at 13:50

## 2019-04-16 RX ADMIN — SINCALIDE 2.3 MCG: 5 INJECTION, POWDER, LYOPHILIZED, FOR SOLUTION INTRAVENOUS at 14:52

## 2019-04-16 RX ADMIN — SODIUM CHLORIDE, PRESERVATIVE FREE 3 ML: 5 INJECTION INTRAVENOUS at 09:26

## 2019-04-16 RX ADMIN — LEVOTHYROXINE SODIUM 25 MCG: 25 TABLET ORAL at 07:05

## 2019-04-16 RX ADMIN — SODIUM CHLORIDE 50 ML/HR: 9 INJECTION, SOLUTION INTRAVENOUS at 01:31

## 2019-04-16 RX ADMIN — INSULIN LISPRO 2 UNITS: 100 INJECTION, SOLUTION INTRAVENOUS; SUBCUTANEOUS at 18:14

## 2019-04-16 RX ADMIN — MELATONIN TAB 5 MG 5 MG: 5 TAB at 21:30

## 2019-04-16 RX ADMIN — PANTOPRAZOLE SODIUM 40 MG: 40 INJECTION, POWDER, FOR SOLUTION INTRAVENOUS at 09:25

## 2019-04-16 RX ADMIN — ATORVASTATIN CALCIUM 40 MG: 40 TABLET, FILM COATED ORAL at 09:25

## 2019-04-16 NOTE — PROGRESS NOTES
"GI Daily Progress Note  Subjective:    Chief Complaint:  Abdominal pain, nausea, vomiting    Freida Martinez is a 49 year old female admitted initially with JACKIE; patient is POD1 from EGD.  Patient is resting in bed this morning.  States that she ate the majority of breakfast without any nausea, vomiting or pain associated with eating.  Patient states that she has some right jaw swelling following her endoscopy yesterday.  States that her abdominal pain is the same; has had BM since procedure and denies any vomiting.     Objective:    /93 (BP Location: Right arm, Patient Position: Lying)   Pulse 73   Temp 97.5 °F (36.4 °C) (Oral)   Resp 18   Ht 162.6 cm (64.02\")   Wt 105 kg (232 lb 6.4 oz)   SpO2 97%   BMI 39.87 kg/m²     Physical Exam   Cardiovascular: Normal rate, regular rhythm, normal heart sounds and intact distal pulses. Exam reveals no gallop and no friction rub.   No murmur heard.  No carotid bruits.    Pulmonary/Chest: Effort normal and breath sounds normal. No stridor. No respiratory distress. She has no wheezes. She has no rales. She exhibits no tenderness.   Abdominal: Soft. Normal appearance, normal aorta and bowel sounds are normal. There is no hepatosplenomegaly. There is tenderness in the right upper quadrant, epigastric area and periumbilical area. There is guarding. No hernia.   Nursing note and vitals reviewed.      Lab  Lab Results   Component Value Date    WBC 11.08 (H) 04/15/2019    HGB 13.3 04/15/2019    HGB 12.5 04/14/2019    HGB 12.6 04/13/2019    MCV 84.2 04/15/2019     04/15/2019    INR 0.95 04/29/2015    INR 0.96 05/25/2014       Lab Results   Component Value Date    GLUCOSE 130 (H) 04/15/2019    BUN 14 04/15/2019    CREATININE 1.19 (H) 04/15/2019    EGFRIFNONA 48 (L) 04/15/2019    BCR 11.8 04/15/2019    CO2 25.0 04/15/2019    CALCIUM 9.6 04/15/2019    ALBUMIN 3.80 04/15/2019    ALKPHOS 76 04/15/2019    BILITOT 0.4 04/15/2019    ALT 8 04/15/2019    AST 10 04/15/2019 "       Assessment/ Plan:      JACKIE (acute kidney injury) (CMS/HCC)    Essential hypertension    Hypothyroidism    Type 2 diabetes mellitus (CMS/HCC)    Tobacco abuse    CKD (chronic kidney disease) stage 3, GFR 30-59 ml/min (CMS/HCC)    Abdominal pain    Long QT interval    Volume depletion    Epigastric pain    Patient likely has post-endoscopy sialadenitis. Will treat this conservatively with warm compresses and hydration. Given unimproved abdominal pain, will proceed with abdominal ultrasound to rule out any hepatobiliary pathology or disease process.      >>>Warm compresses to right neck as needed  >>>Abdominal Ultrasound; indication: right upper quadrant pain, nausea, vomiting.   >>>Continue pantoprazole 40mg BID.   >>>awaiting pathology report from biopsy.     LUCIA Srivastava Student  04/16/19  9:22 AM  Path pending.  States 20 % better.  HIDA 90% without sx      Will get mesenteric duplex in am

## 2019-04-16 NOTE — PROGRESS NOTES
"   LOS: 4 days    Patient Care Team:  Erica Beatty APRN as PCP - General (Obstetrics and Gynecology)  Provider, No Known as PCP - Family Medicine  History of present illness:    49-year-old morbidly obese with history of CKD stage III diagnosed a couple years ago   History of diabetes, hypertension.  Patient presented with generalized weakness, nausea, poor oral intake, abdominal pain and chills for a week.    Few weeks back creatinine around 1.54.  Patient return to the hospital with a creatinine 2.04 with poor oral intake.  Subjective   New events, no obvious distress.  Renal function improved.     Review of Systems:   Early morning nausea, resolved now.  No chest pain shortness of breath dysuria hematuria.      Objective     Vital Sign Min/Max for last 24 hours  Temp  Min: 97.5 °F (36.4 °C)  Max: 97.5 °F (36.4 °C)   BP  Min: 138/93  Max: 147/92   Pulse  Min: 73  Max: 101   Resp  Min: 18  Max: 18   SpO2  Min: 97 %  Max: 97 %   No Data Recorded   No Data Recorded     Flowsheet Rows      First Filed Value   Admission Height  162.6 cm (64\") Documented at 04/12/2019 1715   Admission Weight  113 kg (249 lb) Documented at 04/12/2019 1715          No intake/output data recorded.  I/O last 3 completed shifts:  In: 4492 [P.O.:2160; I.V.:2332]  Out: 3250 [Urine:3250]    Physical Exam:  General Appearance: Awake alert oriented morbidly obese -American female  eyes: PER, EOMI.  Neck: Supple no JVD.  Lungs: Clear auscultation, no rales rhonchi's, equal chest movement, nonlabored.  Heart: No gallop, murmur, rub, RRR.  Abdomen: Soft, nontender, positive bowel sounds, no organomegaly.  Obese  Extremities: No edema, no cyanosis.  Neuro: No focal deficit, moving all extremities, alert oriented X 3       Results from last 7 days   Lab Units 04/15/19  0455 04/14/19  0339 04/13/19  0525 04/13/19  0403 04/12/19  1758   WBC 10*3/mm3 11.08* 9.89 10.37  --  14.76*   HEMOGLOBIN g/dL 13.3 12.5 12.6  --  14.4   HEMATOCRIT % 41.5 " 39.6 40.4  --  44.9   PLATELETS 10*3/mm3 302 314 329  --  370   SODIUM mmol/L 140 138  --  138 138   POTASSIUM mmol/L 4.1 3.8  --  4.0 3.9   CHLORIDE mmol/L 104 102  --  101 97*   CO2 mmol/L 25.0 25.0  --  26.0 26.0   BUN mg/dL 14 20  --  27* 27*   CREATININE mg/dL 1.19* 1.85*  --  1.99* 2.04*   GLUCOSE mg/dL 130* 131*  --  96 78   CALCIUM mg/dL 9.6 9.2  --  8.9 9.8   PHOSPHORUS mg/dL  --  2.8  --   --   --          Results Review:     I reviewed the patient's new clinical results.      amLODIPine 10 mg Oral Daily   atorvastatin 40 mg Oral Daily   FLUoxetine 40 mg Oral Daily   heparin (porcine) 5,000 Units Subcutaneous Q8H   insulin lispro 0-7 Units Subcutaneous 4x Daily With Meals & Nightly   levothyroxine 25 mcg Oral Daily   nicotine 1 patch Transdermal Q24H   pantoprazole 40 mg Oral BID AC   sodium chloride 3 mL Intravenous Q12H       sodium chloride 50 mL/hr Last Rate: 50 mL/hr (04/16/19 0131)       Medication Review: As above  Assessment/Plan    1.  Acute on chronic renal failure with a creatinine on presentation of 2.04.  Previous creatinine generally 1.54.    Creatinine 1.9 patient has history of CKD stage III diagnosed couple years ago but declined to follow-up with the nephrology, kidney normal size kidney of 11.6 cm right small 2 cm cyst, left kidney 12.2 cm.  No hydronephrosis or nephrolithiasis.  Bladder was grossly normal.  UA has shown some proteinuria.  Urine protein 17, urine creatinine 70, urine sodium 104.  2.  CKD stage III proteinuria 0.24 g  3.  Proteinuria: Proteinuria 0.24 g likely related to diabetes, hypertension and morbid obesity.  Plan:   Agree with holding diuretics for now.  Avoid nephrotoxic medications.  Okay to discharge from renal point.  Return to office in 1 month time.  Discussed with the patient in detail.  For the future plan       Shree Acosta MD  04/16/19  4:22 PM

## 2019-04-16 NOTE — PLAN OF CARE
Problem: Patient Care Overview  Goal: Plan of Care Review  Outcome: Ongoing (interventions implemented as appropriate)      Problem: Renal Failure/Kidney Injury, Acute (Adult)  Goal: Signs and Symptoms of Listed Potential Problems Will be Absent, Minimized or Managed (Renal Failure/Kidney Injury, Acute)  Outcome: Ongoing (interventions implemented as appropriate)      Problem: GI Endoscopy (Adult)  Goal: Signs and Symptoms of Listed Potential Problems Will be Absent, Minimized or Managed (GI Endoscopy)  Outcome: Ongoing (interventions implemented as appropriate)

## 2019-04-16 NOTE — PROGRESS NOTES
[facility] Medicine Services  PROGRESS NOTE    Patient Name: Freida Martinez  : 1970  MRN: [id number]    Date of Admission: 2019  Length of Stay: 4  Primary Care Physician: Erica Beatty APRN    Subjective   Subjective     CC:  Nausea/abdominal pain    HPI:    Resting in bed in no acute distress but still has some nausea and abdominal disc comfort although she tells me that she is better than yesterday.  No fever or chills.  No chest pain, palpitation, shortness of breath at rest.    Review of Systems    Otherwise ROS is negative except as mentioned in the HPI.    Objective   Objective     Vital Signs:   Temp:  [97.5 °F (36.4 °C)] 97.5 °F (36.4 °C)  Heart Rate:  [] 101  Resp:  [18] 18  BP: (138-147)/(92-93) 147/92        Constitutional: Awake, alert  Eyes: PERRLA, sclerae anicteric, no conjunctival injection  HENT: NCAT, mucous membranes moist  Neck: Supple, no thyromegaly, no lymphadenopathy, trachea midline  Respiratory: Clear to auscultation bilaterally, nonlabored respirations   Cardiovascular: RRR, no murmurs, rubs, or gallops.  Gastrointestinal: Very obese abdomen.  Positive bowel sounds, soft, mild epigastric tenderness, not distended.  Musculoskeletal: Morbidly obese.   bilateral ankle edema, no clubbing or cyanosis to extremities  Psychiatric: Appropriate affect, cooperative  Neurologic: Oriented x 3, strength symmetric in all extremities, Cranial Nerves grossly intact to confrontation, speech clear  Skin: No rashes    Results Reviewed:  I have personally reviewed current lab, radiology, and data and agree.    Results from last 7 days   Lab Units 04/15/19  0455 19  0339 19  0525   WBC 10*3/mm3 11.08* 9.89 10.37   HEMOGLOBIN g/dL 13.3 12.5 12.6   HEMATOCRIT % 41.5 39.6 40.4   PLATELETS 10*3/mm3 302 314 329     Results from last 7 days   Lab Units 04/15/19  0455 19  0339 19  0403 19  1807 19  1758   SODIUM mmol/L 140 138 138   --  138   POTASSIUM mmol/L 4.1 3.8 4.0  --  3.9   CHLORIDE mmol/L 104 102 101  --  97*   CO2 mmol/L 25.0 25.0 26.0  --  26.0   BUN mg/dL 14 20 27*  --  27*   CREATININE mg/dL 1.19* 1.85* 1.99*  --  2.04*   GLUCOSE mg/dL 130* 131* 96  --  78   CALCIUM mg/dL 9.6 9.2 8.9  --  9.8   ALT (SGPT) U/L 8 7  --   --  9   AST (SGOT) U/L 10 9  --   --  11   TROPONIN I ng/mL  --   --   --  0.00  --      Estimated Creatinine Clearance: 67.5 mL/min (A) (by C-G formula based on SCr of 1.19 mg/dL (H)).    No results found for: BNP    Microbiology Results Abnormal     None          Imaging Results (last 24 hours)     Procedure Component Value Units Date/Time    NM HIDA Scan With Pharmacological Intervention [[id number]] Updated:  04/16/19 154               I have reviewed the medications:    Current Facility-Administered Medications:   •  acetaminophen (TYLENOL) tablet 650 mg, 650 mg, Oral, Q4H PRN, Nancy Talley M, APRN, 650 mg at 04/15/19 0615  •  amLODIPine (NORVASC) tablet 10 mg, 10 mg, Oral, Daily, Nancy Talley, APRN, 10 mg at 04/16/19 0926  •  atorvastatin (LIPITOR) tablet 40 mg, 40 mg, Oral, Daily, Nancy Talley, APRN, 40 mg at 04/16/19 0925  •  dextrose (D50W) 25 g/ 50mL Intravenous Solution 25 g, 25 g, Intravenous, Q15 Min PRN, StormyNolaAnel G, DO  •  dextrose (GLUTOSE) oral gel 15 g, 15 g, Oral, Q15 Min PRN, Stormy, Anel G, DO  •  FLUoxetine (PROzac) capsule 40 mg, 40 mg, Oral, Daily, Nancy Talley, APRN, 40 mg at 04/16/19 0926  •  glucagon (human recombinant) (GLUCAGEN DIAGNOSTIC) injection 1 mg, 1 mg, Subcutaneous, PRN, Stormy, Anel G, DO  •  heparin (porcine) 5000 UNIT/ML injection 5,000 Units, 5,000 Units, Subcutaneous, Q8H, Nancy Talley, APRN  •  insulin lispro (humaLOG) injection 0-7 Units, 0-7 Units, Subcutaneous, 4x Daily With Meals & Nightly, Anel Burrows,   •  levothyroxine (SYNTHROID, LEVOTHROID) tablet 25 mcg, 25 mcg, Oral, Daily, Nancy Talley, APRN, 25 mcg at 04/16/19 0705  •  melatonin  tablet 5 mg, 5 mg, Oral, Nightly PRN, Nancy Talley, APRN, 5 mg at 04/15/19 2049  •  nicotine (NICODERM CQ) 21 MG/24HR patch 1 patch, 1 patch, Transdermal, Q24H, Anel Burrows, DO  •  ondansetron (ZOFRAN) tablet 4 mg, 4 mg, Oral, Q6H PRN, Nancy Talley, APRN, 4 mg at 04/12/19 2246  •  pantoprazole (PROTONIX) EC tablet 40 mg, 40 mg, Oral, BID AC, Bal Walters G, RPH  •  sodium chloride 0.9 % flush 10 mL, 10 mL, Intravenous, PRN, Erick Dupont MD  •  sodium chloride 0.9 % flush 3 mL, 3 mL, Intravenous, Q12H, Nancy Talley, APRN, 3 mL at 04/16/19 0926  •  sodium chloride 0.9 % flush 3-10 mL, 3-10 mL, Intravenous, PRN, Nancy Talley, APRN  •  sodium chloride 0.9 % infusion, 50 mL/hr, Intravenous, Continuous, Zachary Lemus MD, Last Rate: 50 mL/hr at 04/16/19 0131, 50 mL/hr at 04/16/19 0131  •  traMADol (ULTRAM) tablet 50 mg, 50 mg, Oral, Q6H PRN, Helio Santamaria MD      Assessment/Plan   Assessment / Plan     Active Hospital Problems    Diagnosis POA   • **JACKIE (acute kidney injury) (CMS/[facility]) [N17.9] Yes   • Volume depletion [E86.9] Yes   • Long QT interval [R94.31] Unknown   • Essential hypertension [I10] Unknown   • Hypothyroidism [E03.9] Unknown   • Type 2 diabetes mellitus (CMS/[facility]) [E11.9] Unknown   • Tobacco abuse [Z72.0] Unknown   • CKD (chronic kidney disease) stage 3, GFR 30-59 ml/min (CMS/[facility]) [N18.3] Unknown   • Abdominal pain [R10.9] Unknown   • Epigastric pain [R10.13] Unknown     Added automatically from request for surgery [id number]            Brief Hospital Course to date:  Freida Martinez is a 49 y.o. female here with nausea and abdominal pain, with anorexia, and JACKIE.    Nausea/abdominal pain.  Etiology is uncertain.  CT scan is nonrevealing and patient is scheduled to have EGD today.    JACKIE  --slowly improved, has CKD at baseline, likely DM induced    Hx of HTN    Hypothyroidism    DM  --controlled    Tobacco abuse    Morbid obesity    PLAN:    - cont current care  - home  soon.    DVT Prophylaxis:  BARTOLO    Disposition: I expect the patient to be discharged in 1-2 days.    CODE STATUS:   Code Status and Medical Interventions:   Ordered at: 04/12/19 211     Level Of Support Discussed With:    Patient     Code Status:    CPR     Medical Interventions (Level of Support Prior to Arrest):    Full         Electronically signed by William Ayala MD, 04/16/19, 3:57 PM.

## 2019-04-17 ENCOUNTER — APPOINTMENT (OUTPATIENT)
Dept: CARDIOLOGY | Facility: HOSPITAL | Age: 49
End: 2019-04-17

## 2019-04-17 LAB
CYTO UR: NORMAL
GLUCOSE BLDC GLUCOMTR-MCNC: 124 MG/DL (ref 70–130)
GLUCOSE BLDC GLUCOMTR-MCNC: 139 MG/DL (ref 70–130)
GLUCOSE BLDC GLUCOMTR-MCNC: 186 MG/DL (ref 70–130)
GLUCOSE BLDC GLUCOMTR-MCNC: 96 MG/DL (ref 70–130)
LAB AP CASE REPORT: NORMAL
LAB AP CLINICAL INFORMATION: NORMAL
PATH REPORT.FINAL DX SPEC: NORMAL
PATH REPORT.GROSS SPEC: NORMAL

## 2019-04-17 PROCEDURE — 99225 PR SBSQ OBSERVATION CARE/DAY 25 MINUTES: CPT | Performed by: INTERNAL MEDICINE

## 2019-04-17 PROCEDURE — 93975 VASCULAR STUDY: CPT

## 2019-04-17 PROCEDURE — 82962 GLUCOSE BLOOD TEST: CPT

## 2019-04-17 PROCEDURE — G0378 HOSPITAL OBSERVATION PER HR: HCPCS

## 2019-04-17 RX ORDER — NICOTINE 21 MG/24HR
1 PATCH, TRANSDERMAL 24 HOURS TRANSDERMAL
Status: DISCONTINUED | OUTPATIENT
Start: 2019-04-18 | End: 2019-04-19 | Stop reason: HOSPADM

## 2019-04-17 RX ORDER — NICOTINE 21 MG/24HR
1 PATCH, TRANSDERMAL 24 HOURS TRANSDERMAL ONCE
Status: DISCONTINUED | OUTPATIENT
Start: 2019-04-17 | End: 2019-04-19 | Stop reason: HOSPADM

## 2019-04-17 RX ORDER — NICOTINE 21 MG/24HR
1 PATCH, TRANSDERMAL 24 HOURS TRANSDERMAL
Status: DISCONTINUED | OUTPATIENT
Start: 2019-04-18 | End: 2019-04-17

## 2019-04-17 RX ADMIN — ATORVASTATIN CALCIUM 40 MG: 40 TABLET, FILM COATED ORAL at 08:22

## 2019-04-17 RX ADMIN — PANTOPRAZOLE SODIUM 40 MG: 40 TABLET, DELAYED RELEASE ORAL at 08:22

## 2019-04-17 RX ADMIN — AMLODIPINE BESYLATE 10 MG: 10 TABLET ORAL at 08:22

## 2019-04-17 RX ADMIN — PANTOPRAZOLE SODIUM 40 MG: 40 TABLET, DELAYED RELEASE ORAL at 18:10

## 2019-04-17 RX ADMIN — SODIUM CHLORIDE 50 ML/HR: 9 INJECTION, SOLUTION INTRAVENOUS at 22:39

## 2019-04-17 RX ADMIN — SODIUM CHLORIDE, PRESERVATIVE FREE 3 ML: 5 INJECTION INTRAVENOUS at 08:23

## 2019-04-17 RX ADMIN — FLUOXETINE HYDROCHLORIDE 40 MG: 20 CAPSULE ORAL at 08:22

## 2019-04-17 RX ADMIN — MELATONIN TAB 5 MG 5 MG: 5 TAB at 20:10

## 2019-04-17 RX ADMIN — INSULIN LISPRO 2 UNITS: 100 INJECTION, SOLUTION INTRAVENOUS; SUBCUTANEOUS at 20:14

## 2019-04-17 RX ADMIN — LEVOTHYROXINE SODIUM 25 MCG: 25 TABLET ORAL at 05:38

## 2019-04-17 NOTE — PROGRESS NOTES
Continued Stay Note  [facility]     Patient Name: Freida Martinez  MRN: [id number]  Today's Date: 4/17/2019    Admit Date: 4/12/2019    Discharge Plan     Row Name 04/17/19 1209       Plan    Plan  HOME    Patient/Family in Agreement with Plan  yes    Plan Comments  Met with pt at bedside to f/u DCP.  Goal remains to return home when medically ready.  No immediate CM needs identified/voiced.  CM will cont to follow.    Final Discharge Disposition Code  01 - home or self-care        Discharge Codes    No documentation.             Cara Bates

## 2019-04-17 NOTE — PLAN OF CARE
Problem: Patient Care Overview  Goal: Plan of Care Review  Outcome: Ongoing (interventions implemented as appropriate)   04/17/19 0350   Coping/Psychosocial   Plan of Care Reviewed With patient   Plan of Care Review   Progress no change   OTHER   Outcome Summary Pt with no c/o pain or nausea. No acute distress overnight. Possible D/C in AM.

## 2019-04-17 NOTE — PROGRESS NOTES
[facility] Medicine Services  PROGRESS NOTE    Patient Name: Freida Martinez  : 1970  MRN: [id number]    Date of Admission: 2019  Length of Stay: 5  Primary Care Physician: Erica Beatty APRN    Subjective   Subjective     CC:  Nausea/abdominal pain    HPI:    Resting in bed in no acute distress but still has some nausea and abdominal discomfort. No fever or chills.  No chest pain, palpitation, shortness of breath at rest.    Review of Systems    Otherwise ROS is negative except as mentioned in the HPI.    Objective   Objective     Vital Signs:   Temp:  [97.8 °F (36.6 °C)] 97.8 °F (36.6 °C)  Heart Rate:  [78] 78  Resp:  [20] 20  BP: (111)/(81) 111/81        Constitutional: Awake, alert  Eyes: PERRLA, sclerae anicteric, no conjunctival injection  HENT: NCAT, mucous membranes moist  Neck: Supple, no thyromegaly, no lymphadenopathy, trachea midline  Respiratory: Clear to auscultation bilaterally, nonlabored respirations   Cardiovascular: RRR, no murmurs, rubs, or gallops.  Gastrointestinal: Very obese abdomen.  Positive bowel sounds, soft, mild epigastric tenderness, not distended.  Musculoskeletal: Morbidly obese.   bilateral ankle edema, no clubbing or cyanosis to extremities  Psychiatric: Appropriate affect, cooperative  Neurologic: Oriented x 3, strength symmetric in all extremities, Cranial Nerves grossly intact to confrontation, speech clear  Skin: No rashes    Results Reviewed:  I have personally reviewed current lab, radiology, and data and agree.    Results from last 7 days   Lab Units 04/15/19  04519  03319  0525   WBC 10*3/mm3 11.08* 9.89 10.37   HEMOGLOBIN g/dL 13.3 12.5 12.6   HEMATOCRIT % 41.5 39.6 40.4   PLATELETS 10*3/mm3 302 314 329     Results from last 7 days   Lab Units 04/15/19  0455 19  0339 19  0403 19  1807 19  1758   SODIUM mmol/L 140 138 138  --  138   POTASSIUM mmol/L 4.1 3.8 4.0  --  3.9   CHLORIDE mmol/L 104  102 101  --  97*   CO2 mmol/L 25.0 25.0 26.0  --  26.0   BUN mg/dL 14 20 27*  --  27*   CREATININE mg/dL 1.19* 1.85* 1.99*  --  2.04*   GLUCOSE mg/dL 130* 131* 96  --  78   CALCIUM mg/dL 9.6 9.2 8.9  --  9.8   ALT (SGPT) U/L 8 7  --   --  9   AST (SGOT) U/L 10 9  --   --  11   TROPONIN I ng/mL  --   --   --  0.00  --      Estimated Creatinine Clearance: 67.5 mL/min (A) (by C-G formula based on SCr of 1.19 mg/dL (H)).    No results found for: BNP    Microbiology Results Abnormal     None          Imaging Results (last 24 hours)     ** No results found for the last 24 hours. **               I have reviewed the medications:    Current Facility-Administered Medications:   •  acetaminophen (TYLENOL) tablet 650 mg, 650 mg, Oral, Q4H PRN, Nancy Talley, APRN, 650 mg at 04/15/19 0615  •  amLODIPine (NORVASC) tablet 10 mg, 10 mg, Oral, Daily, Nancy Talley, APRN, 10 mg at 04/17/19 0822  •  atorvastatin (LIPITOR) tablet 40 mg, 40 mg, Oral, Daily, Nancy Talley, APRN, 40 mg at 04/17/19 0822  •  dextrose (D50W) 25 g/ 50mL Intravenous Solution 25 g, 25 g, Intravenous, Q15 Min PRN, Anel Burrows G, DO  •  dextrose (GLUTOSE) oral gel 15 g, 15 g, Oral, Q15 Min PRN, Anel Burrows G, DO  •  FLUoxetine (PROzac) capsule 40 mg, 40 mg, Oral, Daily, Nancy Talley, APRN, 40 mg at 04/17/19 0822  •  glucagon (human recombinant) (GLUCAGEN DIAGNOSTIC) injection 1 mg, 1 mg, Subcutaneous, PRN, StormyAnel vigil G, DO  •  heparin (porcine) 5000 UNIT/ML injection 5,000 Units, 5,000 Units, Subcutaneous, Q8H, Nancy Talley, APRN  •  insulin lispro (humaLOG) injection 0-7 Units, 0-7 Units, Subcutaneous, 4x Daily With Meals & Nightly, Anel Burrows, DO, 2 Units at 04/16/19 1814  •  levothyroxine (SYNTHROID, LEVOTHROID) tablet 25 mcg, 25 mcg, Oral, Daily, Nancy Talley, LUCIA, 25 mcg at 04/17/19 0538  •  melatonin tablet 5 mg, 5 mg, Oral, Nightly PRN, Nancy Talley, APRN, 5 mg at 04/16/19 2130  •  nicotine (NICODERM CQ) 21 MG/24HR patch 1  patch, 1 patch, Transdermal, Q24H, Anel Burrows, DO  •  ondansetron (ZOFRAN) tablet 4 mg, 4 mg, Oral, Q6H PRN, Nancy Talley, APRN, 4 mg at 04/12/19 2246  •  pantoprazole (PROTONIX) EC tablet 40 mg, 40 mg, Oral, BID AC, Bal Walters G, RPH, 40 mg at 04/17/19 1810  •  sodium chloride 0.9 % flush 10 mL, 10 mL, Intravenous, PRN, Erick Dupont MD  •  sodium chloride 0.9 % flush 3 mL, 3 mL, Intravenous, Q12H, Nancy Talley, APRN, 3 mL at 04/17/19 0823  •  sodium chloride 0.9 % flush 3-10 mL, 3-10 mL, Intravenous, PRN, Nancy Talley, APRN  •  sodium chloride 0.9 % infusion, 50 mL/hr, Intravenous, Continuous, Zachary Lemus MD, Last Rate: 50 mL/hr at 04/16/19 0131, 50 mL/hr at 04/16/19 0131  •  traMADol (ULTRAM) tablet 50 mg, 50 mg, Oral, Q6H PRN, Helio Santamaria MD      Assessment/Plan   Assessment / Plan     Active Hospital Problems    Diagnosis POA   • **JACKIE (acute kidney injury) (CMS/[facility]) [N17.9] Yes   • Volume depletion [E86.9] Yes   • Long QT interval [R94.31] Unknown   • Essential hypertension [I10] Unknown   • Hypothyroidism [E03.9] Unknown   • Type 2 diabetes mellitus (CMS/[facility]) [E11.9] Unknown   • Tobacco abuse [Z72.0] Unknown   • CKD (chronic kidney disease) stage 3, GFR 30-59 ml/min (CMS/[facility]) [N18.3] Unknown   • Abdominal pain [R10.9] Unknown   • Epigastric pain [R10.13] Unknown     Added automatically from request for surgery [id number]            Brief Hospital Course to date:  Freida Martinez is a 49 y.o. female here with nausea and abdominal pain, with anorexia, and JACKIE.    Nausea/abdominal pain.  Etiology is uncertain.  CT scan is nonrevealing and patient is scheduled to have EGD today.    JACKIE  --slowly improved, has CKD at baseline, likely DM induced    Hx of HTN    Hypothyroidism    DM  --controlled    Tobacco abuse    Morbid obesity    PLAN:    - cont current care  - mesenteric duplex per GI  - home soon.    DVT Prophylaxis:  BARTOLO    Disposition: I expect the patient to be discharged in  1-2 days.    CODE STATUS:   Code Status and Medical Interventions:   Ordered at: 04/12/19 2114     Level Of Support Discussed With:    Patient     Code Status:    CPR     Medical Interventions (Level of Support Prior to Arrest):    Full         Electronically signed by William Ayala MD, 04/17/19, 6:20 PM.

## 2019-04-17 NOTE — PROGRESS NOTES
"   LOS: 5 days    Patient Care Team:  Erica Beatty APRN as PCP - General (Obstetrics and Gynecology)  Provider, No Known as PCP - Family Medicine  History of present illness:    49-year-old morbidly obese with history of CKD stage III diagnosed a couple years ago   History of diabetes, hypertension.  Patient presented with generalized weakness, nausea, poor oral intake, abdominal pain and chills for a week.    Few weeks back creatinine around 1.54.  Patient return to the hospital with a creatinine 2.04 with poor oral intake.  Subjective   No new events no obvious distress.     Review of Systems:   Denies any nausea vomiting chest pain or shortness of breath.      Objective     Vital Sign Min/Max for last 24 hours  Temp  Min: 97.8 °F (36.6 °C)  Max: 97.8 °F (36.6 °C)   BP  Min: 111/81  Max: 111/81   Pulse  Min: 78  Max: 78   Resp  Min: 20  Max: 20   SpO2  Min: 99 %  Max: 99 %   No Data Recorded   No Data Recorded     Flowsheet Rows      First Filed Value   Admission Height  162.6 cm (64\") Documented at 04/12/2019 1715   Admission Weight  113 kg (249 lb) Documented at 04/12/2019 1715          I/O this shift:  In: 480 [P.O.:480]  Out: -   I/O last 3 completed shifts:  In: 3340 [P.O.:2340; I.V.:1000]  Out: 900 [Urine:900]    Physical Exam:  General Appearance: Morbidly obese -American female, no obvious distress.  eyes: PER, EOMI.  Neck: Supple no JVD.  Lungs: Clear auscultation, no rales rhonchi's, equal chest movement, nonlabored.  Heart: No gallop, murmur, rub, RRR.  Abdomen: Soft, nontender, positive bowel sounds, no organomegaly.  Obese  Extremities: No edema, no cyanosis.  Neuro: No focal deficit, moving all extremities, alert oriented X 3  Skin: Warm and dry.     Results from last 7 days   Lab Units 04/15/19  0455 04/14/19  0339 04/13/19  0525 04/13/19  0403 04/12/19  1758   WBC 10*3/mm3 11.08* 9.89 10.37  --  14.76*   HEMOGLOBIN g/dL 13.3 12.5 12.6  --  14.4   HEMATOCRIT % 41.5 39.6 40.4  --  44.9 "   PLATELETS 10*3/mm3 302 314 329  --  370   SODIUM mmol/L 140 138  --  138 138   POTASSIUM mmol/L 4.1 3.8  --  4.0 3.9   CHLORIDE mmol/L 104 102  --  101 97*   CO2 mmol/L 25.0 25.0  --  26.0 26.0   BUN mg/dL 14 20  --  27* 27*   CREATININE mg/dL 1.19* 1.85*  --  1.99* 2.04*   GLUCOSE mg/dL 130* 131*  --  96 78   CALCIUM mg/dL 9.6 9.2  --  8.9 9.8   PHOSPHORUS mg/dL  --  2.8  --   --   --          Results Review:     I reviewed the patient's new clinical results.      amLODIPine 10 mg Oral Daily   atorvastatin 40 mg Oral Daily   FLUoxetine 40 mg Oral Daily   heparin (porcine) 5,000 Units Subcutaneous Q8H   insulin lispro 0-7 Units Subcutaneous 4x Daily With Meals & Nightly   levothyroxine 25 mcg Oral Daily   nicotine 1 patch Transdermal Q24H   pantoprazole 40 mg Oral BID AC   sodium chloride 3 mL Intravenous Q12H       sodium chloride 50 mL/hr Last Rate: 50 mL/hr (04/16/19 0131)       Medication Review: As above  Assessment/Plan    1.  Acute on chronic renal failure with a creatinine on presentation of 2.04.  Previous creatinine generally 1.54.    Creatinine 1.9 patient has history of CKD stage III diagnosed couple years ago but declined to follow-up with the nephrology, kidney normal size kidney of 11.6 cm right small 2 cm cyst, left kidney 12.2 cm.  No hydronephrosis or nephrolithiasis.  Bladder was grossly normal.  UA has shown some proteinuria.  Urine protein 17, urine creatinine 70, urine sodium 104.  2.  CKD stage III proteinuria 0.24 g  3.  Proteinuria: Proteinuria 0.24 g likely related to diabetes, hypertension and morbid obesity.  Plan:   Agree with holding diuretics for now.  Check labs in the morning.  Avoid nephrotoxic medications.  Okay to discharge from renal point.  Return to office in 1 month time.  Discussed with the patient and family member in detail gusty future treatment plan.  She needs to reduce weight, decrease salt intake, increase vegetarian diet and exercise.       Shree Acosta,  MD  04/17/19  2:26 PM

## 2019-04-18 LAB
ALBUMIN SERPL-MCNC: 3.8 G/DL (ref 3.5–5.2)
ANION GAP SERPL CALCULATED.3IONS-SCNC: 12 MMOL/L
BUN BLD-MCNC: 16 MG/DL (ref 6–20)
BUN/CREAT SERPL: 12.5 (ref 7–25)
CALCIUM SPEC-SCNC: 9.7 MG/DL (ref 8.6–10.5)
CHLORIDE SERPL-SCNC: 105 MMOL/L (ref 98–107)
CO2 SERPL-SCNC: 24 MMOL/L (ref 22–29)
CREAT BLD-MCNC: 1.28 MG/DL (ref 0.57–1)
GFR SERPL CREATININE-BSD FRML MDRD: 44 ML/MIN/1.73
GLUCOSE BLD-MCNC: 141 MG/DL (ref 65–99)
GLUCOSE BLDC GLUCOMTR-MCNC: 148 MG/DL (ref 70–130)
GLUCOSE BLDC GLUCOMTR-MCNC: 160 MG/DL (ref 70–130)
GLUCOSE BLDC GLUCOMTR-MCNC: 164 MG/DL (ref 70–130)
GLUCOSE BLDC GLUCOMTR-MCNC: 173 MG/DL (ref 70–130)
PHOSPHATE SERPL-MCNC: 3.2 MG/DL (ref 2.5–4.5)
POTASSIUM BLD-SCNC: 3.8 MMOL/L (ref 3.5–5.2)
SODIUM BLD-SCNC: 141 MMOL/L (ref 136–145)

## 2019-04-18 PROCEDURE — 99232 SBSQ HOSP IP/OBS MODERATE 35: CPT | Performed by: INTERNAL MEDICINE

## 2019-04-18 PROCEDURE — 99225 PR SBSQ OBSERVATION CARE/DAY 25 MINUTES: CPT | Performed by: INTERNAL MEDICINE

## 2019-04-18 PROCEDURE — 82962 GLUCOSE BLOOD TEST: CPT

## 2019-04-18 PROCEDURE — G0378 HOSPITAL OBSERVATION PER HR: HCPCS

## 2019-04-18 PROCEDURE — 80069 RENAL FUNCTION PANEL: CPT | Performed by: INTERNAL MEDICINE

## 2019-04-18 RX ADMIN — LEVOTHYROXINE SODIUM 25 MCG: 25 TABLET ORAL at 06:58

## 2019-04-18 RX ADMIN — ATORVASTATIN CALCIUM 40 MG: 40 TABLET, FILM COATED ORAL at 09:53

## 2019-04-18 RX ADMIN — PANTOPRAZOLE SODIUM 40 MG: 40 TABLET, DELAYED RELEASE ORAL at 06:58

## 2019-04-18 RX ADMIN — AMLODIPINE BESYLATE 10 MG: 10 TABLET ORAL at 09:53

## 2019-04-18 RX ADMIN — PANTOPRAZOLE SODIUM 40 MG: 40 TABLET, DELAYED RELEASE ORAL at 19:42

## 2019-04-18 RX ADMIN — INSULIN LISPRO 2 UNITS: 100 INJECTION, SOLUTION INTRAVENOUS; SUBCUTANEOUS at 13:46

## 2019-04-18 RX ADMIN — NICOTINE 1 PATCH: 21 PATCH, EXTENDED RELEASE TRANSDERMAL at 09:50

## 2019-04-18 RX ADMIN — FLUOXETINE HYDROCHLORIDE 40 MG: 20 CAPSULE ORAL at 09:50

## 2019-04-18 NOTE — PLAN OF CARE
Problem: Patient Care Overview  Goal: Plan of Care Review  Outcome: Ongoing (interventions implemented as appropriate)   04/18/19 3334   Coping/Psychosocial   Plan of Care Reviewed With patient   Plan of Care Review   Progress no change   OTHER   Outcome Summary Pt with no acute distress overnight. Refusing IV fluid at this time. Plan for discharge in AM.

## 2019-04-18 NOTE — PROGRESS NOTES
"   LOS: 5 days    Patient Care Team:  Erica Beatty APRN as PCP - General (Obstetrics and Gynecology)  Provider, No Known as PCP - Family Medicine  History of present illness:    49-year-old morbidly obese with history of CKD stage III diagnosed a couple years ago   History of diabetes, hypertension.  Patient presented with generalized weakness, nausea, poor oral intake, abdominal pain and chills for a week.    Few weeks back creatinine around 1.54.  Patient return to the hospital with a creatinine 2.04 with poor oral intake.  Subjective   Renal function slightly worse from yesterday.  No new other events.  No obvious distress.     Review of Systems:   Patient denies shortness of breath, chest pain, dysuria, hematuria, nausea, vomiting.        Objective     Vital Sign Min/Max for last 24 hours  Temp  Min: 98.1 °F (36.7 °C)  Max: 98.3 °F (36.8 °C)   BP  Min: 113/74  Max: 125/85   Pulse  Min: 82  Max: 89   Resp  Min: 16  Max: 18   SpO2  Min: 96 %  Max: 96 %   No Data Recorded   Weight  Min: 105 kg (232 lb)  Max: 105 kg (232 lb)     Flowsheet Rows      First Filed Value   Admission Height  162.6 cm (64\") Documented at 04/12/2019 1715   Admission Weight  113 kg (249 lb) Documented at 04/12/2019 1715          I/O this shift:  In: 240 [P.O.:240]  Out: 1100 [Urine:1100]  I/O last 3 completed shifts:  In: 2830 [P.O.:2280; I.V.:550]  Out: -     Physical Exam:  General Appearance: morbid obesity -American female alert oriented no obvious distress   eyes: PER, EOMI.  Neck: Supple no JVD.  Lungs: Clear auscultation, no rales rhonchi's, equal chest movement, nonlabored.  Heart: No gallop, murmur, rub, RRR.  Abdomen: Soft, nontender, positive bowel sounds, no organomegaly.  Obese  Extremities: No edema, no cyanosis.  Neuro: No focal deficit, moving all extremities, alert oriented X 3       Results from last 7 days   Lab Units 04/18/19  0628 04/15/19  0455 04/14/19  0339 04/13/19  0525 04/13/19  0403 04/12/19  1758 "   WBC 10*3/mm3  --  11.08* 9.89 10.37  --  14.76*   HEMOGLOBIN g/dL  --  13.3 12.5 12.6  --  14.4   HEMATOCRIT %  --  41.5 39.6 40.4  --  44.9   PLATELETS 10*3/mm3  --  302 314 329  --  370   SODIUM mmol/L 141 140 138  --  138 138   POTASSIUM mmol/L 3.8 4.1 3.8  --  4.0 3.9   CHLORIDE mmol/L 105 104 102  --  101 97*   CO2 mmol/L 24.0 25.0 25.0  --  26.0 26.0   BUN mg/dL 16 14 20  --  27* 27*   CREATININE mg/dL 1.28* 1.19* 1.85*  --  1.99* 2.04*   GLUCOSE mg/dL 141* 130* 131*  --  96 78   CALCIUM mg/dL 9.7 9.6 9.2  --  8.9 9.8   PHOSPHORUS mg/dL 3.2  --  2.8  --   --   --          Results Review:     I reviewed the patient's new clinical results.      amLODIPine 10 mg Oral Daily   atorvastatin 40 mg Oral Daily   FLUoxetine 40 mg Oral Daily   heparin (porcine) 5,000 Units Subcutaneous Q8H   insulin lispro 0-7 Units Subcutaneous 4x Daily With Meals & Nightly   levothyroxine 25 mcg Oral Daily   nicotine 1 patch Transdermal Q24H   nicotine 1 patch Transdermal Once   pantoprazole 40 mg Oral BID AC   sodium chloride 3 mL Intravenous Q12H       sodium chloride 50 mL/hr Last Rate: Stopped (04/18/19 0116)       Medication Review: As above  Assessment/Plan    1.  Acute on chronic renal failure with a creatinine on presentation of 2.04.  Previous creatinine generally 1.54.    Creatinine 1.9 patient has history of CKD stage III diagnosed couple years ago but declined to follow-up with the nephrology, kidney normal size kidney of 11.6 cm right small 2 cm cyst, left kidney 12.2 cm.  No hydronephrosis or nephrolithiasis.  Bladder was grossly normal.  UA has shown some proteinuria.  Urine protein 17, urine creatinine 70, urine sodium 104.  2.  CKD stage III proteinuria 0.24 g  3.  Proteinuria: Proteinuria 0.24 g likely related to diabetes, hypertension and morbid obesity.  Plan:   Agree with holding diuretics for now.  Monitor creatinine  Avoid nephrotoxic medications.    Return to office in 1 month time.  Discussed with the patient  in detail.  For the future plan       Shree Acosta MD  04/18/19  3:11 PM

## 2019-04-18 NOTE — PROGRESS NOTES
"GI Daily Progress Note  Subjective:    Chief Complaint:  Abdominal pain     Patient sleeping in bed.  Denies pain and states she does not feel any better and wants to go home.  Refuses to answer remaining questions.     Objective:    /85   Pulse 88   Temp 98.1 °F (36.7 °C) (Oral)   Resp 16   Ht 162.6 cm (64\")   Wt 105 kg (232 lb)   SpO2 96%   BMI 39.82 kg/m²     Physical Exam   Constitutional: She appears well-developed and well-nourished.   Somnolent    HENT:   Head: Normocephalic and atraumatic.   Cardiovascular: Normal rate, regular rhythm, normal heart sounds and intact distal pulses. Exam reveals no gallop and no friction rub.   No murmur heard.  Pulmonary/Chest: Effort normal and breath sounds normal. No respiratory distress.   Abdominal: Soft. Bowel sounds are normal. There is tenderness.   Psychiatric: Her speech is normal. Judgment and thought content normal. Her affect is blunt. She is agitated. Cognition and memory are normal. She is inattentive.   Nursing note and vitals reviewed.      Lab  Lab Results   Component Value Date    WBC 11.08 (H) 04/15/2019    HGB 13.3 04/15/2019    HGB 12.5 04/14/2019    HGB 12.6 04/13/2019    MCV 84.2 04/15/2019     04/15/2019    INR 0.95 04/29/2015    INR 0.96 05/25/2014       Lab Results   Component Value Date    GLUCOSE 141 (H) 04/18/2019    BUN 16 04/18/2019    CREATININE 1.28 (H) 04/18/2019    EGFRIFNONA 44 (L) 04/18/2019    BCR 12.5 04/18/2019    CO2 24.0 04/18/2019    CALCIUM 9.7 04/18/2019    ALBUMIN 3.80 04/18/2019    ALKPHOS 76 04/15/2019    BILITOT 0.4 04/15/2019    ALT 8 04/15/2019    AST 10 04/15/2019     Pathology Reviewed showing inflammatory process to gastric antrum.    HIDA scan reviewed showing 90% EF.      [facility] NONINVASIVE LAB  [address] [address] [phone]             Freida Martinez   Duplex Mesenteric Complete CAR   Order# [id number]   Reading physician: Helio Izquierdo MD Ordering " physician: Jude Clinton MD Study date: 19   Patient Information     Patient Name  Freida Martinez MRN  [id number] Sex  Female  (Age)  1970 (49 y.o.)   Clinical Indication     generalized abdominal pain   Admission Information     Admission Date/Time Discharge Date/Time Room/Bed   19  1728  S445/1      Interpretation Summary     EXAMINATION:  DUPLEX MESENTERIC ULTRASOUND - 2019     HISTORY: Generalized abdominal pain     COMPARISON: Abdomen and pelvis CT scan without contrast 2019.     FINDINGS: Concurrent CT scan does not show more than minimal  calcification of the abdominal aorta or mesenteric vasculature. Today's  exam, however, shows evidence of hemodynamically significant stenosis of  the celiac artery, maximal peak systolic velocities of 249 at the  origin, 282 cm/s proximally, and 310 cm/s distally. Values of the SMA  range from normal to upper limits of normal, with maximal peak systolic  velocity in the proximal SMA of 199 cm/s. Peak systolic SONI velocity is  shown as 174 cm/s, although criteria for stenosis are not well  established.     IMPRESSION:  Probable hemodynamically significant stenosis of the celiac  axis. No evidence of hemodynamically significant SMA stenosis.     D:  2019  E:  2019       Assessment/ Plan:      JACKIE (acute kidney injury) (CMS/[facility])    Essential hypertension    Hypothyroidism    Type 2 diabetes mellitus (CMS/[facility])    Tobacco abuse    CKD (chronic kidney disease) stage 3, GFR 30-59 ml/min (CMS/HCC)    Abdominal pain    Long QT interval    Volume depletion    Epigastric pain  Celiac stenosis without calcification      Will get CTA.      >>>Continue at home pantoprazole 40 mg PO BID.   >>>[place] up with [facility] [place] 4-6 weeks.         LUCIA Srivastava Student  19  10:27 AM  I personally obtained history and performed examination of the patient.  I fully participated in management of the patient, and placed all orders.  I verify  the history, physical exam, assessment, and plan documented by LUCIA Srivastava Student, and edited per myself.

## 2019-04-18 NOTE — PROGRESS NOTES
[facility] Medicine Services  PROGRESS NOTE    Patient Name: Freida Martinez  : 1970  MRN: [id number]    Date of Admission: 2019  Length of Stay: 5  Primary Care Physician: Erica Beatty APRN    Subjective   Subjective     CC:  Nausea/abdominal pain    HPI:    Resting in bed in no acute distress but still has some nausea and abdominal discomfort. No fever or chills.  No chest pain, palpitation, shortness of breath at rest.    Review of Systems    Otherwise ROS is negative except as mentioned in the HPI.    Objective   Objective     Vital Signs:   Temp:  [98.1 °F (36.7 °C)-98.3 °F (36.8 °C)] 98.1 °F (36.7 °C)  Heart Rate:  [82-89] 88  Resp:  [16-18] 16  BP: (113-125)/(74-85) 125/85        Constitutional: Awake, alert  Eyes: PERRLA, sclerae anicteric, no conjunctival injection  HENT: NCAT, mucous membranes moist  Neck: Supple, no thyromegaly, no lymphadenopathy, trachea midline  Respiratory: Clear to auscultation bilaterally, nonlabored respirations   Cardiovascular: RRR, no murmurs, rubs, or gallops.  Gastrointestinal: Very obese abdomen.  Positive bowel sounds, soft, mild epigastric tenderness, not distended.  Musculoskeletal: Morbidly obese.   bilateral ankle edema, no clubbing or cyanosis to extremities  Psychiatric: Appropriate affect, cooperative  Neurologic: Oriented x 3, strength symmetric in all extremities, Cranial Nerves grossly intact to confrontation, speech clear  Skin: No rashes    Results Reviewed:  I have personally reviewed current lab, radiology, and data and agree.    Results from last 7 days   Lab Units 04/15/19  0455 19  0339 19  0525   WBC 10*3/mm3 11.08* 9.89 10.37   HEMOGLOBIN g/dL 13.3 12.5 12.6   HEMATOCRIT % 41.5 39.6 40.4   PLATELETS 10*3/mm3 302 314 329     Results from last 7 days   Lab Units 19  0628 04/15/19  0455 19  0339  19  1807 19  1758   SODIUM mmol/L 141 140 138   < >  --  138   POTASSIUM mmol/L 3.8  4.1 3.8   < >  --  3.9   CHLORIDE mmol/L 105 104 102   < >  --  97*   CO2 mmol/L 24.0 25.0 25.0   < >  --  26.0   BUN mg/dL 16 14 20   < >  --  27*   CREATININE mg/dL 1.28* 1.19* 1.85*   < >  --  2.04*   GLUCOSE mg/dL 141* 130* 131*   < >  --  78   CALCIUM mg/dL 9.7 9.6 9.2   < >  --  9.8   ALT (SGPT) U/L  --  8 7  --   --  9   AST (SGOT) U/L  --  10 9  --   --  11   TROPONIN I ng/mL  --   --   --   --  0.00  --     < > = values in this interval not displayed.     Estimated Creatinine Clearance: 62.8 mL/min (A) (by C-G formula based on SCr of 1.28 mg/dL (H)).    No results found for: BNP    Microbiology Results Abnormal     None          Imaging Results (last 24 hours)     ** No results found for the last 24 hours. **               I have reviewed the medications:    Current Facility-Administered Medications:   •  acetaminophen (TYLENOL) tablet 650 mg, 650 mg, Oral, Q4H PRN, Nancy Talley, APRN, 650 mg at 04/15/19 0615  •  amLODIPine (NORVASC) tablet 10 mg, 10 mg, Oral, Daily, Nancy Talley, APRN, 10 mg at 04/18/19 0953  •  atorvastatin (LIPITOR) tablet 40 mg, 40 mg, Oral, Daily, Nancy Talley, APRN, 40 mg at 04/18/19 0953  •  dextrose (D50W) 25 g/ 50mL Intravenous Solution 25 g, 25 g, Intravenous, Q15 Min PRN, Stormy, Anel G, DO  •  dextrose (GLUTOSE) oral gel 15 g, 15 g, Oral, Q15 Min PRN, StormyNola vigilsie G, DO  •  FLUoxetine (PROzac) capsule 40 mg, 40 mg, Oral, Daily, Nancy Talley, APRN, 40 mg at 04/18/19 0950  •  glucagon (human recombinant) (GLUCAGEN DIAGNOSTIC) injection 1 mg, 1 mg, Subcutaneous, PRN, Stormy, Anel G,   •  heparin (porcine) 5000 UNIT/ML injection 5,000 Units, 5,000 Units, Subcutaneous, Q8H, Nancy Talley, APRN  •  insulin lispro (humaLOG) injection 0-7 Units, 0-7 Units, Subcutaneous, 4x Daily With Meals & Nightly, Anel Burrows DO, 2 Units at 04/18/19 1346  •  levothyroxine (SYNTHROID, LEVOTHROID) tablet 25 mcg, 25 mcg, Oral, Daily, Nancy Talley, APRN, 25 mcg at 04/18/19  0658  •  melatonin tablet 5 mg, 5 mg, Oral, Nightly PRN, Nancy Talley, APRN, 5 mg at 04/17/19 2010  •  nicotine (NICODERM CQ) 21 MG/24HR patch 1 patch, 1 patch, Transdermal, Q24H, Little Goetz, APRN, 1 patch at 04/18/19 0950  •  nicotine (NICODERM CQ) 21 MG/24HR patch 1 patch, 1 patch, Transdermal, Once, William Ayala MD  •  ondansetron (ZOFRAN) tablet 4 mg, 4 mg, Oral, Q6H PRN, Nancy Talley, APRN, 4 mg at 04/12/19 2246  •  pantoprazole (PROTONIX) EC tablet 40 mg, 40 mg, Oral, BID AC, Bal Walters, RPH, 40 mg at 04/18/19 0658  •  sodium chloride 0.9 % flush 10 mL, 10 mL, Intravenous, PRN, Erick Dupont MD  •  sodium chloride 0.9 % flush 3 mL, 3 mL, Intravenous, Q12H, Nancy Talley, APRN, 3 mL at 04/17/19 0823  •  sodium chloride 0.9 % flush 3-10 mL, 3-10 mL, Intravenous, PRN, Nancy Talley, APRN  •  sodium chloride 0.9 % infusion, 50 mL/hr, Intravenous, Continuous, Zachary Lemus MD, Stopped at 04/18/19 0116  •  traMADol (ULTRAM) tablet 50 mg, 50 mg, Oral, Q6H PRN, Helio Santamaria MD      Assessment/Plan   Assessment / Plan     Active Hospital Problems    Diagnosis POA   • **JACKIE (acute kidney injury) (CMS/[facility]) [N17.9] Yes   • Volume depletion [E86.9] Yes   • Long QT interval [R94.31] Unknown   • Essential hypertension [I10] Unknown   • Hypothyroidism [E03.9] Unknown   • Type 2 diabetes mellitus (CMS/[facility]) [E11.9] Unknown   • Tobacco abuse [Z72.0] Unknown   • CKD (chronic kidney disease) stage 3, GFR 30-59 ml/min (CMS/[facility]) [N18.3] Unknown   • Abdominal pain [R10.9] Unknown   • Epigastric pain [R10.13] Unknown     Added automatically from request for surgery [id number]            Brief Hospital Course to date:  Freida Martinez is a 49 y.o. female here with nausea and abdominal pain, with anorexia, and JACKIE.    Nausea/abdominal pain. Mesenteric duplex shows abnormality. further investigation with CTA is planned.    JACKIE  --slowly improved, has CKD at baseline, likely DM induced    Hx of  HTN    Hypothyroidism    DM  --controlled    Tobacco abuse    Morbid obesity    PLAN:    - cont current care  - mesenteric CTA  - home soon.    DVT Prophylaxis:  BARTOLO    Disposition: I expect the patient to be discharged in 1-2 days.    CODE STATUS:   Code Status and Medical Interventions:   Ordered at: 04/12/19 2114     Level Of Support Discussed With:    Patient     Code Status:    CPR     Medical Interventions (Level of Support Prior to Arrest):    Full         Electronically signed by William Ayala MD, 04/18/19, 4:23 PM.

## 2019-04-19 ENCOUNTER — APPOINTMENT (OUTPATIENT)
Dept: CT IMAGING | Facility: HOSPITAL | Age: 49
End: 2019-04-19

## 2019-04-19 VITALS
TEMPERATURE: 98.4 F | OXYGEN SATURATION: 97 % | SYSTOLIC BLOOD PRESSURE: 150 MMHG | BODY MASS INDEX: 39.61 KG/M2 | HEIGHT: 64 IN | DIASTOLIC BLOOD PRESSURE: 115 MMHG | RESPIRATION RATE: 20 BRPM | HEART RATE: 87 BPM | WEIGHT: 232 LBS

## 2019-04-19 LAB
BH CV VAS SMA 1ST PP TIME: 15 MIN
BH CV VAS SMA 2ND PP TIME: 30 MIN
BH CV VAS SMA 3RD PP TIME: 45 MIN
BH CV VAS SMA AORTA EDV: 15.3 CM/S
BH CV VAS SMA AORTA PSV: 69.7 CM/S
BH CV VAS SMA CELIAC DIST EDV: 96.2 CM/S
BH CV VAS SMA CELIAC DIST PSV: 310 CM/S
BH CV VAS SMA CELIAC ORIGIN EDV: 69.3 CM/S
BH CV VAS SMA CELIAC ORIGIN PSV: 249 CM/S
BH CV VAS SMA CELIAC PROX EDV: 111 CM/S
BH CV VAS SMA CELIAC PROX PSV: 282 CM/S
BH CV VAS SMA HEPATIC EDV: 26.4 CM/S
BH CV VAS SMA HEPATIC PSV: 81.2 CM/S
BH CV VAS SMA IMA PSV: 174 CM/S
BH CV VAS SMA ORIGIN EDV: 26.9 CM/S
BH CV VAS SMA ORIGIN PSV: 160 CM/S
BH CV VAS SMA SMA DIST EDV: 12.2 CM/S
BH CV VAS SMA SMA DIST PSV: 61.1 CM/S
BH CV VAS SMA SMA MID EDV: 15.3 CM/S
BH CV VAS SMA SMA MID PSV: 90.1 CM/S
BH CV VAS SMA SMA PROX EDV: 26.9 CM/S
BH CV VAS SMA SMA PROX PSV: 199 CM/S
BH CV VAS SMA SPLENIC EDV: 46.9 CM/S
BH CV VAS SMA SPLENIC PSV: 101 CM/S
GLUCOSE BLDC GLUCOMTR-MCNC: 142 MG/DL (ref 70–130)
GLUCOSE BLDC GLUCOMTR-MCNC: 152 MG/DL (ref 70–130)

## 2019-04-19 PROCEDURE — 82962 GLUCOSE BLOOD TEST: CPT

## 2019-04-19 PROCEDURE — 74174 CTA ABD&PLVS W/CONTRAST: CPT

## 2019-04-19 PROCEDURE — 0 IOPAMIDOL PER 1 ML: Performed by: INTERNAL MEDICINE

## 2019-04-19 PROCEDURE — G0378 HOSPITAL OBSERVATION PER HR: HCPCS

## 2019-04-19 PROCEDURE — 99217 PR OBSERVATION CARE DISCHARGE MANAGEMENT: CPT | Performed by: INTERNAL MEDICINE

## 2019-04-19 RX ORDER — LOSARTAN POTASSIUM 50 MG/1
50 TABLET ORAL DAILY
Qty: 30 TABLET | Refills: 0 | Status: SHIPPED | OUTPATIENT
Start: 2019-04-19 | End: 2019-07-24 | Stop reason: ALTCHOICE

## 2019-04-19 RX ORDER — LOSARTAN POTASSIUM 50 MG/1
50 TABLET ORAL
Status: DISCONTINUED | OUTPATIENT
Start: 2019-04-19 | End: 2019-04-19 | Stop reason: HOSPADM

## 2019-04-19 RX ORDER — GLIPIZIDE 5 MG/1
5 TABLET ORAL
Qty: 60 TABLET | Refills: 0 | Status: SHIPPED | OUTPATIENT
Start: 2019-04-19 | End: 2019-10-25 | Stop reason: SDUPTHER

## 2019-04-19 RX ORDER — PANTOPRAZOLE SODIUM 40 MG/1
40 TABLET, DELAYED RELEASE ORAL DAILY
Qty: 30 TABLET | Refills: 0 | Status: SHIPPED | OUTPATIENT
Start: 2019-04-19 | End: 2019-10-25 | Stop reason: SDUPTHER

## 2019-04-19 RX ORDER — GLIPIZIDE 5 MG/1
5 TABLET ORAL
Status: DISCONTINUED | OUTPATIENT
Start: 2019-04-19 | End: 2019-04-19 | Stop reason: HOSPADM

## 2019-04-19 RX ADMIN — INSULIN LISPRO 2 UNITS: 100 INJECTION, SOLUTION INTRAVENOUS; SUBCUTANEOUS at 08:33

## 2019-04-19 RX ADMIN — PANTOPRAZOLE SODIUM 40 MG: 40 TABLET, DELAYED RELEASE ORAL at 08:28

## 2019-04-19 RX ADMIN — ATORVASTATIN CALCIUM 40 MG: 40 TABLET, FILM COATED ORAL at 08:28

## 2019-04-19 RX ADMIN — SODIUM CHLORIDE, PRESERVATIVE FREE 3 ML: 5 INJECTION INTRAVENOUS at 08:30

## 2019-04-19 RX ADMIN — FLUOXETINE HYDROCHLORIDE 40 MG: 20 CAPSULE ORAL at 08:28

## 2019-04-19 RX ADMIN — LEVOTHYROXINE SODIUM 25 MCG: 25 TABLET ORAL at 08:28

## 2019-04-19 RX ADMIN — AMLODIPINE BESYLATE 10 MG: 10 TABLET ORAL at 08:28

## 2019-04-19 RX ADMIN — IOPAMIDOL 80 ML: 755 INJECTION, SOLUTION INTRAVENOUS at 05:48

## 2019-04-19 NOTE — PROGRESS NOTES
"   LOS: 5 days    Patient Care Team:  Erica Beatty APRN as PCP - General (Obstetrics and Gynecology)  Provider, No Known as PCP - Family Medicine    Reason For Visit:    Subjective   Patient seen/examined. Underwent ct with contrast today to eval her abdominal pain. Plan for discharge today  Review of Systems:    Pulm: No soa   CV:  No CP      Objective       amLODIPine 10 mg Oral Daily   atorvastatin 40 mg Oral Daily   FLUoxetine 40 mg Oral Daily   heparin (porcine) 5,000 Units Subcutaneous Q8H   insulin lispro 0-7 Units Subcutaneous 4x Daily With Meals & Nightly   levothyroxine 25 mcg Oral Daily   nicotine 1 patch Transdermal Q24H   nicotine 1 patch Transdermal Once   pantoprazole 40 mg Oral BID AC   sodium chloride 3 mL Intravenous Q12H       sodium chloride 50 mL/hr Last Rate: Stopped (04/18/19 0116)         Vital Signs:  Blood pressure (!) 150/115, pulse 87, temperature 98.4 °F (36.9 °C), temperature source Oral, resp. rate 20, height 162.6 cm (64.02\"), weight 105 kg (232 lb 0 oz), SpO2 97 %.    Flowsheet Rows      First Filed Value   Admission Height  162.6 cm (64\") Documented at 04/12/2019 1715   Admission Weight  113 kg (249 lb) Documented at 04/12/2019 1715          04/18 0701 - 04/19 0700  In: 240 [P.O.:240]  Out: 1100 [Urine:1100]    Physical Exam:    General Appearance: NAD, alert and cooperative, Ox3  Eyes: PER, conjunctivae and sclerae normal, no icterus  Lungs: respirations regular and unlabored, no crepitus, clear to auscultation  Heart/CV: regular rhythm & normal rate, no murmur, no gallop, no rub and no edema  Abdomen: not distended, soft, non-tender, no masses,  bowel sounds present  Skin: No rash, Warm and dry    Radiology:      Renal Imaging:        Labs:  Results from last 7 days   Lab Units 04/15/19  0455 04/14/19  0339 04/13/19  0525   WBC 10*3/mm3 11.08* 9.89 10.37   HEMOGLOBIN g/dL 13.3 12.5 12.6   HEMATOCRIT % 41.5 39.6 40.4   PLATELETS 10*3/mm3 302 314 329     Results from last 7 " days   Lab Units 04/18/19  0628 04/15/19  0455 04/14/19  0339 04/13/19  0403 04/12/19  1758   SODIUM mmol/L 141 140 138 138 138   POTASSIUM mmol/L 3.8 4.1 3.8 4.0 3.9   CHLORIDE mmol/L 105 104 102 101 97*   CO2 mmol/L 24.0 25.0 25.0 26.0 26.0   BUN mg/dL 16 14 20 27* 27*   CREATININE mg/dL 1.28* 1.19* 1.85* 1.99* 2.04*   CALCIUM mg/dL 9.7 9.6 9.2 8.9 9.8   PHOSPHORUS mg/dL 3.2  --  2.8  --   --    MAGNESIUM mg/dL  --   --   --   --  2.2   ALBUMIN g/dL 3.80 3.80 3.50  --  4.10     Results from last 7 days   Lab Units 04/18/19  0628   GLUCOSE mg/dL 141*       Results from last 7 days   Lab Units 04/15/19  0455   ALK PHOS U/L 76   BILIRUBIN mg/dL 0.4   ALT (SGPT) U/L 8   AST (SGOT) U/L 10           Results from last 7 days   Lab Units 04/12/19  1804   COLOR UA  Yellow   CLARITY UA  Clear   PH, URINE  6.0   SPECIFIC GRAVITY, URINE  1.013   GLUCOSE UA  Negative   KETONES UA  Negative   BILIRUBIN UA  Negative   PROTEIN UA  Trace*   BLOOD UA  Trace*   LEUKOCYTES UA  Negative   NITRITE UA  Negative       Estimated Creatinine Clearance: 62.8 mL/min (A) (by C-G formula based on SCr of 1.28 mg/dL (H)).      Assessment       JACKIE (acute kidney injury) (CMS/[facility])    Essential hypertension    Hypothyroidism    Type 2 diabetes mellitus (CMS/[facility])    Tobacco abuse    CKD (chronic kidney disease) stage 3, GFR 30-59 ml/min (CMS/[facility])    Abdominal pain    Long QT interval    Volume depletion    Epigastric pain        Impression:   Non-oliguric JACKIE improved  CKD stage 2/3 at baseline likely from nephrosclerosis  DM  HTN  Anemia  Abd pain        Recommendations:   Will need to watch cr over the next 48-72 hours as she did receive contrast  Orders for renal panel written to have done next week after discharge to check kidney function  No NSAIDs  No nephrotoxic agents    Hold losartan/hctz until her renal panel is checked next week    Nella Patel DO  04/19/19  1:17 PM

## 2019-04-19 NOTE — DISCHARGE SUMMARY
[facility] Medicine Services  DISCHARGE SUMMARY    Patient Name: Freida Martinez  : 1970  MRN: [id number]    Date of Admission: 2019  Date of Discharge:  19  Primary Care Physician: Erica Beatty APRN    Consults     Date and Time Order Name Status Description    2019 0845 Inpatient Nephrology Consult Completed     2019 0844 Inpatient Gastroenterology Consult Completed           Hospital Course     Presenting Problem:   JACKIE (acute kidney injury) (CMS/[facility]) [N17.9]  Volume depletion [E86.9]  Volume depletion [E86.9]    Active Hospital Problems    Diagnosis  POA   • **JACKIE (acute kidney injury) (CMS/[facility]) [N17.9]  Yes   • Volume depletion [E86.9]  Yes   • Long QT interval [R94.31]  Unknown   • Essential hypertension [I10]  Unknown   • Hypothyroidism [E03.9]  Unknown   • Type 2 diabetes mellitus (CMS/[facility]) [E11.9]  Unknown   • Tobacco abuse [Z72.0]  Unknown   • CKD (chronic kidney disease) stage 3, GFR 30-59 ml/min (CMS/[facility]) [N18.3]  Unknown   • Abdominal pain [R10.9]  Unknown   • Epigastric pain [R10.13]  Unknown      Resolved Hospital Problems   No resolved problems to display.          Hospital Course:  Freida Martinez is a 49 y.o. female with past medical history significant for hypertension, hypothyroidism, diabetes mellitus, morbid obesity, tobacco abuse.  Patient was admitted on 2019 for abdominal pain and nausea vomiting.  Initial evaluation included a CBC which showed WBC of 14.76 hemoglobin 14.4 hematocrit 44.9 MCV MCH and platelet wall were all within normal limits.  Compressive metabolic panel also was done which showed glucose of 78 BUN 27 creatinine 2.04 sodium 138 potassium 3.9 chloride 97 liver enzymes within normal limits and GFR was estimated as 26.  Globin A1c also was measured was at 9.20.  Patient was admitted to telemetry and GI consultation was obtained.  Extensive GI workup was done including upper endoscopy which showed some mild  gastritis.  Also mesenteric duplex was done which suggested possible arterial stenosis as a result of a mesenteric CTA was done but this is study did not show any significant stenosis.  Patient also has been on Protonix 40 mg twice daily.  Symptoms have improved but not completely subsided.  We also consulted nephrology as patient had acute kidney injury.  Today's labs show glucose 141 BUN 16 creatinine 1.28 rest of the chemistry parameters are within normal limits.  She will also received diabetic education and I have talked to the patient also extensively about control of diabetes and hypertension.  We will discharge patient home to follow-up with the primary care physician on this coming Monday.  We have made an appointment for her.  Patient also needs to follow-up with the GI as per schedule.          Day of Discharge       Review of Systems      Otherwise ROS is negative except as mentioned in the HPI.    Vital Signs:   Temp:  [98.4 °F (36.9 °C)-98.6 °F (37 °C)] 98.4 °F (36.9 °C)  Heart Rate:  [87] 87  Resp:  [16-20] 20  BP: (129-150)/() 150/115     Physical Exam:     Constitutional: Awake, alert  Eyes: PERRLA, sclerae anicteric, no conjunctival injection  HENT: NCAT, mucous membranes moist  Neck: Supple, no thyromegaly, no lymphadenopathy, trachea midline  Respiratory: Clear to auscultation bilaterally, nonlabored respirations   Cardiovascular: RRR, no murmurs, rubs, or gallops.  Gastrointestinal: Very obese abdomen.  Positive bowel sounds, soft, mild epigastric tenderness, not distended.  Musculoskeletal: Morbidly obese.   bilateral ankle edema, no clubbing or cyanosis to extremities  Psychiatric: Appropriate affect, cooperative  Neurologic: Oriented x 3, strength symmetric in all extremities, Cranial Nerves grossly intact to confrontation, speech clear  Skin: No rashes          Pertinent  and/or Most Recent Results     Results from last 7 days   Lab Units 04/18/19  0628 04/15/19  0455 04/14/19  0334  04/13/19  0525 04/13/19  0403 04/12/19  1758   WBC 10*3/mm3  --  11.08* 9.89 10.37  --  14.76*   HEMOGLOBIN g/dL  --  13.3 12.5 12.6  --  14.4   HEMATOCRIT %  --  41.5 39.6 40.4  --  44.9   PLATELETS 10*3/mm3  --  302 314 329  --  370   SODIUM mmol/L 141 140 138  --  138 138   POTASSIUM mmol/L 3.8 4.1 3.8  --  4.0 3.9   CHLORIDE mmol/L 105 104 102  --  101 97*   CO2 mmol/L 24.0 25.0 25.0  --  26.0 26.0   BUN mg/dL 16 14 20  --  27* 27*   CREATININE mg/dL 1.28* 1.19* 1.85*  --  1.99* 2.04*   GLUCOSE mg/dL 141* 130* 131*  --  96 78   CALCIUM mg/dL 9.7 9.6 9.2  --  8.9 9.8     Results from last 7 days   Lab Units 04/15/19  0455 04/14/19  0339 04/12/19  1758   BILIRUBIN mg/dL 0.4 0.5 0.4   ALK PHOS U/L 76 70 81   ALT (SGPT) U/L 8 7 9   AST (SGOT) U/L 10 9 11           Invalid input(s): TG, LDLCALC, LDLREALC  Results from last 7 days   Lab Units 04/13/19  0403 04/12/19  1807   HEMOGLOBIN A1C % 9.20*  --    TROPONIN I ng/mL  --  0.00       Brief Urine Lab Results  (Last result in the past 365 days)      Color   Clarity   Blood   Leuk Est   Nitrite   Protein   CREAT   Urine HCG        04/15/19 0909               Negative           Microbiology Results Abnormal     None          Imaging Results (all)     Procedure Component Value Units Date/Time    CT Angiogram Abdomen Pelvis With & Without Contrast [[id number]] Collected:  04/19/19 0854     Updated:  04/19/19 0854    Narrative:       EXAMINATION: CT ANGIOGRAM ABDOMEN AND PELVIS W WO CONTRAST-      INDICATION: Abnormal bowel sounds.     TECHNIQUE: Unenhanced 5 mm images through the abdomen and pelvis,  subsequent 5 mm arterial venous and delayed venous phase images through  the abdomen and pelvis with 2-D and 3-D angiographic reconstructions of  the aortoiliac system.     The radiation dose reduction device was turned on for each scan per the  ALARA (As Low as Reasonably Achievable) protocol.     COMPARISON: Duplex SMA 04/17/2019.     FINDINGS: Previous exam report indicated  suspected hemodynamically  significant celiac axis stenosis but no evidence of SMA stenosis.     Unenhanced images show no evidence of significant atherosclerotic  calcification of the abdominal aorta or mesenteric vessels. Postcontrast  images show tortuous, but normally patent celiac axis, and contrast to  the elevated velocity measurements measured on ultrasound. There is no  visible clot as a potential cause of narrowing. Hepatic and splenic  arteries are normal size and normal in appearance. SMA appears normal.  Renal arteries appear normal. The abdominal aorta itself is tortuous but  nondistended. There are no visible inflammatory changes. Iliac and  proximal femoral arteries appear normal.     Elsewhere, the included lung bases appear grossly clear. There is  expected degree of fatty liver change for body habitus. No significant  abnormalities are seen of the spleen, pancreas, gallbladder, or adrenal  glands. There is an approximately 2.5 cm water density lesion of the  right upper pole presumably a cyst. There is some mild generalized right  upper pole cortical volume loss. No adenopathy ascites or acute  inflammatory change is seen. There is a fat-only containing  nonstrangulated periumbilical hernia. Large and small bowel loops are  normal in caliber and normal in appearance. Descending colon and sigmoid  contain scattered diverticula but no inflammatory focus is identified.  Uterus is premenopausal in size. Ovaries are not enlarged.       Impression:       1. Tortuous celiac axis and abdominal aorta, but no evidence of  hemodynamically significant celiac axis SMA, or other intraabdominal  vascular stenosis.  2. Fatty liver change and right upper pole renal cyst and cortical  thinning. Fat-containing nonstrangulated periumbilical hernia.  3. Sigmoid diverticulosis without evidence of diverticulitis No clearly  acute intraabdominal or intrapelvic disease is identified.     D:  04/19/2019  E:  04/19/2019        NM HIDA Scan With Pharmacological Intervention [[id number]] Collected:  04/16/19 1642     Updated:  04/16/19 1659    Narrative:       EXAMINATION: NM HIDA SCAN WITH PHARMACOLOGICAL INTERVENTION-04/16/2019:     INDICATION: Nausea, vomiting, diarrhea; E86.9-Volume depletion,  unspecified; N17.9-Acute kidney failure, unspecified; D72.829-Elevated  white blood cell count, unspecified; R10.13-Epigastric pain.      TECHNIQUE: Nuclear medicine hepatobiliary scan with radiopharmaceutical  7.7 mCi Choletec administered. CCK or Sincalide 2.3 mcg injected  intravenously at 2 minutes over 4 minutes duration in an attempt to  elicit symptomatology of right upper quadrant pain.     COMPARISON: NONE.     FINDINGS: Appropriate hepatocyte uptake and prompt biliary excretion  within 5 minutes of imaging. The gallbladder fills with an ejection  fraction at 90% calculated. Appropriate common bile duct and subsequent  small bowel activity excluding obstructive pathology. The patient's  symptomatology not re-created during CCK injection as noted by the  technologist.       Impression:       Appropriate hepatocyte and hepatobiliary tracer activity  with gallbladder filling. Ejection fraction calculated at 90% within  normal limits of prompt emptying. No obstructive pathology with  subsequent small bowel activity noted. Symptomatology not re-created on  CCK injection from the patient's reported right upper quadrant abdominal  pain.     D:  04/16/2019  E:  04/16/2019            This report was finalized on 4/16/2019 4:56 PM by Dr. Jose Armando Trevizo.       US Renal Bilateral [[id number]] Collected:  04/13/19 1204     Updated:  04/13/19 1713    Narrative:       EXAMINATION: US RENAL BILATERAL - 04/13/2019     INDICATION:  E86.9-Volume depletion, unspecified; N17.9-Acute kidney  failure, unspecified; D72.829-Elevated white blood cell count,  unspecified. Renal insufficiency.     TECHNIQUE: Sonographic imaging is obtained of the kidneys in both  the  sagittal and transverse planes.     COMPARISON: NONE     FINDINGS: The right kidney measures in length from portable 11.6 cm.  Small hypoechoic area suggesting a cyst measuring 2 cm. No solid mass.  No hydronephrosis. The left kidney measures in length from [id number]  12.2 cm. No solid cortical mass or renal cortical cysts seen within left  kidney. No hydronephrosis or nephrolithiasis. Bladder is grossly  unremarkable in appearance.       Impression:       Small cyst on the right kidney; otherwise, unremarkable  bilateral renal ultrasound.     DICTATED:   04/13/2019  EDITED/ls :   04/13/2019          This report was finalized on 4/13/2019 5:10 PM by Dr. Marisol Kulkarni MD.       CT Abdomen Pelvis Without Contrast [[id number]] Collected:  04/12/19 2110     Updated:  04/13/19 1036    Narrative:       EXAMINATION: CT ABDOMEN/PELVIS WO CONTRAST, CT CHEST WO CONTRAST -  04/12/2019      INDICATION: Abdominal pain.     TECHNIQUE: Imaging is obtained of the chest, abdomen and pelvis without  the administration of oral and intravenous contrast.     The radiation dose reduction device was turned on for each scan per the  ALARA (As Low as Reasonably Achievable) protocol.     COMPARISON: NONE     FINDINGS:      CHEST: The thyroid is homogeneous in appearance. No mediastinal mass or  adenopathy. The cardiac chambers are within normal limits. There is no  pericardial effusion. No bulky hilar or axillary lymphadenopathy. The  lung parenchyma is grossly clear. No parenchymal consolidation,  pulmonary mass or nodule identified. Minimal scarring identified at the  right lung base.     ABDOMEN: The liver is homogeneous. Spleen is unremarkable. Kidneys and  adrenal glands are within normal limits. There are no stones seen in the  gallbladder. Pancreas is homogeneous. No abdominal or retroperitoneal  lymphadenopathy. Diverticulosis of the colon without evidence of  diverticulitis. There is a cyst seen within the right  kidney.     PELVIS: The pelvic organs are unremarkable. The pelvic portion of the  gastrointestinal tract is within normal limits. Small periumbilical  hernia containing mesenteric fat only. Diverticulosis with no evidence  of diverticulitis. No free fluid or free air. No pelvic adenopathy. The  pelvic organs are unremarkable. No abnormal mass or fluid collections  identified. The bony structures reveal no evidence of osseous  abnormality. Degenerative change is seen within the spine and pelvis.       Impression:       No CT evidence of acute intrathoracic abnormality. No acute  intra-abdominal or pelvic abnormality.     DICTATED:   04/12/2019  EDITED/ls :   04/12/2019         This report was finalized on 4/13/2019 10:33 AM by Dr. Marisol Kulkarni MD.       CT Chest Without Contrast [[id number]] Collected:  04/12/19 2110     Updated:  04/13/19 1036    Narrative:       EXAMINATION: CT ABDOMEN/PELVIS WO CONTRAST, CT CHEST WO CONTRAST -  04/12/2019      INDICATION: Abdominal pain.     TECHNIQUE: Imaging is obtained of the chest, abdomen and pelvis without  the administration of oral and intravenous contrast.     The radiation dose reduction device was turned on for each scan per the  ALARA (As Low as Reasonably Achievable) protocol.     COMPARISON: NONE     FINDINGS:      CHEST: The thyroid is homogeneous in appearance. No mediastinal mass or  adenopathy. The cardiac chambers are within normal limits. There is no  pericardial effusion. No bulky hilar or axillary lymphadenopathy. The  lung parenchyma is grossly clear. No parenchymal consolidation,  pulmonary mass or nodule identified. Minimal scarring identified at the  right lung base.     ABDOMEN: The liver is homogeneous. Spleen is unremarkable. Kidneys and  adrenal glands are within normal limits. There are no stones seen in the  gallbladder. Pancreas is homogeneous. No abdominal or retroperitoneal  lymphadenopathy. Diverticulosis of the colon without evidence  of  diverticulitis. There is a cyst seen within the right kidney.     PELVIS: The pelvic organs are unremarkable. The pelvic portion of the  gastrointestinal tract is within normal limits. Small periumbilical  hernia containing mesenteric fat only. Diverticulosis with no evidence  of diverticulitis. No free fluid or free air. No pelvic adenopathy. The  pelvic organs are unremarkable. No abnormal mass or fluid collections  identified. The bony structures reveal no evidence of osseous  abnormality. Degenerative change is seen within the spine and pelvis.       Impression:       No CT evidence of acute intrathoracic abnormality. No acute  intra-abdominal or pelvic abnormality.     DICTATED:   04/12/2019  EDITED/ls :   04/12/2019         This report was finalized on 4/13/2019 10:33 AM by Dr. Marisol Kulkarni MD.       XR Chest 1 View [[id number]] Collected:  04/12/19 2031     Updated:  04/13/19 1036    Narrative:          EXAMINATION: XR CHEST 1 VW - 04/12/2019     INDICATION: Weakness, dizziness, altered mental status.     COMPARISON: 04/05/2019     FINDINGS: Portable chest reveals the heart to be borderline enlarged.  Ill-defined opacification at the left lung base with small left pleural  effusion. Lung fields are grossly clear. Degenerative change is seen  within the spine. Pulmonary vascularity is within normal limits.           Impression:       Mild increased markings left lung base with small left  pleural effusion.     DICTATED:   04/12/2019  EDITED/ls :   04/12/2019      This report was finalized on 4/13/2019 10:33 AM by Dr. Marisol Kulkarni MD.             Results for orders placed during the hospital encounter of 04/12/19   Duplex Mesenteric Complete CAR    Narrative EXAMINATION:  DUPLEX MESENTERIC ULTRASOUND - 04/17/2019     HISTORY: Generalized abdominal pain     COMPARISON: Abdomen and pelvis CT scan without contrast 04/12/2019.     FINDINGS: Concurrent CT scan does not show more than  minimal  calcification of the abdominal aorta or mesenteric vasculature. Today's  exam, however, shows evidence of hemodynamically significant stenosis of  the celiac artery, maximal peak systolic velocities of 249 at the  origin, 282 cm/s proximally, and 310 cm/s distally. Values of the SMA  range from normal to upper limits of normal, with maximal peak systolic  velocity in the proximal SMA of 199 cm/s. Peak systolic SONI velocity is  shown as 174 cm/s, although criteria for stenosis are not well  established.       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 No evidence of hemodynamically significant SMA stenosis.     D:  04/18/2019  E:  04/18/2019          Results for orders placed during the hospital encounter of 04/12/19   Duplex Mesenteric Complete CAR    Narrative EXAMINATION:  DUPLEX MESENTERIC ULTRASOUND - 04/17/2019     HISTORY: Generalized abdominal pain     COMPARISON: Abdomen and pelvis CT scan without contrast 04/12/2019.     FINDINGS: Concurrent CT scan does not show more than minimal  calcification of the abdominal aorta or mesenteric vasculature. Today's  exam, however, shows evidence of hemodynamically significant stenosis of  the celiac artery, maximal peak systolic velocities of 249 at the  origin, 282 cm/s proximally, and 310 cm/s distally. Values of the SMA  range from normal to upper limits of normal, with maximal peak systolic  velocity in the proximal SMA of 199 cm/s. Peak systolic SONI velocity is  shown as 174 cm/s, although criteria for stenosis are not well  established.       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 No evidence of hemodynamically significant SMA stenosis.     D:  04/18/2019  E:  04/18/2019                 Discharge Details        Discharge Medications      New Medications      Instructions Start Date   losartan 50 MG tablet  Commonly known as:  COZAAR   50 mg, Oral, Daily      pantoprazole 40 MG EC  tablet  Commonly known as:  PROTONIX   40 mg, Oral, Daily         Changes to Medications      Instructions Start Date   glipiZIDE 5 MG tablet  Commonly known as:  GLUCOTROL  What changed:    · medication strength  · when to take this   5 mg, Oral, 2 Times Daily Before Meals         Continue These Medications      Instructions Start Date   amLODIPine 10 MG tablet  Commonly known as:  NORVASC   10 mg, Oral, Daily      aspirin 325 MG tablet   325 mg, Oral, Daily      atorvastatin 40 MG tablet  Commonly known as:  LIPITOR   40 mg, Oral, Daily      levothyroxine 25 MCG tablet  Commonly known as:  SYNTHROID, LEVOTHROID   25 mcg, Oral, Daily      metFORMIN 500 MG tablet  Commonly known as:  GLUCOPHAGE   500 mg, Oral, 2 Times Daily With Meals      phenazopyridine 200 MG tablet  Commonly known as:  PYRIDIUM   200 mg, Oral, 3 Times Daily PRN      PROZAC 40 MG capsule  Generic drug:  FLUoxetine   40 mg, Oral, Daily      VITAMIN D2 PO   50,000 Units, Oral, Weekly         Stop These Medications    cefdinir 300 MG capsule  Commonly known as:  OMNICEF     cyclobenzaprine 10 MG tablet  Commonly known as:  FLEXERIL     losartan-hydrochlorothiazide 50-12.5 MG per tablet  Commonly known as:  HYZAAR     naproxen 500 MG EC tablet  Commonly known as:  EC NAPROSYN     predniSONE 10 MG (21) tablet pack  Commonly known as:  DELTASONE            Allergies   Allergen Reactions   • Lisinopril Swelling         Discharge Disposition:  Home or Self Care    Discharge Diet:  Diet Order   Procedures   • Diet Regular; Low Fat, Consistent Carbohydrate         Discharge Activity:   Activity Instructions     Activity as Tolerated              CODE STATUS:    Code Status and Medical Interventions:   Ordered at: 04/12/19 2114     Level Of Support Discussed With:    Patient     Code Status:    CPR     Medical Interventions (Level of Support Prior to Arrest):    Full         Future Appointments   Date Time Provider Department Center   4/22/2019 10:45 AM  Annia Rico APRN [facility] None       Additional Instructions for the Follow-ups that You Need to Schedule     Discharge Follow-up with PCP   As directed       Currently Documented PCP:    Erica Beatty APRN    PCP Phone Number:    [phone]     Follow Up Details:  as per schedule.         Discharge Follow-up with Specified Provider: GI as per schedule.   As directed      To:  GI as per schedule.         Renal Function Panel    Apr 24, 2019 (Approximate)            Time Spent on Discharge:  45 minutes    Electronically signed by William Ayala MD, 04/19/19, 2:47 PM.

## 2019-04-19 NOTE — PLAN OF CARE
Problem: Patient Care Overview  Goal: Plan of Care Review  Outcome: Ongoing (interventions implemented as appropriate)   04/19/19 0355   Coping/Psychosocial   Plan of Care Reviewed With patient   Plan of Care Review   Progress no change   OTHER   Outcome Summary VSS, no compliants        Problem: Renal Failure/Kidney Injury, Acute (Adult)  Goal: Signs and Symptoms of Listed Potential Problems Will be Absent, Minimized or Managed (Renal Failure/Kidney Injury, Acute)  Outcome: Ongoing (interventions implemented as appropriate)      Problem: GI Endoscopy (Adult)  Goal: Signs and Symptoms of Listed Potential Problems Will be Absent, Minimized or Managed (GI Endoscopy)  Outcome: Ongoing (interventions implemented as appropriate)

## 2019-04-19 NOTE — PROGRESS NOTES
"Adult Nutrition  Assessment/PES    Patient Name:  Freida Martinez  YOB: 1970  MRN: [id number]  Admit Date:  4/12/2019    Assessment Date:  4/19/2019        Reason for Assessment     Row Name 04/19/19 1211          Reason for Assessment    Reason For Assessment  other (see comments) LOS           Anthropometrics     Row Name 04/19/19 1211          Anthropometrics    Height Method  other (see comments) No method documented     Height  162.6 cm (64.02\")     Weight  105 kg (232 lb 0 oz)        Ideal Body Weight (IBW)    Ideal Body Weight (IBW) (kg)  55.04     % Ideal Body Weight  191.2        Body Mass Index (BMI)    BMI (kg/m2)  39.89     BMI Assessment  BMI 35-39.9: obesity grade II        IBW Adjustment, Para/Tetraplegia    5% Adjustment, Para (IBW)  52.29     10% Adjustment, Para (IBW)  49.54     10% Adjustment, Tetra (IBW)  49.54     15% Adjustment, Tetra (IBW)  46.78         Labs/Tests/Procedures/Meds     Row Name 04/19/19 1213          Labs/Procedures/Meds    Lab Results Reviewed  reviewed        Medications    Pertinent Medications Reviewed  reviewed           Estimated/Assessed Needs     Row Name 04/19/19 1211          Calculation Measurements    Height  162.6 cm (64.02\")         Nutrition Prescription Ordered     Row Name 04/19/19 1213          Nutrition Prescription PO    Current PO Diet  Regular     Common Modifiers  Consistent Carbohydrate;Low Fat         Evaluation of Received Nutrient/Fluid Intake     Row Name 04/19/19 1214 04/19/19 1211       Calculation Measurements    Height  --  162.6 cm (64.02\")       PO Evaluation    Number of Days PO Intake Evaluated  3 days  --    Number of Meals  6  --    % PO Intake  67  --        Evaluation of Prescribed Nutrient/Fluid Intake     Row Name 04/19/19 1211          Calculation Measurements    Height  162.6 cm (64.02\")             Problem/Interventions:  Problem 1     Row Name 04/19/19 1214          Nutrition Diagnoses Problem 1    Problem 1  " Nutrition Appropriate for Condition at this Time                 Intervention Goal     Row Name 04/19/19 1214          Intervention Goal    General  Maintain nutrition     PO  Maintain intake         Nutrition Intervention     Row Name 04/19/19 1214          Nutrition Intervention    RD/Tech Action  Follow Tx progress;Care plan reviewd           Education/Evaluation     Row Name 04/19/19 1215          Monitor/Evaluation    Monitor  Per protocol           Electronically signed by:  Cara Diaz RD  04/19/19 12:15 PM   Time 20 min

## 2019-04-20 ENCOUNTER — READMISSION MANAGEMENT (OUTPATIENT)
Dept: CALL CENTER | Facility: HOSPITAL | Age: 49
End: 2019-04-20

## 2019-04-20 NOTE — OUTREACH NOTE
Prep Survey      Responses   Facility patient discharged from?  [facility]   Is patient eligible?  Yes   Discharge diagnosis  JACKIE (acute kidney injury),     Volume depletion    Does the patient have one of the following disease processes/diagnoses(primary or secondary)?  Other   Does the patient have Home health ordered?  No   Is there a DME ordered?  No   Prep survey completed?  Yes          Ellen German RN

## 2019-04-22 ENCOUNTER — READMISSION MANAGEMENT (OUTPATIENT)
Dept: CALL CENTER | Facility: HOSPITAL | Age: 49
End: 2019-04-22

## 2019-04-22 ENCOUNTER — OFFICE VISIT (OUTPATIENT)
Dept: INTERNAL MEDICINE | Facility: CLINIC | Age: 49
End: 2019-04-22

## 2019-04-22 VITALS
HEART RATE: 107 BPM | OXYGEN SATURATION: 96 % | DIASTOLIC BLOOD PRESSURE: 84 MMHG | WEIGHT: 252.8 LBS | BODY MASS INDEX: 42.12 KG/M2 | TEMPERATURE: 97.1 F | SYSTOLIC BLOOD PRESSURE: 128 MMHG | RESPIRATION RATE: 18 BRPM | HEIGHT: 65 IN

## 2019-04-22 DIAGNOSIS — N17.9 AKI (ACUTE KIDNEY INJURY) (HCC): ICD-10-CM

## 2019-04-22 DIAGNOSIS — I10 ESSENTIAL HYPERTENSION: Primary | ICD-10-CM

## 2019-04-22 DIAGNOSIS — E11.65 UNCONTROLLED TYPE 2 DIABETES MELLITUS WITH HYPERGLYCEMIA (HCC): ICD-10-CM

## 2019-04-22 DIAGNOSIS — Z72.0 TOBACCO ABUSE: ICD-10-CM

## 2019-04-22 DIAGNOSIS — E78.2 MIXED HYPERLIPIDEMIA: ICD-10-CM

## 2019-04-22 DIAGNOSIS — E66.01 MORBID OBESITY WITH BMI OF 45.0-49.9, ADULT (HCC): ICD-10-CM

## 2019-04-22 DIAGNOSIS — F33.41 RECURRENT MAJOR DEPRESSIVE DISORDER, IN PARTIAL REMISSION (HCC): ICD-10-CM

## 2019-04-22 DIAGNOSIS — N18.30 CKD (CHRONIC KIDNEY DISEASE) STAGE 3, GFR 30-59 ML/MIN (HCC): ICD-10-CM

## 2019-04-22 DIAGNOSIS — E03.9 ACQUIRED HYPOTHYROIDISM: ICD-10-CM

## 2019-04-22 LAB
ALBUMIN SERPL-MCNC: 4.1 G/DL (ref 3.5–5.2)
ALBUMIN/GLOB SERPL: 1.2 G/DL
ALP SERPL-CCNC: 83 U/L (ref 39–117)
ALT SERPL W P-5'-P-CCNC: 13 U/L (ref 1–33)
ANION GAP SERPL CALCULATED.3IONS-SCNC: 12.6 MMOL/L
AST SERPL-CCNC: 11 U/L (ref 1–32)
BASOPHILS # BLD AUTO: 0.05 10*3/MM3 (ref 0–0.2)
BASOPHILS NFR BLD AUTO: 0.5 % (ref 0–1.5)
BILIRUB SERPL-MCNC: 0.4 MG/DL (ref 0.2–1.2)
BUN BLD-MCNC: 20 MG/DL (ref 6–20)
BUN/CREAT SERPL: 13.4 (ref 7–25)
CALCIUM SPEC-SCNC: 9.2 MG/DL (ref 8.6–10.5)
CHLORIDE SERPL-SCNC: 97 MMOL/L (ref 98–107)
CO2 SERPL-SCNC: 25.4 MMOL/L (ref 22–29)
CREAT BLD-MCNC: 1.49 MG/DL (ref 0.57–1)
DEPRECATED RDW RBC AUTO: 47.4 FL (ref 37–54)
EOSINOPHIL # BLD AUTO: 0.32 10*3/MM3 (ref 0–0.4)
EOSINOPHIL NFR BLD AUTO: 3.3 % (ref 0.3–6.2)
ERYTHROCYTE [DISTWIDTH] IN BLOOD BY AUTOMATED COUNT: 14.9 % (ref 12.3–15.4)
GFR SERPL CREATININE-BSD FRML MDRD: 37 ML/MIN/1.73
GLOBULIN UR ELPH-MCNC: 3.3 GM/DL
GLUCOSE BLD-MCNC: 173 MG/DL (ref 65–99)
HCT VFR BLD AUTO: 40.5 % (ref 34–46.6)
HGB BLD-MCNC: 12.6 G/DL (ref 12–15.9)
IMM GRANULOCYTES # BLD AUTO: 0.06 10*3/MM3 (ref 0–0.05)
IMM GRANULOCYTES NFR BLD AUTO: 0.6 % (ref 0–0.5)
LYMPHOCYTES # BLD AUTO: 2.49 10*3/MM3 (ref 0.7–3.1)
LYMPHOCYTES NFR BLD AUTO: 25.8 % (ref 19.6–45.3)
MCH RBC QN AUTO: 26.9 PG (ref 26.6–33)
MCHC RBC AUTO-ENTMCNC: 31.1 G/DL (ref 31.5–35.7)
MCV RBC AUTO: 86.5 FL (ref 79–97)
MONOCYTES # BLD AUTO: 0.48 10*3/MM3 (ref 0.1–0.9)
MONOCYTES NFR BLD AUTO: 5 % (ref 5–12)
NEUTROPHILS # BLD AUTO: 6.26 10*3/MM3 (ref 1.7–7)
NEUTROPHILS NFR BLD AUTO: 64.8 % (ref 42.7–76)
NRBC BLD AUTO-RTO: 0 /100 WBC (ref 0–0.2)
PLATELET # BLD AUTO: 346 10*3/MM3 (ref 140–450)
PMV BLD AUTO: 12.4 FL (ref 6–12)
POTASSIUM BLD-SCNC: 4.2 MMOL/L (ref 3.5–5.2)
PROT SERPL-MCNC: 7.4 G/DL (ref 6–8.5)
RBC # BLD AUTO: 4.68 10*6/MM3 (ref 3.77–5.28)
SODIUM BLD-SCNC: 135 MMOL/L (ref 136–145)
TSH SERPL DL<=0.05 MIU/L-ACNC: 3.99 MIU/ML (ref 0.27–4.2)
WBC NRBC COR # BLD: 9.66 10*3/MM3 (ref 3.4–10.8)

## 2019-04-22 PROCEDURE — 99204 OFFICE O/P NEW MOD 45 MIN: CPT | Performed by: NURSE PRACTITIONER

## 2019-04-22 PROCEDURE — 80050 GENERAL HEALTH PANEL: CPT | Performed by: NURSE PRACTITIONER

## 2019-04-22 RX ORDER — ALBUTEROL SULFATE 2.5 MG/3ML
SOLUTION RESPIRATORY (INHALATION)
COMMUNITY
Start: 2019-04-04

## 2019-04-22 RX ORDER — ALBUTEROL SULFATE 90 UG/1
AEROSOL, METERED RESPIRATORY (INHALATION)
COMMUNITY
Start: 2019-04-04 | End: 2019-11-11 | Stop reason: SDUPTHER

## 2019-04-22 RX ORDER — LEVOTHYROXINE SODIUM 0.05 MG/1
TABLET ORAL
COMMUNITY
Start: 2019-04-04 | End: 2019-10-25 | Stop reason: SDUPTHER

## 2019-04-22 NOTE — OUTREACH NOTE
Medical Week 1 Survey      Responses   Facility patient discharged from?  [facility]   Does the patient have one of the following disease processes/diagnoses(primary or secondary)?  Other   Is there a successful TCM telephone encounter documented?  No   Week 1 attempt successful?  No   Unsuccessful attempts  Attempt 1          Precious Pace RN

## 2019-04-22 NOTE — PATIENT INSTRUCTIONS
Stop metformin  Start Januvia  Acute Kidney Injury, Adult  Acute kidney injury is a sudden worsening of kidney function. The kidneys are organs that have several jobs. They filter the blood to remove waste products and extra fluid. They also maintain a healthy balance of minerals and hormones in the body, which helps control blood pressure and keep bones strong. With this condition, your kidneys do not do their jobs as well as they should.  This condition ranges from mild to severe. Over time it may develop into long-lasting (chronic) kidney disease. Early detection and treatment may prevent acute kidney injury from developing into a chronic condition.  What are the causes?  Common causes of this condition include:  · A problem with blood flow to the kidneys. This may be caused by:  ? Low blood pressure (hypotension) or shock.  ? Blood loss.  ? Heart and blood vessel (cardiovascular) disease.  ? Severe burns.  ? Liver disease.  · Direct damage to the kidneys. This may be caused by:  ? Certain medicines.  ? A kidney infection.  ? Poisoning.  ? Being around or in contact with toxic substances.  ? A surgical wound.  ? A hard, direct hit to the kidney area.  · A sudden blockage of urine flow. This may be caused by:  ? Cancer.  ? Kidney stones.  ? An enlarged prostate in males.    What are the signs or symptoms?  Symptoms of this condition may not be obvious until the condition becomes severe. Symptoms of this condition can include:  · Tiredness (lethargy), or difficulty staying awake.  · Nausea or vomiting.  · Swelling (edema) of the face, legs, ankles, or feet.  · Problems with urination, such as:  ? Abdominal pain, or pain along the side of your stomach (flank).  ? Decreased urine production.  ? Decrease in the force of urine flow.  · Muscle twitches and cramps, especially in the legs.  · Confusion or trouble concentrating.  · Loss of appetite.  · Fever.    How is this diagnosed?  This condition may be diagnosed with  tests, including:  · Blood tests.  · Urine tests.  · Imaging tests.  · A test in which a sample of tissue is removed from the kidneys to be examined under a microscope (kidney biopsy).    How is this treated?  Treatment for this condition depends on the cause and how severe the condition is. In mild cases, treatment may not be needed. The kidneys may heal on their own. In more severe cases, treatment will involve:  · Treating the cause of the kidney injury. This may involve changing any medicines you are taking or adjusting your dosage.  · Fluids. You may need specialized IV fluids to balance your body's needs.  · Having a catheter placed to drain urine and prevent blockages.  · Preventing problems from occurring. This may mean avoiding certain medicines or procedures that can cause further injury to the kidneys.    In some cases treatment may also require:  · A procedure to remove toxic wastes from the body (dialysis or continuous renal replacement therapy - CRRT).  · Surgery. This may be done to repair a torn kidney, or to remove the blockage from the urinary system.    Follow these instructions at home:  Medicines  · Take over-the-counter and prescription medicines only as told by your health care provider.  · Do not take any new medicines without your health care provider's approval. Many medicines can worsen your kidney damage.  · Do not take any vitamin and mineral supplements without your health care provider's approval. Many nutritional supplements can worsen your kidney damage.  Lifestyle  · If your health care provider prescribed changes to your diet, follow them. You may need to decrease the amount of protein you eat.  · Achieve and maintain a healthy weight. If you need help with this, ask your health care provider.  · Start or continue an exercise plan. Try to exercise at least 30 minutes a day, 5 days a week.  · Do not use any tobacco products, such as cigarettes, chewing tobacco, and e-cigarettes. If  you need help quitting, ask your health care provider.  General instructions  · Keep track of your blood pressure. Report changes in your blood pressure as told by your health care provider.  · Stay up to date with immunizations. Ask your health care provider which immunizations you need.  · Keep all follow-up visits as told by your health care provider. This is important.  Where to find more information  · American Association of Kidney Patients: www.aakp.org  · National Kidney Foundation: www.kidney.org  · American Kidney Fund: www.akfinc.org  · [employer] [facility]:  ? www.lifeoptions.org  ? www.kidneyschool.org  Contact a health care provider if:  · Your symptoms get worse.  · You develop new symptoms.  Get help right away if:  · You develop symptoms of worsening kidney disease, which include:  ? Headaches.  ? Abnormally dark or light skin.  ? Easy bruising.  ? Frequent hiccups.  ? Chest pain.  ? Shortness of breath.  ? End of menstruation in women.  ? Seizures.  ? Confusion or altered mental status.  ? Abdominal or back pain.  ? Itchiness.  · You have a fever.  · Your body is producing less urine.  · You have pain or bleeding when you urinate.  Summary  · Acute kidney injury is a sudden worsening of kidney function.  · Acute kidney injury can be caused by problems with blood flow to the kidneys, direct damage to the kidneys, and sudden blockage of urine flow.  · Symptoms of this condition may not be obvious until it becomes severe. Symptoms may include edema, lethargy, confusion, nausea or vomiting, and problems passing urine.  · This condition can usually be diagnosed with blood tests, urine tests, and imaging tests. Sometimes a kidney biopsy is done to diagnose this condition.  · Treatment for this condition often involves treating the underlying cause. It is treated with fluids, medicines, dialysis, diet changes, or surgery.  This information is not intended to replace advice given to you by  your health care provider. Make sure you discuss any questions you have with your health care provider.  Document Released: 07/02/2012 Document Revised: 04/19/2018 Document Reviewed: 12/08/2017  [employer] Interactive Patient Education © 2019 [employer]    Diabetes Mellitus and Sick Day Management  Blood sugar (glucose) can be difficult to control when you are sick. Common illnesses that can cause problems for people with diabetes (diabetes mellitus) include colds, fever, flu (influenza), nausea, vomiting, and diarrhea. These illnesses can cause stress and loss of body fluids (dehydration), and those issues can cause blood glucose levels to increase. Because of this, it is very important to take your insulin and diabetes medicines and eat some form of carbohydrate when you are sick.  You should make a plan for days when you are sick (sick day plan) as part of your diabetes management plan. You and your health care provider should make this plan in advance. The following guidelines are intended to help you manage an illness that lasts for about 24 hours or less. Your health care provider may also give you more specific instructions.  What do I need to do to manage my blood glucose?  · Check your blood glucose every 2-4 hours, or as often as told by your health care provider.  · Know your sick day treatment goals. Your target blood glucose levels may be different when you are sick.  · If you use insulin, take your usual dose.  ? If your blood glucose continues to be too high, you may need to take an additional insulin dose as told by your health care provider.  · If you use oral diabetes medicine, you may need to stop taking it if you are not able to eat or drink normally. Ask your health care provider about whether you need to stop taking these medicines while you are sick.  · If you use injectable hormone medicines other than insulin to control your diabetes, ask your health care provider about whether you need to  stop taking these medicines while you are sick.  What else can I do to manage my diabetes when I am sick?  Check your ketones  · If you have type 1 diabetes, check your urine ketones every 4 hours.  · If you have type 2 diabetes, check your urine ketones as often as told by your health care provider.  Drink fluids  · Drink enough fluid to keep your urine clear or pale yellow. This is especially important if you have a fever, vomiting, or diarrhea. Those symptoms can lead to dehydration.  · Follow any instructions from your health care provider about beverages to avoid.  ? Do not drink alcohol, caffeine, or drinks that contain a lot of sugar.  Take medicines as directed  · Take-over-the-counter and prescription medicines only as told by your health care provider.  · Check medicine labels for added sugars. Some medicines may contain sugar or types of sugars that can raise your blood glucose level.  What foods can I eat when I am sick?  You need to eat some form of carbohydrates when you are sick. You should eat 45-50 grams (45-50 g) of carbohydrates every 3-4 hours until you feel better.  All of the food choices below contain about 15 g of carbohydrates. Plan ahead and keep some of these foods around so you have them if you get sick.  · 4-6 oz (120-177 mL) carbonated beverage that contains sugar, such as regular (not diet) soda. You may be able to drink carbonated beverages more easily if you open the beverage and let it sit at room temperature for a few minutes before drinking.  · ½ of a twin frozen ice pop.  · 4 oz (120 g) regular gelatin.  · 4 oz (120 mL) fruit juice.  · 4 oz (120 g) ice cream or frozen yogurt.  · 2 oz (60 g) sherbet.  · 8 oz (240 mL) clear broth or soup.  · 4 oz (120 g) regular custard.  · 4 oz (120 g) regular pudding.  · 8 oz (240 g) plain yogurt.  · 1 slice bread or toast.  · 6 saltine crackers.  · 5 vanilla wafers.    Questions to ask your health care provider  Consider asking the following  questions so you know what to do on days when you are sick:  · Should I adjust my diabetes medicines?  · How often do I need to check my blood glucose?  · What supplies do I need to manage my diabetes at home when I am sick?  · What number can I call if I have questions?  · What foods and drinks should I avoid?    Contact a health care provider if:  · You develop symptoms of diabetic ketoacidosis, such as:  ? Fatigue.  ? Weight loss.  ? Excessive thirst.  ? Light-headedness.  ? Fruity or sweet-smelling breath.  ? Excessive urination.  ? Vision changes.  ? Confusion or irritability.  ? Nausea.  ? Vomiting.  ? Rapid breathing.  ? Pain in the abdomen.  ? Feeling flushed.  · You are unable to drink fluids without vomiting.  · You have any of the following for more than 6 hours:  ? Nausea.  ? Vomiting.  ? Diarrhea.  · Your blood glucose is at or above 240 mg/dL (13.3 mmol/L), even after you take an additional insulin dose.  · You have a change in how you think, feel, or act (mental status).  · You develop another serious illness.  · You have been sick or have had a fever for 2 days or longer and you are not getting better.  Get help right away if:  · Your blood glucose is lower than 54 mg/dL (3.0 mmol/L).  · You have difficulty breathing.  · You have moderate or high ketone levels in your urine.  · You used emergency glucagon to treat low blood glucose.  Summary  · Blood sugar (glucose) can be difficult to control when you are sick. Common illnesses that can cause problems for people with diabetes (diabetes mellitus) include colds, fever, flu (influenza), nausea, vomiting, and diarrhea.  · Illnesses can cause stress and loss of body fluids (dehydration), and those issues can cause blood glucose levels to increase.  · Make a plan for days when you are sick (sick day plan) as part of your diabetes management plan. You and your health care provider should make this plan in advance.  · It is very important to take your  insulin and diabetes medicines and to eat some form of carbohydrate when you are sick.  · Contact your health care provider if have problems managing your blood glucose levels when you are sick, or if you have been sick or had a fever for 2 days or longer and are not getting better.  This information is not intended to replace advice given to you by your health care provider. Make sure you discuss any questions you have with your health care provider.  Document Released: 12/20/2004 Document Revised: 09/15/2017 Document Reviewed: 09/15/2017  [employer] Interactive Patient Education © 2019 [employer]  BMI for Adults  Body mass index (BMI) is a number that is calculated from a person's weight and height. In most adults, the number is used to find how much of an adult's weight is made up of fat. BMI is not as accurate as a direct measure of body fat.  How is BMI calculated?  BMI is calculated by dividing weight in kilograms by height in meters squared. It can also be calculated by dividing weight in pounds by height in inches squared, then multiplying the resulting number by 703. Charts are available to help you find your BMI quickly and easily without doing this calculation.  How is BMI interpreted?  Health care professionals use BMI charts to identify whether an adult is underweight, at a normal weight, or overweight based on the following guidelines:  · Underweight: BMI less than 18.5.  · Normal weight: BMI between 18.5 and 24.9.  · Overweight: BMI between 25 and 29.9.  · Obese: BMI of 30 and above.    BMI is usually interpreted the same for males and females.  Weight includes both fat and muscle, so someone with a muscular build, such as an athlete, may have a BMI that is higher than 24.9. In cases like these, BMI may not accurately depict body fat. To determine if excess body fat is the cause of a BMI of 25 or higher, further assessments may need to be done by a health care provider.  Why is BMI a useful  tool?  BMI is used to identify a possible weight problem that may be related to a medical problem or may increase the risk for medical problems. BMI can also be used to promote changes to reach a healthy weight.  This information is not intended to replace advice given to you by your health care provider. Make sure you discuss any questions you have with your health care provider.  Document Released: 08/29/2005 Document Revised: 04/27/2017 Document Reviewed: 05/15/2015  [employer] Interactive Patient Education © 2018 [employer]

## 2019-04-22 NOTE — PROGRESS NOTES
Subjective   Freida Martinez is a 49 y.o. female here to establish care.  Chief Complaint   Patient presents with   • Establish Care   • Hospital follow up     kidneys   • Diabetes   • Hypertension   • Depression   • Hypothyroidism   • Hyperlipidemia       History of Present Illness         Hospital Course:  Freida Martinez is a 49 y.o. female with past medical history significant for hypertension, hypothyroidism, diabetes mellitus, morbid obesity, and tobacco abuse.  She was admitted to [facility] 4/12/2019 for abdominal pain and nausea vomiting.  Initial  CBC which showed WBC of 14.76 hemoglobin 14.4 hematocrit 44.9 MCV MCH and platelet wall were all within normal limits. Initial CMP  glucose of 78 BUN 27 creatinine 2.04 sodium 138 potassium 3.9 chloride 97 liver enzymes within normal limits and GFR was estimated as 26.  A1c was at 9.2%  Patient was admitted to telemetry and GI consultation was obtained.    Extensive GI workup including upper endoscopy which showed some mild gastritis and a mesenteric duplex was completed and  suggested possible arterial stenosis as a result she has a mesenteric CTA which did not show any significant stenosis.    She was started on Protonix 40 mg twice daily and has had improvement in her symptoms.   She will FU with Dr. Hogan 5/2/2019.     Pt was seen by Dr. Acosta while hospitalized. Per nephrology notes she was found to have acute on chronic renal failure with a creatinine on presentation of 2.04.  Previous creatinine generally 1.54.  She has history of CKD stage III diagnosed couple years ago but declined to follow-up with the nephrology, kidney normal size kidney of 11.6 cm right small 2 cm cyst, left kidney 12.2 cm.  No hydronephrosis or nephrolithiasis.  Bladder was grossly normal.  UA has shown some proteinuria.  Urine protein 17, urine creatinine 70, urine sodium 104.  Plan to hold diuretics, Avoid nephrotoxic medications, and monitor creatinine. She will see him in office  "5/22/2019 with lasb 1 week prior to visit.          Diabetes- new diagnosis 4/2018.   Was previously taking glipizide 5 mg daily and metformin 500 mg BID. She thinks the metformin may have caused her \"stomach\" problems  Does not check glucose. She does have a meter and supplies.   Has seen DM ed at time of diagnosis and again at recent hospitalization at [facility]. Declined further visits  Does not see podiatry, has some calloused areas on bottom of both feet. Has some pedal edema    Hypothyroidism- chronic, she is currently taking levothyroxine 50mcg. N orecent thyroid labs.  Denies hypo or hyperthyroid symptoms.     Hypertension- chronic, diagnosed several years ago. She is currently taking amlodipine, and losartan. Germain snot check BP at home. Denies headaches, dizziness, visual disturbances, chest pain, dyspnea, TIA, leg pain/claudication symptoms, and edema.     Hyperlipidemia- chronic, currently on statin. No recent lipids. She is not fasting today.     Depression - chronic. Diagnosed about 4-5 y ago. She is currently on prozac. Symptoms well controlled. Deneis any manic episodes, suicidal or homicidal ideations.       The following portions of the patient's history were reviewed and updated as appropriate: allergies, current medications, past family history, past medical history, past social history, past surgical history and problem list.    Review of Systems   Constitutional: Negative for appetite change, chills, diaphoresis, fatigue, fever and unexpected weight change.   HENT: Negative for congestion, ear pain, rhinorrhea, sinus pressure and sore throat.    Eyes: Negative for itching and visual disturbance.   Respiratory: Negative for cough, chest tightness, shortness of breath and wheezing.    Cardiovascular: Positive for leg swelling. Negative for chest pain and palpitations.   Gastrointestinal: Negative for abdominal pain, constipation, diarrhea, nausea and vomiting.   Endocrine: Negative for cold intolerance, " "heat intolerance, polydipsia, polyphagia and polyuria.   Genitourinary: Negative for difficulty urinating, dysuria and hematuria.   Musculoskeletal: Negative for arthralgias, back pain, joint swelling and myalgias.   Skin: Negative for color change, rash and wound.        Calloused feet   Allergic/Immunologic: Negative for environmental allergies and food allergies.   Neurological: Negative for dizziness, syncope, weakness, light-headedness, numbness and headaches.   Hematological: Negative for adenopathy. Does not bruise/bleed easily.   Psychiatric/Behavioral: Negative for dysphoric mood and sleep disturbance. The patient is not nervous/anxious.      Blood pressure 128/84, pulse 107, temperature 97.1 °F (36.2 °C), resp. rate 18, height 164.5 cm (64.75\"), weight 115 kg (252 lb 12.8 oz), SpO2 96 %.    Allergies   Allergen Reactions   • Lisinopril Swelling     Past Medical History:   Diagnosis Date   • Arthritis    • Asthma    • Diabetes mellitus (CMS/HCC)    • Disease of thyroid gland    • Hypertension    • Kidney infection    • Sleep apnea      Past Surgical History:   Procedure Laterality Date   • ENDOSCOPY N/A 4/15/2019    Procedure: ESOPHAGOGASTRODUODENOSCOPY;  Surgeon: Jude Clinton MD;  Location: [facility] ENDOSCOPY;  Service: Gastroenterology   • TUBAL ABDOMINAL LIGATION     • VAGINAL DELIVERY      x4   • WISDOM TOOTH EXTRACTION       Family History   Problem Relation Age of Onset   • Diabetes Father    • Kidney disease Father    • Cancer Maternal Aunt    • Arthritis Paternal Aunt    • Cancer Maternal Grandmother    • Breast cancer Maternal Grandmother    • Kidney disease Paternal Grandfather      Social History     Socioeconomic History   • Marital status: Single     Spouse name: Not on file   • Number of children: Not on file   • Years of education: Not on file   • Highest education level: Not on file   Tobacco Use   • Smoking status: Current Every Day Smoker     Packs/day: 1.00     Types: Cigarettes   • " Smokeless tobacco: Never Used   Substance and Sexual Activity   • Alcohol use: No     Frequency: Never   • Drug use: No   • Sexual activity: No     Immunization History   Administered Date(s) Administered   • Pneumococcal Polysaccharide (PPSV23) 04/01/2019       Current Outpatient Medications:   •  albuterol (PROVENTIL) (2.5 MG/3ML) 0.083% nebulizer solution, , Disp: , Rfl:   •  albuterol sulfate  (90 Base) MCG/ACT inhaler, , Disp: , Rfl:   •  amLODIPine (NORVASC) 10 MG tablet, Take 10 mg by mouth Daily., Disp: , Rfl:   •  aspirin 325 MG tablet, Take 325 mg by mouth Daily., Disp: , Rfl:   •  FLUoxetine (PROZAC) 40 MG capsule, Take 40 mg by mouth Daily., Disp: , Rfl:   •  glipiZIDE (GLUCOTROL) 5 MG tablet, Take 1 tablet by mouth 2 (Two) Times a Day Before Meals., Disp: 60 tablet, Rfl: 0  •  levothyroxine (SYNTHROID, LEVOTHROID) 50 MCG tablet, , Disp: , Rfl:   •  atorvastatin (LIPITOR) 40 MG tablet, Take 40 mg by mouth Daily., Disp: , Rfl:   •  Ergocalciferol (VITAMIN D2 PO), Take 50,000 Units by mouth 1 (One) Time Per Week., Disp: , Rfl:   •  losartan (COZAAR) 50 MG tablet, Take 1 tablet by mouth Daily., Disp: 30 tablet, Rfl: 0  •  pantoprazole (PROTONIX) 40 MG EC tablet, Take 1 tablet by mouth Daily., Disp: 30 tablet, Rfl: 0  •  SITagliptin (JANUVIA) 50 MG tablet, Take 1 tablet by mouth Daily., Disp: 30 tablet, Rfl: 3    Objective   Physical Exam   Constitutional: She is oriented to person, place, and time. She appears well-developed and well-nourished. No distress.   HENT:   Head: Normocephalic and atraumatic.   Eyes: Conjunctivae and EOM are normal. Pupils are equal, round, and reactive to light.   Neck: Normal range of motion. No JVD present.   Cardiovascular: Normal rate, regular rhythm, normal heart sounds and intact distal pulses.   No murmur heard.  Pulses:       Dorsalis pedis pulses are 2+ on the right side, and 2+ on the left side.        Posterior tibial pulses are 2+ on the right side, and 2+ on  the left side.   Pulmonary/Chest: Effort normal and breath sounds normal. No respiratory distress. She exhibits no tenderness.   Abdominal: Soft. Bowel sounds are normal. She exhibits no distension. There is no tenderness.   Musculoskeletal: Normal range of motion. She exhibits no edema.     Vascular Status -  Her right foot exhibits normal foot vasculature  and no edema. Her left foot exhibits normal foot vasculature  and no edema.  Skin Integrity  -  Her right foot skin is intact. She has callous right foot.Her left foot skin is intact.She has callous left foot..  Neurological: She is alert and oriented to person, place, and time. She has normal reflexes. Coordination normal.   Skin: Skin is warm and dry. No rash noted. She is not diaphoretic. No erythema. No pallor.   Psychiatric: She has a normal mood and affect. Her behavior is normal. Judgment and thought content normal.   Nursing note and vitals reviewed.      Assessment/Plan   Freida was seen today for establish care, hospital follow up, diabetes, hypertension, depression, hypothyroidism and hyperlipidemia.    Diagnoses and all orders for this visit:    Essential hypertension  -     CBC & Differential  -     Comprehensive Metabolic Panel  -     TSH  -     CBC Auto Differential    Acquired hypothyroidism  -     CBC & Differential  -     Comprehensive Metabolic Panel  -     TSH  -     CBC Auto Differential    Uncontrolled type 2 diabetes mellitus with hyperglycemia (CMS/[facility])  -     CBC & Differential  -     Comprehensive Metabolic Panel  -     TSH  -     Ambulatory Referral to Podiatry  -     SITagliptin (JANUVIA) 50 MG tablet; Take 1 tablet by mouth Daily.  -     CBC Auto Differential    JACKIE (acute kidney injury) (CMS/[facility])  -     CBC & Differential  -     Comprehensive Metabolic Panel  -     TSH  -     CBC Auto Differential    Morbid obesity with BMI of 45.0-49.9, adult (CMS/[facility])  -     CBC & Differential  -     Comprehensive Metabolic Panel  -     TSH  -      CBC Auto Differential    Tobacco abuse  -     CBC & Differential  -     Comprehensive Metabolic Panel  -     TSH  -     CBC Auto Differential    Recurrent major depressive disorder, in partial remission (CMS/HCC)  -     CBC & Differential  -     Comprehensive Metabolic Panel  -     TSH  -     CBC Auto Differential    Mixed hyperlipidemia  -     CBC & Differential  -     Comprehensive Metabolic Panel  -     TSH  -     CBC Auto Differential    CKD (chronic kidney disease) stage 3, GFR 30-59 ml/min (CMS/HCC)  -     CBC & Differential  -     Comprehensive Metabolic Panel  -     TSH  -     CBC Auto Differential    I advised the patient of the risks in continuing to smoke.  I provided this patient with smoking cessation educational materials and discussed how to quit smoking and the patient has NOT expressed willingness to quit. Time spent counseling was 3-10 minutes.      Stop metformin.   Creatinine clearance based on last labs is 95. Will go ahead and add januvia. Continue the glipizide BID. Monitor for hypoglycemia- instructed on hypoglycemic symptoms and treatment.   Labs sent as above- will notify of results nad treat accordingly.   Long discussion about the need to manage her HTN and DM closely. She delined any injections at this time and states she does well to remember to take pills. Encouraged to take meds every day as directed.   Patient to monitor glucose at home BID. Keep log and bring to FU. Will let me know if glucose consistently > 140 fasting prior to next FU. She has meter nad supplies. Declined another visit with DM education.   Referred to podiatry for DM foot care.   Encouraged DM diet.   Diabetic handbook provided by [facility] provided to pt. Encouraged her to viist the ADA website for more DM info.   Encouraged physical activity of a minimum of 150 minutes of moderate intensity per week.  No other medication changes today.   FU with GI and nephrology as planned  Return in about 6 weeks (around  6/3/2019).     Plan of care discussed with pt. They verbalized understanding and agreement.

## 2019-04-23 ENCOUNTER — READMISSION MANAGEMENT (OUTPATIENT)
Dept: CALL CENTER | Facility: HOSPITAL | Age: 49
End: 2019-04-23

## 2019-04-23 NOTE — OUTREACH NOTE
Medical Week 1 Survey      Responses   Facility patient discharged from?  [place]   Does the patient have one of the following disease processes/diagnoses(primary or secondary)?  Other   Is there a successful TCM telephone encounter documented?  No   Week 1 attempt successful?  No   Unsuccessful attempts  Attempt 2          Verenice Vieira RN

## 2019-04-29 ENCOUNTER — READMISSION MANAGEMENT (OUTPATIENT)
Dept: CALL CENTER | Facility: HOSPITAL | Age: 49
End: 2019-04-29

## 2019-04-29 NOTE — OUTREACH NOTE
Medical Week 2 Survey      Responses   Facility patient discharged from?  [place]   Does the patient have one of the following disease processes/diagnoses(primary or secondary)?  Other   Week 2 attempt successful?  No   Unsuccessful attempts  Attempt 1          Dina Sheehan RN

## 2019-04-30 ENCOUNTER — TELEPHONE (OUTPATIENT)
Dept: INTERNAL MEDICINE | Facility: CLINIC | Age: 49
End: 2019-04-30

## 2019-05-01 ENCOUNTER — READMISSION MANAGEMENT (OUTPATIENT)
Dept: CALL CENTER | Facility: HOSPITAL | Age: 49
End: 2019-05-01

## 2019-05-01 NOTE — OUTREACH NOTE
Medical Week 2 Survey      Responses   Facility patient discharged from?  [place]   Does the patient have one of the following disease processes/diagnoses(primary or secondary)?  Other   Week 2 attempt successful?  No   Unsuccessful attempts  Attempt 2          Maira Mckenzie RN

## 2019-05-08 ENCOUNTER — LAB (OUTPATIENT)
Dept: INTERNAL MEDICINE | Facility: CLINIC | Age: 49
End: 2019-05-08

## 2019-05-08 DIAGNOSIS — N17.9 AKI (ACUTE KIDNEY INJURY) (HCC): ICD-10-CM

## 2019-05-08 LAB
ALBUMIN SERPL-MCNC: 3.8 G/DL (ref 3.5–5.2)
ANION GAP SERPL CALCULATED.3IONS-SCNC: 12.5 MMOL/L
BUN BLD-MCNC: 19 MG/DL (ref 6–20)
BUN/CREAT SERPL: 13.8 (ref 7–25)
CALCIUM SPEC-SCNC: 9.7 MG/DL (ref 8.6–10.5)
CHLORIDE SERPL-SCNC: 102 MMOL/L (ref 98–107)
CO2 SERPL-SCNC: 24.5 MMOL/L (ref 22–29)
CREAT BLD-MCNC: 1.38 MG/DL (ref 0.57–1)
GFR SERPL CREATININE-BSD FRML MDRD: 41 ML/MIN/1.73
GLUCOSE BLD-MCNC: 112 MG/DL (ref 65–99)
PHOSPHATE SERPL-MCNC: 2.8 MG/DL (ref 2.5–4.5)
POTASSIUM BLD-SCNC: 4.1 MMOL/L (ref 3.5–5.2)
SODIUM BLD-SCNC: 139 MMOL/L (ref 136–145)

## 2019-05-08 PROCEDURE — 36415 COLL VENOUS BLD VENIPUNCTURE: CPT

## 2019-05-08 PROCEDURE — 80069 RENAL FUNCTION PANEL: CPT | Performed by: INTERNAL MEDICINE

## 2019-05-10 ENCOUNTER — TELEPHONE (OUTPATIENT)
Dept: INTERNAL MEDICINE | Facility: CLINIC | Age: 49
End: 2019-05-10

## 2019-05-10 NOTE — TELEPHONE ENCOUNTER
----- Message from LUCIA Garcia sent at 5/10/2019  8:11 AM EDT -----  Please let her know  Her renal function slightly improved, we will continue to monitor. Check gain at FU 6/5

## 2019-05-20 ENCOUNTER — OFFICE VISIT (OUTPATIENT)
Dept: GASTROENTEROLOGY | Facility: CLINIC | Age: 49
End: 2019-05-20

## 2019-05-20 VITALS — SYSTOLIC BLOOD PRESSURE: 122 MMHG | BODY MASS INDEX: 42.93 KG/M2 | DIASTOLIC BLOOD PRESSURE: 80 MMHG | WEIGHT: 256 LBS

## 2019-05-20 DIAGNOSIS — R10.84 GENERALIZED ABDOMINAL PAIN: Primary | ICD-10-CM

## 2019-05-20 PROCEDURE — 99214 OFFICE O/P EST MOD 30 MIN: CPT | Performed by: NURSE PRACTITIONER

## 2019-05-20 NOTE — PROGRESS NOTES
GASTROENTEROLOGY OFFICE NOTE  Freida Martinez  [id number]  1970    CARE TEAM  Patient Care Team:  Annia Rico APRN as PCP - General (Nurse Practitioner)  Provider, No Known as PCP - Family Medicine    Chief Complaint   Patient presents with   • Abdominal Pain     Follow up from Hospital visit        HISTORY OF PRESENT ILLNESS:  Ms. Martinez is a 49 year old female seen in consultation with a one month history of abdominal pain with associated nausea. She reports that back in April she had severe stabbing abdominal pain that prompted an evaluation in the emergency room. She was admitted and a GI work up including upper endoscopy which showed mild gastritis.  Also mesenteric duplex was done which suggested possible arterial stenosis as a result of a mesenteric CTA was done but this is study did not show any significant stenosis.  Patient was prescribed Protonix 40 mg twice daily.  Symptoms have improved but did not completely subside. Today, she reports continued abdominal pain that is described as a constant stabbing pain all over but worse in the LUQ. The severity has lessened considerable since her hospital admission. She denies any further nausea. She has a non-bloody bowel movement daily and has had no change in her bowel habits. She denies melena, constipation, or diarrhea.     She has never had a colonoscopy and has no family history of colon polyps or colon cancer.         PAST MEDICAL HISTORY  Past Medical History:   Diagnosis Date   • Arthritis    • Asthma    • Diabetes mellitus (CMS/HCC)    • Disease of thyroid gland    • Hypertension    • Kidney infection    • Sleep apnea         PAST SURGICAL HISTORY  Past Surgical History:   Procedure Laterality Date   • ENDOSCOPY N/A 4/15/2019    Procedure: ESOPHAGOGASTRODUODENOSCOPY;  Surgeon: Jude Clinton MD;  Location: [facility] ENDOSCOPY;  Service: Gastroenterology   • TUBAL ABDOMINAL LIGATION     • VAGINAL DELIVERY      x4   • WISDOM TOOTH EXTRACTION           MEDICATIONS:    Current Outpatient Medications:   •  albuterol sulfate  (90 Base) MCG/ACT inhaler, , Disp: , Rfl:   •  amLODIPine (NORVASC) 10 MG tablet, Take 10 mg by mouth Daily., Disp: , Rfl:   •  aspirin 325 MG tablet, Take 325 mg by mouth Daily., Disp: , Rfl:   •  FLUoxetine (PROZAC) 40 MG capsule, Take 40 mg by mouth Daily., Disp: , Rfl:   •  glipiZIDE (GLUCOTROL) 5 MG tablet, Take 1 tablet by mouth 2 (Two) Times a Day Before Meals., Disp: 60 tablet, Rfl: 0  •  losartan (COZAAR) 50 MG tablet, Take 1 tablet by mouth Daily., Disp: 30 tablet, Rfl: 0  •  pantoprazole (PROTONIX) 40 MG EC tablet, Take 1 tablet by mouth Daily., Disp: 30 tablet, Rfl: 0  •  SITagliptin (JANUVIA) 50 MG tablet, Take 1 tablet by mouth Daily., Disp: 30 tablet, Rfl: 3  •  albuterol (PROVENTIL) (2.5 MG/3ML) 0.083% nebulizer solution, , Disp: , Rfl:   •  atorvastatin (LIPITOR) 40 MG tablet, Take 40 mg by mouth Daily., Disp: , Rfl:   •  Ergocalciferol (VITAMIN D2 PO), Take 50,000 Units by mouth 1 (One) Time Per Week., Disp: , Rfl:   •  levothyroxine (SYNTHROID, LEVOTHROID) 50 MCG tablet, , Disp: , Rfl:     ALLERGIES  Allergies   Allergen Reactions   • Lisinopril Swelling       FAMILY HISTORY:  Family History   Problem Relation Age of Onset   • Diabetes Father    • Kidney disease Father    • Cancer Maternal Aunt    • Arthritis Paternal Aunt    • Cancer Maternal Grandmother    • Breast cancer Maternal Grandmother    • Kidney disease Paternal Grandfather        SOCIAL HISTORY  Social History     Socioeconomic History   • Marital status: Single     Spouse name: Not on file   • Number of children: Not on file   • Years of education: Not on file   • Highest education level: Not on file   Tobacco Use   • Smoking status: Current Every Day Smoker     Packs/day: 1.00     Types: Cigarettes   • Smokeless tobacco: Never Used   Substance and Sexual Activity   • Alcohol use: No     Frequency: Never   • Drug use: No   • Sexual activity: No        REVIEW OF SYSTEMS  Review of Systems   Constitutional: Negative.    HENT: Positive for congestion and postnasal drip.    Eyes: Negative.    Respiratory: Positive for cough.    Cardiovascular: Negative.    Gastrointestinal: Positive for abdominal pain.   Genitourinary: Negative.    Musculoskeletal: Positive for back pain.   Neurological: Negative.    Psychiatric/Behavioral: Negative.        PHYSICAL EXAM   /80 (BP Location: Right arm, Patient Position: Sitting, Cuff Size: Large Adult)   Wt 116 kg (256 lb)   BMI 42.93 kg/m²   General: Alert and oriented x 3. In no apparent or acute distress  HEENT: Anicteric slcera. Normal oropharynx  Neck: Supple. Without lymphadenopathy  CV: Regular rate and rhythm, S1, S2  Lungs: Diffuse wheezing, scattered rhonchi. Respirations even and unlabored.  Abdomen: Soft with diffuse tenderness. Bowel sounds normal.  Extremeties: without clubbing, cyanosis or edema  Neurologic: Alert and oriented x 3 without focal motor or sensory deficits  Rectal exam: deferred     LAB DATA  CMP  Lab Results   Component Value Date    GLUCOSE 112 (H) 05/08/2019    BUN 19 05/08/2019    CREATININE 1.38 (H) 05/08/2019    EGFRIFNONA 41 (L) 05/08/2019    BCR 13.8 05/08/2019    K 4.1 05/08/2019    CO2 24.5 05/08/2019    CALCIUM 9.7 05/08/2019    ALBUMIN 3.80 05/08/2019    AST 11 04/22/2019    ALT 13 04/22/2019        CBC w/DIFF   Lab Results   Component Value Date    WBC 9.66 04/22/2019    RBC 4.68 04/22/2019    HGB 12.6 04/22/2019    HCT 40.5 04/22/2019    MCV 86.5 04/22/2019    MCH 26.9 04/22/2019    MCHC 31.1 (L) 04/22/2019    RDW 14.9 04/22/2019    RDWSD 47.4 04/22/2019    MPV 12.4 (H) 04/22/2019     04/22/2019    NEUTRORELPCT 64.8 04/22/2019    LYMPHORELPCT 25.8 04/22/2019    MONORELPCT 5.0 04/22/2019    EOSRELPCT 3.3 04/22/2019    BASORELPCT 0.5 04/22/2019    AUTOIGPER 0.6 (H) 04/22/2019    NEUTROABS 6.26 04/22/2019    LYMPHSABS 2.49 04/22/2019    MONOSABS 0.48 04/22/2019    EOSABS  0.32 04/22/2019    BASOSABS 0.05 04/22/2019    AUTOIGNUM 0.06 (H) 04/22/2019    NRBC 0.0 04/22/2019       PT/INR  Lab Results   Component Value Date    PROTIME 9.9 04/29/2015    INR 0.95 04/29/2015       aPTT  Lab Results   Component Value Date    PTT 27 04/29/2015       Results Review:  Hospital records reviewed and discussed with patient    ASSESSMENT / PLAN  1. Abdominal pain  -Diagnostic testing thus far has been unremarkable  -Plan for colonoscopy  -Follow up after procedure    Return follow up after colonoscopy.    I discussed the patients findings and my recommendations with patient    Moses Salcido APRN

## 2019-06-03 RX ORDER — SODIUM, POTASSIUM,MAG SULFATES 17.5-3.13G
2 SOLUTION, RECONSTITUTED, ORAL ORAL TAKE AS DIRECTED
Qty: 354 ML | Refills: 0 | Status: SHIPPED | OUTPATIENT
Start: 2019-06-03 | End: 2019-06-06

## 2019-06-06 ENCOUNTER — OFFICE VISIT (OUTPATIENT)
Dept: INTERNAL MEDICINE | Facility: CLINIC | Age: 49
End: 2019-06-06

## 2019-06-06 VITALS
HEIGHT: 65 IN | BODY MASS INDEX: 42.32 KG/M2 | DIASTOLIC BLOOD PRESSURE: 88 MMHG | SYSTOLIC BLOOD PRESSURE: 126 MMHG | RESPIRATION RATE: 18 BRPM | OXYGEN SATURATION: 96 % | TEMPERATURE: 98 F | HEART RATE: 104 BPM | WEIGHT: 254 LBS

## 2019-06-06 DIAGNOSIS — R22.1 NECK MASS: ICD-10-CM

## 2019-06-06 DIAGNOSIS — F33.41 RECURRENT MAJOR DEPRESSIVE DISORDER, IN PARTIAL REMISSION (HCC): ICD-10-CM

## 2019-06-06 DIAGNOSIS — N18.30 CKD (CHRONIC KIDNEY DISEASE) STAGE 3, GFR 30-59 ML/MIN (HCC): ICD-10-CM

## 2019-06-06 DIAGNOSIS — E03.9 ACQUIRED HYPOTHYROIDISM: ICD-10-CM

## 2019-06-06 DIAGNOSIS — E11.65 UNCONTROLLED TYPE 2 DIABETES MELLITUS WITH HYPERGLYCEMIA (HCC): ICD-10-CM

## 2019-06-06 DIAGNOSIS — I10 ESSENTIAL HYPERTENSION: ICD-10-CM

## 2019-06-06 DIAGNOSIS — E66.01 MORBID OBESITY WITH BMI OF 45.0-49.9, ADULT (HCC): ICD-10-CM

## 2019-06-06 DIAGNOSIS — E78.2 MIXED HYPERLIPIDEMIA: ICD-10-CM

## 2019-06-06 DIAGNOSIS — N17.9 AKI (ACUTE KIDNEY INJURY) (HCC): Primary | ICD-10-CM

## 2019-06-06 LAB
ANION GAP SERPL CALCULATED.3IONS-SCNC: 12.1 MMOL/L
BASOPHILS # BLD AUTO: 0.07 10*3/MM3 (ref 0–0.2)
BASOPHILS NFR BLD AUTO: 0.7 % (ref 0–1.5)
BUN BLD-MCNC: 16 MG/DL (ref 6–20)
BUN/CREAT SERPL: 10.1 (ref 7–25)
CALCIUM SPEC-SCNC: 9.2 MG/DL (ref 8.6–10.5)
CHLORIDE SERPL-SCNC: 99 MMOL/L (ref 98–107)
CO2 SERPL-SCNC: 23.9 MMOL/L (ref 22–29)
CREAT BLD-MCNC: 1.58 MG/DL (ref 0.57–1)
DEPRECATED RDW RBC AUTO: 49.9 FL (ref 37–54)
EOSINOPHIL # BLD AUTO: 0.29 10*3/MM3 (ref 0–0.4)
EOSINOPHIL NFR BLD AUTO: 2.9 % (ref 0.3–6.2)
ERYTHROCYTE [DISTWIDTH] IN BLOOD BY AUTOMATED COUNT: 15.2 % (ref 12.3–15.4)
GFR SERPL CREATININE-BSD FRML MDRD: 35 ML/MIN/1.73
GLUCOSE BLD-MCNC: 200 MG/DL (ref 65–99)
HCT VFR BLD AUTO: 41.4 % (ref 34–46.6)
HGB BLD-MCNC: 12.3 G/DL (ref 12–15.9)
IMM GRANULOCYTES # BLD AUTO: 0.07 10*3/MM3 (ref 0–0.05)
IMM GRANULOCYTES NFR BLD AUTO: 0.7 % (ref 0–0.5)
LYMPHOCYTES # BLD AUTO: 2.61 10*3/MM3 (ref 0.7–3.1)
LYMPHOCYTES NFR BLD AUTO: 25.8 % (ref 19.6–45.3)
MCH RBC QN AUTO: 26.8 PG (ref 26.6–33)
MCHC RBC AUTO-ENTMCNC: 29.7 G/DL (ref 31.5–35.7)
MCV RBC AUTO: 90.2 FL (ref 79–97)
MONOCYTES # BLD AUTO: 0.44 10*3/MM3 (ref 0.1–0.9)
MONOCYTES NFR BLD AUTO: 4.4 % (ref 5–12)
NEUTROPHILS # BLD AUTO: 6.62 10*3/MM3 (ref 1.7–7)
NEUTROPHILS NFR BLD AUTO: 65.5 % (ref 42.7–76)
NRBC BLD AUTO-RTO: 0 /100 WBC (ref 0–0.2)
PLATELET # BLD AUTO: 326 10*3/MM3 (ref 140–450)
PMV BLD AUTO: 11.4 FL (ref 6–12)
POTASSIUM BLD-SCNC: 3.7 MMOL/L (ref 3.5–5.2)
RBC # BLD AUTO: 4.59 10*6/MM3 (ref 3.77–5.28)
SODIUM BLD-SCNC: 135 MMOL/L (ref 136–145)
WBC NRBC COR # BLD: 10.1 10*3/MM3 (ref 3.4–10.8)

## 2019-06-06 PROCEDURE — 80048 BASIC METABOLIC PNL TOTAL CA: CPT | Performed by: NURSE PRACTITIONER

## 2019-06-06 PROCEDURE — 99214 OFFICE O/P EST MOD 30 MIN: CPT | Performed by: NURSE PRACTITIONER

## 2019-06-06 PROCEDURE — 85025 COMPLETE CBC W/AUTO DIFF WBC: CPT | Performed by: NURSE PRACTITIONER

## 2019-06-06 NOTE — PROGRESS NOTES
Subjective   Freida Martinez is a 49 y.o. female.     Chief Complaint   Patient presents with   • Chronic Kidney Disease   • Neck Mass       History of Present Illness     Freida Martinez is a 49 y.o. female with past medical history significant for hypertension, hypothyroidism, diabetes mellitus, morbid obesity, and tobacco abuse.  She was admitted to [facility] 4/12/2019 for abdominal pain and nausea vomiting.  Initial  CBC which showed WBC of 14.76 hemoglobin 14.4 hematocrit 44.9 MCV MCH and platelet wall were all within normal limits. Initial CMP  glucose of 78 BUN 27 creatinine 2.04 sodium 138 potassium 3.9 chloride 97 liver enzymes within normal limits and GFR was estimated as 26.  A1c was at 9.2%  Patient was admitted to telemetry and GI consultation was obtained.    Extensive GI workup including upper endoscopy which showed some mild gastritis and a mesenteric duplex was completed and  suggested possible arterial stenosis as a result she has a mesenteric CTA which did not show any significant stenosis.    She was started on Protonix 40 mg twice daily and has had improvement in her symptoms.      Saw GI-Dr. Hogan -5/20/2019 and will  Have her colonoscopy tomorrow.      Pt was seen by Dr. Acosta while hospitalized. Per nephrology notes she was found to have acute on chronic renal failure with a creatinine on presentation of 2.04.  Previous creatinine generally 1.54.  She has history of CKD stage III diagnosed couple years ago but declined to follow-up with the nephrology, kidney normal size kidney of 11.6 cm right small 2 cm cyst, left kidney 12.2 cm.  No hydronephrosis or nephrolithiasis.  Bladder was grossly normal.  UA has shown some proteinuria.  Urine protein 17, urine creatinine 70, urine sodium 104.  Plan to hold diuretics, Avoid nephrotoxic medications, and monitor creatinine.  Saw nephrology 5/22/2019.  She does not recall if she had labs or not to follow-up on her renal function.  She thinks she goes back  "to see him about 1 month        Neck mass-she reports that she has a mass on the right posterior neck.  She reports that this is tender and has been present for several months.      Diabetes- new diagnosis 4/2018. Was previously taking glipizide 5 mg daily and metformin 500 mg BID. She thinks the metformin may have caused her \"stomach\" problems  Does not check glucose. She does have a meter and supplies.   Has seen DM ed at time of diagnosis and again at recent hospitalization at [facility]. Declined further visits  Does not see podiatry, has some calloused areas on bottom of both feet. Has some pedal edema.  Refer last visit she has not seen them yet.  Metformin stopped at previous visit and Januvia added.     Hypothyroidism- chronic, she is currently taking levothyroxine 50mcg.   Denies hypo or hyperthyroid symptoms.  Clinically euthyroid with last TSH 3.9 on 4/22/2019       Hypertension- chronic, diagnosed several years ago. She is currently taking amlodipine, and losartan. Does not check BP at home. Denies headaches, dizziness, visual disturbances, chest pain, dyspnea, TIA, leg pain/claudication symptoms, and edema.      Hyperlipidemia- chronic, currently on statin.  No recent lipids -last lipids 4/30/2015 were stable.  She is not fasting today       Depression - chronic. Diagnosed about 4-5 y ago. She is currently on prozac. Symptoms well controlled. Deneis any manic episodes, suicidal or homicidal ideations.        The following portions of the patient's history were reviewed and updated as appropriate: allergies, current medications, past family history, past medical history, past social history, past surgical history and problem list.    Review of Systems   Constitutional: Negative for appetite change, chills, diaphoresis, fatigue and unexpected weight change.   HENT: Negative.    Eyes: Negative for visual disturbance.   Respiratory: Negative for cough, chest tightness, shortness of breath and wheezing.  "   Cardiovascular: Negative for chest pain, palpitations and leg swelling.   Gastrointestinal: Negative for constipation, diarrhea, nausea and vomiting.   Endocrine: Negative for polydipsia, polyphagia and polyuria.   Genitourinary: Negative for difficulty urinating and dysuria.   Musculoskeletal: Positive for neck pain ( Mass).   Skin: Negative for color change and rash.   Neurological: Negative for dizziness, syncope, weakness, light-headedness, numbness and headaches.   Psychiatric/Behavioral: Negative for sleep disturbance.       Outpatient Medications Marked as Taking for the 6/6/19 encounter (Office Visit) with Annia Rico APRN   Medication Sig Dispense Refill   • albuterol (PROVENTIL) (2.5 MG/3ML) 0.083% nebulizer solution      • albuterol sulfate  (90 Base) MCG/ACT inhaler      • amLODIPine (NORVASC) 10 MG tablet Take 10 mg by mouth Daily.     • aspirin 325 MG tablet Take 325 mg by mouth Daily.     • atorvastatin (LIPITOR) 40 MG tablet Take 40 mg by mouth Daily.     • FLUoxetine (PROZAC) 40 MG capsule Take 40 mg by mouth Daily.     • glipiZIDE (GLUCOTROL) 5 MG tablet Take 1 tablet by mouth 2 (Two) Times a Day Before Meals. 60 tablet 0   • levothyroxine (SYNTHROID, LEVOTHROID) 50 MCG tablet      • losartan (COZAAR) 50 MG tablet Take 1 tablet by mouth Daily. 30 tablet 0   • pantoprazole (PROTONIX) 40 MG EC tablet Take 1 tablet by mouth Daily. 30 tablet 0   • SITagliptin (JANUVIA) 50 MG tablet Take 1 tablet by mouth Daily. 30 tablet 3         Objective   Physical Exam   Constitutional: She is oriented to person, place, and time. She appears well-developed and well-nourished. No distress.   Obesity noted     HENT:   Head: Normocephalic and atraumatic.   Eyes: Conjunctivae are normal. Pupils are equal, round, and reactive to light.   Neck: Normal range of motion. No JVD present. No thyromegaly present.   Cardiovascular: Normal rate, regular rhythm, normal heart sounds and intact distal pulses.   No  murmur heard.  Pulses:       Dorsalis pedis pulses are 2+ on the right side, and 2+ on the left side.        Posterior tibial pulses are 2+ on the right side, and 2+ on the left side.   Pulmonary/Chest: Effort normal and breath sounds normal. No respiratory distress. She exhibits no tenderness.   Abdominal: Soft. Normal appearance and bowel sounds are normal. She exhibits no distension. There is no tenderness.   Musculoskeletal: Normal range of motion. She exhibits no edema.   Lymphadenopathy:     She has cervical adenopathy.        Right cervical: Posterior cervical adenopathy present. No superficial cervical and no deep cervical adenopathy present.       Left cervical: No superficial cervical, no deep cervical and no posterior cervical adenopathy present.   Neurological: She is alert and oriented to person, place, and time. Coordination normal.   Skin: Skin is warm and dry. No rash noted. She is not diaphoretic. No erythema. No pallor.   Psychiatric: She has a normal mood and affect. Her behavior is normal. Judgment and thought content normal.   Nursing note and vitals reviewed.      Vitals:    06/06/19 1033   BP: 126/88   Pulse: 104   Resp: 18   Temp: 98 °F (36.7 °C)   SpO2: 96%         Assessment/Plan   Freida was seen today for chronic kidney disease and neck mass.    Diagnoses and all orders for this visit:    JACKIE (acute kidney injury) (CMS/[facility]) -prior hospitalization  -     Basic Metabolic Panel    CKD (chronic kidney disease) stage 3, GFR 30-59 ml/min (CMS/[facility])  -     Basic Metabolic Panel  Continue to follow with nephrology.  We will request copy of last office note from [facility].    Neck mass  -     US Head Neck Soft Tissue; Future  -     CBC & Differential  -     CBC Auto Differential    Essential hypertension  -     Basic Metabolic Panel  -     CBC & Differential  -     CBC Auto Differential  Continue amlodipine and losartan    Uncontrolled type 2 diabetes mellitus with hyperglycemia  (CMS/[facility])  -     Basic Metabolic Panel  -     CBC & Differential  -     CBC Auto Differential  She will check glucose daily.  Keep log and bring to follow-up visit  She reports she is not sure how to use her meter.  She states her daughter will help her to figure out how to use it.  If she is unable to do so I have asked her to bring her meter to the office for a nurse visit and we will help her with this  Continue Januvia and glipizide  A1c to follow-up    Mixed hyperlipidemia  Continue atorvastatin.  We will need lipids at next visit.      Morbid obesity with BMI of 45.0-49.9, adult (CMS/[facility])    Acquired hypothyroidism    Recurrent major depressive disorder, in partial remission (CMS/[facility])  Continue Prozac    Continue current medications.  No refills needed.  Encouraged diabetic diet and 150 minutes of moderate intensity exercise per week.  We will obtain an ultrasound of the mass on her neck.  Labs sent as above-we will notify results and treat accordingly.           Return in about 1 month (around 7/6/2019) for chronic condition follow up, with fasting labs.  Will need lipids checked.  She is aware to fast for next set of labs    Plan of care discussed with pt. They verbalized understanding and agreement.       * Please note that portions of this note were completed with a voice recognition program. Efforts were made to edit the dictation but occasionally words are erroneously transcribed.

## 2019-06-07 ENCOUNTER — TELEPHONE (OUTPATIENT)
Dept: INTERNAL MEDICINE | Facility: CLINIC | Age: 49
End: 2019-06-07

## 2019-06-07 PROBLEM — R10.9 ABDOMINAL PAIN: Status: RESOLVED | Noted: 2019-04-12 | Resolved: 2019-06-07

## 2019-06-07 NOTE — TELEPHONE ENCOUNTER
----- Message from LUCIA Garcia sent at 6/7/2019  9:42 AM EDT -----  Can you get last nephrology notes please? And update the care team.

## 2019-06-10 ENCOUNTER — TELEPHONE (OUTPATIENT)
Dept: INTERNAL MEDICINE | Facility: CLINIC | Age: 49
End: 2019-06-10

## 2019-06-10 NOTE — TELEPHONE ENCOUNTER
----- Message from LUCIA Garcia sent at 6/10/2019 12:03 PM EDT -----  Please let her know labs are stable- continue to FU with nephrology related to decreased renal function

## 2019-06-13 ENCOUNTER — HOSPITAL ENCOUNTER (OUTPATIENT)
Dept: ULTRASOUND IMAGING | Facility: HOSPITAL | Age: 49
Discharge: HOME | End: 2019-06-13
Admitting: NURSE PRACTITIONER

## 2019-06-13 DIAGNOSIS — R22.1 NECK MASS: ICD-10-CM

## 2019-06-13 PROCEDURE — 76536 US EXAM OF HEAD AND NECK: CPT

## 2019-06-14 ENCOUNTER — TELEPHONE (OUTPATIENT)
Dept: INTERNAL MEDICINE | Facility: CLINIC | Age: 49
End: 2019-06-14

## 2019-06-14 NOTE — TELEPHONE ENCOUNTER
----- Message from LUCIA Garcia sent at 6/14/2019  4:35 PM EDT -----  Please let her know her US showed a small normal appearing lymph node. Nothing needed with this at this time. If enlarges or other symptoms we could reevaluate at a later time

## 2019-06-20 ENCOUNTER — OUTSIDE FACILITY SERVICE (OUTPATIENT)
Dept: GASTROENTEROLOGY | Facility: CLINIC | Age: 49
End: 2019-06-20

## 2019-06-20 PROCEDURE — 45378 DIAGNOSTIC COLONOSCOPY: CPT | Performed by: INTERNAL MEDICINE

## 2019-07-24 ENCOUNTER — OFFICE VISIT (OUTPATIENT)
Dept: INTERNAL MEDICINE | Facility: CLINIC | Age: 49
End: 2019-07-24

## 2019-07-24 VITALS
HEART RATE: 100 BPM | WEIGHT: 254 LBS | RESPIRATION RATE: 18 BRPM | SYSTOLIC BLOOD PRESSURE: 126 MMHG | TEMPERATURE: 96.8 F | DIASTOLIC BLOOD PRESSURE: 84 MMHG | OXYGEN SATURATION: 96 % | BODY MASS INDEX: 42.32 KG/M2 | HEIGHT: 65 IN

## 2019-07-24 DIAGNOSIS — E03.9 ACQUIRED HYPOTHYROIDISM: ICD-10-CM

## 2019-07-24 DIAGNOSIS — I10 ESSENTIAL HYPERTENSION: ICD-10-CM

## 2019-07-24 DIAGNOSIS — E78.2 MIXED HYPERLIPIDEMIA: ICD-10-CM

## 2019-07-24 DIAGNOSIS — E11.65 UNCONTROLLED TYPE 2 DIABETES MELLITUS WITH HYPERGLYCEMIA (HCC): Primary | ICD-10-CM

## 2019-07-24 DIAGNOSIS — Z91.199 NON-COMPLIANCE: ICD-10-CM

## 2019-07-24 DIAGNOSIS — E66.01 MORBID OBESITY WITH BMI OF 45.0-49.9, ADULT (HCC): ICD-10-CM

## 2019-07-24 DIAGNOSIS — F33.41 RECURRENT MAJOR DEPRESSIVE DISORDER, IN PARTIAL REMISSION (HCC): ICD-10-CM

## 2019-07-24 DIAGNOSIS — Z86.69 HISTORY OF SLEEP APNEA: ICD-10-CM

## 2019-07-24 LAB
ALBUMIN SERPL-MCNC: 4.1 G/DL (ref 3.5–5.2)
ALBUMIN UR-MCNC: 35.4 MG/L
ALBUMIN/GLOB SERPL: 1.2 G/DL
ALP SERPL-CCNC: 94 U/L (ref 39–117)
ALT SERPL W P-5'-P-CCNC: 12 U/L (ref 1–33)
ANION GAP SERPL CALCULATED.3IONS-SCNC: 12.8 MMOL/L (ref 5–15)
AST SERPL-CCNC: 10 U/L (ref 1–32)
BILIRUB SERPL-MCNC: 0.4 MG/DL (ref 0.2–1.2)
BUN BLD-MCNC: 20 MG/DL (ref 6–20)
BUN/CREAT SERPL: 14.6 (ref 7–25)
CALCIUM SPEC-SCNC: 9.7 MG/DL (ref 8.6–10.5)
CHLORIDE SERPL-SCNC: 104 MMOL/L (ref 98–107)
CHOLEST SERPL-MCNC: 131 MG/DL (ref 0–200)
CO2 SERPL-SCNC: 25.2 MMOL/L (ref 22–29)
CREAT BLD-MCNC: 1.37 MG/DL (ref 0.57–1)
CREAT UR-MCNC: 115.8 MG/DL
DEPRECATED RDW RBC AUTO: 45.9 FL (ref 37–54)
ERYTHROCYTE [DISTWIDTH] IN BLOOD BY AUTOMATED COUNT: 14.4 % (ref 12.3–15.4)
GFR SERPL CREATININE-BSD FRML MDRD: 41 ML/MIN/1.73
GLOBULIN UR ELPH-MCNC: 3.5 GM/DL
GLUCOSE BLD-MCNC: 128 MG/DL (ref 65–99)
HBA1C MFR BLD: 7 %
HCT VFR BLD AUTO: 47.1 % (ref 34–46.6)
HDLC SERPL-MCNC: 33 MG/DL (ref 40–60)
HGB BLD-MCNC: 14 G/DL (ref 12–15.9)
LDLC SERPL CALC-MCNC: 59 MG/DL (ref 0–100)
LDLC/HDLC SERPL: 1.79 {RATIO}
MCH RBC QN AUTO: 26.2 PG (ref 26.6–33)
MCHC RBC AUTO-ENTMCNC: 29.7 G/DL (ref 31.5–35.7)
MCV RBC AUTO: 88.2 FL (ref 79–97)
MICROALBUMIN/CREAT UR: 305.7 MG/G
PLATELET # BLD AUTO: 329 10*3/MM3 (ref 140–450)
PMV BLD AUTO: 12.2 FL (ref 6–12)
POTASSIUM BLD-SCNC: 4.9 MMOL/L (ref 3.5–5.2)
PROT SERPL-MCNC: 7.6 G/DL (ref 6–8.5)
RBC # BLD AUTO: 5.34 10*6/MM3 (ref 3.77–5.28)
SODIUM BLD-SCNC: 142 MMOL/L (ref 136–145)
TRIGL SERPL-MCNC: 195 MG/DL (ref 0–150)
TSH SERPL DL<=0.05 MIU/L-ACNC: 6.47 MIU/ML (ref 0.27–4.2)
VLDLC SERPL-MCNC: 39 MG/DL (ref 5–40)
WBC NRBC COR # BLD: 11.53 10*3/MM3 (ref 3.4–10.8)

## 2019-07-24 PROCEDURE — 99214 OFFICE O/P EST MOD 30 MIN: CPT | Performed by: NURSE PRACTITIONER

## 2019-07-24 PROCEDURE — 82570 ASSAY OF URINE CREATININE: CPT | Performed by: NURSE PRACTITIONER

## 2019-07-24 PROCEDURE — 84439 ASSAY OF FREE THYROXINE: CPT | Performed by: NURSE PRACTITIONER

## 2019-07-24 PROCEDURE — 83036 HEMOGLOBIN GLYCOSYLATED A1C: CPT | Performed by: NURSE PRACTITIONER

## 2019-07-24 PROCEDURE — 80061 LIPID PANEL: CPT | Performed by: NURSE PRACTITIONER

## 2019-07-24 PROCEDURE — 80050 GENERAL HEALTH PANEL: CPT | Performed by: NURSE PRACTITIONER

## 2019-07-24 PROCEDURE — 82043 UR ALBUMIN QUANTITATIVE: CPT | Performed by: NURSE PRACTITIONER

## 2019-07-24 NOTE — PATIENT INSTRUCTIONS

## 2019-07-24 NOTE — PROGRESS NOTES
"Subjective   Freida Martinez is a 49 y.o. female.     Chief Complaint   Patient presents with   • Hypertension     fasting labs   • Hyperlipidemia   • Hypothyroidism   • Diabetes     last A1C on 4/13=9.2       History of Present Illness   Freida Martinez is a 49 y.o. female with past medical history significant for hypertension, hypothyroidism, diabetes mellitus, morbid obesity, and tobacco abuse.           Diabetes- new diagnosis 4/2018. Was previously taking glipizide 5 mg daily and metformin 500 mg BID. She thinks the metformin may have caused her \"stomach\" problems  Does not check glucose. She does have a meter and supplies.   Has seen DM ed at time of diagnosis and again at recent hospitalization at [facility] 2019. Declined further visits  Does not see podiatry, has some calloused areas on bottom of both feet. Has some pedal edema.    I have previously referred her that she has not seen them yet  Metformin stopped at previous visit and Alessio added.  She has not been taking her medication  All the time because she often forgets. Has been off of them for 6 weeks.   She is not following any special diet and is not exercising.  She is on aspirin and statin.  Her last A1c was 9.2% and it is down to 7% today      Hypothyroidism- chronic, she is currently taking levothyroxine 50mcg.  Compliant with dosing.  Denies hypo or hyperthyroid symptoms.   last TSH 3.9 on 4/22/2019        Hypertension- chronic, diagnosed several years ago. She is not taking amlodipine, and losartan anymore.  Reports nephrology stopped these. Does not check BP at home. Denies headaches, dizziness, visual disturbances, chest pain, dyspnea, TIA, leg pain/claudication symptoms, and edema.      Hyperlipidemia- chronic, currently on statin.  No recent lipids -last lipids 4/30/2015 were stable.    She is fasting today  She reports that she has not been consistently taking her medication.     Depression - chronic. Diagnosed about 4-5 y ago. She is " currently on prozac. Symptoms well controlled. Deneis any manic episodes, suicidal or homicidal ideations.   She reports that she has not consistently been taking her Prozac.     Pt was seen by Dr. Acosta for  acute on chronic renal failure with a creatinine on presentation of 2.04.  Previous creatinine generally 1.54.    She has history of CKD stage III diagnosed couple years ago but declined to follow-up with the nephrology, kidney normal size kidney of 11.6 cm right small 2 cm cyst, left kidney 12.2 cm.  No hydronephrosis or nephrolithiasis.  Bladder was grossly normal.  UA has shown some proteinuria.  Urine protein 17, urine creatinine 70, urine sodium 104. Saw nephrology 5/2019.  She  reports they took her off the  BP med: amlodipine and losartan.     Previous neck mass has improved per patient.  We did do an ultrasound on 6/13/2019 which demonstrated lymphadenopathy.    She has a history of sleep apnea.  She is not currently wearing a CPAP or followed by sleep medicine.  Requests referral today.      The following portions of the patient's history were reviewed and updated as appropriate: allergies, current medications, past family history, past medical history, past social history, past surgical history and problem list.    Review of Systems   Constitutional: Negative for appetite change, chills, diaphoresis, fatigue, fever and unexpected weight change.   HENT: Negative for congestion, ear pain, rhinorrhea, sinus pressure and sore throat.    Eyes: Negative for itching and visual disturbance.   Respiratory: Negative for cough, chest tightness, shortness of breath and wheezing.    Cardiovascular: Positive for leg swelling. Negative for chest pain and palpitations.   Gastrointestinal: Negative for abdominal pain, constipation, diarrhea, nausea and vomiting.   Endocrine: Negative for cold intolerance, heat intolerance, polydipsia, polyphagia and polyuria.   Genitourinary: Negative for difficulty urinating,  dysuria and hematuria.   Musculoskeletal: Negative for arthralgias, back pain, joint swelling and myalgias.   Skin: Negative for color change, rash and wound.        Calloused feet   Allergic/Immunologic: Negative for environmental allergies and food allergies.   Neurological: Negative for dizziness, syncope, weakness, light-headedness, numbness and headaches.   Hematological: Negative for adenopathy. Does not bruise/bleed easily.   Psychiatric/Behavioral: Negative for dysphoric mood and sleep disturbance. The patient is not nervous/anxious.        Outpatient Medications Marked as Taking for the 7/24/19 encounter (Office Visit) with Annia Rico APRN   Medication Sig Dispense Refill   • albuterol (PROVENTIL) (2.5 MG/3ML) 0.083% nebulizer solution      • albuterol sulfate  (90 Base) MCG/ACT inhaler      • aspirin 325 MG tablet Take 325 mg by mouth Daily.     • atorvastatin (LIPITOR) 40 MG tablet Take 40 mg by mouth Daily.     • FLUoxetine (PROZAC) 40 MG capsule Take 40 mg by mouth Daily.     • glipiZIDE (GLUCOTROL) 5 MG tablet Take 1 tablet by mouth 2 (Two) Times a Day Before Meals. 60 tablet 0   • levothyroxine (SYNTHROID, LEVOTHROID) 50 MCG tablet      • pantoprazole (PROTONIX) 40 MG EC tablet Take 1 tablet by mouth Daily. 30 tablet 0   • SITagliptin (JANUVIA) 50 MG tablet Take 1 tablet by mouth Daily. 30 tablet 3   • [DISCONTINUED] amLODIPine (NORVASC) 10 MG tablet Take 10 mg by mouth Daily.     • [DISCONTINUED] losartan (COZAAR) 50 MG tablet Take 1 tablet by mouth Daily. 30 tablet 0         Objective   Physical Exam   Constitutional: She is oriented to person, place, and time. She appears well-developed and well-nourished. No distress.   Obesity noted     HENT:   Head: Normocephalic and atraumatic.   Eyes: Conjunctivae are normal. Pupils are equal, round, and reactive to light.   Neck: Normal range of motion. No JVD present. No thyromegaly present.   Cardiovascular: Normal rate, regular rhythm, normal  heart sounds and intact distal pulses.   No murmur heard.  Pulses:       Dorsalis pedis pulses are 2+ on the right side, and 2+ on the left side.        Posterior tibial pulses are 2+ on the right side, and 2+ on the left side.   Pulmonary/Chest: Effort normal and breath sounds normal. No respiratory distress. She exhibits no tenderness.   Abdominal: Soft. Normal appearance and bowel sounds are normal. She exhibits no distension. There is no tenderness.   Musculoskeletal: Normal range of motion. She exhibits no edema.   Lymphadenopathy:     She has no cervical adenopathy.        Right cervical: No superficial cervical, no deep cervical and no posterior cervical adenopathy present.       Left cervical: No superficial cervical, no deep cervical and no posterior cervical adenopathy present.   Neurological: She is alert and oriented to person, place, and time. Coordination normal.   Skin: Skin is warm and dry. No rash noted. She is not diaphoretic. No erythema. No pallor.   Psychiatric: She has a normal mood and affect. Her behavior is normal. Judgment and thought content normal.   Nursing note and vitals reviewed.      Vitals:    07/24/19 1045   BP: 126/84   Pulse: 100   Resp: 18   Temp: 96.8 °F (36 °C)   SpO2: 96%         Assessment/Plan   Freida was seen today for hypertension, hyperlipidemia, hypothyroidism and diabetes.    Diagnoses and all orders for this visit:    Uncontrolled type 2 diabetes mellitus with hyperglycemia (CMS/[facility])  -     POC Glycosylated Hemoglobin (Hb A1C)  -     Comprehensive Metabolic Panel  -     Lipid Panel  -     TSH Rfx On Abnormal To Free T4  -     Microalbumin / Creatinine Urine Ratio - Urine, Clean Catch  -     CBC (No Diff)  -     T4, Free; Future  -     T4, Free    Essential hypertension  -     Comprehensive Metabolic Panel  -     Lipid Panel  -     TSH Rfx On Abnormal To Free T4  -     Microalbumin / Creatinine Urine Ratio - Urine, Clean Catch  -     CBC (No Diff)  -     T4, Free;  Future  -     T4, Free    Mixed hyperlipidemia  -     Comprehensive Metabolic Panel  -     Lipid Panel  -     TSH Rfx On Abnormal To Free T4  -     CBC (No Diff)    Acquired hypothyroidism  -     Comprehensive Metabolic Panel  -     Lipid Panel  -     TSH Rfx On Abnormal To Free T4  -     CBC (No Diff)  -     T4, Free; Future  -     T4, Free    Recurrent major depressive disorder, in partial remission (CMS/[facility])  -     Comprehensive Metabolic Panel  -     Lipid Panel  -     TSH Rfx On Abnormal To Free T4  -     CBC (No Diff)  -     T4, Free; Future  -     T4, Free    History of sleep apnea  -     Comprehensive Metabolic Panel  -     Lipid Panel  -     TSH Rfx On Abnormal To Free T4  -     Ambulatory Referral to Sleep Medicine    Morbid obesity with BMI of 45.0-49.9, adult (CMS/[facility])  -     Comprehensive Metabolic Panel  -     Lipid Panel  -     TSH Rfx On Abnormal To Free T4  -     Ambulatory Referral to Sleep Medicine  -     CBC (No Diff)  -     T4, Free; Future  -     T4, Free    Non-compliance                No medication changes today  Encouraged medication compliance.  Encouraged diabetic diet and 150 minutes of moderate intensity exercise per week.  Labs sent as above-we will notify results and treat accordingly  Refer to sleep medicine for sleep apnea evaluation and treatment.  Encouraged her to make appointment with podiatrist  Request records from nephrology appointment.  Return in about 3 weeks (around 8/14/2019).  Plan of care discussed with pt. They verbalized understanding and agreement.       * Please note that portions of this note were completed with a voice recognition program. Efforts were made to edit the dictation but occasionally words are erroneously transcribed.

## 2019-07-25 LAB — T4 FREE SERPL-MCNC: 1.06 NG/DL (ref 0.93–1.7)

## 2019-07-30 ENCOUNTER — TELEPHONE (OUTPATIENT)
Dept: INTERNAL MEDICINE | Facility: CLINIC | Age: 49
End: 2019-07-30

## 2019-08-20 ENCOUNTER — OFFICE VISIT (OUTPATIENT)
Dept: ORTHOPEDIC SURGERY | Facility: CLINIC | Age: 49
End: 2019-08-20

## 2019-08-20 VITALS — HEART RATE: 68 BPM | OXYGEN SATURATION: 95 % | HEIGHT: 65 IN | BODY MASS INDEX: 42.09 KG/M2 | WEIGHT: 252.65 LBS

## 2019-08-20 DIAGNOSIS — E66.01 MORBID OBESITY WITH BMI OF 40.0-44.9, ADULT (HCC): ICD-10-CM

## 2019-08-20 DIAGNOSIS — M17.0 PRIMARY OSTEOARTHRITIS OF BOTH KNEES: Primary | ICD-10-CM

## 2019-08-20 PROCEDURE — 99213 OFFICE O/P EST LOW 20 MIN: CPT | Performed by: ORTHOPAEDIC SURGERY

## 2019-08-20 PROCEDURE — 20610 DRAIN/INJ JOINT/BURSA W/O US: CPT | Performed by: ORTHOPAEDIC SURGERY

## 2019-08-20 PROCEDURE — 99406 BEHAV CHNG SMOKING 3-10 MIN: CPT | Performed by: ORTHOPAEDIC SURGERY

## 2019-08-20 RX ORDER — TRIAMCINOLONE ACETONIDE 40 MG/ML
80 INJECTION, SUSPENSION INTRA-ARTICULAR; INTRAMUSCULAR
Status: COMPLETED | OUTPATIENT
Start: 2019-08-20 | End: 2019-08-20

## 2019-08-20 RX ORDER — AMLODIPINE BESYLATE 10 MG/1
TABLET ORAL
COMMUNITY
Start: 2019-08-19 | End: 2019-10-25 | Stop reason: SDUPTHER

## 2019-08-20 RX ORDER — LIDOCAINE HYDROCHLORIDE 10 MG/ML
3 INJECTION, SOLUTION EPIDURAL; INFILTRATION; INTRACAUDAL; PERINEURAL
Status: COMPLETED | OUTPATIENT
Start: 2019-08-20 | End: 2019-08-20

## 2019-08-20 RX ADMIN — LIDOCAINE HYDROCHLORIDE 3 ML: 10 INJECTION, SOLUTION EPIDURAL; INFILTRATION; INTRACAUDAL; PERINEURAL at 10:36

## 2019-08-20 RX ADMIN — LIDOCAINE HYDROCHLORIDE 3 ML: 10 INJECTION, SOLUTION EPIDURAL; INFILTRATION; INTRACAUDAL; PERINEURAL at 10:37

## 2019-08-20 RX ADMIN — TRIAMCINOLONE ACETONIDE 80 MG: 40 INJECTION, SUSPENSION INTRA-ARTICULAR; INTRAMUSCULAR at 10:36

## 2019-08-20 RX ADMIN — TRIAMCINOLONE ACETONIDE 80 MG: 40 INJECTION, SUSPENSION INTRA-ARTICULAR; INTRAMUSCULAR at 10:37

## 2019-08-20 NOTE — PROGRESS NOTES
Procedure   Large Joint Arthrocentesis: R knee  Date/Time: 8/20/2019 10:36 AM  Consent given by: patient  Site marked: site marked  Timeout: Immediately prior to procedure a time out was called to verify the correct patient, procedure, equipment, support staff and site/side marked as required   Supporting Documentation  Indications: pain   Procedure Details  Location: knee - R knee  Preparation: Patient was prepped and draped in the usual sterile fashion  Needle size: 22 G  Approach: anterolateral  Medications administered: 3 mL lidocaine PF 1% 1 %; 80 mg triamcinolone acetonide 40 MG/ML (3cc 0.25%Bupivacaine NDC: 88358-905-64 LOT:[id number] EXP:02/01/22)  Patient tolerance: patient tolerated the procedure well with no immediate complications    Large Joint Arthrocentesis: L knee  Date/Time: 8/20/2019 10:37 AM  Consent given by: patient  Site marked: site marked  Timeout: Immediately prior to procedure a time out was called to verify the correct patient, procedure, equipment, support staff and site/side marked as required   Supporting Documentation  Indications: pain   Procedure Details  Location: knee - L knee  Preparation: Patient was prepped and draped in the usual sterile fashion  Needle size: 22 G  Approach: anterolateral  Medications administered: 3 mL lidocaine PF 1% 1 %; 80 mg triamcinolone acetonide 40 MG/ML (3cc 0.25%Bupivacaine NDC: 27834-543-26 LOT:[id number] EXP:02/01/22)  Patient tolerance: patient tolerated the procedure well with no immediate complications

## 2019-08-20 NOTE — PROGRESS NOTES
Orthopaedic Clinic Note: Knee Established Patient    Chief Complaint   Patient presents with   • Follow-up     1 year follow up - Primary osteoarthritis of both knees - injection given 08/09/18        HPI    It has been 1  year(s) since Ms. Martinez's last visit. She returns to clinic today for follow-up bilateral knee arthritis.  She was given an injection in both knees a year ago.  She states the injections provided about a year of relief.  Her pain is gradually returned.  She rates her pain 5/10 on the pain scale today.  She is ambulating with no assistive device.  She states that weightbearing and walking increase her pain.  She is taking Tylenol for pain control.  Overall she is doing slightly worse since the injections have worn off.  Patient continues to use daily tobacco.  She remains overweight with a BMI of 42.    Past Medical History:   Diagnosis Date   • Abdominal pain 4/12/2019   • Arthritis    • Asthma    • Diabetes mellitus (CMS/HCC)    • Disease of thyroid gland    • Hypertension    • Kidney infection    • Sleep apnea       Past Surgical History:   Procedure Laterality Date   • ENDOSCOPY N/A 4/15/2019    Procedure: ESOPHAGOGASTRODUODENOSCOPY;  Surgeon: Jude Clinton MD;  Location: [facility] ENDOSCOPY;  Service: Gastroenterology   • TUBAL ABDOMINAL LIGATION     • VAGINAL DELIVERY      x4   • WISDOM TOOTH EXTRACTION        Family History   Problem Relation Age of Onset   • Diabetes Father    • Kidney disease Father    • Cancer Maternal Aunt    • Arthritis Paternal Aunt    • Cancer Maternal Grandmother    • Breast cancer Maternal Grandmother    • Kidney disease Paternal Grandfather      Social History     Socioeconomic History   • Marital status: Single     Spouse name: Not on file   • Number of children: Not on file   • Years of education: Not on file   • Highest education level: Not on file   Tobacco Use   • Smoking status: Current Every Day Smoker     Packs/day: 1.00     Types: Cigarettes   • Smokeless  "tobacco: Never Used   Substance and Sexual Activity   • Alcohol use: No     Frequency: Never   • Drug use: No   • Sexual activity: No      Current Outpatient Medications on File Prior to Visit   Medication Sig Dispense Refill   • albuterol (PROVENTIL) (2.5 MG/3ML) 0.083% nebulizer solution      • albuterol sulfate  (90 Base) MCG/ACT inhaler      • amLODIPine (NORVASC) 10 MG tablet      • aspirin 325 MG tablet Take 325 mg by mouth Daily.     • atorvastatin (LIPITOR) 40 MG tablet Take 40 mg by mouth Daily.     • FLUoxetine (PROZAC) 40 MG capsule Take 40 mg by mouth Daily.     • glipiZIDE (GLUCOTROL) 5 MG tablet Take 1 tablet by mouth 2 (Two) Times a Day Before Meals. 60 tablet 0   • levothyroxine (SYNTHROID, LEVOTHROID) 50 MCG tablet      • pantoprazole (PROTONIX) 40 MG EC tablet Take 1 tablet by mouth Daily. 30 tablet 0   • SITagliptin (JANUVIA) 50 MG tablet Take 1 tablet by mouth Daily. 30 tablet 3     No current facility-administered medications on file prior to visit.       Allergies   Allergen Reactions   • Lisinopril Swelling        Review of Systems   Constitutional: Positive for activity change.   HENT: Negative.    Eyes: Negative.    Respiratory: Negative.    Cardiovascular: Negative.    Gastrointestinal: Negative.    Endocrine: Negative.    Genitourinary: Negative.    Musculoskeletal: Positive for arthralgias and joint swelling.   Skin: Negative.    Allergic/Immunologic: Negative.    Neurological: Negative.    Hematological: Negative.    Psychiatric/Behavioral: Negative.         The patient's Review of Systems was personally reviewed and confirmed as accurate.    Physical Exam  Pulse 68, height 164.5 cm (64.76\"), weight 115 kg (252 lb 10.4 oz), SpO2 95 %, not currently breastfeeding.    Body mass index is 42.35 kg/m².    GENERAL APPEARANCE: awake, alert, oriented, in no acute distress, well developed, well nourished and obese  LUNGS:  breathing nonlabored  EXTREMITIES: no clubbing, " cyanosis  PERIPHERAL PULSES: palpable dorsalis pedis and posterior tibial pulses bilaterally.    GAIT:  Antalgic        ----------  Bilateral Knee Exam:  ----------  ALIGNMENT: neutral  ----------  RANGE OF MOTION:  Normal (0-120 degrees) with no extensor lag or flexion contracture  LIGAMENTOUS STABILITY:   stable to varus and valgus stress at terminal extension and 30 degrees without any evidence of laxity  ----------  STRENGTH:  KNEE FLEXION 5/5  KNEE EXTENSION  5/5  ANKLE DORSIFLEXION  5/5  ANKLE PLANTARFLEXION  5/5  ----------  PAIN WITH PALPATION:global  KNEE EFFUSION: yes, trace effusion  PAIN WITH KNEE ROM: yes  PATELLAR CREPITUS:  yes, painful and symptomatic  ----------  SENSATION TO LIGHT TOUCH:  DEEP PERONEAL/SUPERFICIAL PERONEAL/SURAL/SAPHENOUS/TIBIAL:    intact  ----------  EDEMA:  no  ERYTHEMA:    no  WOUNDS/INCISIONS:  no  _____________________________________________________________________  _____________________________________________________________________    RADIOGRAPHIC FINDINGS:   Indication: Bilateral knee pain    Comparison: Todays xrays were compared to previous xrays from 8/9/2018    Knee films: Mild to moderate tricompartmental osteoarthritis with neutral alignment of bilateral knees.  Small periarticular osteophytes visualized in all compartments.  No significant changes compared to prior radiographs.    Assessment/Plan:   Diagnosis Plan   1. Primary osteoarthritis of both knees  XR Knee 4+ View Bilateral    Large Joint Arthrocentesis: R knee    Large Joint Arthrocentesis: L knee   2. Morbid obesity with BMI of 40.0-44.9, adult (CMS/[facility])       Patient has an elevated BMI greater than 40.  This places the patient at increased risk for perioperative complications as well as increased risk of accelerating the degenerative processes within the joint.  As a result, surgical intervention will be deferred until the patient can achieve a BMI less than 40.  In the interval, the patient has been  instructed on weight loss avenues including diet, portion control, calorie restriction, low/no impact exercise, referral to weight loss management and/or bariatric surgery.  It was explained that weight loss can improve joint pain alone by decreasing the joint reaction forces.  For every pound of weight change, the knee and hip joints see a 4 to 5 fold multiplier affect.  Given these options, the patient will proceed with diet and portion control.    I spent approximately 5-10 minutes counseling the patient regarding the adverse effects of tobacco use.  Included in this counseling session were treatment options for cessation including quitting cold turkey, medication, nicotine step-down programs, and smoking cessation programs.  Furthermore, I explained to the patient the importance of abstaining from nicotine usage as nicotine is known to increase the incidence of joint related pain.  As a result of this counseling session, the patient will weigh the options and determine how to proceed with achieving tobacco cessation in preparation for surgery.    We will proceed with bilateral knee cortisone injections today.  She will follow-up as needed.    Procedure Note:  I discussed with the patient the potential benefits of performing a therapeutic injections of the bilateral knees as well as potential risks including but not limited to infection, swelling, pain, bleeding, bruising, nerve/vessel damage, skin color changes, transient elevation in blood glucose levels, and fat atrophy. After informed consent and after the areas were prepped with alcohol, ethyl chloride was used to numb the skin. Via the superior lateral approach, 3cc of 1% lidocaine, 3cc of 0.25% marcaine and 2 cc of 40mg/ml of Kenalog were each injected into the bilateral knees. The patient tolerated the procedures well. There were no complications. A sterile dressing was placed over each injection site.      Sanjeev Campbell MD  08/20/19  10:39 AM

## 2019-09-12 ENCOUNTER — CONSULT (OUTPATIENT)
Dept: SLEEP MEDICINE | Facility: HOSPITAL | Age: 49
End: 2019-09-12

## 2019-09-12 VITALS
BODY MASS INDEX: 42.55 KG/M2 | DIASTOLIC BLOOD PRESSURE: 81 MMHG | WEIGHT: 255.4 LBS | OXYGEN SATURATION: 93 % | SYSTOLIC BLOOD PRESSURE: 144 MMHG | HEART RATE: 101 BPM | HEIGHT: 65 IN

## 2019-09-12 DIAGNOSIS — E66.01 MORBID OBESITY WITH BMI OF 45.0-49.9, ADULT (HCC): ICD-10-CM

## 2019-09-12 DIAGNOSIS — R06.83 SNORING: Primary | ICD-10-CM

## 2019-09-12 DIAGNOSIS — G47.33 OBSTRUCTIVE SLEEP APNEA, ADULT: ICD-10-CM

## 2019-09-12 PROCEDURE — 99203 OFFICE O/P NEW LOW 30 MIN: CPT | Performed by: INTERNAL MEDICINE

## 2019-09-13 NOTE — PROGRESS NOTES
Subjective   Freida Martinez is a 49 y.o. female is being seen for consultation today at the request of LUCIA Garcia for the evaluation of snoring and obstructive sleep apnea.    History of Present Illness  Patient states that she does not sleep much.  She has been told she snores a great deal and she falls asleep all the time during the day.  She was previously studied here in July 2010.  She had an AHI of 117.  She says she used CPAP for less than a year..  She says that she is still very sleepy during the day.  She does not drive because she was falling asleep when she was driving.  She naps daily for about an hour.  She is sleepy even if she increases her time in bed.    She has loud snoring and snores in all positions.  She is awakened with a dry mouth and gasping.  She is awake and coughing and has trouble breathing through her nose at night.  She denies ever breaking her nose.  She is not rested on arising in the morning and has a morning headache about 4 days/week.  She denies any reflux symptoms.  She says she may have hypnagogic hallucinations once or twice per week.  Denies any history of sleep paralysis.  She denies any history of cataplexy.  She has occasional kicking of her legs at night and says she has chronic back pain that may bother her at night.  She says her weight is been fairly stable.    She says she gets to bed about 1:59 AM.  She will fall asleep right away.  She awakens once or twice during the night.  She thinks she gets 3 to 4 hours of sleep but is still tired.  She is also napping daily for about an hour.  She has a history of hypertension for 9 years.  She has been a known diabetic for a year she has a history of hypothyroidism and renal insufficiency.  She has a history of depression and anxiety.  Allergies   Allergen Reactions   • Anaprox [Naproxen] Other (See Comments)     CHRONIC KIDNEY DISEASE   • Celebrex [Celecoxib] Other (See Comments)     CHRONIC KIDNEY DISEASE   •  Flector [Diclofenac Epolamine] Other (See Comments)     CHRONIC KIDNEY DISEASE   • Lisinopril Swelling   • Motrin [Ibuprofen] Other (See Comments)     CHRONIC KIDNEY DISEASE   • Voltaren [Diclofenac Sodium] Other (See Comments)     CHRONIC KIDNEY DISEASE          Current Outpatient Medications:   •  albuterol (PROVENTIL) (2.5 MG/3ML) 0.083% nebulizer solution, , Disp: , Rfl:   •  albuterol sulfate  (90 Base) MCG/ACT inhaler, , Disp: , Rfl:   •  amLODIPine (NORVASC) 10 MG tablet, , Disp: , Rfl:   •  aspirin 325 MG tablet, Take 325 mg by mouth Daily., Disp: , Rfl:   •  atorvastatin (LIPITOR) 40 MG tablet, Take 40 mg by mouth Daily., Disp: , Rfl:   •  FLUoxetine (PROZAC) 40 MG capsule, Take 40 mg by mouth Daily., Disp: , Rfl:   •  glipiZIDE (GLUCOTROL) 5 MG tablet, Take 1 tablet by mouth 2 (Two) Times a Day Before Meals., Disp: 60 tablet, Rfl: 0  •  levothyroxine (SYNTHROID, LEVOTHROID) 50 MCG tablet, , Disp: , Rfl:   •  pantoprazole (PROTONIX) 40 MG EC tablet, Take 1 tablet by mouth Daily., Disp: 30 tablet, Rfl: 0  •  SITagliptin (JANUVIA) 50 MG tablet, Take 1 tablet by mouth Daily., Disp: 30 tablet, Rfl: 3    Social History    Tobacco Use      Smoking status: Current Every Day Smoker she is smoked a pack per day for 31 years        Packs/day: 1.00        Types: Cigarettes      Smokeless tobacco: Never Used       Social History     Substance and Sexual Activity   Alcohol Use No   • Frequency: Never       Caffeine: She has 2 cups of coffee per week.  She says she probably has tea only twice per month and cola 2 times per week    Past Medical History:   Diagnosis Date   • Abdominal pain 4/12/2019   • Arthritis    • Asthma    • Diabetes mellitus (CMS/HCC)    • Disease of thyroid gland    • Hypertension    • Kidney infection    • Mental disorder    • Sleep apnea        Past Surgical History:   Procedure Laterality Date   • ENDOSCOPY N/A 4/15/2019    Procedure: ESOPHAGOGASTRODUODENOSCOPY;  Surgeon: Jude Clinton,  "MD;  [facility];  Service: Gastroenterology   • TUBAL ABDOMINAL LIGATION     • VAGINAL DELIVERY      x4   • WISDOM TOOTH EXTRACTION         Family History   Problem Relation Age of Onset   • Hypertension Mother    • Diabetes Father    • Kidney disease Father    • Heart disease Father    • Hypertension Father    • Sleep apnea Father    • Cancer Maternal Aunt    • Arthritis Paternal Aunt    • Cancer Maternal Grandmother         BREAST   • Breast cancer Maternal Grandmother    • Kidney disease Paternal Grandfather    • Kidney disease Paternal Uncle    Family history is also positive for asthma.    The following portions of the patient's history were reviewed and updated as appropriate: allergies, current medications, past family history, past medical history, past social history, past surgical history and problem list.    Review of Systems   Constitutional: Positive for fatigue.   HENT: Positive for postnasal drip and sinus pressure.    Eyes: Positive for visual disturbance.   Respiratory: Positive for cough, shortness of breath and wheezing.    Cardiovascular: Positive for leg swelling.   Gastrointestinal: Positive for abdominal pain, nausea and vomiting.        She complains of change in bowel movements   Endocrine: Positive for cold intolerance, polydipsia and polyuria.   Genitourinary: Positive for frequency.   Musculoskeletal: Positive for arthralgias, back pain, gait problem, joint swelling and myalgias.   Skin: Negative.    Allergic/Immunologic: Negative.    Neurological: Positive for dizziness, light-headedness, numbness and headaches.   Hematological: Negative.    Psychiatric/Behavioral: Positive for confusion, decreased concentration and dysphoric mood. The patient is nervous/anxious.    [place] score is 24/24    Objective     /81   Pulse 101   Ht 164.5 cm (64.75\")   Wt 116 kg (255 lb 6.4 oz)   SpO2 93%   BMI 42.83 kg/m²      Physical Exam   Constitutional: She is oriented to " person, place, and time. She appears well-developed and well-nourished.   She is morbidly obese.   HENT:   Head: Normocephalic and atraumatic.   She has Mallampati class IV anatomy.   Eyes: EOM are normal. Pupils are equal, round, and reactive to light.   Neck: Normal range of motion. Neck supple.   Cardiovascular: Normal rate, regular rhythm and normal heart sounds.   Pulmonary/Chest: Effort normal and breath sounds normal.   Abdominal: Soft. Bowel sounds are normal.   Musculoskeletal: Normal range of motion. She exhibits no edema.   Neurological: She is alert and oriented to person, place, and time.   Skin: Skin is warm and dry.   Psychiatric: She has a normal mood and affect. Her behavior is normal.         Assessment/Plan   Freida was seen today for sleeping problem.    Diagnoses and all orders for this visit:    Snoring  -     Home Sleep Study; Future    Obstructive sleep apnea, adult  -     Home Sleep Study; Future    Morbid obesity with BMI of 45.0-49.9, adult (CMS/[facility])    Patient presents with a history of snoring and nonrestorative sleep.  She had a previously positive study in 2010 and her weight is up slightly since then.  I suspect she still has significant obstructive sleep apnea.  We will plan to proceed to home sleep testing.  I discussed possible therapies including CPAP, weight control, oral appliance, and surgery.  We have also discussed the long-term consequences of obstructive sleep apnea.  She is encouraged to lose weight.  She declines to be referred to weight management.  She is encouraged to avoid alcohol and sedatives close to bedtime.  She is encouraged to practice lateral position sleep.  We will see her back after her study.         Patriico Wright MD [facility]  Sleep Medicine  Pulmonary and Critical Care Medicine

## 2019-10-02 ENCOUNTER — HOSPITAL ENCOUNTER (OUTPATIENT)
Dept: SLEEP MEDICINE | Facility: HOSPITAL | Age: 49
Discharge: HOME | End: 2019-10-02
Admitting: INTERNAL MEDICINE

## 2019-10-02 VITALS
SYSTOLIC BLOOD PRESSURE: 158 MMHG | DIASTOLIC BLOOD PRESSURE: 107 MMHG | HEART RATE: 100 BPM | HEIGHT: 65 IN | OXYGEN SATURATION: 92 % | WEIGHT: 251.2 LBS | BODY MASS INDEX: 41.85 KG/M2

## 2019-10-02 DIAGNOSIS — R06.83 SNORING: ICD-10-CM

## 2019-10-02 DIAGNOSIS — G47.33 OBSTRUCTIVE SLEEP APNEA, ADULT: ICD-10-CM

## 2019-10-02 PROCEDURE — 95806 SLEEP STUDY UNATT&RESP EFFT: CPT | Performed by: INTERNAL MEDICINE

## 2019-10-02 PROCEDURE — 95806 SLEEP STUDY UNATT&RESP EFFT: CPT

## 2019-10-07 DIAGNOSIS — E66.01 MORBID OBESITY WITH BMI OF 45.0-49.9, ADULT (HCC): ICD-10-CM

## 2019-10-07 DIAGNOSIS — G47.33 OBSTRUCTIVE SLEEP APNEA, ADULT: Primary | ICD-10-CM

## 2019-10-07 DIAGNOSIS — R06.83 SNORING: ICD-10-CM

## 2019-10-08 ENCOUNTER — OFFICE VISIT (OUTPATIENT)
Dept: ORTHOPEDIC SURGERY | Facility: CLINIC | Age: 49
End: 2019-10-08

## 2019-10-08 VITALS — HEIGHT: 65 IN | HEART RATE: 98 BPM | WEIGHT: 251.32 LBS | OXYGEN SATURATION: 98 % | BODY MASS INDEX: 41.87 KG/M2

## 2019-10-08 DIAGNOSIS — M22.2X1 PATELLOFEMORAL PAIN SYNDROME OF BOTH KNEES: ICD-10-CM

## 2019-10-08 DIAGNOSIS — M22.2X2 PATELLOFEMORAL PAIN SYNDROME OF BOTH KNEES: ICD-10-CM

## 2019-10-08 DIAGNOSIS — E66.01 MORBID OBESITY WITH BMI OF 40.0-44.9, ADULT (HCC): ICD-10-CM

## 2019-10-08 DIAGNOSIS — M17.0 PRIMARY OSTEOARTHRITIS OF BOTH KNEES: Primary | ICD-10-CM

## 2019-10-08 DIAGNOSIS — S80.02XA CONTUSION OF LEFT KNEE, INITIAL ENCOUNTER: ICD-10-CM

## 2019-10-08 PROCEDURE — 99213 OFFICE O/P EST LOW 20 MIN: CPT | Performed by: ORTHOPAEDIC SURGERY

## 2019-10-08 NOTE — PROGRESS NOTES
Orthopaedic Clinic Note: Knee Established Patient    Chief Complaint   Patient presents with   • Follow-up     7 week recheck Primary osteoarthritis of both knees - injection given 08/20/2019        HPI    It has been 7  week(s) since Ms. Martinez's last visit. She returns to clinic today for follow-up bilateral knees.  She underwent bilateral knee cortisone injection 7 weeks ago.  The left knee was doing well up until her recent mechanical fall on Sunday.  Since then she has had increased pain in the left knee.  She states the right knee cortisone injection failed to provide any significant relief.  She is continued to have pain localized globally throughout both knees.  Her left knee is now hurting significantly more in the front of the knee.  She has been taking Tylenol with only minimal improvement.  She is ambulate with assistance of a walker.  She is having aching throbbing sensation in the knee.  Overall she is doing worse.     Past Medical History:   Diagnosis Date   • Abdominal pain 4/12/2019   • Arthritis    • Asthma    • Diabetes mellitus (CMS/HCC)    • Disease of thyroid gland    • Hypertension    • Kidney infection    • Mental disorder    • Sleep apnea       Past Surgical History:   Procedure Laterality Date   • ENDOSCOPY N/A 4/15/2019    Procedure: ESOPHAGOGASTRODUODENOSCOPY;  Surgeon: Jude Clinton MD;  Location: [facility] ENDOSCOPY;  Service: Gastroenterology   • TUBAL ABDOMINAL LIGATION     • VAGINAL DELIVERY      x4   • WISDOM TOOTH EXTRACTION        Family History   Problem Relation Age of Onset   • Hypertension Mother    • Diabetes Father    • Kidney disease Father    • Heart disease Father    • Hypertension Father    • Sleep apnea Father    • Cancer Maternal Aunt    • Arthritis Paternal Aunt    • Cancer Maternal Grandmother         BREAST   • Breast cancer Maternal Grandmother    • Kidney disease Paternal Grandfather    • Kidney disease Paternal Uncle      Social History     Socioeconomic History    • Marital status: Single     Spouse name: Not on file   • Number of children: Not on file   • Years of education: Not on file   • Highest education level: Not on file   Tobacco Use   • Smoking status: Current Every Day Smoker     Packs/day: 1.00     Types: Cigarettes   • Smokeless tobacco: Never Used   Substance and Sexual Activity   • Alcohol use: No     Frequency: Never   • Drug use: No   • Sexual activity: No      Current Outpatient Medications on File Prior to Visit   Medication Sig Dispense Refill   • albuterol (PROVENTIL) (2.5 MG/3ML) 0.083% nebulizer solution      • albuterol sulfate  (90 Base) MCG/ACT inhaler      • amLODIPine (NORVASC) 10 MG tablet      • aspirin 325 MG tablet Take 325 mg by mouth Daily.     • atorvastatin (LIPITOR) 40 MG tablet Take 40 mg by mouth Daily.     • FLUoxetine (PROZAC) 40 MG capsule Take 40 mg by mouth Daily.     • glipiZIDE (GLUCOTROL) 5 MG tablet Take 1 tablet by mouth 2 (Two) Times a Day Before Meals. 60 tablet 0   • levothyroxine (SYNTHROID, LEVOTHROID) 50 MCG tablet      • pantoprazole (PROTONIX) 40 MG EC tablet Take 1 tablet by mouth Daily. 30 tablet 0   • SITagliptin (JANUVIA) 50 MG tablet Take 1 tablet by mouth Daily. 30 tablet 3     No current facility-administered medications on file prior to visit.       Allergies   Allergen Reactions   • Anaprox [Naproxen] Other (See Comments)     CHRONIC KIDNEY DISEASE   • Celebrex [Celecoxib] Other (See Comments)     CHRONIC KIDNEY DISEASE   • Flector [Diclofenac Epolamine] Other (See Comments)     CHRONIC KIDNEY DISEASE   • Lisinopril Swelling   • Motrin [Ibuprofen] Other (See Comments)     CHRONIC KIDNEY DISEASE   • Voltaren [Diclofenac Sodium] Other (See Comments)     CHRONIC KIDNEY DISEASE        Review of Systems   Constitutional: Positive for fatigue.   HENT: Positive for drooling, postnasal drip, sinus pressure and sneezing.    Eyes: Negative.    Respiratory: Positive for apnea, shortness of breath and wheezing.   "  Cardiovascular: Positive for palpitations.   Gastrointestinal: Positive for abdominal pain and nausea.   Endocrine: Negative.    Genitourinary: Negative.    Musculoskeletal: Positive for arthralgias and back pain.   Skin: Negative.    Allergic/Immunologic: Negative.    Neurological: Positive for dizziness, weakness, light-headedness, numbness and headaches.   Hematological: Negative.    Psychiatric/Behavioral: Positive for sleep disturbance. The patient is nervous/anxious.         The patient's Review of Systems was personally reviewed and confirmed as accurate.    Physical Exam  Pulse 98, height 164.5 cm (64.76\"), weight 114 kg (251 lb 5.2 oz), SpO2 98 %, not currently breastfeeding.    Body mass index is 42.13 kg/m².    GENERAL APPEARANCE: awake, alert, oriented, in no acute distress, well developed, well nourished and obese  LUNGS:  breathing nonlabored  EXTREMITIES: no clubbing, cyanosis  PERIPHERAL PULSES: palpable dorsalis pedis and posterior tibial pulses bilaterally.    GAIT:  Antalgic        ----------  Bilateral Knee Exam:  ----------  ALIGNMENT: neutral  ----------  RANGE OF MOTION:  Normal (0-120 degrees) with no extensor lag or flexion contracture  LIGAMENTOUS STABILITY:   stable to varus and valgus stress at terminal extension and 30 degrees without any evidence of laxity  ----------  STRENGTH:  KNEE FLEXION 5/5  KNEE EXTENSION  5/5  ANKLE DORSIFLEXION  5/5  ANKLE PLANTARFLEXION  5/5  ----------  PAIN WITH PALPATION: Significantly tender to palpation anteriorly on the left knee, global throughout the right knee  KNEE EFFUSION: yes, trace effusion bilaterally  PAIN WITH KNEE ROM: yes  PATELLAR CREPITUS:  yes, painful and symptomatic  ----------  SENSATION TO LIGHT TOUCH:  DEEP PERONEAL/SUPERFICIAL PERONEAL/SURAL/SAPHENOUS/TIBIAL:    intact  ----------  EDEMA:  no  ERYTHEMA:    no  WOUNDS/INCISIONS:  " no  _____________________________________________________________________  _____________________________________________________________________    RADIOGRAPHIC FINDINGS:   Indication: Left knee pain    Comparison: Todays xrays were compared to previous xrays from  8/20/2019     Knee films: moderate tricompartmental osteoarthritis with no bony injury or fracture.  No significant changes compared to prior radiographs.    Assessment/Plan:   Diagnosis Plan   1. Primary osteoarthritis of both knees  XR Knee 4+ View Left    diclofenac (VOLTAREN) 1 % gel gel   2. Morbid obesity with BMI of 40.0-44.9, adult (CMS/HCC)     3. Patellofemoral pain syndrome of both knees     4. Contusion of left knee, initial encounter  diclofenac (VOLTAREN) 1 % gel gel     Patient has a left knee contusion.  In regards to her bilateral knees, she has moderate tricompartmental arthritic changes.  I suggested that she undertake an anti-inflammatory.  A topical anti-inflammatory was provided.  Due to her kidney disease, she is able to take an oral anti-inflammatory.  She is agreeable to this plan.  She will follow-up as previously scheduled.      Sanjeev Campbell MD  10/08/19  3:29 PM

## 2019-10-15 ENCOUNTER — OFFICE VISIT (OUTPATIENT)
Dept: SLEEP MEDICINE | Facility: HOSPITAL | Age: 49
End: 2019-10-15

## 2019-10-15 VITALS
HEART RATE: 100 BPM | WEIGHT: 251 LBS | DIASTOLIC BLOOD PRESSURE: 113 MMHG | HEIGHT: 65 IN | SYSTOLIC BLOOD PRESSURE: 166 MMHG | OXYGEN SATURATION: 95 % | BODY MASS INDEX: 41.82 KG/M2

## 2019-10-15 DIAGNOSIS — E66.01 MORBID OBESITY WITH BMI OF 45.0-49.9, ADULT (HCC): ICD-10-CM

## 2019-10-15 DIAGNOSIS — G47.33 OSA (OBSTRUCTIVE SLEEP APNEA): Primary | ICD-10-CM

## 2019-10-15 DIAGNOSIS — G47.34 NOCTURNAL HYPOXEMIA: ICD-10-CM

## 2019-10-15 PROCEDURE — 99213 OFFICE O/P EST LOW 20 MIN: CPT | Performed by: NURSE PRACTITIONER

## 2019-10-15 NOTE — PROGRESS NOTES
Chief Complaint:   Chief Complaint   Patient presents with   • Follow-up       HPI:    Freida Martinez is a 49 y.o. female here for follow-up of sleep study results.  Patient was seen here in consult 9/12/2019 for snoring, excessive daytime sleepiness, falling asleep while driving and past history of severe sleep apnea.  Patient was diagnosed in 2010 with severe sleep apnea she did use CPAP less than a year.  Patient states co-pay for supplies and machine was $20 monthly and at that time she could not afford.  Patient did turn in her machine.  Patient is now sleeping 3 to 4 hours nightly and not refreshed upon awakening.  Patient has an [place] score of 24/24.  Patient had a sleep study 10-19 did show severe obstructive sleep apnea with an AHI of 127.7.  Also showed nocturnal hypoxemia.  Patient understands the consequences of untreated sleep apnea and therapies available to her.  I have encouraged patient to try CPAP and she would like to initiate.  However, in the meantime of starting CPAP she would like an ENT referral to see if this could be possible treatment.        Current medications are:   Current Outpatient Medications:   •  albuterol (PROVENTIL) (2.5 MG/3ML) 0.083% nebulizer solution, , Disp: , Rfl:   •  albuterol sulfate  (90 Base) MCG/ACT inhaler, , Disp: , Rfl:   •  amLODIPine (NORVASC) 10 MG tablet, , Disp: , Rfl:   •  aspirin 325 MG tablet, Take 325 mg by mouth Daily., Disp: , Rfl:   •  atorvastatin (LIPITOR) 40 MG tablet, Take 40 mg by mouth Daily., Disp: , Rfl:   •  diclofenac (VOLTAREN) 1 % gel gel, Apply 4 g topically to the appropriate area as directed 4 (Four) Times a Day. Small amount to affected area, Disp: 300 g, Rfl: 3  •  FLUoxetine (PROZAC) 40 MG capsule, Take 40 mg by mouth Daily., Disp: , Rfl:   •  glipiZIDE (GLUCOTROL) 5 MG tablet, Take 1 tablet by mouth 2 (Two) Times a Day Before Meals., Disp: 60 tablet, Rfl: 0  •  levothyroxine (SYNTHROID, LEVOTHROID) 50 MCG tablet, ,  Disp: , Rfl:   •  pantoprazole (PROTONIX) 40 MG EC tablet, Take 1 tablet by mouth Daily., Disp: 30 tablet, Rfl: 0  •  SITagliptin (JANUVIA) 50 MG tablet, Take 1 tablet by mouth Daily., Disp: 30 tablet, Rfl: 3.      The patient's relevant past medical, surgical, family and social history were reviewed and updated in Epic as appropriate.       Review of Systems   Constitutional: Positive for fatigue.   Eyes: Positive for visual disturbance.   Respiratory: Positive for apnea, cough, shortness of breath and wheezing.    Cardiovascular: Positive for leg swelling.   Gastrointestinal: Positive for abdominal pain, nausea and vomiting.   Endocrine: Positive for cold intolerance, polydipsia and polyuria.   Genitourinary: Positive for frequency.   Musculoskeletal: Positive for back pain, gait problem, joint swelling and myalgias.   Neurological: Positive for dizziness, light-headedness, numbness and headaches.   Psychiatric/Behavioral: Positive for confusion, decreased concentration, dysphoric mood and sleep disturbance. The patient is nervous/anxious.          Objective:    Physical Exam   Constitutional: She is oriented to person, place, and time. She appears well-developed and well-nourished.   HENT:   Mouth/Throat: Oropharynx is clear and moist.   Mallampati 4 anatomy   Eyes: Conjunctivae are normal.   Neck: Neck supple. No thyromegaly present.   Cardiovascular: Normal rate and regular rhythm.   Pulmonary/Chest: Effort normal and breath sounds normal.   Lymphadenopathy:     She has no cervical adenopathy.   Neurological: She is alert and oriented to person, place, and time.   Skin: Skin is warm and dry.   Psychiatric: She has a normal mood and affect. Her behavior is normal. Judgment and thought content normal.   Nursing note and vitals reviewed.        ASSESSMENT/PLAN    Freida was seen today for follow-up.    Diagnoses and all orders for this visit:    GINNY (obstructive sleep apnea)  -     Ambulatory Referral to ENT  (Otolaryngology)    Morbid obesity with BMI of 45.0-49.9, adult (CMS/[facility])    Nocturnal hypoxemia            1. Counseled patient regarding multimodal approach with healthy nutrition, healthy sleep, regular physical activity, social activities, counseling, and medications. Encouraged to practice lateral sleep position. Avoid alcohol and sedatives close to bedtime.  2. Order to initiate CPAP therapy faxed to DME of patient's choosing.  3. Patient is encouraged to join weight loss program.  4. Overnight oximetry will be ordered after she is compliant with CPAP.  5. I will see patient back in 31 to 90 days to reassess.    I have reviewed the results of my evaluation and impression and discussed my recommendations in detail with the patient.      Signed by  LUCIA Enamorado    October 15, 2019      CC: Annia Rico, LUCIA          No ref. provider found

## 2019-10-15 NOTE — PATIENT INSTRUCTIONS
Hypoxemia  Hypoxemia occurs when the blood does not contain enough oxygen. The body cannot work well when it does not have enough oxygen because every part of the body needs oxygen. Oxygen enters the lungs when we breathe in, then it travels to all parts of the body through the blood. Hypoxemia can develop suddenly or slowly.  What are the causes?  Common causes of this condition include:  · Long-term (chronic) lung diseases, such as chronic obstructive pulmonary disease (COPD) or interstitial lung disease.  · Disorders that affect breathing at night, such as sleep apnea.  · Fluid buildup in the lungs (pulmonary edema).  · Lung infection (pneumonia).  · Lung or throat cancer.  · Abnormal blood flow that bypasses the lungs (having a shunt).  · Certain diseases that affect nerves or muscles.  · A collapsed lung (pneumothorax).  · A blood clot in the lungs (pulmonary embolus).  · Certain types of heart disease.  · Slow or shallow breathing (hypoventilation).  · Certain medicines.  · High altitudes.  · Toxic chemicals, smoke, and gases.  What are the signs or symptoms?  In some cases, there may be no symptoms of this condition. If you do have symptoms, they may include:  · Shortness of breath (dyspnea).  · Bluish color of the skin, lips, or nail beds.  · Breathing that is fast, noisy, or shallow.  · A fast heartbeat.  · Feeling tired or sleepy.  · Feeling confused or worried.  If hypoxemia develops quickly, you will likely have dyspnea. If hypoxemia develops slowly over months or years, you may not notice any symptoms.  How is this diagnosed?  This condition is diagnosed by:  · A physical exam.  · Blood tests.  · A test that measures the percentage of oxygen in your blood (pulse oximetry). This is done with a sensor that is placed on your finger, toe, or earlobe.  How is this treated?    Treatment for this condition depends on the underlying cause of your hypoxemia. You will likely be treated with oxygen therapy to  restore your blood oxygen level. Depending on the cause of your hypoxemia, you may need oxygen therapy for a short time (weeks or months), or you may need it for the rest of your life.  Your health care provider may also recommend other therapies to treat the underlying cause of your hypoxemia.  Follow these instructions at home:    · Take over-the-counter and prescription medicines only as told by your health care provider.  · If you are on oxygen therapy, follow oxygen safety precautions as directed by your health care provider. These may include:  ? Always having a backup supply of oxygen.  ? Not allowing anyone to smoke or have a fire around oxygen.  ? Handling oxygen tanks carefully and as instructed.  · Do not use any products that contain nicotine or tobacco, such as cigarettes and e-cigarettes. If you need help quitting, ask your health care provider. Stay away from people who smoke.  · Keep all follow-up visits as told by your health care provider. This is important.  Contact a health care provider if:  · You have any concerns about your oxygen therapy.  · You have trouble breathing, even during or after treatment.  · You become short of breath when you exercise.  · You are tired when you wake up.  · You have a headache when you wake up.  Get help right away if:  · Your shortness of breath gets worse, especially with normal or minimal activity.  · You have a bluish color of the skin, lips, or nail beds.  · You become confused or you cannot think properly.  · You cough up dark mucus or blood.  · You have chest pain.  · You have a fever.  Summary  · Hypoxemia occurs when the blood does not contain enough oxygen.  · Hypoxemia may or may not cause symptoms. Often, the main symptom is shortness of breath (dyspnea).  · Depending on the cause of your hypoxemia, you may need oxygen therapy for a short time (weeks or months), or you may need it for the rest of your life.  · If you are on oxygen therapy, follow  oxygen safety precautions as directed by your health care provider.  This information is not intended to replace advice given to you by your health care provider. Make sure you discuss any questions you have with your health care provider.  Document Released: 07/02/2012 Document Revised: 11/21/2017 Document Reviewed: 11/21/2017  [employer] Interactive Patient Education © 2019 [employer]  Sleep Apnea  Sleep apnea is a condition that affects breathing. People with sleep apnea have moments during sleep when their breathing pauses briefly or gets shallow. Sleep apnea can cause these symptoms:  · Trouble staying asleep.  · Sleepiness or tiredness during the day.  · Irritability.  · Loud snoring.  · Morning headaches.  · Trouble concentrating.  · Forgetting things.  · Less interest in sex.  · Being sleepy for no reason.  · Mood swings.  · Personality changes.  · Depression.  · Waking up a lot during the night to pee (urinate).  · Dry mouth.  · Sore throat.  Follow these instructions at home:    · Make any changes in your routine that your doctor recommends.  · Eat a healthy, well-balanced diet.  · Take over-the-counter and prescription medicines only as told by your doctor.  · Avoid using alcohol, calming medicines (sedatives), and narcotic medicines.  · Take steps to lose weight if you are overweight.  · If you were given a machine (device) to use while you sleep, use it only as told by your doctor.  · Do not use any tobacco products, such as cigarettes, chewing tobacco, and e-cigarettes. If you need help quitting, ask your doctor.  · Keep all follow-up visits as told by your doctor. This is important.  Contact a doctor if:  · The machine that you were given to use during sleep is uncomfortable or does not seem to be working.  · Your symptoms do not get better.  · Your symptoms get worse.  Get help right away if:  · Your chest hurts.  · You have trouble breathing in enough air (shortness of breath).  · You have an  uncomfortable feeling in your back, arms, or stomach.  · You have trouble talking.  · One side of your body feels weak.  · A part of your face is hanging down (drooping).  These symptoms may be an emergency. Do not wait to see if the symptoms will go away. Get medical help right away. Call your local emergency services (911 in the [place] Do not drive yourself to the hospital.  This information is not intended to replace advice given to you by your health care provider. Make sure you discuss any questions you have with your health care provider.  Document Released: 09/26/2009 Document Revised: 07/16/2018 Document Reviewed: 09/26/2016  [employer] Interactive Patient Education © 2019 [employer]

## 2019-10-17 PROBLEM — G47.33 OSA (OBSTRUCTIVE SLEEP APNEA): Status: ACTIVE | Noted: 2019-10-17

## 2019-10-25 ENCOUNTER — OFFICE VISIT (OUTPATIENT)
Dept: INTERNAL MEDICINE | Facility: CLINIC | Age: 49
End: 2019-10-25

## 2019-10-25 VITALS
DIASTOLIC BLOOD PRESSURE: 84 MMHG | OXYGEN SATURATION: 96 % | WEIGHT: 247 LBS | SYSTOLIC BLOOD PRESSURE: 120 MMHG | HEART RATE: 91 BPM | HEIGHT: 65 IN | BODY MASS INDEX: 41.15 KG/M2 | RESPIRATION RATE: 16 BRPM | TEMPERATURE: 97.1 F

## 2019-10-25 DIAGNOSIS — Z23 FLU VACCINE NEED: ICD-10-CM

## 2019-10-25 DIAGNOSIS — Z91.199 NON-COMPLIANCE: ICD-10-CM

## 2019-10-25 DIAGNOSIS — E11.65 UNCONTROLLED TYPE 2 DIABETES MELLITUS WITH HYPERGLYCEMIA (HCC): Primary | ICD-10-CM

## 2019-10-25 DIAGNOSIS — E03.9 ACQUIRED HYPOTHYROIDISM: ICD-10-CM

## 2019-10-25 DIAGNOSIS — F33.0 MILD EPISODE OF RECURRENT MAJOR DEPRESSIVE DISORDER (HCC): ICD-10-CM

## 2019-10-25 DIAGNOSIS — I10 ESSENTIAL HYPERTENSION: ICD-10-CM

## 2019-10-25 DIAGNOSIS — R68.89 GENERAL ILL FEELING: ICD-10-CM

## 2019-10-25 DIAGNOSIS — E78.2 MIXED HYPERLIPIDEMIA: ICD-10-CM

## 2019-10-25 DIAGNOSIS — R53.81 MALAISE: ICD-10-CM

## 2019-10-25 DIAGNOSIS — K21.9 CHRONIC GERD: ICD-10-CM

## 2019-10-25 LAB
ALBUMIN SERPL-MCNC: 3.8 G/DL (ref 3.5–5.2)
ALBUMIN/GLOB SERPL: 1 G/DL
ALP SERPL-CCNC: 93 U/L (ref 39–117)
ALT SERPL W P-5'-P-CCNC: 10 U/L (ref 1–33)
ANION GAP SERPL CALCULATED.3IONS-SCNC: 11.8 MMOL/L (ref 5–15)
AST SERPL-CCNC: 9 U/L (ref 1–32)
BILIRUB SERPL-MCNC: 0.2 MG/DL (ref 0.2–1.2)
BUN BLD-MCNC: 19 MG/DL (ref 6–20)
BUN/CREAT SERPL: 11.9 (ref 7–25)
CALCIUM SPEC-SCNC: 9.6 MG/DL (ref 8.6–10.5)
CHLORIDE SERPL-SCNC: 103 MMOL/L (ref 98–107)
CHOLEST SERPL-MCNC: 88 MG/DL (ref 0–200)
CO2 SERPL-SCNC: 26.2 MMOL/L (ref 22–29)
CREAT BLD-MCNC: 1.59 MG/DL (ref 0.57–1)
DEPRECATED RDW RBC AUTO: 41.8 FL (ref 37–54)
ERYTHROCYTE [DISTWIDTH] IN BLOOD BY AUTOMATED COUNT: 14.7 % (ref 12.3–15.4)
EXPIRATION DATE: NORMAL
FLUAV AG NPH QL: NEGATIVE
FLUBV AG NPH QL: NEGATIVE
GFR SERPL CREATININE-BSD FRML MDRD: 42 ML/MIN/1.73
GLOBULIN UR ELPH-MCNC: 3.7 GM/DL
GLUCOSE BLD-MCNC: 123 MG/DL (ref 65–99)
HBA1C MFR BLD: 7.9 %
HCT VFR BLD AUTO: 43.7 % (ref 34–46.6)
HDLC SERPL-MCNC: 32 MG/DL (ref 40–60)
HGB BLD-MCNC: 14.4 G/DL (ref 12–15.9)
INTERNAL CONTROL: NORMAL
LDLC SERPL CALC-MCNC: 32 MG/DL (ref 0–100)
LDLC/HDLC SERPL: 0.99 {RATIO}
Lab: NORMAL
MCH RBC QN AUTO: 26.2 PG (ref 26.6–33)
MCHC RBC AUTO-ENTMCNC: 33 G/DL (ref 31.5–35.7)
MCV RBC AUTO: 79.6 FL (ref 79–97)
PLATELET # BLD AUTO: 326 10*3/MM3 (ref 140–450)
PMV BLD AUTO: 11.7 FL (ref 6–12)
POTASSIUM BLD-SCNC: 4.5 MMOL/L (ref 3.5–5.2)
PROT SERPL-MCNC: 7.5 G/DL (ref 6–8.5)
RBC # BLD AUTO: 5.49 10*6/MM3 (ref 3.77–5.28)
SODIUM BLD-SCNC: 141 MMOL/L (ref 136–145)
TRIGL SERPL-MCNC: 122 MG/DL (ref 0–150)
TSH SERPL DL<=0.05 MIU/L-ACNC: 5.14 UIU/ML (ref 0.27–4.2)
VLDLC SERPL-MCNC: 24.4 MG/DL (ref 5–40)
WBC NRBC COR # BLD: 10.45 10*3/MM3 (ref 3.4–10.8)

## 2019-10-25 PROCEDURE — 90674 CCIIV4 VAC NO PRSV 0.5 ML IM: CPT | Performed by: NURSE PRACTITIONER

## 2019-10-25 PROCEDURE — 83036 HEMOGLOBIN GLYCOSYLATED A1C: CPT | Performed by: NURSE PRACTITIONER

## 2019-10-25 PROCEDURE — 99214 OFFICE O/P EST MOD 30 MIN: CPT | Performed by: NURSE PRACTITIONER

## 2019-10-25 PROCEDURE — 87804 INFLUENZA ASSAY W/OPTIC: CPT | Performed by: NURSE PRACTITIONER

## 2019-10-25 PROCEDURE — 90471 IMMUNIZATION ADMIN: CPT | Performed by: NURSE PRACTITIONER

## 2019-10-25 PROCEDURE — 80061 LIPID PANEL: CPT | Performed by: NURSE PRACTITIONER

## 2019-10-25 PROCEDURE — 84439 ASSAY OF FREE THYROXINE: CPT | Performed by: NURSE PRACTITIONER

## 2019-10-25 PROCEDURE — 80050 GENERAL HEALTH PANEL: CPT | Performed by: NURSE PRACTITIONER

## 2019-10-25 RX ORDER — FLUOXETINE HYDROCHLORIDE 40 MG/1
40 CAPSULE ORAL DAILY
Qty: 30 CAPSULE | Refills: 3 | Status: SHIPPED | OUTPATIENT
Start: 2019-10-25 | End: 2020-05-12 | Stop reason: SDUPTHER

## 2019-10-25 RX ORDER — AMLODIPINE BESYLATE 10 MG/1
10 TABLET ORAL DAILY
Qty: 30 TABLET | Refills: 3 | Status: SHIPPED | OUTPATIENT
Start: 2019-10-25 | End: 2020-05-12 | Stop reason: SDUPTHER

## 2019-10-25 RX ORDER — LEVOTHYROXINE SODIUM 0.05 MG/1
50 TABLET ORAL DAILY
Qty: 30 TABLET | Refills: 3 | Status: SHIPPED | OUTPATIENT
Start: 2019-10-25 | End: 2020-05-12 | Stop reason: SDUPTHER

## 2019-10-25 RX ORDER — PANTOPRAZOLE SODIUM 40 MG/1
40 TABLET, DELAYED RELEASE ORAL DAILY
Qty: 30 TABLET | Refills: 3 | Status: SHIPPED | OUTPATIENT
Start: 2019-10-25 | End: 2020-05-12 | Stop reason: SDUPTHER

## 2019-10-25 RX ORDER — ATORVASTATIN CALCIUM 40 MG/1
40 TABLET, FILM COATED ORAL DAILY
Qty: 30 TABLET | Refills: 3 | Status: SHIPPED | OUTPATIENT
Start: 2019-10-25 | End: 2020-05-12 | Stop reason: SDUPTHER

## 2019-10-25 RX ORDER — GLIPIZIDE 5 MG/1
5 TABLET ORAL
Qty: 60 TABLET | Refills: 3 | Status: SHIPPED | OUTPATIENT
Start: 2019-10-25 | End: 2020-05-12 | Stop reason: SDUPTHER

## 2019-10-25 NOTE — PATIENT INSTRUCTIONS
Blood Glucose Monitoring, Adult  Monitoring your blood sugar (glucose) is an important part of managing your diabetes (diabetes mellitus). Blood glucose monitoring involves checking your blood glucose as often as directed and keeping a record (log) of your results over time.  Checking your blood glucose regularly and keeping a blood glucose log can:  · Help you and your health care provider adjust your diabetes management plan as needed, including your medicines or insulin.  · Help you understand how food, exercise, illnesses, and medicines affect your blood glucose.  · Let you know what your blood glucose is at any time. You can quickly find out if you have low blood glucose (hypoglycemia) or high blood glucose (hyperglycemia).  Your health care provider will set individualized treatment goals for you. Your goals will be based on your age, other medical conditions you have, and how you respond to diabetes treatment. Generally, the goal of treatment is to maintain the following blood glucose levels:  · Before meals (preprandial):  mg/dL (4.4-7.2 mmol/L).  · After meals (postprandial): below 180 mg/dL (10 mmol/L).  · A1c level: less than 7%.  Supplies needed:  · Blood glucose meter.  · Test strips for your meter. Each meter has its own strips. You must use the strips that came with your meter.  · A needle to prick your finger (lancet). Do not use a lancet more than one time.  · A device that holds the lancet (lancing device).  · A journal or log book to write down your results.  How to check your blood glucose    1. Wash your hands with soap and water.  2. Prick the side of your finger (not the tip) with the lancet. Use a different finger each time.  3. Gently rub the finger until a small drop of blood appears.  4. Follow instructions that come with your meter for inserting the test strip, applying blood to the strip, and using your blood glucose meter.  5. Write down your result and any notes.  Some meters  allow you to use areas of your body other than your finger (alternative sites) to test your blood. The most common alternative sites are:  · Forearm.  · Thigh.  · Palm of the hand.  If you think you may have hypoglycemia, or if you have a history of not knowing when your blood glucose is getting low (hypoglycemia unawareness), do not use alternative sites. Use your finger instead. Alternative sites may not be as accurate as the fingers, because blood flow is slower in these areas. This means that the result you get may be delayed, and it may be different from the result that you would get from your finger.  Follow these instructions at home:  Blood glucose log    · Every time you check your blood glucose, write down your result. Also write down any notes about things that may be affecting your blood glucose, such as your diet and exercise for the day. This information can help you and your health care provider:  ? Look for patterns in your blood glucose over time.  ? Adjust your diabetes management plan as needed.  · Check if your meter allows you to download your records to a computer. Most glucose meters store a record of glucose readings in the meter.  If you have type 1 diabetes:  · Check your blood glucose 2 or more times a day.  · Also check your blood glucose:  ? Before every insulin injection.  ? Before and after exercise.  ? Before meals.  ? 2 hours after a meal.  ? Occasionally between 2:00 a.m. and 3:00 a.m., as directed.  ? Before potentially dangerous tasks, like driving or using heavy machinery.  ? At bedtime.  · You may need to check your blood glucose more often, up to 6-10 times a day, if you:  ? Use an insulin pump.  ? Need multiple daily injections (MDI).  ? Have diabetes that is not well-controlled.  ? Are ill.  ? Have a history of severe hypoglycemia.  ? Have hypoglycemia unawareness.  If you have type 2 diabetes:  · If you take insulin or other diabetes medicines, check your blood glucose 2 or  "more times a day.  · If you are on intensive insulin therapy, check your blood glucose 4 or more times a day. Occasionally, you may also need to check between 2:00 a.m. and 3:00 a.m., as directed.  · Also check your blood glucose:  ? Before and after exercise.  ? Before potentially dangerous tasks, like driving or using heavy machinery.  · You may need to check your blood glucose more often if:  ? Your medicine is being adjusted.  ? Your diabetes is not well-controlled.  ? You are ill.  General tips  · Always keep your supplies with you.  · If you have questions or need help, all blood glucose meters have a 24-hour \"hotline\" phone number that you can call. You may also contact your health care provider.  · After you use a few boxes of test strips, adjust (calibrate) your blood glucose meter by following instructions that came with your meter.  Contact a health care provider if:  · Your blood glucose is at or above 240 mg/dL (13.3 mmol/L) for 2 days in a row.  · You have been sick or have had a fever for 2 days or longer, and you are not getting better.  · You have any of the following problems for more than 6 hours:  ? You cannot eat or drink.  ? You have nausea or vomiting.  ? You have diarrhea.  Get help right away if:  · Your blood glucose is lower than 54 mg/dL (3 mmol/L).  · You become confused or you have trouble thinking clearly.  · You have difficulty breathing.  · You have moderate or large ketone levels in your urine.  Summary  · Monitoring your blood sugar (glucose) is an important part of managing your diabetes (diabetes mellitus).  · Blood glucose monitoring involves checking your blood glucose as often as directed and keeping a record (log) of your results over time.  · Your health care provider will set individualized treatment goals for you. Your goals will be based on your age, other medical conditions you have, and how you respond to diabetes treatment.  · Every time you check your blood glucose, " write down your result. Also write down any notes about things that may be affecting your blood glucose, such as your diet and exercise for the day.  This information is not intended to replace advice given to you by your health care provider. Make sure you discuss any questions you have with your health care provider.  Document Released: 12/20/2004 Document Revised: 10/29/2018 Document Reviewed: 05/29/2017  [employer] Patient Education © 2019 [employer]    Diabetes Basics    Diabetes (diabetes mellitus) is a long-term (chronic) disease. It occurs when the body does not properly use sugar (glucose) that is released from food after you eat.  Diabetes may be caused by one or both of these problems:  · Your pancreas does not make enough of a hormone called insulin.  · Your body does not react in a normal way to insulin that it makes.  Insulin lets sugars (glucose) go into cells in your body. This gives you energy. If you have diabetes, sugars cannot get into cells. This causes high blood sugar (hyperglycemia).  Follow these instructions at home:  How is Diabetes treated?  You may need to take insulin or other diabetes medicines daily to keep your blood sugar in balance. Take your diabetes medicines every day as told by your doctor. List your diabetes medicines here:  Diabetes medicines  · Name of medicine: ______________________________  ? Amount (dose): _______________ Time (a.m./p.m.): _______________ Notes: ___________________________________  · Name of medicine: ______________________________  ? Amount (dose): _______________ Time (a.m./p.m.): _______________ Notes: ___________________________________  · Name of medicine: ______________________________  ? Amount (dose): _______________ Time (a.m./p.m.): _______________ Notes: ___________________________________  If you use insulin, you will learn how to give yourself insulin by injection. You may need to adjust the amount based on the food that you  eat. List the types of insulin you use here:  Insulin  · Insulin type: ______________________________  ? Amount (dose): _______________ Time (a.m./p.m.): _______________ Notes: ___________________________________  · Insulin type: ______________________________  ? Amount (dose): _______________ Time (a.m./p.m.): _______________ Notes: ___________________________________  · Insulin type: ______________________________  ? Amount (dose): _______________ Time (a.m./p.m.): _______________ Notes: ___________________________________  · Insulin type: ______________________________  ? Amount (dose): _______________ Time (a.m./p.m.): _______________ Notes: ___________________________________  · Insulin type: ______________________________  ? Amount (dose): _______________ Time (a.m./p.m.): _______________ Notes: ___________________________________  How do I manage my blood sugar?    Check your blood sugar levels using a blood glucose monitor as directed by your doctor.  Your doctor will set treatment goals for you. Generally, you should have these blood sugar levels:  · Before meals (preprandial):  mg/dL (4.4-7.2 mmol/L).  · After meals (postprandial): below 180 mg/dL (10 mmol/L).  · A1c level: less than 7%.  Write down the times that you will check your blood sugar levels:  Blood sugar checks  · Time: _______________ Notes: ___________________________________  · Time: _______________ Notes: ___________________________________  · Time: _______________ Notes: ___________________________________  · Time: _______________ Notes: ___________________________________  · Time: _______________ Notes: ___________________________________  · Time: _______________ Notes: ___________________________________    What do I need to know about low blood sugar?  Low blood sugar is called hypoglycemia. This is when blood sugar is at or below 70 mg/dL (3.9 mmol/L). Symptoms may include:  · Feeling:  ? Hungry.  ? Worried or nervous  (anxious).  ? Sweaty and clammy.  ? Confused.  ? Dizzy.  ? Sleepy.  ? Sick to your stomach (nauseous).  · Having:  ? A fast heartbeat.  ? A headache.  ? A change in your vision.  ? Tingling or no feeling (numbness) around the mouth, lips, or tongue.  ? Jerky movements that you cannot control (seizure).  · Having trouble with:  ? Moving (coordination).  ? Sleeping.  ? Passing out (fainting).  ? Getting upset easily (irritability).  Treating low blood sugar  To treat low blood sugar, eat or drink something sugary right away. If you can think clearly and swallow safely, follow the 15:15 rule:  · Take 15 grams of a fast-acting carb (carbohydrate). Some fast-acting carbs are:  ? 1 tube of glucose gel.  ? 3 sugar tablets (glucose pills).  ? 6-8 pieces of hard candy.  ? 4 oz (120 mL) of fruit juice.  ? 4 oz (120 mL) of regular (not diet) soda.  · Check your blood sugar 15 minutes after you take the carb.  · If your blood sugar is still at or below 70 mg/dL (3.9 mmol/L), take 15 grams of a carb again.  · If your blood sugar does not go above 70 mg/dL (3.9 mmol/L) after 3 tries, get help right away.  · After your blood sugar goes back to normal, eat a meal or a snack within 1 hour.  Treating very low blood sugar  If your blood sugar is at or below 54 mg/dL (3 mmol/L), you have very low blood sugar (severe hypoglycemia). This is an emergency. Do not wait to see if the symptoms will go away. Get medical help right away. Call your local emergency services (911 in the [place] Do not drive yourself to the hospital.  Questions to ask your health care provider  · Do I need to meet with a diabetes educator?  · What equipment will I need to care for myself at home?  · What diabetes medicines do I need? When should I take them?  · How often do I need to check my blood sugar?  · What number can I call if I have questions?  · When is my next doctor's visit?  · Where can I find a support group for people with diabetes?  Where to find more  information  · American Diabetes Association: www.diabetes.org  · American Association of Diabetes Educators: www.diabeteseducator.org/patient-resources  Contact a doctor if:  · Your blood sugar is at or above 240 mg/dL (13.3 mmol/L) for 2 days in a row.  · You have been sick or have had a fever for 2 days or more, and you are not getting better.  · You have any of these problems for more than 6 hours:  ? You cannot eat or drink.  ? You feel sick to your stomach (nauseous).  ? You throw up (vomit).  ? You have watery poop (diarrhea).  Get help right away if:  · Your blood sugar is lower than 54 mg/dL (3 mmol/L).  · You get confused.  · You have trouble:  ? Thinking clearly.  ? Breathing.  Summary  · Diabetes (diabetes mellitus) is a long-term (chronic) disease. It occurs when the body does not properly use sugar (glucose) that is released from food after digestion.  · Take insulin and diabetes medicines as told.  · Check your blood sugar every day, as often as told.  · Keep all follow-up visits as told by your doctor. This is important.  This information is not intended to replace advice given to you by your health care provider. Make sure you discuss any questions you have with your health care provider.  Document Released: 03/22/2019 Document Revised: 03/22/2019 Document Reviewed: 03/22/2019  [employer] Patient Education © 2019 [employer]

## 2019-10-25 NOTE — PROGRESS NOTES
"Subjective   Freida Martinez is a 49 y.o. female.     Chief Complaint   Patient presents with   • Flu Symptoms     feverish/chills, dizzy, body aches, nausea bad taste in mouth,4 days.  she thinks she has the flu   • Diabetes   • Hypertension   • Depression   • Hyperlipidemia   • Hypothyroidism   • Heartburn       History of Present Illness     She started suddenly feeling ill on Tuesday. No sick exposures.   Had not checked her temperature but has had chills and sweats.   Cough- non productive.   She is a smoker. Eating was making worse, but not anymore.   Was able to eat yesterday. No diarrhea. Some nausea, no vomiting.   Drinking plenty of fluids.   starting to have gradual improvement in symptoms.   She is not taking anything for symptoms  She thought her meds were making her sick and has not been takgin them. Restarted them on Monday and started getting sick on Tuesday.       HTN-chronic.  She has not been taking her medications regularly.  She is not checking her blood pressure at home.  Reports she is been feeling well until she restarted her medicines a few days ago.  She denies headaches, dizziness, chest pain, dyspnea, Leg pain, or edema.      Diabetes-chronic type II.  Diagnosed April 2018.  She reports that metformin caused some \"stomach\" problems in the past.  She is not checking her glucose although she does have a meter and supplies to do so.  She is seen diabetes education when she was in the hospital in early 2019.  She does not see podiatry.  Eye exam is not up-to-date.  She is on aspirin and statin but is not compliant with taking these.  Her last hemoglobin A1c was 7%.  She is not following a diabetic diet and is not exercising.    Hypothyroid-chronic.  She is prescribed levothyroxine but is not taking it routinely.    Gerd-chronic.  Well controlled when she is taking PPI therapy.    Depression-chronic.  She is currently on fluoxetine.  She is compliant with dosing on this medication.  She " reports that her depression is still ongoing and she would like to see somebody from psychiatry.  She does deny any suicidal or homicidal ideations.    Hyperlipidemia-chronic.  Last lipids were checked7/24/2019.  Total cholesterol was 131, triglycerides 195, HDL 33, and LDL 59.  She is prescribed atorvastatin but is not taking this currently.      The following portions of the patient's history were reviewed and updated as appropriate: allergies, current medications, past family history, past medical history, past social history, past surgical history and problem list.    Review of Systems   Constitutional: Positive for activity change, appetite change, chills, fatigue and fever. Negative for diaphoresis and unexpected weight change.   HENT: Positive for congestion. Negative for sinus pressure, sinus pain, sneezing and sore throat.    Eyes: Negative.  Negative for pain, redness, itching and visual disturbance.   Respiratory: Positive for cough. Negative for chest tightness, shortness of breath and wheezing.    Cardiovascular: Negative for chest pain, palpitations and leg swelling.   Gastrointestinal: Positive for nausea. Negative for abdominal distention, abdominal pain, constipation, diarrhea and vomiting.        Patient experiencing heartburn/acid reflux     Endocrine: Negative for polydipsia, polyphagia and polyuria.   Genitourinary: Negative for difficulty urinating and dysuria.   Musculoskeletal: Positive for myalgias.   Skin: Negative for color change and rash.   Neurological: Positive for dizziness. Negative for syncope, weakness, light-headedness, numbness and headaches.   Psychiatric/Behavioral: Positive for decreased concentration, dysphoric mood and sleep disturbance. Negative for self-injury and suicidal ideas. The patient is nervous/anxious.        Outpatient Medications Marked as Taking for the 10/25/19 encounter (Office Visit) with Annia Rico APRN   Medication Sig Dispense Refill   • albuterol  (PROVENTIL) (2.5 MG/3ML) 0.083% nebulizer solution      • albuterol sulfate  (90 Base) MCG/ACT inhaler      • amLODIPine (NORVASC) 10 MG tablet Take 1 tablet by mouth Daily. 30 tablet 3   • aspirin 325 MG tablet Take 325 mg by mouth Daily.     • atorvastatin (LIPITOR) 40 MG tablet Take 1 tablet by mouth Daily. 30 tablet 3   • diclofenac (VOLTAREN) 1 % gel gel Apply 4 g topically to the appropriate area as directed 4 (Four) Times a Day. Small amount to affected area 300 g 3   • FLUoxetine (PROZAC) 40 MG capsule Take 1 capsule by mouth Daily. 30 capsule 3   • glipizide (GLUCOTROL) 5 MG tablet Take 1 tablet by mouth 2 (Two) Times a Day Before Meals. 60 tablet 3   • levothyroxine (SYNTHROID, LEVOTHROID) 50 MCG tablet Take 1 tablet by mouth Daily. 30 tablet 3   • pantoprazole (PROTONIX) 40 MG EC tablet Take 1 tablet by mouth Daily. 30 tablet 3   • SITagliptin (JANUVIA) 50 MG tablet Take 1 tablet by mouth Daily. 30 tablet 3   • [DISCONTINUED] amLODIPine (NORVASC) 10 MG tablet      • [DISCONTINUED] atorvastatin (LIPITOR) 40 MG tablet Take 40 mg by mouth Daily.     • [DISCONTINUED] FLUoxetine (PROZAC) 40 MG capsule Take 40 mg by mouth Daily.     • [DISCONTINUED] glipiZIDE (GLUCOTROL) 5 MG tablet Take 1 tablet by mouth 2 (Two) Times a Day Before Meals. 60 tablet 0   • [DISCONTINUED] levothyroxine (SYNTHROID, LEVOTHROID) 50 MCG tablet      • [DISCONTINUED] pantoprazole (PROTONIX) 40 MG EC tablet Take 1 tablet by mouth Daily. 30 tablet 0   • [DISCONTINUED] SITagliptin (JANUVIA) 50 MG tablet Take 1 tablet by mouth Daily. 30 tablet 3           Objective   Physical Exam   Constitutional: She is oriented to person, place, and time. She appears well-developed and well-nourished. No distress.   Obesity noted     HENT:   Head: Normocephalic and atraumatic.   Mouth/Throat: Oropharynx is clear and moist.   Eyes: Conjunctivae are normal. Pupils are equal, round, and reactive to light.   Neck: Normal range of motion. No JVD  "present. No thyromegaly present.   Cardiovascular: Normal rate, regular rhythm, normal heart sounds and intact distal pulses.   No murmur heard.  Pulses:       Dorsalis pedis pulses are 2+ on the right side, and 2+ on the left side.        Posterior tibial pulses are 2+ on the right side, and 2+ on the left side.   Pulmonary/Chest: Effort normal and breath sounds normal. No respiratory distress. She exhibits no tenderness.   Abdominal: Soft. Normal appearance and bowel sounds are normal. She exhibits no distension and no mass. There is no tenderness. There is no rebound and no guarding. No hernia.   Musculoskeletal: Normal range of motion. She exhibits no edema.   Neurological: She is alert and oriented to person, place, and time. Coordination normal.   Skin: Skin is warm and dry. No rash noted. She is not diaphoretic. No erythema. No pallor.   Psychiatric: She has a normal mood and affect. Her behavior is normal. Judgment and thought content normal.   Nursing note and vitals reviewed.      Vitals:    10/25/19 1003   BP: 120/84   Pulse: 91   Resp: 16   Temp: 97.1 °F (36.2 °C)   SpO2: 96%   Weight: 112 kg (247 lb)   Height: 164.5 cm (64.75\")     Body mass index is 41.42 kg/m².        Assessment/Plan   Freida was seen today for flu symptoms, diabetes, hypertension, depression, hyperlipidemia, hypothyroidism and heartburn.    Diagnoses and all orders for this visit:    Uncontrolled type 2 diabetes mellitus with hyperglycemia (CMS/[facility])  -     POC Glycosylated Hemoglobin (Hb A1C)  -     CBC (No Diff)  -     Comprehensive Metabolic Panel  -     Lipid Panel  -     TSH Rfx On Abnormal To Free T4  -     glipizide (GLUCOTROL) 5 MG tablet; Take 1 tablet by mouth 2 (Two) Times a Day Before Meals.  -     SITagliptin (JANUVIA) 50 MG tablet; Take 1 tablet by mouth Daily.  -     Ambulatory Referral to Diabetic Education  -     T4, Free; Future  -     T4, Free    Essential hypertension  -     CBC (No Diff)  -     Comprehensive " Metabolic Panel  -     Lipid Panel  -     TSH Rfx On Abnormal To Free T4  -     amLODIPine (NORVASC) 10 MG tablet; Take 1 tablet by mouth Daily.  -     T4, Free; Future  -     T4, Free    Mixed hyperlipidemia  -     CBC (No Diff)  -     Comprehensive Metabolic Panel  -     Lipid Panel  -     TSH Rfx On Abnormal To Free T4  -     atorvastatin (LIPITOR) 40 MG tablet; Take 1 tablet by mouth Daily.  -     T4, Free; Future  -     T4, Free    Acquired hypothyroidism  -     CBC (No Diff)  -     Comprehensive Metabolic Panel  -     Lipid Panel  -     TSH Rfx On Abnormal To Free T4  -     levothyroxine (SYNTHROID, LEVOTHROID) 50 MCG tablet; Take 1 tablet by mouth Daily.  -     T4, Free; Future  -     T4, Free    Malaise  -     POCT Influenza A/B  -     CBC (No Diff)  -     Comprehensive Metabolic Panel  -     Lipid Panel  -     TSH Rfx On Abnormal To Free T4  -     T4, Free; Future  -     T4, Free    Flu vaccine need  -     Flucelvax Quad=>4Years ([id number])    General ill feeling  -     POCT Influenza A/B    Non-compliance    Mild episode of recurrent major depressive disorder (CMS/HCC)  -     FLUoxetine (PROZAC) 40 MG capsule; Take 1 capsule by mouth Daily.  -     Ambulatory Referral to Psychiatry    Chronic GERD  -     pantoprazole (PROTONIX) 40 MG EC tablet; Take 1 tablet by mouth Daily.         a1c up to 7.9% today.  I discussed this at length with her and encouraged compliance with her medication regimen.  The only barrier identified to taking medications is her preference not to take medications.  We will refer her to psychiatry for evaluation and management of her depression.  Symptoms are still active despite her current use of fluoxetine.  She does deny any suicidal or homicidal ideations.  Patient does have a general feeling of malaise.  Her flu swab in office was negative.  She is afebrile and does not demonstrate any signs of acute illness in office.       Return in about 6 weeks (around 12/6/2019) for Annual,  with pap.  I discussed my findings and recommendations with patient.  The plan of care was  discussed with patient. They verbalized understanding and agreement.  Patient was encouraged to keep me informed of any acute changes, lack of improvement, or any new concerning symptoms.       * Please note that portions of this note were completed with a voice recognition program. Efforts were made to edit the dictation but occasionally words are erroneously transcribed.

## 2019-10-26 LAB — T4 FREE SERPL-MCNC: 1.07 NG/DL (ref 0.93–1.7)

## 2019-10-30 ENCOUNTER — TELEPHONE (OUTPATIENT)
Dept: INTERNAL MEDICINE | Facility: CLINIC | Age: 49
End: 2019-10-30

## 2019-10-30 NOTE — TELEPHONE ENCOUNTER
----- Message from LUCIA Garcia sent at 10/30/2019  8:09 AM EDT -----  Please let her know that her labs show that she is not getting enough thyroid medication.  She is to make sure she is taking this daily.  I will not adjust dose at this time.  Needs to repeat labs in 2 to 3 months on this.  Kidney function is still slightly declined.  She really needs to work on taking her medications and better glucose and blood pressure control.  Encourage fluids as well.

## 2019-10-30 NOTE — TELEPHONE ENCOUNTER
PN of results.  She stated understanding.  She says she has not taken her meds for a couple months.  Her meds have been sent and she will start taking them again.  She will return in 2-3 months for labs

## 2019-11-04 ENCOUNTER — APPOINTMENT (OUTPATIENT)
Dept: GENERAL RADIOLOGY | Facility: HOSPITAL | Age: 49
End: 2019-11-04

## 2019-11-04 ENCOUNTER — HOSPITAL ENCOUNTER (EMERGENCY)
Facility: HOSPITAL | Age: 49
Discharge: HOME | End: 2019-11-04
Attending: EMERGENCY MEDICINE | Admitting: EMERGENCY MEDICINE

## 2019-11-04 ENCOUNTER — APPOINTMENT (OUTPATIENT)
Dept: CT IMAGING | Facility: HOSPITAL | Age: 49
End: 2019-11-04

## 2019-11-04 VITALS
HEIGHT: 64 IN | BODY MASS INDEX: 42.17 KG/M2 | RESPIRATION RATE: 19 BRPM | WEIGHT: 247 LBS | SYSTOLIC BLOOD PRESSURE: 130 MMHG | HEART RATE: 81 BPM | OXYGEN SATURATION: 98 % | TEMPERATURE: 98.4 F | DIASTOLIC BLOOD PRESSURE: 86 MMHG

## 2019-11-04 DIAGNOSIS — N39.0 ACUTE URINARY TRACT INFECTION: ICD-10-CM

## 2019-11-04 DIAGNOSIS — R42 DIZZINESS: Primary | ICD-10-CM

## 2019-11-04 LAB
ALBUMIN SERPL-MCNC: 3.7 G/DL (ref 3.5–5.2)
ALBUMIN/GLOB SERPL: 1.1 G/DL
ALP SERPL-CCNC: 91 U/L (ref 39–117)
ALT SERPL W P-5'-P-CCNC: 9 U/L (ref 1–33)
ANION GAP SERPL CALCULATED.3IONS-SCNC: 12 MMOL/L (ref 5–15)
AST SERPL-CCNC: 10 U/L (ref 1–32)
B-HCG UR QL: NEGATIVE
BACTERIA UR QL AUTO: ABNORMAL /HPF
BASOPHILS # BLD AUTO: 0.04 10*3/MM3 (ref 0–0.2)
BASOPHILS NFR BLD AUTO: 0.4 % (ref 0–1.5)
BILIRUB SERPL-MCNC: 0.2 MG/DL (ref 0.2–1.2)
BILIRUB UR QL STRIP: NEGATIVE
BUN BLD-MCNC: 12 MG/DL (ref 6–20)
BUN/CREAT SERPL: 9 (ref 7–25)
CALCIUM SPEC-SCNC: 9.2 MG/DL (ref 8.6–10.5)
CHLORIDE SERPL-SCNC: 106 MMOL/L (ref 98–107)
CLARITY UR: ABNORMAL
CO2 SERPL-SCNC: 23 MMOL/L (ref 22–29)
COLOR UR: YELLOW
CREAT BLD-MCNC: 1.34 MG/DL (ref 0.57–1)
DEPRECATED RDW RBC AUTO: 44.2 FL (ref 37–54)
EOSINOPHIL # BLD AUTO: 0.29 10*3/MM3 (ref 0–0.4)
EOSINOPHIL NFR BLD AUTO: 3.2 % (ref 0.3–6.2)
ERYTHROCYTE [DISTWIDTH] IN BLOOD BY AUTOMATED COUNT: 14.6 % (ref 12.3–15.4)
GFR SERPL CREATININE-BSD FRML MDRD: 51 ML/MIN/1.73
GLOBULIN UR ELPH-MCNC: 3.5 GM/DL
GLUCOSE BLD-MCNC: 94 MG/DL (ref 65–99)
GLUCOSE BLDC GLUCOMTR-MCNC: 92 MG/DL (ref 70–130)
GLUCOSE UR STRIP-MCNC: NEGATIVE MG/DL
HCT VFR BLD AUTO: 43.7 % (ref 34–46.6)
HGB BLD-MCNC: 13.7 G/DL (ref 12–15.9)
HGB UR QL STRIP.AUTO: NEGATIVE
HOLD SPECIMEN: NORMAL
HOLD SPECIMEN: NORMAL
HYALINE CASTS UR QL AUTO: ABNORMAL /LPF
IMM GRANULOCYTES # BLD AUTO: 0.03 10*3/MM3 (ref 0–0.05)
IMM GRANULOCYTES NFR BLD AUTO: 0.3 % (ref 0–0.5)
INTERNAL NEGATIVE CONTROL: NEGATIVE
INTERNAL POSITIVE CONTROL: POSITIVE
KETONES UR QL STRIP: NEGATIVE
LEUKOCYTE ESTERASE UR QL STRIP.AUTO: ABNORMAL
LYMPHOCYTES # BLD AUTO: 2.25 10*3/MM3 (ref 0.7–3.1)
LYMPHOCYTES NFR BLD AUTO: 24.8 % (ref 19.6–45.3)
Lab: NORMAL
MAGNESIUM SERPL-MCNC: 2.1 MG/DL (ref 1.6–2.6)
MCH RBC QN AUTO: 25.8 PG (ref 26.6–33)
MCHC RBC AUTO-ENTMCNC: 31.4 G/DL (ref 31.5–35.7)
MCV RBC AUTO: 82.1 FL (ref 79–97)
MONOCYTES # BLD AUTO: 0.51 10*3/MM3 (ref 0.1–0.9)
MONOCYTES NFR BLD AUTO: 5.6 % (ref 5–12)
NEUTROPHILS # BLD AUTO: 5.97 10*3/MM3 (ref 1.7–7)
NEUTROPHILS NFR BLD AUTO: 65.7 % (ref 42.7–76)
NITRITE UR QL STRIP: NEGATIVE
NRBC BLD AUTO-RTO: 0 /100 WBC (ref 0–0.2)
PH UR STRIP.AUTO: 7 [PH] (ref 5–8)
PLATELET # BLD AUTO: 300 10*3/MM3 (ref 140–450)
PMV BLD AUTO: 11.1 FL (ref 6–12)
POTASSIUM BLD-SCNC: 3.8 MMOL/L (ref 3.5–5.2)
PROT SERPL-MCNC: 7.2 G/DL (ref 6–8.5)
PROT UR QL STRIP: ABNORMAL
RBC # BLD AUTO: 5.32 10*6/MM3 (ref 3.77–5.28)
RBC # UR: ABNORMAL /HPF
REF LAB TEST METHOD: ABNORMAL
RENAL EPI CELLS #/AREA URNS HPF: ABNORMAL /HPF
SODIUM BLD-SCNC: 141 MMOL/L (ref 136–145)
SP GR UR STRIP: 1.01 (ref 1–1.03)
SQUAMOUS #/AREA URNS HPF: ABNORMAL /HPF
TROPONIN T SERPL-MCNC: <0.01 NG/ML (ref 0–0.03)
UROBILINOGEN UR QL STRIP: ABNORMAL
WBC NRBC COR # BLD: 9.09 10*3/MM3 (ref 3.4–10.8)
WBC UR QL AUTO: ABNORMAL /HPF
WHOLE BLOOD HOLD SPECIMEN: NORMAL
WHOLE BLOOD HOLD SPECIMEN: NORMAL

## 2019-11-04 PROCEDURE — 83735 ASSAY OF MAGNESIUM: CPT | Performed by: EMERGENCY MEDICINE

## 2019-11-04 PROCEDURE — 25010000002 CEFTRIAXONE PER 250 MG: Performed by: EMERGENCY MEDICINE

## 2019-11-04 PROCEDURE — 85025 COMPLETE CBC W/AUTO DIFF WBC: CPT | Performed by: EMERGENCY MEDICINE

## 2019-11-04 PROCEDURE — 80053 COMPREHEN METABOLIC PANEL: CPT | Performed by: EMERGENCY MEDICINE

## 2019-11-04 PROCEDURE — 82962 GLUCOSE BLOOD TEST: CPT

## 2019-11-04 PROCEDURE — 96375 TX/PRO/DX INJ NEW DRUG ADDON: CPT

## 2019-11-04 PROCEDURE — 70450 CT HEAD/BRAIN W/O DYE: CPT

## 2019-11-04 PROCEDURE — 84484 ASSAY OF TROPONIN QUANT: CPT | Performed by: EMERGENCY MEDICINE

## 2019-11-04 PROCEDURE — 81025 URINE PREGNANCY TEST: CPT | Performed by: EMERGENCY MEDICINE

## 2019-11-04 PROCEDURE — 25010000002 ONDANSETRON PER 1 MG: Performed by: EMERGENCY MEDICINE

## 2019-11-04 PROCEDURE — 71045 X-RAY EXAM CHEST 1 VIEW: CPT

## 2019-11-04 PROCEDURE — 93005 ELECTROCARDIOGRAM TRACING: CPT | Performed by: EMERGENCY MEDICINE

## 2019-11-04 PROCEDURE — 99283 EMERGENCY DEPT VISIT LOW MDM: CPT

## 2019-11-04 PROCEDURE — 96365 THER/PROPH/DIAG IV INF INIT: CPT

## 2019-11-04 PROCEDURE — 81001 URINALYSIS AUTO W/SCOPE: CPT | Performed by: EMERGENCY MEDICINE

## 2019-11-04 RX ORDER — MECLIZINE HYDROCHLORIDE 25 MG/1
25 TABLET ORAL ONCE
Status: COMPLETED | OUTPATIENT
Start: 2019-11-04 | End: 2019-11-04

## 2019-11-04 RX ORDER — CEFDINIR 300 MG/1
300 CAPSULE ORAL 2 TIMES DAILY
Qty: 14 CAPSULE | Refills: 0 | Status: SHIPPED | OUTPATIENT
Start: 2019-11-04 | End: 2019-11-11

## 2019-11-04 RX ORDER — SODIUM CHLORIDE 0.9 % (FLUSH) 0.9 %
10 SYRINGE (ML) INJECTION AS NEEDED
Status: DISCONTINUED | OUTPATIENT
Start: 2019-11-04 | End: 2019-11-05 | Stop reason: HOSPADM

## 2019-11-04 RX ORDER — MECLIZINE HYDROCHLORIDE 25 MG/1
25 TABLET ORAL EVERY 6 HOURS PRN
Qty: 20 TABLET | Refills: 0 | Status: SHIPPED | OUTPATIENT
Start: 2019-11-04 | End: 2019-11-13 | Stop reason: SDUPTHER

## 2019-11-04 RX ORDER — ONDANSETRON 2 MG/ML
4 INJECTION INTRAMUSCULAR; INTRAVENOUS ONCE
Status: COMPLETED | OUTPATIENT
Start: 2019-11-04 | End: 2019-11-04

## 2019-11-04 RX ADMIN — MECLIZINE HYDROCHLORIDE 25 MG: 25 TABLET ORAL at 19:46

## 2019-11-04 RX ADMIN — ONDANSETRON 4 MG: 2 INJECTION INTRAMUSCULAR; INTRAVENOUS at 19:46

## 2019-11-04 RX ADMIN — CEFTRIAXONE 1 G: 1 INJECTION, POWDER, FOR SOLUTION INTRAMUSCULAR; INTRAVENOUS at 21:22

## 2019-11-04 RX ADMIN — SODIUM CHLORIDE 1000 ML: 9 INJECTION, SOLUTION INTRAVENOUS at 19:46

## 2019-11-05 NOTE — DISCHARGE INSTRUCTIONS
She is advised to rest and drink plenty of fluids.    Take antibiotics as prescribed until completion.    Take Tylenol as needed for fever.    Take meclizine as needed to help with dizziness.    Follow-up with primary care physician for recheck in 2 to 3 days.    Return to the ER with any further concern or worsening of symptoms.

## 2019-11-05 NOTE — ED PROVIDER NOTES
Subjective   Freida Martinez is a 49 y.o. female who presents to the ED with c/o dizziness. The patient states approximately 1 hour ago she began to feel dizzy with blurred vision and nausea. She is unsure of whether or not she has had prior episodes such as the current one but she has not been taking her medications for blood pressure or diabetes mellitus. The patient complains of eye pain and mild weakness but denies vomiting, chest pain, shortness of breath, abdominal pain, fever, cough, congestion, rhinorrhea, and sore throat. She states she has a past medical history of hypertension, end stage renal failure, myocardial infarction, and diabetes mellitus but denies any past diagnoses of blood clots or vertigo. Her surgical history includes tubal ligation. She is currently in a walking boot on her left foot for plantar fasciitis. The patient is current every day smoker using approximately 1 pack of cigarettes per day. There are no other acute complaints at this time.        History provided by:  Patient  Dizziness   Quality:  Head spinning, lightheadedness and room spinning  Severity:  Moderate  Onset quality:  Sudden  Duration:  1 hour  Timing:  Constant  Progression:  Worsening  Chronicity:  New  Relieved by:  None tried  Worsened by:  Nothing  Ineffective treatments:  None tried  Associated symptoms: nausea, vision changes and weakness (mild)    Associated symptoms: no chest pain, no diarrhea, no headaches, no hearing loss, no palpitations, no shortness of breath, no syncope and no vomiting    Risk factors: multiple medications (does not take them faithfully)    Risk factors: no hx of stroke and no hx of vertigo        Review of Systems   Constitutional: Negative for fever.   HENT: Negative for congestion and hearing loss.    Eyes: Positive for pain and visual disturbance.   Respiratory: Negative for cough and shortness of breath.    Cardiovascular: Negative for chest pain, palpitations and syncope.    Gastrointestinal: Positive for nausea. Negative for abdominal pain, diarrhea and vomiting.   Neurological: Positive for dizziness, weakness (mild) and light-headedness. Negative for syncope and headaches.   All other systems reviewed and are negative.      Past Medical History:   Diagnosis Date   • Abdominal pain 4/12/2019   • Arthritis    • Asthma    • Diabetes mellitus (CMS/HCC)    • Disease of thyroid gland    • Hypertension    • Kidney infection    • Mental disorder    • Sleep apnea        Allergies   Allergen Reactions   • Anaprox [Naproxen] Other (See Comments)     CHRONIC KIDNEY DISEASE   • Celebrex [Celecoxib] Other (See Comments)     CHRONIC KIDNEY DISEASE   • Flector [Diclofenac Epolamine] Other (See Comments)     CHRONIC KIDNEY DISEASE   • Lisinopril Swelling   • Motrin [Ibuprofen] Other (See Comments)     CHRONIC KIDNEY DISEASE   • Voltaren [Diclofenac Sodium] Other (See Comments)     CHRONIC KIDNEY DISEASE       Past Surgical History:   Procedure Laterality Date   • ENDOSCOPY N/A 4/15/2019    Procedure: ESOPHAGOGASTRODUODENOSCOPY;  Surgeon: Jude Clinton MD;  Location: [facility] ENDOSCOPY;  Service: Gastroenterology   • TUBAL ABDOMINAL LIGATION     • VAGINAL DELIVERY      x4   • WISDOM TOOTH EXTRACTION         Family History   Problem Relation Age of Onset   • Hypertension Mother    • Diabetes Father    • Kidney disease Father    • Heart disease Father    • Hypertension Father    • Sleep apnea Father    • Cancer Maternal Aunt    • Arthritis Paternal Aunt    • Cancer Maternal Grandmother         BREAST   • Breast cancer Maternal Grandmother    • Kidney disease Paternal Grandfather    • Kidney disease Paternal Uncle        Social History     Socioeconomic History   • Marital status: Single     Spouse name: Not on file   • Number of children: Not on file   • Years of education: Not on file   • Highest education level: Not on file   Tobacco Use   • Smoking status: Current Every Day Smoker     Packs/day:  1.00     Types: Cigarettes   • Smokeless tobacco: Never Used   Substance and Sexual Activity   • Alcohol use: No     Frequency: Never   • Drug use: No   • Sexual activity: No         Objective   Physical Exam   Constitutional: She is oriented to person, place, and time. She appears well-developed and well-nourished. No distress.   HENT:   Head: Normocephalic and atraumatic.   Eyes: Conjunctivae are normal. No scleral icterus. Right eye exhibits nystagmus.   With right lateral gaze there is mild nystagmus.   Neck: Normal range of motion. Neck supple.   Cardiovascular: Normal rate, regular rhythm and normal heart sounds.   No murmur heard.  Pulmonary/Chest: Effort normal and breath sounds normal. No respiratory distress.   Abdominal: Soft. There is no tenderness.   Musculoskeletal: Normal range of motion. She exhibits no edema.   Neurological: She is alert and oriented to person, place, and time.   The patient is awake and alert with normal mentation and normal speech.  No focal weakness facially or in any of the extremities.   Skin: Skin is warm and dry.   Psychiatric: She has a normal mood and affect. Her behavior is normal.   Nursing note and vitals reviewed.      Procedures         ED Course       Recent Results (from the past 24 hour(s))   POC Glucose Once    Collection Time: 11/04/19  7:30 PM   Result Value Ref Range    Glucose 92 70 - 130 mg/dL   Comprehensive Metabolic Panel    Collection Time: 11/04/19  7:31 PM   Result Value Ref Range    Glucose 94 65 - 99 mg/dL    BUN 12 6 - 20 mg/dL    Creatinine 1.34 (H) 0.57 - 1.00 mg/dL    Sodium 141 136 - 145 mmol/L    Potassium 3.8 3.5 - 5.2 mmol/L    Chloride 106 98 - 107 mmol/L    CO2 23.0 22.0 - 29.0 mmol/L    Calcium 9.2 8.6 - 10.5 mg/dL    Total Protein 7.2 6.0 - 8.5 g/dL    Albumin 3.70 3.50 - 5.20 g/dL    ALT (SGPT) 9 1 - 33 U/L    AST (SGOT) 10 1 - 32 U/L    Alkaline Phosphatase 91 39 - 117 U/L    Total Bilirubin 0.2 0.2 - 1.2 mg/dL    eGFR  African Amer 51  (L) >60 mL/min/1.73    Globulin 3.5 gm/dL    A/G Ratio 1.1 g/dL    BUN/Creatinine Ratio 9.0 7.0 - 25.0    Anion Gap 12.0 5.0 - 15.0 mmol/L   Troponin    Collection Time: 11/04/19  7:31 PM   Result Value Ref Range    Troponin T <0.010 0.000 - 0.030 ng/mL   Magnesium    Collection Time: 11/04/19  7:31 PM   Result Value Ref Range    Magnesium 2.1 1.6 - 2.6 mg/dL   Light Blue Top    Collection Time: 11/04/19  7:31 PM   Result Value Ref Range    Extra Tube hold for add-on    Green Top (Gel)    Collection Time: 11/04/19  7:31 PM   Result Value Ref Range    Extra Tube Hold for add-ons.    Lavender Top    Collection Time: 11/04/19  7:31 PM   Result Value Ref Range    Extra Tube hold for add-on    Gold Top - SST    Collection Time: 11/04/19  7:31 PM   Result Value Ref Range    Extra Tube Hold for add-ons.    CBC Auto Differential    Collection Time: 11/04/19  7:31 PM   Result Value Ref Range    WBC 9.09 3.40 - 10.80 10*3/mm3    RBC 5.32 (H) 3.77 - 5.28 10*6/mm3    Hemoglobin 13.7 12.0 - 15.9 g/dL    Hematocrit 43.7 34.0 - 46.6 %    MCV 82.1 79.0 - 97.0 fL    MCH 25.8 (L) 26.6 - 33.0 pg    MCHC 31.4 (L) 31.5 - 35.7 g/dL    RDW 14.6 12.3 - 15.4 %    RDW-SD 44.2 37.0 - 54.0 fl    MPV 11.1 6.0 - 12.0 fL    Platelets 300 140 - 450 10*3/mm3    Neutrophil % 65.7 42.7 - 76.0 %    Lymphocyte % 24.8 19.6 - 45.3 %    Monocyte % 5.6 5.0 - 12.0 %    Eosinophil % 3.2 0.3 - 6.2 %    Basophil % 0.4 0.0 - 1.5 %    Immature Grans % 0.3 0.0 - 0.5 %    Neutrophils, Absolute 5.97 1.70 - 7.00 10*3/mm3    Lymphocytes, Absolute 2.25 0.70 - 3.10 10*3/mm3    Monocytes, Absolute 0.51 0.10 - 0.90 10*3/mm3    Eosinophils, Absolute 0.29 0.00 - 0.40 10*3/mm3    Basophils, Absolute 0.04 0.00 - 0.20 10*3/mm3    Immature Grans, Absolute 0.03 0.00 - 0.05 10*3/mm3    nRBC 0.0 0.0 - 0.2 /100 WBC   Urinalysis With Microscopic If Indicated (No Culture) - Urine, Clean Catch    Collection Time: 11/04/19  7:43 PM   Result Value Ref Range    Color, UA Yellow Yellow,  Straw    Appearance, UA Turbid (A) Clear    pH, UA 7.0 5.0 - 8.0    Specific Gravity, UA 1.013 1.001 - 1.030    Glucose, UA Negative Negative    Ketones, UA Negative Negative    Bilirubin, UA Negative Negative    Blood, UA Negative Negative    Protein, UA 30 mg/dL (1+) (A) Negative    Leuk Esterase, UA Large (3+) (A) Negative    Nitrite, UA Negative Negative    Urobilinogen, UA 1.0 E.U./dL 0.2 - 1.0 E.U./dL   Urinalysis, Microscopic Only - Urine, Clean Catch    Collection Time: 11/04/19  7:43 PM   Result Value Ref Range    RBC, UA 0-2 None Seen, 0-2 /HPF    WBC, UA Too Numerous to Count (A) None Seen, 0-2 /HPF    Bacteria, UA 3+ (A) None Seen, Trace /HPF    Squamous Epithelial Cells, UA 31-50 (A) None Seen, 0-2 /HPF    Renal Epithelial Cells, UA 3-6 (A) 0 - 2 /HPF    Hyaline Casts, UA 0-6 0 - 6 /LPF    Methodology Manual Light Microscopy    POCT pregnancy, urine    Collection Time: 11/04/19  7:52 PM   Result Value Ref Range    HCG, Urine, QL Negative Negative    Lot Number [id number]     Internal Positive Control Positive     Internal Negative Control Negative      Note: In addition to lab results from this visit, the labs listed above may include labs taken at another facility or during a different encounter within the last 24 hours. Please correlate lab times with ED admission and discharge times for further clarification of the services performed during this visit.    CT Head Without Contrast   Final Result   Normal, negative unenhanced head CT.               Signer Name: Yasmeen Stewart MD    Signed: 11/4/2019 8:34 PM    [facility]: [email or web address]     [facility]      XR Chest 1 View   Final Result   No acute cardiopulmonary findings.      Signer Name: Eran Loredo MD    Signed: 11/4/2019 7:37 PM    Workstation Name: [id number]     [facility]        Vitals:    11/04/19 1856 11/04/19 1935 11/04/19 1938 11/04/19 1940   BP: 141/98 (!) 147/106 (!) 152/112 (!)  "159/121   BP Location: Right arm Left arm Left arm Left arm   Patient Position: Sitting Lying Sitting Standing   Pulse: 89 (!) 123 112 (!) 121   Resp: 28      Temp: 99.4 °F (37.4 °C)      TempSrc: Oral      SpO2: 97%      Weight: 112 kg (247 lb)      Height: 162.6 cm (64\")        Medications   sodium chloride 0.9 % flush 10 mL (not administered)   meclizine (ANTIVERT) tablet 25 mg (25 mg Oral Given 11/4/19 1946)   sodium chloride 0.9 % bolus 1,000 mL (0 mL Intravenous Stopped 11/4/19 2016)   ondansetron (ZOFRAN) injection 4 mg (4 mg Intravenous Given 11/4/19 1946)   cefTRIAXone (ROCEPHIN) 1 g/100 mL 0.9% NS (MBP) (1 g Intravenous New Bag 11/4/19 2122)     ECG/EMG Results (last 24 hours)     Procedure Component Value Units Date/Time    ECG 12 Lead [[id number]] Collected:  11/04/19 1917     Updated:  11/04/19 1918        ECG 12 Lead                           MDM  Number of Diagnoses or Management Options  Acute urinary tract infection: new and requires workup  Dizziness: new and requires workup  Diagnosis management comments: CT scan of the head and labs do not show any acute ab mellitus.    Urine appears consistent with acute urinary tract infection versus contamination. Initial dose of Rocephin given here in the ER.    I discussed admission versus outpatient management the patient, she desires to go home at this given time.    She will be advised to rest and drink plenty of fluids and take Tylenol as needed for fever.    She was given Rocephin here in the ER will be discharged with Omnicef for treatment of urinary tract infection.    He will be given meclizine to help with dizziness.    Please note the patient has been documented to be tachycardic on vital signs.  However, the patient's heart rate was always in the 80s upon my assessment. I evaluated her 5-6 times to just check heart rate.   The patient remained unhooked from the cardiac monitor a large portion of the time.  The patient is afebrile, and therefore not " felt to be septic.  She has a normal white blood cell count.    She is advised to follow-up primary care physician for recheck in 2 days.    Advised to return to the ER with any further concern.       Amount and/or Complexity of Data Reviewed  Clinical lab tests: ordered and reviewed  Tests in the radiology section of CPT®: ordered and reviewed  Decide to obtain previous medical records or to obtain history from someone other than the patient: yes  Obtain history from someone other than the patient: yes  Review and summarize past medical records: yes  Independent visualization of images, tracings, or specimens: yes        Final diagnoses:   Dizziness   Acute urinary tract infection       Documentation assistance provided by lachelle Cade.  Information recorded by the lachelle was done at my direction and has been verified and validated by me.     Neo Cade  11/04/19 9856       James Claudio MD  11/04/19 0946

## 2019-11-07 ENCOUNTER — HOSPITAL ENCOUNTER (OUTPATIENT)
Dept: DIABETES SERVICES | Facility: HOSPITAL | Age: 49
Setting detail: RECURRING SERIES
Discharge: HOME | End: 2019-11-07

## 2019-11-07 PROCEDURE — G0109 DIAB MANAGE TRN IND/GROUP: HCPCS | Performed by: REGISTERED NURSE

## 2019-11-11 ENCOUNTER — OFFICE VISIT (OUTPATIENT)
Dept: INTERNAL MEDICINE | Facility: CLINIC | Age: 49
End: 2019-11-11

## 2019-11-11 VITALS
WEIGHT: 249 LBS | HEART RATE: 92 BPM | BODY MASS INDEX: 41.48 KG/M2 | RESPIRATION RATE: 18 BRPM | TEMPERATURE: 98.1 F | DIASTOLIC BLOOD PRESSURE: 84 MMHG | OXYGEN SATURATION: 97 % | SYSTOLIC BLOOD PRESSURE: 126 MMHG | HEIGHT: 65 IN

## 2019-11-11 DIAGNOSIS — H81.13 BENIGN PAROXYSMAL POSITIONAL VERTIGO DUE TO BILATERAL VESTIBULAR DISORDER: Primary | ICD-10-CM

## 2019-11-11 DIAGNOSIS — N39.0 ACUTE UTI: ICD-10-CM

## 2019-11-11 PROCEDURE — 99214 OFFICE O/P EST MOD 30 MIN: CPT | Performed by: NURSE PRACTITIONER

## 2019-11-11 RX ORDER — ALBUTEROL SULFATE 90 UG/1
2 AEROSOL, METERED RESPIRATORY (INHALATION) EVERY 6 HOURS PRN
Qty: 1 INHALER | Refills: 3 | Status: SHIPPED | OUTPATIENT
Start: 2019-11-11 | End: 2020-05-12 | Stop reason: SDUPTHER

## 2019-11-11 NOTE — PATIENT INSTRUCTIONS
How to Perform the Epley Maneuver  The Epley maneuver is an exercise that relieves symptoms of vertigo. Vertigo is the feeling that you or your surroundings are moving when they are not. When you feel vertigo, you may feel like the room is spinning and have trouble walking. Dizziness is a little different than vertigo. When you are dizzy, you may feel unsteady or light-headed.  You can do this maneuver at home whenever you have symptoms of vertigo. You can do it up to 3 times a day until your symptoms go away.  Even though the Epley maneuver may relieve your vertigo for a few weeks, it is possible that your symptoms will return. This maneuver relieves vertigo, but it does not relieve dizziness.  What are the risks?  If it is done correctly, the Epley maneuver is considered safe. Sometimes it can lead to dizziness or nausea that goes away after a short time. If you develop other symptoms, such as changes in vision, weakness, or numbness, stop doing the maneuver and call your health care provider.  How to perform the Epley maneuver  1. Sit on the edge of a bed or table with your back straight and your legs extended or hanging over the edge of the bed or table.  2. Turn your head prison toward the affected ear or side.  3. Lie backward quickly with your head turned until you are lying flat on your back. You may want to position a pillow under your shoulders.  4. Hold this position for 30 seconds. You may experience an attack of vertigo. This is normal.  5. Turn your head to the opposite direction until your unaffected ear is facing the floor.  6. Hold this position for 30 seconds. You may experience an attack of vertigo. This is normal. Hold this position until the vertigo stops.  7. Turn your whole body to the same side as your head. Hold for another 30 seconds.  8. Sit back up.  You can repeat this exercise up to 3 times a day.  Follow these instructions at home:  · After doing the Epley maneuver, you can return to  your normal activities.  · Ask your health care provider if there is anything you should do at home to prevent vertigo. He or she may recommend that you:  ? Keep your head raised (elevated) with two or more pillows while you sleep.  ? Do not sleep on the side of your affected ear.  ? Get up slowly from bed.  ? Avoid sudden movements during the day.  ? Avoid extreme head movement, like looking up or bending over.  Contact a health care provider if:  · Your vertigo gets worse.  · You have other symptoms, including:  ? Nausea.  ? Vomiting.  ? Headache.  Get help right away if:  · You have vision changes.  · You have a severe or worsening headache or neck pain.  · You cannot stop vomiting.  · You have new numbness or weakness in any part of your body.  Summary  · Vertigo is the feeling that you or your surroundings are moving when they are not.  · The Epley maneuver is an exercise that relieves symptoms of vertigo.  · If the Epley maneuver is done correctly, it is considered safe. You can do it up to 3 times a day.  This information is not intended to replace advice given to you by your health care provider. Make sure you discuss any questions you have with your health care provider.  Document Released: 12/23/2014 Document Revised: 11/07/2017 Document Reviewed: 11/07/2017  Elsevier Interactive Patient Education © 2019 [employer]

## 2019-11-11 NOTE — PROGRESS NOTES
Subjective   Freida Martinez is a 49 y.o. female.     Chief Complaint   Patient presents with   • Urinary Tract Infection     ER follow up on UTI and dizziness.  Has 1 more dose of antibiotic to take tonight   • Dizziness       History of Present Illness     Patient was seen in the emergency department on 11/4/2019 with a chief complaint of dizziness.  She began to feel dizzy and have blurred vision with some associated nausea on 11/4/2019.  She is diabetic and has hypertension.  She had not been taking her medications for either of those.  In the emergency department she had an extensive work-up including labs which demonstrated a glucose of 92.,  Creatinine 1.34 with a GFR of 51, negative troponin, normal magnesium, and urinalysis was positive for urinary tract infection.  She was given a dose of Rocephin in the emergency department and started on Omnicef for 7 days.She had a CT of her head which revealed no acute abnormalities.  A chest x-ray showed no acute cardiopulmonary findings. She was started on meclizine to help with the dizziness.    She has nearly finished her antibiotic and is feeling better.  She does still have some occasional dizziness.  She reports that this feels like she is swaying with the room is spinning.  She has no hearing loss.  The vertigo is episodic.      The following portions of the patient's history were reviewed and updated as appropriate: allergies, current medications, past family history, past medical history, past social history, past surgical history and problem list.    Review of Systems   Constitutional: Negative for appetite change, chills, diaphoresis, fatigue and fever.   Gastrointestinal: Negative for abdominal pain, nausea and vomiting.   Genitourinary: Negative for difficulty urinating, dysuria, frequency, hematuria and urgency.   Neurological: Positive for dizziness. Negative for weakness, light-headedness and numbness.       Outpatient Medications Marked as Taking for  the 19 encounter (Office Visit) with Annia RicoLUCIA   Medication Sig Dispense Refill   • albuterol (PROVENTIL) (2.5 MG/3ML) 0.083% nebulizer solution      • albuterol sulfate  (90 Base) MCG/ACT inhaler Inhale 2 puffs Every 6 (Six) Hours As Needed for Wheezing or Shortness of Air. 1 inhaler 3   • amLODIPine (NORVASC) 10 MG tablet Take 1 tablet by mouth Daily. 30 tablet 3   • aspirin 325 MG tablet Take 325 mg by mouth Daily.     • atorvastatin (LIPITOR) 40 MG tablet Take 1 tablet by mouth Daily. 30 tablet 3   • [] cefdinir (OMNICEF) 300 MG capsule Take 1 capsule by mouth 2 (Two) Times a Day for 7 days. 14 capsule 0   • diclofenac (VOLTAREN) 1 % gel gel Apply 4 g topically to the appropriate area as directed 4 (Four) Times a Day. Small amount to affected area 300 g 3   • FLUoxetine (PROZAC) 40 MG capsule Take 1 capsule by mouth Daily. 30 capsule 3   • glipizide (GLUCOTROL) 5 MG tablet Take 1 tablet by mouth 2 (Two) Times a Day Before Meals. 60 tablet 3   • levothyroxine (SYNTHROID, LEVOTHROID) 50 MCG tablet Take 1 tablet by mouth Daily. 30 tablet 3   • meclizine (ANTIVERT) 25 MG tablet Take 1 tablet by mouth Every 6 (Six) Hours As Needed for dizziness. 20 tablet 0   • pantoprazole (PROTONIX) 40 MG EC tablet Take 1 tablet by mouth Daily. 30 tablet 3   • SITagliptin (JANUVIA) 50 MG tablet Take 1 tablet by mouth Daily. 30 tablet 3   • [DISCONTINUED] albuterol sulfate  (90 Base) MCG/ACT inhaler              Objective   Physical Exam   Constitutional: She is oriented to person, place, and time. She appears well-developed and well-nourished. No distress.   HENT:   Head: Normocephalic and atraumatic.   Right Ear: Hearing and tympanic membrane normal.   Left Ear: Hearing and tympanic membrane normal.   Positive Shaina-Hallpike bilaterally   Eyes: Conjunctivae are normal. Pupils are equal, round, and reactive to light.   Neck: Normal range of motion. Normal carotid pulses present. Carotid bruit  "is not present.   Cardiovascular: Normal rate, regular rhythm and normal heart sounds.   Pulmonary/Chest: Effort normal and breath sounds normal. No respiratory distress.   Abdominal: Soft. Normal appearance and bowel sounds are normal. There is no tenderness.   Musculoskeletal: Normal range of motion.   Neurological: She is alert and oriented to person, place, and time. She displays normal reflexes. No cranial nerve deficit. She exhibits normal muscle tone. Coordination normal.   Skin: Skin is warm and dry. She is not diaphoretic.   Psychiatric: She has a normal mood and affect. Her behavior is normal. Judgment and thought content normal.   Nursing note and vitals reviewed.      Vitals:    11/11/19 1231   BP: 126/84   Pulse: 92   Resp: 18   Temp: 98.1 °F (36.7 °C)   SpO2: 97%   Weight: 113 kg (249 lb)   Height: 164.5 cm (64.75\")     Body mass index is 41.76 kg/m².        Assessment/Plan   Freida was seen today for urinary tract infection and dizziness.    Diagnoses and all orders for this visit:    Benign paroxysmal positional vertigo due to bilateral vestibular disorder  Meclizine 25 mg p.o. every 6 hours as needed dizziness.  Patient with a positive Patriot-Hallpike bilaterally.   I have instructed her on the Epley maneuver.  I have offered to refer her to physical therapy to assist with resolution of the BPPV episode.  She denies that at this time.  Acute UTI  Urinalysis from ER reviewed.  She will finish her antibiotic.  If symptoms persist or return she will follow-up with a repeat urine.    I have obtained and reviewed the documentation from the ER physician including the radiology imaging, laboratory tests, and progress note with the patient in the office today.         Return for Next scheduled follow up.  I discussed my findings and recommendations with patient.  The plan of care was  discussed with patient. They verbalized understanding and agreement.  Patient was encouraged to keep me informed of any acute " changes, lack of improvement, or any new concerning symptoms.       * Please note that portions of this note were completed with a voice recognition program. Efforts were made to edit the dictation but occasionally words are erroneously transcribed.

## 2019-11-13 RX ORDER — MECLIZINE HYDROCHLORIDE 25 MG/1
25 TABLET ORAL EVERY 6 HOURS PRN
Qty: 20 TABLET | Refills: 0
Start: 2019-11-13 | End: 2020-05-19

## 2020-03-31 ENCOUNTER — TELEPHONE (OUTPATIENT)
Dept: ORTHOPEDIC SURGERY | Facility: CLINIC | Age: 50
End: 2020-03-31

## 2020-03-31 NOTE — TELEPHONE ENCOUNTER
PATIENT CALLED IN ASKING FOR A LETTER FROM DR. MICHELLE BECAUSE SHE IS APPLYING FOR DISABILITY PLEASE CALL PATIENT [phone]

## 2020-03-31 NOTE — TELEPHONE ENCOUNTER
I do not get involved with disability claims. She can have my office notes if desired, but any formal letters supporting disability need to go through her PCP.

## 2020-03-31 NOTE — TELEPHONE ENCOUNTER
I spoke with the patient and explained that Dr. Campbell can give her his medical records but any disability determination will need to come from her PCP.   I will mail her a medical release form for her to fill out and she will mail back to us and we can pass it along to medical release so they can mail her records to her.  Vidhya

## 2020-05-03 ENCOUNTER — HOSPITAL ENCOUNTER (EMERGENCY)
Facility: HOSPITAL | Age: 50
Discharge: HOME | End: 2020-05-03
Attending: EMERGENCY MEDICINE | Admitting: EMERGENCY MEDICINE

## 2020-05-03 VITALS
SYSTOLIC BLOOD PRESSURE: 140 MMHG | WEIGHT: 290 LBS | RESPIRATION RATE: 18 BRPM | BODY MASS INDEX: 49.51 KG/M2 | OXYGEN SATURATION: 96 % | HEIGHT: 64 IN | TEMPERATURE: 98.8 F | DIASTOLIC BLOOD PRESSURE: 86 MMHG | HEART RATE: 100 BPM

## 2020-05-03 DIAGNOSIS — N61.1 BREAST ABSCESS: Primary | ICD-10-CM

## 2020-05-03 PROCEDURE — 99283 EMERGENCY DEPT VISIT LOW MDM: CPT

## 2020-05-03 RX ORDER — DOXYCYCLINE 100 MG/1
100 CAPSULE ORAL 2 TIMES DAILY
Qty: 14 CAPSULE | Refills: 0 | Status: SHIPPED | OUTPATIENT
Start: 2020-05-03 | End: 2020-05-19

## 2020-05-03 RX ORDER — LIDOCAINE HYDROCHLORIDE AND EPINEPHRINE 10; 10 MG/ML; UG/ML
10 INJECTION, SOLUTION INFILTRATION; PERINEURAL ONCE
Status: COMPLETED | OUTPATIENT
Start: 2020-05-03 | End: 2020-05-03

## 2020-05-03 RX ADMIN — LIDOCAINE HYDROCHLORIDE,EPINEPHRINE BITARTRATE 10 ML: 10; .01 INJECTION, SOLUTION INFILTRATION; PERINEURAL at 11:52

## 2020-05-03 NOTE — DISCHARGE INSTRUCTIONS
Do not put any creams or lotions on this for at least a couple of days.  Return if you have fever, worsening redness or swelling, or any other concerns.  You must call Annia Rico to arrange follow-up and mammogram for further evaluation of your breast.

## 2020-05-03 NOTE — ED PROVIDER NOTES
Subjective   Mrs. Martinez presents with an injury to her breast.  She tells me she walked into a door frame 2 weeks ago and noted swelling and pain in her left breast.  It is continued to be sore.  This morning she notes drainage from her nipple and tells me the pain is actually better today.  She denies fevers, chills, vomiting, or any other ongoing illnesses.  She is a diabetic.      History provided by:  Patient  Abscess   Location:  Torso  Torso abscess location:  L breast  Abscess quality: draining    Red streaking: no    Chronicity:  New  Context: diabetes    Relieved by:  Nothing  Worsened by:  Nothing  Associated symptoms: no fever, no nausea and no vomiting        Review of Systems   Constitutional: Negative for fever.   Gastrointestinal: Negative for nausea and vomiting.       Past Medical History:   Diagnosis Date   • Abdominal pain 4/12/2019   • Arthritis    • Asthma    • Diabetes mellitus (CMS/HCC)    • Disease of thyroid gland    • Hypertension    • Kidney infection    • Mental disorder    • Sleep apnea        Allergies   Allergen Reactions   • Anaprox [Naproxen] Other (See Comments)     CHRONIC KIDNEY DISEASE   • Celebrex [Celecoxib] Other (See Comments)     CHRONIC KIDNEY DISEASE   • Flector [Diclofenac Epolamine] Other (See Comments)     CHRONIC KIDNEY DISEASE   • Lisinopril Swelling   • Motrin [Ibuprofen] Other (See Comments)     CHRONIC KIDNEY DISEASE   • Voltaren [Diclofenac Sodium] Other (See Comments)     CHRONIC KIDNEY DISEASE       Past Surgical History:   Procedure Laterality Date   • ENDOSCOPY N/A 4/15/2019    Procedure: ESOPHAGOGASTRODUODENOSCOPY;  Surgeon: Jude Clinton MD;  Location: [facility] ENDOSCOPY;  Service: Gastroenterology   • TUBAL ABDOMINAL LIGATION     • VAGINAL DELIVERY      x4   • WISDOM TOOTH EXTRACTION         Family History   Problem Relation Age of Onset   • Hypertension Mother    • Diabetes Father    • Kidney disease Father    • Heart disease Father    • Hypertension  Father    • Sleep apnea Father    • Cancer Maternal Aunt    • Arthritis Paternal Aunt    • Cancer Maternal Grandmother         BREAST   • Breast cancer Maternal Grandmother    • Kidney disease Paternal Grandfather    • Kidney disease Paternal Uncle        Social History     Socioeconomic History   • Marital status: Single     Spouse name: Not on file   • Number of children: Not on file   • Years of education: Not on file   • Highest education level: Not on file   Tobacco Use   • Smoking status: Current Every Day Smoker     Packs/day: 1.00     Types: Cigarettes   • Smokeless tobacco: Never Used   Substance and Sexual Activity   • Alcohol use: No     Frequency: Never   • Drug use: No   • Sexual activity: Never           Objective   Physical Exam   Constitutional: She is oriented to person, place, and time. She appears well-developed and well-nourished. No distress.   HENT:   Head: Normocephalic and atraumatic.   Eyes: Conjunctivae are normal. No scleral icterus.   Neck: Normal range of motion. Neck supple. No JVD present.   Cardiovascular: Normal rate and regular rhythm.   No murmur heard.  Pulmonary/Chest: Effort normal and breath sounds normal. She has no wheezes. She has no rales.   Musculoskeletal:   I examined her left breast with AJ.  The breast itself is not swollen, tender, red, or warm.  The nipple itself is swollen.  It is tender.  I do not palpate any abscesses in the breast itself.  I cannot express any pus from her nipple but there are a couple areas of pointing   Neurological: She is alert and oriented to person, place, and time.   Skin: Skin is warm and dry.   Psychiatric: She has a normal mood and affect. Her behavior is normal.   Nursing note and vitals reviewed.      Incision & Drainage  Date/Time: 5/3/2020 12:56 PM  Performed by: Devendra Boyd MD  Authorized by: Devendra Boyd MD     Consent:     Consent obtained:  Verbal    Consent given by:  Patient  Location:     Type:  Abscess    Size:  1  cm    Location:  Trunk    Trunk location:  L breast  Pre-procedure details:     Skin preparation:  Betadine  Anesthesia (see MAR for exact dosages):     Anesthesia method:  Local infiltration    Local anesthetic:  Lidocaine 1% WITH epi  Procedure type:     Complexity:  Simple  Procedure details:     Needle aspiration: no      Incision types:  Stab incision    Incision depth:  Subcutaneous    Scalpel blade:  11    Wound management:  Probed and deloculated    Drainage:  Purulent    Drainage amount:  Scant    Wound treatment:  Wound left open  Post-procedure details:     Patient tolerance of procedure:  Tolerated well, no immediate complications               ED Course  ED Course as of May 03 1257   Sun May 03, 2020   1210 Performed I and D and obtain small amount of pus.  Incision was about a half a centimeter long so I left it open.  I talked to her about need to follow-up with her doctor and get a mammogram for further evaluation of this    [DT]      ED Course User Index  [DT] Devendra Boyd MD                                           [facility]    Final diagnoses:   Breast abscess            Devendra Boyd MD  05/03/20 1257

## 2020-05-12 DIAGNOSIS — E11.65 UNCONTROLLED TYPE 2 DIABETES MELLITUS WITH HYPERGLYCEMIA (HCC): ICD-10-CM

## 2020-05-12 DIAGNOSIS — F33.0 MILD EPISODE OF RECURRENT MAJOR DEPRESSIVE DISORDER (HCC): ICD-10-CM

## 2020-05-12 DIAGNOSIS — I10 ESSENTIAL HYPERTENSION: ICD-10-CM

## 2020-05-12 DIAGNOSIS — E03.9 ACQUIRED HYPOTHYROIDISM: ICD-10-CM

## 2020-05-12 DIAGNOSIS — E78.2 MIXED HYPERLIPIDEMIA: ICD-10-CM

## 2020-05-12 DIAGNOSIS — K21.9 CHRONIC GERD: ICD-10-CM

## 2020-05-12 RX ORDER — AMLODIPINE BESYLATE 10 MG/1
10 TABLET ORAL DAILY
Qty: 30 TABLET | Refills: 0 | Status: SHIPPED | OUTPATIENT
Start: 2020-05-12 | End: 2020-06-25 | Stop reason: SDUPTHER

## 2020-05-12 RX ORDER — FLUOXETINE HYDROCHLORIDE 40 MG/1
40 CAPSULE ORAL DAILY
Qty: 30 CAPSULE | Refills: 0 | Status: SHIPPED | OUTPATIENT
Start: 2020-05-12 | End: 2020-06-25 | Stop reason: SDUPTHER

## 2020-05-12 RX ORDER — GLIPIZIDE 5 MG/1
5 TABLET ORAL
Qty: 60 TABLET | Refills: 0 | Status: SHIPPED | OUTPATIENT
Start: 2020-05-12 | End: 2020-05-19 | Stop reason: SDDI

## 2020-05-12 RX ORDER — ATORVASTATIN CALCIUM 40 MG/1
40 TABLET, FILM COATED ORAL DAILY
Qty: 30 TABLET | Refills: 0 | Status: SHIPPED | OUTPATIENT
Start: 2020-05-12 | End: 2020-06-25 | Stop reason: SDUPTHER

## 2020-05-12 RX ORDER — LEVOTHYROXINE SODIUM 0.05 MG/1
50 TABLET ORAL DAILY
Qty: 30 TABLET | Refills: 0 | Status: SHIPPED | OUTPATIENT
Start: 2020-05-12 | End: 2020-06-25 | Stop reason: SDUPTHER

## 2020-05-12 RX ORDER — PANTOPRAZOLE SODIUM 40 MG/1
40 TABLET, DELAYED RELEASE ORAL DAILY
Qty: 30 TABLET | Refills: 0 | Status: SHIPPED | OUTPATIENT
Start: 2020-05-12 | End: 2020-06-25 | Stop reason: SDUPTHER

## 2020-05-12 RX ORDER — ALBUTEROL SULFATE 90 UG/1
2 AEROSOL, METERED RESPIRATORY (INHALATION) EVERY 6 HOURS PRN
Qty: 1 INHALER | Refills: 0 | Status: SHIPPED | OUTPATIENT
Start: 2020-05-12 | End: 2020-06-25 | Stop reason: SDUPTHER

## 2020-05-19 ENCOUNTER — OFFICE VISIT (OUTPATIENT)
Dept: INTERNAL MEDICINE | Facility: CLINIC | Age: 50
End: 2020-05-19

## 2020-05-19 VITALS
SYSTOLIC BLOOD PRESSURE: 136 MMHG | DIASTOLIC BLOOD PRESSURE: 98 MMHG | OXYGEN SATURATION: 98 % | TEMPERATURE: 98 F | WEIGHT: 243 LBS | HEIGHT: 64 IN | HEART RATE: 104 BPM | BODY MASS INDEX: 41.48 KG/M2 | RESPIRATION RATE: 18 BRPM

## 2020-05-19 DIAGNOSIS — E11.65 UNCONTROLLED TYPE 2 DIABETES MELLITUS WITH HYPERGLYCEMIA (HCC): ICD-10-CM

## 2020-05-19 DIAGNOSIS — I10 ESSENTIAL HYPERTENSION: ICD-10-CM

## 2020-05-19 DIAGNOSIS — F33.41 RECURRENT MAJOR DEPRESSIVE DISORDER, IN PARTIAL REMISSION (HCC): ICD-10-CM

## 2020-05-19 DIAGNOSIS — Z11.59 NEED FOR HEPATITIS C SCREENING TEST: ICD-10-CM

## 2020-05-19 DIAGNOSIS — E03.9 ACQUIRED HYPOTHYROIDISM: ICD-10-CM

## 2020-05-19 DIAGNOSIS — E78.2 MIXED HYPERLIPIDEMIA: ICD-10-CM

## 2020-05-19 DIAGNOSIS — N63.0 BREAST LUMP: Primary | ICD-10-CM

## 2020-05-19 DIAGNOSIS — E66.01 MORBID OBESITY WITH BMI OF 40.0-44.9, ADULT (HCC): ICD-10-CM

## 2020-05-19 PROBLEM — E86.9 VOLUME DEPLETION: Status: RESOLVED | Noted: 2019-04-14 | Resolved: 2020-05-19

## 2020-05-19 PROBLEM — R10.13 EPIGASTRIC PAIN: Status: RESOLVED | Noted: 2019-04-12 | Resolved: 2020-05-19

## 2020-05-19 PROBLEM — N17.9 AKI (ACUTE KIDNEY INJURY): Status: RESOLVED | Noted: 2019-04-12 | Resolved: 2020-05-19

## 2020-05-19 PROCEDURE — 99214 OFFICE O/P EST MOD 30 MIN: CPT | Performed by: NURSE PRACTITIONER

## 2020-05-19 NOTE — PROGRESS NOTES
Subjective   Freida Martinez is a 50 y.o. female.     Chief Complaint   Patient presents with   • breast abscess     [facility] ER follow up on left breast abscess       History of Present Illness     Patient reports that she hit her nipple on furniture and it became and swollen for 2 weeks.  She started getting some discharge from the nipple so she went to the [facility] emergency department.   Patient was seen in the [facility] emergency department on 5/3/2020 related to discharge from her left breast.  She underwent incision and drainage of the area with only a small amount of pus.  Area was about half a centimeter long as was left open to air to heal.  Patient reports that it is healed well and she is no longer having any drainage.  She denies any fever, chills, myalgias or feeling of general malaise.     Hypertension-chronic.  She is currently on amlodipine.  Compliant with dosing and denies adverse effects.  She denies any headaches, dizziness, chest pain, palpitations, dyspnea, claudication symptoms, or edema.    Diabetes-chronic.  Type II.  She is currently on sitagliptin.  She is not taking her glipizide as she feels like it made her feel jittery and shaky.  She tried on several different occasions and feels much better without it.  She reports that she has cut sugar out of her diet.  She has lost about 4 pounds since her last visit with me in November.  She is exercising daily at home.    hypothyroid-chronic.  She is currently on levothyroxine.  Compliant with dosing.  Denies any hypo-or hyperthyroidism symptoms.  Her last TSH was slightly elevated at 5.140 with a normal free T4 in 10/2019.    Hyperlipidemia-chronic.  Her last lipids were checked in 10 of 2019.  They were stable at that time with an LDL of 32.  She is currently on statin therapy.  Compliant with dosing and denies adverse effects.    Depression-chronic.  She is currently on fluoxetine.  Feels like symptoms are very well  controlled currently.  Denies any suicidal or homicidal ideations.      The following portions of the patient's history were reviewed and updated as appropriate: allergies, current medications, past family history, past medical history, past social history, past surgical history and problem list.        Review of Systems   Constitutional: Negative for appetite change, diaphoresis, fatigue, fever, unexpected weight gain and unexpected weight loss.   HENT: Negative.    Eyes: Negative for pain and visual disturbance.   Respiratory: Negative for cough, chest tightness, shortness of breath and wheezing.    Cardiovascular: Negative for chest pain, palpitations and leg swelling.   Gastrointestinal: Negative for abdominal distention, abdominal pain, constipation, diarrhea, nausea and vomiting.   Endocrine: Negative for polydipsia, polyphagia and polyuria.   Genitourinary: Negative.  Negative for difficulty urinating.   Musculoskeletal: Positive for arthralgias, back pain and myalgias. Negative for gait problem, joint swelling, neck pain and neck stiffness.   Skin: Negative for color change and rash.   Neurological: Negative for dizziness, facial asymmetry, weakness, light-headedness, numbness, headache and confusion.   Hematological: Does not bruise/bleed easily.   Psychiatric/Behavioral: Negative for sleep disturbance, suicidal ideas, depressed mood and stress. The patient is not nervous/anxious.            Outpatient Medications Marked as Taking for the 5/19/20 encounter (Office Visit) with Annia Rico APRN   Medication Sig Dispense Refill   • albuterol (PROVENTIL) (2.5 MG/3ML) 0.083% nebulizer solution      • albuterol sulfate  (90 Base) MCG/ACT inhaler Inhale 2 puffs Every 6 (Six) Hours As Needed for Wheezing or Shortness of Air. 1 inhaler 0   • amLODIPine (NORVASC) 10 MG tablet Take 1 tablet by mouth Daily. 30 tablet 0   • aspirin 325 MG tablet Take 325 mg by mouth Daily.     • atorvastatin (LIPITOR) 40 MG  tablet Take 1 tablet by mouth Daily. 30 tablet 0   • diclofenac (VOLTAREN) 1 % gel gel Apply 4 g topically to the appropriate area as directed 4 (Four) Times a Day. Small amount to affected area 300 g 3   • FLUoxetine (PROzac) 40 MG capsule Take 1 capsule by mouth Daily. 30 capsule 0   • levothyroxine (SYNTHROID, LEVOTHROID) 50 MCG tablet Take 1 tablet by mouth Daily. 30 tablet 0   • pantoprazole (PROTONIX) 40 MG EC tablet Take 1 tablet by mouth Daily. 30 tablet 0   • SITagliptin (JANUVIA) 50 MG tablet Take 1 tablet by mouth Daily. 30 tablet 0   • [DISCONTINUED] glipizide (GLUCOTROL) 5 MG tablet Take 1 tablet by mouth 2 (Two) Times a Day Before Meals. 60 tablet 0           Objective   Physical Exam   Constitutional: She is oriented to person, place, and time. She appears well-developed and well-nourished. No distress.   HENT:   Head: Normocephalic and atraumatic.   Eyes: Pupils are equal, round, and reactive to light. Conjunctivae are normal.   Neck: Normal range of motion. No JVD present.   Cardiovascular: Normal rate, regular rhythm, normal heart sounds and intact distal pulses.   No murmur heard.  Pulses:       Dorsalis pedis pulses are 2+ on the right side, and 2+ on the left side.        Posterior tibial pulses are 2+ on the right side, and 2+ on the left side.   Pulmonary/Chest: Effort normal and breath sounds normal. No respiratory distress. She exhibits no tenderness. Right breast exhibits no inverted nipple, no mass, no nipple discharge, no skin change and no tenderness. Left breast exhibits mass. Left breast exhibits no inverted nipple, no nipple discharge, no skin change and no tenderness. No breast swelling, tenderness, discharge or bleeding. Breasts are asymmetrical.       Abdominal: Soft. Normal appearance and bowel sounds are normal. She exhibits no distension. There is no tenderness.   Musculoskeletal: Normal range of motion. She exhibits no edema.   Neurological: She is alert and oriented to person,  "place, and time. Coordination normal.   Skin: Skin is warm and dry. No rash noted. She is not diaphoretic. No erythema. No pallor.   Psychiatric: She has a normal mood and affect. Her behavior is normal. Judgment and thought content normal.   Nursing note and vitals reviewed.      Vitals:    05/19/20 1501   BP: 136/98   Pulse: 104   Resp: 18   Temp: 98 °F (36.7 °C)   SpO2: 98%   Weight: 110 kg (243 lb)   Height: 162.6 cm (64\")     Body mass index is 41.71 kg/m².        Assessment/Plan   Freida was seen today for breast abscess.    Diagnoses and all orders for this visit:    Breast lump  -     Mammo Diagnostic Digital Tomosynthesis Bilateral With CAD; Future  -     Hemoglobin A1c; Future  -     CBC (No Diff); Future  -     Comprehensive Metabolic Panel; Future  -     Lipid Panel; Future  -     TSH; Future  We will get patient set up for a diagnostic mammogram to further evaluate the breast lump in the enlarged nipple.  I suspect the nipple discharge was more related to an abscess due to the trauma but will confirm with diagnostic mammo-.    Essential hypertension  -     Hemoglobin A1c; Future  -     CBC (No Diff); Future  -     Comprehensive Metabolic Panel; Future  -     Lipid Panel; Future  -     TSH; Future  Well-controlled.  Continue current medications.    Mixed hyperlipidemia  -     Hemoglobin A1c; Future  -     CBC (No Diff); Future  -     Comprehensive Metabolic Panel; Future  -     Lipid Panel; Future  -     TSH; Future  Stable on last lipids.  Continue statin therapy.  Due for recheck of lipids.  Encourage low-fat diet.    Uncontrolled type 2 diabetes mellitus with hyperglycemia (CMS/[facility])  -     Hemoglobin A1c; Future  -     CBC (No Diff); Future  -     Comprehensive Metabolic Panel; Future  -     Lipid Panel; Future  -     TSH; Future  Last hemoglobin A1c was elevated.  She has not been taking her glipizide.  She is taking her Januvia.  Encouraged diabetic diet.    Acquired hypothyroidism  -     Hemoglobin " A1c; Future  -     CBC (No Diff); Future  -     Comprehensive Metabolic Panel; Future  -     Lipid Panel; Future  -     TSH; Future  Due for recheck TSH.    Need for hepatitis C screening test  -     Hepatitis C Antibody; Future  Patient due for one-time screening for hepatitis C.  Low risk.  Morbid obesity with BMI of 40.0-44.9, adult (CMS/HCC)  Courage diet and exercise for weight reduction.  She has lost 4 pounds since her last visit with me in November.    Recurrent major depressive disorder, in partial remission (CMS/HCC)  Stable and currently without symptoms.  Continue fluoxetine.                Return for Next scheduled follow up 5/26/20.    I discussed my findings,recommendations, and plan of care was with the patient. They verbalized understanding and agreement.  Patient was encouraged to keep me informed of any acute changes, lack of improvement, or any new concerning symptoms.       * Please note that portions of this note were completed with a voice recognition program. Efforts were made to edit the dictation but occasionally words are erroneously transcribed.

## 2020-05-21 ENCOUNTER — LAB (OUTPATIENT)
Dept: LAB | Facility: HOSPITAL | Age: 50
End: 2020-05-21

## 2020-05-21 DIAGNOSIS — E78.2 MIXED HYPERLIPIDEMIA: ICD-10-CM

## 2020-05-21 DIAGNOSIS — E03.9 ACQUIRED HYPOTHYROIDISM: ICD-10-CM

## 2020-05-21 DIAGNOSIS — N63.0 BREAST LUMP: ICD-10-CM

## 2020-05-21 DIAGNOSIS — Z11.59 NEED FOR HEPATITIS C SCREENING TEST: ICD-10-CM

## 2020-05-21 DIAGNOSIS — I10 ESSENTIAL HYPERTENSION: ICD-10-CM

## 2020-05-21 DIAGNOSIS — E11.65 UNCONTROLLED TYPE 2 DIABETES MELLITUS WITH HYPERGLYCEMIA (HCC): ICD-10-CM

## 2020-05-21 LAB
ALBUMIN SERPL-MCNC: 4.1 G/DL (ref 3.5–5.2)
ALBUMIN/GLOB SERPL: 1.3 G/DL
ALP SERPL-CCNC: 89 U/L (ref 39–117)
ALT SERPL W P-5'-P-CCNC: 13 U/L (ref 1–33)
ANION GAP SERPL CALCULATED.3IONS-SCNC: 12.2 MMOL/L (ref 5–15)
AST SERPL-CCNC: 9 U/L (ref 1–32)
BILIRUB SERPL-MCNC: 0.4 MG/DL (ref 0.2–1.2)
BUN BLD-MCNC: 17 MG/DL (ref 6–20)
BUN/CREAT SERPL: 11.8 (ref 7–25)
CALCIUM SPEC-SCNC: 9.4 MG/DL (ref 8.6–10.5)
CHLORIDE SERPL-SCNC: 105 MMOL/L (ref 98–107)
CHOLEST SERPL-MCNC: 111 MG/DL (ref 0–200)
CO2 SERPL-SCNC: 20.8 MMOL/L (ref 22–29)
CREAT BLD-MCNC: 1.44 MG/DL (ref 0.57–1)
GFR SERPL CREATININE-BSD FRML MDRD: 47 ML/MIN/1.73
GLOBULIN UR ELPH-MCNC: 3.2 GM/DL
GLUCOSE BLD-MCNC: 106 MG/DL (ref 65–99)
HBA1C MFR BLD: 6.53 % (ref 4.8–5.6)
HCV AB SER DONR QL: NORMAL
HDLC SERPL-MCNC: 30 MG/DL (ref 40–60)
LDLC SERPL CALC-MCNC: 28 MG/DL (ref 0–100)
LDLC/HDLC SERPL: 0.93 {RATIO}
POTASSIUM BLD-SCNC: 3.8 MMOL/L (ref 3.5–5.2)
PROT SERPL-MCNC: 7.3 G/DL (ref 6–8.5)
SODIUM BLD-SCNC: 138 MMOL/L (ref 136–145)
TRIGL SERPL-MCNC: 265 MG/DL (ref 0–150)
TSH SERPL DL<=0.05 MIU/L-ACNC: 2.27 UIU/ML (ref 0.27–4.2)
VLDLC SERPL-MCNC: 53 MG/DL (ref 5–40)

## 2020-05-21 PROCEDURE — 84443 ASSAY THYROID STIM HORMONE: CPT

## 2020-05-21 PROCEDURE — 83036 HEMOGLOBIN GLYCOSYLATED A1C: CPT

## 2020-05-21 PROCEDURE — 36415 COLL VENOUS BLD VENIPUNCTURE: CPT

## 2020-05-21 PROCEDURE — 80053 COMPREHEN METABOLIC PANEL: CPT

## 2020-05-21 PROCEDURE — 86803 HEPATITIS C AB TEST: CPT

## 2020-05-21 PROCEDURE — 80061 LIPID PANEL: CPT

## 2020-05-26 ENCOUNTER — OFFICE VISIT (OUTPATIENT)
Dept: INTERNAL MEDICINE | Facility: CLINIC | Age: 50
End: 2020-05-26

## 2020-05-26 ENCOUNTER — LAB (OUTPATIENT)
Dept: LAB | Facility: HOSPITAL | Age: 50
End: 2020-05-26

## 2020-05-26 ENCOUNTER — TELEPHONE (OUTPATIENT)
Dept: INTERNAL MEDICINE | Facility: CLINIC | Age: 50
End: 2020-05-26

## 2020-05-26 VITALS
TEMPERATURE: 97.8 F | OXYGEN SATURATION: 97 % | SYSTOLIC BLOOD PRESSURE: 130 MMHG | RESPIRATION RATE: 18 BRPM | DIASTOLIC BLOOD PRESSURE: 96 MMHG | BODY MASS INDEX: 40.32 KG/M2 | HEART RATE: 101 BPM | WEIGHT: 242 LBS | HEIGHT: 65 IN

## 2020-05-26 DIAGNOSIS — I10 ESSENTIAL HYPERTENSION: ICD-10-CM

## 2020-05-26 DIAGNOSIS — Z11.59 NEED FOR HEPATITIS C SCREENING TEST: ICD-10-CM

## 2020-05-26 DIAGNOSIS — E78.2 MIXED HYPERLIPIDEMIA: ICD-10-CM

## 2020-05-26 DIAGNOSIS — R94.31 ABNORMAL EKG: ICD-10-CM

## 2020-05-26 DIAGNOSIS — E66.01 MORBID OBESITY WITH BMI OF 40.0-44.9, ADULT (HCC): ICD-10-CM

## 2020-05-26 DIAGNOSIS — M54.41 CHRONIC MIDLINE LOW BACK PAIN WITH BILATERAL SCIATICA: ICD-10-CM

## 2020-05-26 DIAGNOSIS — Z23 NEED FOR PNEUMOCOCCAL VACCINE: ICD-10-CM

## 2020-05-26 DIAGNOSIS — R07.9 CHEST PAIN, UNSPECIFIED TYPE: ICD-10-CM

## 2020-05-26 DIAGNOSIS — Z00.00 ANNUAL PHYSICAL EXAM: Primary | ICD-10-CM

## 2020-05-26 DIAGNOSIS — G89.29 CHRONIC MIDLINE LOW BACK PAIN WITH BILATERAL SCIATICA: ICD-10-CM

## 2020-05-26 DIAGNOSIS — N63.0 BREAST LUMP: ICD-10-CM

## 2020-05-26 DIAGNOSIS — E03.9 ACQUIRED HYPOTHYROIDISM: ICD-10-CM

## 2020-05-26 DIAGNOSIS — M54.42 CHRONIC MIDLINE LOW BACK PAIN WITH BILATERAL SCIATICA: ICD-10-CM

## 2020-05-26 DIAGNOSIS — F33.41 RECURRENT MAJOR DEPRESSIVE DISORDER, IN PARTIAL REMISSION (HCC): ICD-10-CM

## 2020-05-26 DIAGNOSIS — E11.9 CONTROLLED TYPE 2 DIABETES MELLITUS WITHOUT COMPLICATION, WITHOUT LONG-TERM CURRENT USE OF INSULIN (HCC): ICD-10-CM

## 2020-05-26 DIAGNOSIS — E11.65 UNCONTROLLED TYPE 2 DIABETES MELLITUS WITH HYPERGLYCEMIA (HCC): ICD-10-CM

## 2020-05-26 PROBLEM — Z91.199 NON-COMPLIANCE: Status: RESOLVED | Noted: 2019-07-24 | Resolved: 2020-05-26

## 2020-05-26 LAB
BILIRUB BLD-MCNC: NEGATIVE MG/DL
CLARITY, POC: CLEAR
COLOR UR: YELLOW
DEPRECATED RDW RBC AUTO: 42.6 FL (ref 37–54)
ERYTHROCYTE [DISTWIDTH] IN BLOOD BY AUTOMATED COUNT: 14.5 % (ref 12.3–15.4)
GLUCOSE UR STRIP-MCNC: NEGATIVE MG/DL
HCT VFR BLD AUTO: 38 % (ref 34–46.6)
HGB BLD-MCNC: 12.7 G/DL (ref 12–15.9)
KETONES UR QL: NEGATIVE
LEUKOCYTE EST, POC: NEGATIVE
MCH RBC QN AUTO: 26.7 PG (ref 26.6–33)
MCHC RBC AUTO-ENTMCNC: 33.4 G/DL (ref 31.5–35.7)
MCV RBC AUTO: 80 FL (ref 79–97)
NITRITE UR-MCNC: NEGATIVE MG/ML
PH UR: 6 [PH] (ref 5–8)
PLATELET # BLD AUTO: 313 10*3/MM3 (ref 140–450)
PMV BLD AUTO: 11.5 FL (ref 6–12)
PROT UR STRIP-MCNC: ABNORMAL MG/DL
RBC # BLD AUTO: 4.75 10*6/MM3 (ref 3.77–5.28)
RBC # UR STRIP: NEGATIVE /UL
SP GR UR: 1.02 (ref 1–1.03)
UROBILINOGEN UR QL: NORMAL
WBC NRBC COR # BLD: 9.32 10*3/MM3 (ref 3.4–10.8)

## 2020-05-26 PROCEDURE — 90670 PCV13 VACCINE IM: CPT | Performed by: NURSE PRACTITIONER

## 2020-05-26 PROCEDURE — 99396 PREV VISIT EST AGE 40-64: CPT | Performed by: NURSE PRACTITIONER

## 2020-05-26 PROCEDURE — 93000 ELECTROCARDIOGRAM COMPLETE: CPT | Performed by: NURSE PRACTITIONER

## 2020-05-26 PROCEDURE — 99213 OFFICE O/P EST LOW 20 MIN: CPT | Performed by: NURSE PRACTITIONER

## 2020-05-26 PROCEDURE — 36415 COLL VENOUS BLD VENIPUNCTURE: CPT

## 2020-05-26 PROCEDURE — 90471 IMMUNIZATION ADMIN: CPT | Performed by: NURSE PRACTITIONER

## 2020-05-26 PROCEDURE — 85027 COMPLETE CBC AUTOMATED: CPT

## 2020-05-26 RX ORDER — NITROGLYCERIN 0.4 MG/1
0.4 TABLET SUBLINGUAL
Qty: 20 TABLET | Refills: 0 | Status: SHIPPED | OUTPATIENT
Start: 2020-05-26 | End: 2020-07-10

## 2020-05-26 NOTE — PATIENT INSTRUCTIONS
Angina    Angina is very bad discomfort or pain in the chest, neck, arm, jaw, or back. The discomfort is caused by a lack of blood in the middle layer of the heart wall (myocardium).  What are the causes?  This condition is caused by a buildup of fat and cholesterol (plaque) in your arteries (atherosclerosis). This buildup narrows the arteries and makes it hard for blood to flow.  What increases the risk?  You are more likely to develop this condition if:  · You have high levels of cholesterol in your blood.  · You have high blood pressure (hypertension).  · You have diabetes.  · You have a family history of heart disease.  · You are not active, or you do not exercise enough.  · You feel sad (depressed).  · You have been treated with high energy rays (radiation) on the left side of your chest.  Other risk factors are:  · Using tobacco.  · Being very overweight (obese).  · Eating a diet high in unhealthy fats (saturated fats).  · Having stress, or being exposed to things that cause stress.  · Using drugs, such as cocaine.  Women have a greater risk for angina if:  · They are older than 55.  · They have stopped having their period (are in postmenopause).  What are the signs or symptoms?  Common symptoms of this condition in both men and women may include:  · Chest pain, which may:  ? Feel like a crushing or squeezing in the chest.  ? Feel like a tightness, pressure, fullness, or heaviness in the chest.  ? Last for more than a few minutes at a time.  ? Stop and come back (recur) after a few minutes.  · Pain in the neck, arm, jaw, or back.  · Heartburn or upset stomach (indigestion) for no reason.  · Being short of breath.  · Feeling sick to your stomach (nauseous).  · Sudden cold sweats.  Women and people with diabetes may have other symptoms that are not usual, such as feeling:  · Tired (fatigue).  · Worried or nervous (anxious) for no reason.  · Weak for no reason.  · Dizzy or passing out (fainting).  How is this  treated?  This condition may be treated with:  · Medicines. These are given to:  ? Prevent blood clots.  ? Prevent heart attack.  ? Relax blood vessels and improve blood flow to the heart (nitrates).  ? Reduce blood pressure.  ? Improve the pumping action of the heart.  ? Reduce fat and cholesterol in the blood.  · A procedure to widen a narrowed or blocked artery in the heart (angioplasty).  · Surgery to allow blood to go around a blocked artery (coronary artery bypass surgery).  Follow these instructions at home:  Medicines  · Take over-the-counter and prescription medicines only as told by your doctor.  · Do not take these medicines unless your doctor says that you can:  ? NSAIDs. These include:  § Ibuprofen.  § Naproxen.  ? Vitamin supplements that have vitamin A, vitamin E, or both.  ? Hormone therapy that contains estrogen with or without progestin.  Eating and drinking    · Eat a heart-healthy diet that includes:  ? Lots of fresh fruits and vegetables.  ? Whole grains.  ? Low-fat (lean) protein.  ? Low-fat dairy products.  · Follow instructions from your doctor about what you cannot eat or drink.  Activity  · Follow an exercise program that your doctor tells you.  · Talk with your doctor about joining a program to help improve the health of your heart (cardiac rehab).  · When you feel tired, take a break. Plan breaks if you know you are going to feel tired.  Lifestyle    · Do not use any products that contain nicotine or tobacco. This includes cigarettes, e-cigarettes, and chewing tobacco. If you need help quitting, ask your doctor.  · If your doctor says you can drink alcohol:  ? Limit how much you use to:  § 0-1 drink a day for women who are not pregnant.  § 0-2 drinks a day for men.  ? Be aware of how much alcohol is in your drink. In the [place] one drink equals:  § One 12 oz bottle of beer (355 mL).  § One 5 oz glass of wine (148 mL).  § One 1½ oz glass of hard liquor (44 mL).  General instructions  · Stay  at a healthy weight. If your doctor tells you to do so, work with him or her to lose weight.  · Learn to deal with stress. If you need help, ask your doctor.  · Keep your vaccines up to date. Get a flu shot every year.  · Talk with your doctor if you feel sad. Take a screening test to see if you are at risk for depression.  · Work with your doctor to manage any other health problems that you have. These may include diabetes or high blood pressure.  · Keep all follow-up visits as told by your doctor. This is important.  Get help right away if:  · You have pain in your chest, neck, arm, jaw, or back, and the pain:  ? Lasts more than a few minutes.  ? Comes back.  ? Does not get better after you take medicine under your tongue (sublingual nitroglycerin).  ? Keeps getting worse.  ? Comes more often.  · You have any of these problems for no reason:  ? Sweating a lot.  ? Heartburn or upset stomach.  ? Shortness of breath.  ? Trouble breathing.  ? Feeling sick to your stomach.  ? Throwing up (vomiting).  ? Feeling more tired than normal.  ? Feeling nervous or worrying more than normal.  ? Weakness.  · You are suddenly dizzy or light-headed.  · You pass out.  These symptoms may be an emergency. Do not wait to see if the symptoms will go away. Get medical help right away. Call your local emergency services (911 in the [place] Do not drive yourself to the hospital.  Summary  · Angina is very bad discomfort or pain in the chest, neck, arm, neck, or back.  · Symptoms include chest pain, heartburn or upset stomach for no reason, and shortness of breath.  · Women or people with diabetes may have symptoms that are not usual, such as feeling nervous or worried for no reason, weak for no reason, or tired.  · Take all medicines only as told by your doctor.  · You should eat a heart-healthy diet and follow an exercise program.  This information is not intended to replace advice given to you by your health care provider. Make sure you  discuss any questions you have with your health care provider.  Document Released: 06/05/2009 Document Revised: 08/05/2019 Document Reviewed: 08/05/2019  Elsevier Patient Education © 2020 [employer]

## 2020-05-26 NOTE — TELEPHONE ENCOUNTER
----- Message from LUCIA Garcia sent at 5/26/2020  8:03 AM EDT -----  Please let her know that her A1c is improving.  Her renal function still decreased but stable. .  Please ensure she is staying hydrated. Will recheck in 3 m

## 2020-05-26 NOTE — PROGRESS NOTES
Patient Care Team:  nAnia Rico APRN as PCP - General (Nurse Practitioner)  Emmett Longoria MD as Consulting Physician (Nephrology)  Dameon Gruber OD (Optometry)  Patricio Wright MD as Consulting Physician (Pulmonary Disease)     Chief complaint: Patient is in today for a physical          Patient is a 50 y.o. female who presents for her yearly physical exam.     Chest Pain    This is a new problem. The current episode started more than 1 month ago. The onset quality is gradual. The problem occurs intermittently. The problem has been waxing and waning. The pain is present in the substernal region. The pain is mild. The quality of the pain is described as heavy, sharp, pressure and tightness. The pain does not radiate. Associated symptoms include back pain and palpitations. Pertinent negatives include no abdominal pain, claudication, cough, diaphoresis, dizziness, exertional chest pressure, fever, headaches, hemoptysis, irregular heartbeat, leg pain, lower extremity edema, malaise/fatigue, nausea, near-syncope, numbness, orthopnea, PND, shortness of breath, sputum production, syncope, vomiting or weakness. The pain is aggravated by nothing. She has tried rest for the symptoms. The treatment provided significant relief. Risk factors include lack of exercise, obesity, sedentary lifestyle and stress.   Her past medical history is significant for hypertension and thyroid problem.   Pertinent negatives for past medical history include no MI, no recent injury and no TIA.   Her family medical history is significant for CAD, heart disease, hyperlipidemia and hypertension.    she is chest pain free in office    Hypertension-chronic.  Currently on amlodipine.  She denies any headaches, dizziness, palpitations, dyspnea, claudication, or edema.  She has been having some chest pain.    Diabetes-type II.  Well-controlled with current medications.  Last hemoglobin A1c 6.53    Hypothyroid-chronic.  Currently on  levothyroxine.  Last TSH was stable.    Hyperlipidemia-chronic.  Last lipids stable except for triglycerides slightly elevated.  Currently on statin therapy compliant with dosing.  Depression-chronic.  Currently on fluoxetine.  Feels like symptoms are well controlled.    Low back pain. Ongoing for years. Denies any injuries or trauma. Had imaging about 2-3 years ago and thinks it showed some arthritis. Does not want medication for pain.       Health maintenance/lifestyle:  Immunization History   Administered Date(s) Administered   • Flu Vaccine Quad PF >36MO 03/27/2018, 04/04/2019   • Hepatitis A 04/04/2019   • Influenza Quad Vaccine (Inpatient) 02/23/2016   • Influenza TIV (IM) 01/07/2011   • Influenza, Unspecified 10/25/2019   • Pneumococcal Conjugate 13-Valent (PCV13) 05/26/2020   • Pneumococcal Polysaccharide (PPSV23) 04/04/2019   • Tdap 01/07/2011, 08/03/2017   • flucelvax quad pfs =>4 YRS 10/25/2019       Pap: due  Mammogram:scheduled for June 8th  Menses:   No LMP recorded. (Menstrual status: Tubal ligation).  Colonoscopy: 6/2019    Diet: eating healthier  Exercise: some walking    PHQ-2 Depression Screening  Little interest or pleasure in doing things? 0   Feeling down, depressed, or hopeless? 0   PHQ-2 Total Score 0     Recent Results (from the past 336 hour(s))   Hemoglobin A1c    Collection Time: 05/21/20 11:52 AM   Result Value Ref Range    Hemoglobin A1C 6.53 (H) 4.80 - 5.60 %   Comprehensive Metabolic Panel    Collection Time: 05/21/20 11:52 AM   Result Value Ref Range    Glucose 106 (H) 65 - 99 mg/dL    BUN 17 6 - 20 mg/dL    Creatinine 1.44 (H) 0.57 - 1.00 mg/dL    Sodium 138 136 - 145 mmol/L    Potassium 3.8 3.5 - 5.2 mmol/L    Chloride 105 98 - 107 mmol/L    CO2 20.8 (L) 22.0 - 29.0 mmol/L    Calcium 9.4 8.6 - 10.5 mg/dL    Total Protein 7.3 6.0 - 8.5 g/dL    Albumin 4.10 3.50 - 5.20 g/dL    ALT (SGPT) 13 1 - 33 U/L    AST (SGOT) 9 1 - 32 U/L    Alkaline Phosphatase 89 39 - 117 U/L    Total  Bilirubin 0.4 0.2 - 1.2 mg/dL    eGFR  African Amer 47 (L) >60 mL/min/1.73    Globulin 3.2 gm/dL    A/G Ratio 1.3 g/dL    BUN/Creatinine Ratio 11.8 7.0 - 25.0    Anion Gap 12.2 5.0 - 15.0 mmol/L   Lipid Panel    Collection Time: 05/21/20 11:52 AM   Result Value Ref Range    Total Cholesterol 111 0 - 200 mg/dL    Triglycerides 265 (H) 0 - 150 mg/dL    HDL Cholesterol 30 (L) 40 - 60 mg/dL    LDL Cholesterol  28 0 - 100 mg/dL    VLDL Cholesterol 53 (H) 5 - 40 mg/dL    LDL/HDL Ratio 0.93    TSH    Collection Time: 05/21/20 11:52 AM   Result Value Ref Range    TSH 2.270 0.270 - 4.200 uIU/mL   Hepatitis C Antibody    Collection Time: 05/21/20 11:52 AM   Result Value Ref Range    Hepatitis C Ab Non-Reactive Non-Reactive   CBC (No Diff)    Collection Time: 05/26/20  9:34 AM   Result Value Ref Range    WBC 9.32 3.40 - 10.80 10*3/mm3    RBC 4.75 3.77 - 5.28 10*6/mm3    Hemoglobin 12.7 12.0 - 15.9 g/dL    Hematocrit 38.0 34.0 - 46.6 %    MCV 80.0 79.0 - 97.0 fL    MCH 26.7 26.6 - 33.0 pg    MCHC 33.4 31.5 - 35.7 g/dL    RDW 14.5 12.3 - 15.4 %    RDW-SD 42.6 37.0 - 54.0 fl    MPV 11.5 6.0 - 12.0 fL    Platelets 313 140 - 450 10*3/mm3   POCT urinalysis dipstick, automated    Collection Time: 05/26/20  3:51 PM   Result Value Ref Range    Color Yellow Yellow, Straw, Dark Yellow, Yanira    Clarity, UA Clear Clear    Specific Gravity  1.020 1.005 - 1.030    pH, Urine 6.0 5.0 - 8.0    Leukocytes Negative Negative    Nitrite, UA Negative Negative    Protein, POC Trace (A) Negative mg/dL    Glucose, UA Negative Negative, 1000 mg/dL (3+) mg/dL    Ketones, UA Negative Negative    Urobilinogen, UA Normal Normal    Bilirubin Negative Negative    Blood, UA Negative Negative           Review of Systems   Constitutional: Negative for appetite change, chills, diaphoresis, fatigue, fever and malaise/fatigue.   HENT: Negative for congestion, ear pain, rhinorrhea, sinus pressure and sore throat.    Eyes: Negative for itching and visual  disturbance.   Respiratory: Negative for cough, hemoptysis, sputum production, shortness of breath and wheezing.    Cardiovascular: Positive for chest pain and palpitations. Negative for orthopnea, claudication, leg swelling, syncope, PND and near-syncope.   Gastrointestinal: Negative for abdominal pain, constipation, diarrhea, nausea and vomiting.   Endocrine: Negative for cold intolerance and heat intolerance.   Genitourinary: Negative for difficulty urinating, dysuria and hematuria.   Musculoskeletal: Positive for arthralgias and back pain. Negative for joint swelling and myalgias.   Skin: Negative for rash and wound.   Allergic/Immunologic: Negative for environmental allergies and food allergies.   Neurological: Negative for dizziness, weakness, numbness and headaches.   Hematological: Negative for adenopathy. Does not bruise/bleed easily.   Psychiatric/Behavioral: Negative for dysphoric mood and sleep disturbance. The patient is not nervous/anxious.            History  Past Medical History:   Diagnosis Date   • Abdominal pain 4/12/2019   • JACKIE (acute kidney injury) (CMS/[facility]) 4/12/2019   • Arthritis    • Asthma    • Diabetes mellitus (CMS/[facility])    • Disease of thyroid gland    • Epigastric pain 4/12/2019    Added automatically from request for surgery [id number]   • Hypertension    • Kidney infection    • Mental disorder    • Non-compliance 7/24/2019   • Sleep apnea    • Volume depletion 4/14/2019      Past Surgical History:   Procedure Laterality Date   • ENDOSCOPY N/A 4/15/2019    Procedure: ESOPHAGOGASTRODUODENOSCOPY;  Surgeon: Jude Clinton MD;  Location: [facility] ENDOSCOPY;  Service: Gastroenterology   • TUBAL ABDOMINAL LIGATION     • VAGINAL DELIVERY      x4   • WISDOM TOOTH EXTRACTION        Allergies   Allergen Reactions   • Anaprox [Naproxen] Other (See Comments)     CHRONIC KIDNEY DISEASE   • Celebrex [Celecoxib] Other (See Comments)     CHRONIC KIDNEY DISEASE   • Flector [Diclofenac Epolamine] Other (See  Comments)     CHRONIC KIDNEY DISEASE   • Motrin [Ibuprofen] Other (See Comments)     CHRONIC KIDNEY DISEASE   • Voltaren [Diclofenac Sodium] Other (See Comments)     CHRONIC KIDNEY DISEASE   • Lisinopril Swelling      Family History   Problem Relation Age of Onset   • Hypertension Mother    • Diabetes Father    • Kidney disease Father    • Heart disease Father    • Hypertension Father    • Sleep apnea Father    • Cancer Maternal Aunt    • Arthritis Paternal Aunt    • Cancer Maternal Grandmother         BREAST   • Breast cancer Maternal Grandmother    • Kidney disease Paternal Grandfather    • Kidney disease Paternal Uncle      Social History     Socioeconomic History   • Marital status: Single     Spouse name: Not on file   • Number of children: Not on file   • Years of education: Not on file   • Highest education level: Not on file   Tobacco Use   • Smoking status: Current Every Day Smoker     Packs/day: 1.00     Types: Cigarettes   • Smokeless tobacco: Never Used   Substance and Sexual Activity   • Alcohol use: No     Frequency: Never   • Drug use: No   • Sexual activity: Never        Current Outpatient Medications:   •  albuterol (PROVENTIL) (2.5 MG/3ML) 0.083% nebulizer solution, , Disp: , Rfl:   •  albuterol sulfate  (90 Base) MCG/ACT inhaler, Inhale 2 puffs Every 6 (Six) Hours As Needed for Wheezing or Shortness of Air., Disp: 1 inhaler, Rfl: 0  •  amLODIPine (NORVASC) 10 MG tablet, Take 1 tablet by mouth Daily., Disp: 30 tablet, Rfl: 0  •  aspirin 325 MG tablet, Take 325 mg by mouth Daily., Disp: , Rfl:   •  atorvastatin (LIPITOR) 40 MG tablet, Take 1 tablet by mouth Daily., Disp: 30 tablet, Rfl: 0  •  diclofenac (VOLTAREN) 1 % gel gel, Apply 4 g topically to the appropriate area as directed 4 (Four) Times a Day. Small amount to affected area, Disp: 300 g, Rfl: 3  •  FLUoxetine (PROzac) 40 MG capsule, Take 1 capsule by mouth Daily., Disp: 30 capsule, Rfl: 0  •  levothyroxine (SYNTHROID, LEVOTHROID) 50  "MCG tablet, Take 1 tablet by mouth Daily., Disp: 30 tablet, Rfl: 0  •  pantoprazole (PROTONIX) 40 MG EC tablet, Take 1 tablet by mouth Daily., Disp: 30 tablet, Rfl: 0  •  SITagliptin (JANUVIA) 50 MG tablet, Take 1 tablet by mouth Daily., Disp: 30 tablet, Rfl: 0  •  nitroglycerin (Nitrostat) 0.4 MG SL tablet, Place 1 tablet under the tongue Every 5 (Five) Minutes As Needed for Chest Pain. Up to 3 doses in 15 minutes. Call 911 if pain not resolved, Disp: 20 tablet, Rfl: 0   Immunization History   Administered Date(s) Administered   • Flu Vaccine Quad PF >36MO 03/27/2018, 04/04/2019   • Hepatitis A 04/04/2019   • Influenza Quad Vaccine (Inpatient) 02/23/2016   • Influenza TIV (IM) 01/07/2011   • Influenza, Unspecified 10/25/2019   • Pneumococcal Conjugate 13-Valent (PCV13) 05/26/2020   • Pneumococcal Polysaccharide (PPSV23) 04/04/2019   • Tdap 01/07/2011, 08/03/2017   • flucelvax quad pfs =>4 YRS 10/25/2019                 /96   Pulse 101   Temp 97.8 °F (36.6 °C)   Resp 18   Ht 164.5 cm (64.75\")   Wt 110 kg (242 lb)   SpO2 97%   BMI 40.58 kg/m²       Physical Exam   Constitutional: She is oriented to person, place, and time. She appears well-developed and well-nourished. No distress.   HENT:   Head: Normocephalic and atraumatic.   Right Ear: External ear normal.   Left Ear: External ear normal.   Mouth/Throat: Oropharynx is clear and moist. No oropharyngeal exudate.   Eyes: Conjunctivae and EOM are normal. Right eye exhibits no discharge. Left eye exhibits no discharge. No scleral icterus.   Neck: Normal range of motion. Neck supple. No JVD present. Carotid bruit is not present. No tracheal deviation present. No thyromegaly present.   Cardiovascular: Normal rate, regular rhythm, normal heart sounds and intact distal pulses. Exam reveals no friction rub.   No murmur heard.  Pulmonary/Chest: Effort normal and breath sounds normal. No respiratory distress. She has no wheezes. She has no rales. She exhibits no " tenderness. Right breast exhibits no inverted nipple, no mass, no nipple discharge, no skin change and no tenderness. Left breast exhibits no inverted nipple, no mass, no nipple discharge, no skin change and no tenderness. Breasts are symmetrical.   Abdominal: Soft. Normal appearance and bowel sounds are normal. She exhibits no distension and no mass. There is no hepatosplenomegaly. There is no abdominal tenderness. No hernia.   Genitourinary:    Vagina and uterus normal.   There is no rash, tenderness or lesion on the right labia. There is no rash, tenderness or lesion on the left labia. Cervix exhibits no motion tenderness, no discharge and no friability. Right adnexum displays no mass, no tenderness and no fullness. Left adnexum displays no mass, no tenderness and no fullness.   Musculoskeletal: Normal range of motion. No tenderness, deformity or edema.      Lumbar back: Normal.    Freida had a diabetic foot exam performed today.   During the foot exam she had a monofilament test performed.  Skin Integrity  -  She has callous right foot.She has callous left foot..  Lymphadenopathy:     She has no cervical adenopathy.   Neurological: She is alert and oriented to person, place, and time. She has normal reflexes. She displays normal reflexes.   Skin: Skin is warm and dry. No rash noted. She is not diaphoretic. No erythema. No pallor.   Psychiatric: She has a normal mood and affect. Her behavior is normal. Thought content normal.   Nursing note and vitals reviewed.          ECG 12 Lead  Date/Time: 5/26/2020 4:44 PM  Performed by: Annia Rico APRN  Authorized by: Annia Rico APRN   Comparison: compared with previous ECG from 11/4/2019  Comparison to previous ECG: New depression in v4,v5  Rhythm: sinus rhythm  Rate: normal  BPM: 87  ST Depression: V3, V4 and V5  QRS axis: normal    Clinical impression: abnormal EKG  Comments: Pr 195  qrs 102  Qt/qtc 376/420  p-r-t  27  48  64                      Diagnoses  and all orders for this visit:    Annual physical exam  -     Liquid-based Pap Smear, Screening  -     POCT urinalysis dipstick, automated    Need for pneumococcal vaccine  -     Pneumococcal Conjugate Vaccine 13-Valent All    Chronic midline low back pain with bilateral sciatica  -     XR Spine Lumbar 2 or 3 View; Future  She can use Tens unit OTC for low back pain. Could also try heat or ice with precautions to avoid burns    Chest pain, unspecified type  -     ECG 12 Lead  -     Stress test; Future  -     Adult Transthoracic Echo Complete W/ Cont if Necessary Per Protocol; Future  -     nitroglycerin (Nitrostat) 0.4 MG SL tablet; Place 1 tablet under the tongue Every 5 (Five) Minutes As Needed for Chest Pain. Up to 3 doses in 15 minutes. Call 911 if pain not resolved  -     Cancel: Ambulatory Referral [facility] Clinic  -     Ambulatory Referral [facility] Clinic  Pt denies current CP.  Long discussion regarding the changes on her EKG which could be consistent with myocardial ischemia.. She does not wish to have further evaluated at hospital at this time. I will send in nitro for her to use PRN if CP returns and get her set up for urgent CP clinic eval. I have encouraged her to go to ER if she has return of CP.   Should also continue ASA.       Abnormal EKG  -     Ambulatory Referral Chest Pain Clinic    Mixed hyperlipidemia  Stable with a slight elevation in triglycerides.  Encouraged decrease carbs and sugars.  Encourage routine physical activity.    Essential hypertension  BP stable.  Tinea amlodipine.    Morbid obesity with BMI of 40.0-44.9, adult (CMS/[facility])    Controlled type 2 diabetes mellitus without complication, without long-term current use of insulin (CMS/[facility])  Hemoglobin A1c stable.  Continue Januvia.  Recurrent major depressive disorder, in partial remission (CMS/[facility])  Patient asymptomatic.  PHQ 9 scores of 0 in office.       Labs reviewed with pt in office.   Immunizations and screenings  UTD as  noted in HPI, prevnar updated  Counseling: diet and exercise for weight reduction.    Follow up: Return in about 2 weeks (around 6/9/2020).   TO ER IF CP RETURNS.   Plan of care discussed with pt. They verbalized understanding and agreement.     Annia Rico, APRN   5/26/2020   17:02              * Please note that portions of this note were completed with a voice recognition program. Efforts were made to edit the dictation but occasionally words are erroneously transcribed.

## 2020-05-27 ENCOUNTER — TELEPHONE (OUTPATIENT)
Dept: INTERNAL MEDICINE | Facility: CLINIC | Age: 50
End: 2020-05-27

## 2020-05-27 NOTE — TELEPHONE ENCOUNTER
PATIENT IS CALLING TO SAY THAT SHE IS READY TO STOP SMOKING AND WOULD LIKE A PRESCRIPTION FOR CHANTIX.     [id number] - CONFIRMED.     PT CALL BACK [phone]    PLEASE CALL PATIENT AND ADVISE

## 2020-05-28 NOTE — PROGRESS NOTES
[facility]  [facility]      05/29/2020         Freida Martinez  [address] [facility] [address]  [unfilled]    1970    Annia Rico APRN    Freida Martinez is a 50 y.o. female.      Subjective:     Chief Complaint:  Establish Care and Chest Pain       HPI   50-year-old female with history of hypertension, hypothyroidism, tobacco abuse, CKD stage III, type 2 diabetes, hyperlipidemia, asthma and sleep apnea who presents today for evaluation of chest pain at the request of LUCIA Johnson.  Patient reports over a one-month history of intermittent substernal chest pain.  She describes the pain as mild.  She describes as heavy, sharp, pressure and tightness.  Denies radiating pain.  She does note associated palpitations.  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     She reports chest pain has been going on for a long time and has had several ED visits for this. She does not recall having a stress test.  She reports an intermittent tightness in her chest that usually happens at rest and when she feels palpitations.  She is not aware of any triggering or relieving factors.  She reports that she try taking nitroglycerin and this made her shortness of breath worse and did not help her chest pain.  She has a Lexiscan stress test and echo scheduled for 6/23    Cardiac risk: Hypertension, hyperlipidemia, type 2 diabetes, obesity, sedentary lifestyle, tobacco abuse    Patient Active Problem List   Diagnosis   • Essential hypertension   • Hypothyroidism   • Tobacco abuse   • CKD (chronic kidney disease) stage 3, GFR 30-59 ml/min (CMS/[facility])   • Long QT interval   • Controlled type 2 diabetes mellitus without complication, without long-term current use of insulin (CMS/[facility])   • Morbid obesity with BMI of 40.0-44.9, adult (CMS/[facility])   • Recurrent major depressive disorder, in partial remission (CMS/[facility])    • Mixed hyperlipidemia   • Snoring   • GINNY (obstructive sleep apnea)       Past Medical History:   Diagnosis Date   • Abdominal pain 4/12/2019   • JACKIE (acute kidney injury) (CMS/HCC) 4/12/2019   • Arthritis    • Asthma    • Diabetes mellitus (CMS/[facility])    • Disease of thyroid gland    • Epigastric pain 4/12/2019    Added automatically from request for surgery [id number]   • Hypertension    • Kidney infection    • Mental disorder    • Non-compliance 7/24/2019   • Sleep apnea    • Volume depletion 4/14/2019       Past Surgical History:   Procedure Laterality Date   • ENDOSCOPY N/A 4/15/2019    Procedure: ESOPHAGOGASTRODUODENOSCOPY;  Surgeon: Jude Clinton MD;  Location: [facility] ENDOSCOPY;  Service: Gastroenterology   • TUBAL ABDOMINAL LIGATION     • VAGINAL DELIVERY      x4   • WISDOM TOOTH EXTRACTION         Family History   Problem Relation Age of Onset   • Hypertension Mother    • Diabetes Father    • Kidney disease Father    • Heart disease Father    • Hypertension Father    • Sleep apnea Father    • Cancer Maternal Aunt    • Arthritis Paternal Aunt    • Cancer Maternal Grandmother         BREAST   • Breast cancer Maternal Grandmother    • Kidney disease Paternal Grandfather    • Kidney disease Paternal Uncle        Social History     Socioeconomic History   • Marital status: Single     Spouse name: Not on file   • Number of children: Not on file   • Years of education: Not on file   • Highest education level: Not on file   Tobacco Use   • Smoking status: Current Every Day Smoker     Packs/day: 1.00     Types: Cigarettes   • Smokeless tobacco: Never Used   Substance and Sexual Activity   • Alcohol use: No     Frequency: Never   • Drug use: No   • Sexual activity: Never   Social History Narrative    Caffeine: 1 cup coffee daily, 1 1/2 of 64 ounce cup of tea       Allergies   Allergen Reactions   • Anaprox [Naproxen] Other (See Comments)     CHRONIC KIDNEY DISEASE   • Celebrex [Celecoxib] Other (See Comments)      CHRONIC KIDNEY DISEASE   • Flector [Diclofenac Epolamine] Other (See Comments)     CHRONIC KIDNEY DISEASE   • Motrin [Ibuprofen] Other (See Comments)     CHRONIC KIDNEY DISEASE   • Voltaren [Diclofenac Sodium] Other (See Comments)     CHRONIC KIDNEY DISEASE   • Lisinopril Swelling         Current Outpatient Medications:   •  albuterol (PROVENTIL) (2.5 MG/3ML) 0.083% nebulizer solution, , Disp: , Rfl:   •  albuterol sulfate  (90 Base) MCG/ACT inhaler, Inhale 2 puffs Every 6 (Six) Hours As Needed for Wheezing or Shortness of Air., Disp: 1 inhaler, Rfl: 0  •  amLODIPine (NORVASC) 10 MG tablet, Take 1 tablet by mouth Daily., Disp: 30 tablet, Rfl: 0  •  aspirin 325 MG tablet, Take 325 mg by mouth Daily., Disp: , Rfl:   •  atorvastatin (LIPITOR) 40 MG tablet, Take 1 tablet by mouth Daily., Disp: 30 tablet, Rfl: 0  •  diclofenac (VOLTAREN) 1 % gel gel, Apply 4 g topically to the appropriate area as directed 4 (Four) Times a Day. Small amount to affected area, Disp: 300 g, Rfl: 3  •  FLUoxetine (PROzac) 40 MG capsule, Take 1 capsule by mouth Daily., Disp: 30 capsule, Rfl: 0  •  levothyroxine (SYNTHROID, LEVOTHROID) 50 MCG tablet, Take 1 tablet by mouth Daily., Disp: 30 tablet, Rfl: 0  •  nitroglycerin (Nitrostat) 0.4 MG SL tablet, Place 1 tablet under the tongue Every 5 (Five) Minutes As Needed for Chest Pain. Up to 3 doses in 15 minutes. Call 911 if pain not resolved, Disp: 20 tablet, Rfl: 0  •  pantoprazole (PROTONIX) 40 MG EC tablet, Take 1 tablet by mouth Daily., Disp: 30 tablet, Rfl: 0  •  SITagliptin (JANUVIA) 50 MG tablet, Take 1 tablet by mouth Daily., Disp: 30 tablet, Rfl: 0    The following portions of the patient's history were reviewed today and updated as appropriate: allergies, current medications, past family history, past medical history, past social history, past surgical history and problem list     Review of Systems   Constitution: Positive for malaise/fatigue. Negative for chills and fever.  "  HENT: Negative.    Eyes: Negative.    Cardiovascular: Positive for chest pain, dyspnea on exertion, orthopnea and palpitations. Negative for claudication, cyanosis, irregular heartbeat, leg swelling, near-syncope, paroxysmal nocturnal dyspnea and syncope.   Respiratory: Negative for cough, shortness of breath and snoring.    Endocrine: Negative.    Hematologic/Lymphatic: Does not bruise/bleed easily.   Skin: Negative for poor wound healing.   Musculoskeletal: Positive for muscle weakness.   Gastrointestinal: Negative for abdominal pain, heartburn, hematemesis, melena, nausea and vomiting.   Genitourinary: Negative.  Negative for hematuria.   Neurological: Positive for excessive daytime sleepiness.   Psychiatric/Behavioral: Negative.    Allergic/Immunologic: Negative.        Objective:     Vitals:    05/29/20 1256   BP: 138/100   BP Location: Left arm   Patient Position: Sitting   Cuff Size: Adult   Pulse: 93   Resp: 18   Temp: 98 °F (36.7 °C)   TempSrc: Temporal   SpO2: 99%   Weight: 109 kg (241 lb)   Height: 162.6 cm (64\")       Body mass index is 41.37 kg/m².    Physical Exam   Constitutional: She is oriented to person, place, and time. She appears well-developed and well-nourished. No distress.   HENT:   Head: Normocephalic.   Eyes: Pupils are equal, round, and reactive to light. Conjunctivae are normal.   Neck: Neck supple. No JVD present. No thyromegaly present.   Cardiovascular: Normal rate, regular rhythm, normal heart sounds and intact distal pulses. Exam reveals no gallop and no friction rub.   No murmur heard.  Pulmonary/Chest: Effort normal and breath sounds normal. No respiratory distress. She has no wheezes. She has no rales. She exhibits tenderness.   Abdominal: Soft. Bowel sounds are normal.   Musculoskeletal: Normal range of motion. She exhibits no edema.   Neurological: She is alert and oriented to person, place, and time.   Skin: Skin is warm and dry.   Psychiatric: She has a normal mood and " affect. Her behavior is normal. Thought content normal.   Vitals reviewed.      Lab and Diagnostic Review:  Results for orders placed or performed in visit on 05/26/20   POCT urinalysis dipstick, automated   Result Value Ref Range    Color Yellow Yellow, Straw, Dark Yellow, Yanira    Clarity, UA Clear Clear    Specific Gravity  1.020 1.005 - 1.030    pH, Urine 6.0 5.0 - 8.0    Leukocytes Negative Negative    Nitrite, UA Negative Negative    Protein, POC Trace (A) Negative mg/dL    Glucose, UA Negative Negative, 1000 mg/dL (3+) mg/dL    Ketones, UA Negative Negative    Urobilinogen, UA Normal Normal    Bilirubin Negative Negative    Blood, UA Negative Negative     EKG today shows NSR with 1st degree AVB, possible inferior infarct    Assessment and Plan:   1. Chest pain, unspecified type  Although symptoms are atypical, she has multiple ASCVD risks. Will move up her stress test and change to a stress test with PET imaging due to morbid obesity. Patient unable to walk due to chronic knee pain and MONAE   - Stress Test With Pet Myocardial Perfusion (MULTI STUDY, REST AND STRESS); Future  - ECG 12 Lead; Future    2. Abnormal EKG  Stable EKG today. Possible inferior infarct, noted on previous EKGs  - Stress Test With Pet Myocardial Perfusion (MULTI STUDY, REST AND STRESS); Future    3. Mixed hyperlipidemia  Continue statin  - Stress Test With Pet Myocardial Perfusion (MULTI STUDY, REST AND STRESS); Future    4. Essential hypertension  Mildly elevated today  Encouraged monitoring at home and call distantly greater than 130/90  - Stress Test With Pet Myocardial Perfusion (MULTI STUDY, REST AND STRESS); Future    5. Controlled type 2 diabetes mellitus without complication, without long-term current use of insulin (CMS/[facility])  Lab Results   Component Value Date    HGBA1C 6.53 (H) 05/21/2020     - Stress Test With Pet Myocardial Perfusion (MULTI STUDY, REST AND STRESS); Future    6. Tobacco abuse  - Stress Test With Pet Myocardial  Perfusion (MULTI STUDY, REST AND STRESS); Future    7. Palpitations  - Holter Monitor - 72 Hour Up To 21 Days; Future  - Stress Test With Pet Myocardial Perfusion (MULTI STUDY, REST AND STRESS); Future    8. MONAE  Echo ordered by PCP    RV in 6 weeks    It has been a pleasure to participate in the care of this patient.  Patient was instructed to call the [facility] with any questions, concerns, or worsening symptoms.    *Please note that portions of this note were completed with a voice recognition program. Efforts were made to edit the dictations, but occasionally words are mistranscribed.

## 2020-05-29 ENCOUNTER — OFFICE VISIT (OUTPATIENT)
Dept: CARDIOLOGY | Facility: HOSPITAL | Age: 50
End: 2020-05-29

## 2020-05-29 ENCOUNTER — HOSPITAL ENCOUNTER (OUTPATIENT)
Dept: CARDIOLOGY | Facility: HOSPITAL | Age: 50
Discharge: HOME | End: 2020-05-29
Admitting: NURSE PRACTITIONER

## 2020-05-29 ENCOUNTER — HOSPITAL ENCOUNTER (OUTPATIENT)
Dept: CARDIOLOGY | Facility: HOSPITAL | Age: 50
Discharge: HOME | End: 2020-05-29

## 2020-05-29 VITALS
DIASTOLIC BLOOD PRESSURE: 100 MMHG | HEART RATE: 93 BPM | RESPIRATION RATE: 18 BRPM | TEMPERATURE: 98 F | BODY MASS INDEX: 41.15 KG/M2 | SYSTOLIC BLOOD PRESSURE: 138 MMHG | WEIGHT: 241 LBS | OXYGEN SATURATION: 99 % | HEIGHT: 64 IN

## 2020-05-29 DIAGNOSIS — R94.31 ABNORMAL EKG: ICD-10-CM

## 2020-05-29 DIAGNOSIS — R06.09 DOE (DYSPNEA ON EXERTION): ICD-10-CM

## 2020-05-29 DIAGNOSIS — E11.9 CONTROLLED TYPE 2 DIABETES MELLITUS WITHOUT COMPLICATION, WITHOUT LONG-TERM CURRENT USE OF INSULIN (HCC): ICD-10-CM

## 2020-05-29 DIAGNOSIS — R00.2 PALPITATIONS: ICD-10-CM

## 2020-05-29 DIAGNOSIS — Z72.0 TOBACCO ABUSE: ICD-10-CM

## 2020-05-29 DIAGNOSIS — I10 ESSENTIAL HYPERTENSION: ICD-10-CM

## 2020-05-29 DIAGNOSIS — R07.9 CHEST PAIN, UNSPECIFIED TYPE: Primary | ICD-10-CM

## 2020-05-29 DIAGNOSIS — E78.2 MIXED HYPERLIPIDEMIA: ICD-10-CM

## 2020-05-29 DIAGNOSIS — R07.9 CHEST PAIN, UNSPECIFIED TYPE: ICD-10-CM

## 2020-05-29 PROCEDURE — 99214 OFFICE O/P EST MOD 30 MIN: CPT | Performed by: NURSE PRACTITIONER

## 2020-05-29 PROCEDURE — 0296T HC EXT ECG > 48HR TO 21 DAY RCRD W/CONECT INTL RCRD: CPT

## 2020-05-29 NOTE — PROGRESS NOTES
[facility] Heart Monitor Documentation    Freida Martinez  1970  [id number]  05/29/20    Homa LUCIA Moya    [] ZIO XT Patch  Model [id number] Prescribed for N/A Days    · Serial Number: (N + 9 Digits) N   · Apply-By Date on Box:   · USPS Tracking Number:   · USPS Tracking        [] [employer] Body[employer]ian MINI PLUS Mobile Cardiac Telemetry  Model BGMINIPLUS Prescribed for  Days    · Serial Number: (BGM + 7 Digits) BGM  · Shipped-By Date on Box:   · UPS Tracking Number: [id number] Tracking      [x] [employer] BodyGuardian MINI Holter Monitor  Model BGMINIEL Prescribed for 14 Days    · Serial Number: (7 Digits) [id number]  · Shipped-By Date on Box: 05/22/2020  · UPS Tracking Number: [id number]  · UPS Tracking        This monitor was applied to the patient's chest and checked for proper functioning.  Ms. Freida Martinez was instructed in the proper use of this monitor.  She was given the opportunity to ask questions and left the office with the device 's instruction manual.    Cyndi Arroyo CMA, 1:22 PM, 05/29/20                  [facility]    Answers for HPI/ROS submitted by the patient on 5/27/2020   What is the primary reason for your visit?: Other  Please describe your symptoms.: Chest pain and palptations  Have you had these symptoms before?: Yes  How long have you been having these symptoms?: Greater than 2 weeks  Please list any medications you are currently taking for this condition.: Nitroglycerine  Please describe any probable cause for these symptoms. : When laying down

## 2020-06-01 ENCOUNTER — TELEPHONE (OUTPATIENT)
Dept: CARDIOLOGY | Facility: HOSPITAL | Age: 50
End: 2020-06-01

## 2020-06-06 ENCOUNTER — HOSPITAL ENCOUNTER (EMERGENCY)
Facility: HOSPITAL | Age: 50
Discharge: LEFT BEFORE DISCHARGE | End: 2020-06-06

## 2020-06-06 VITALS
DIASTOLIC BLOOD PRESSURE: 118 MMHG | TEMPERATURE: 98.2 F | OXYGEN SATURATION: 95 % | HEART RATE: 96 BPM | RESPIRATION RATE: 18 BRPM | SYSTOLIC BLOOD PRESSURE: 147 MMHG

## 2020-06-06 PROCEDURE — 93005 ELECTROCARDIOGRAM TRACING: CPT

## 2020-06-06 PROCEDURE — 99211 OFF/OP EST MAY X REQ PHY/QHP: CPT

## 2020-06-08 ENCOUNTER — HOSPITAL ENCOUNTER (OUTPATIENT)
Dept: ULTRASOUND IMAGING | Facility: HOSPITAL | Age: 50
Discharge: HOME | End: 2020-06-08

## 2020-06-08 ENCOUNTER — HOSPITAL ENCOUNTER (OUTPATIENT)
Dept: CARDIOLOGY | Facility: HOSPITAL | Age: 50
Discharge: HOME | End: 2020-06-08

## 2020-06-08 ENCOUNTER — HOSPITAL ENCOUNTER (OUTPATIENT)
Dept: MAMMOGRAPHY | Facility: HOSPITAL | Age: 50
Discharge: HOME | End: 2020-06-08
Admitting: NURSE PRACTITIONER

## 2020-06-08 VITALS
DIASTOLIC BLOOD PRESSURE: 101 MMHG | WEIGHT: 240.3 LBS | OXYGEN SATURATION: 97 % | SYSTOLIC BLOOD PRESSURE: 133 MMHG | BODY MASS INDEX: 41.03 KG/M2 | HEIGHT: 64 IN | HEART RATE: 89 BPM

## 2020-06-08 DIAGNOSIS — R94.31 ABNORMAL EKG: ICD-10-CM

## 2020-06-08 DIAGNOSIS — N63.0 BREAST LUMP: ICD-10-CM

## 2020-06-08 DIAGNOSIS — Z72.0 TOBACCO ABUSE: ICD-10-CM

## 2020-06-08 DIAGNOSIS — R07.9 CHEST PAIN, UNSPECIFIED TYPE: ICD-10-CM

## 2020-06-08 DIAGNOSIS — I10 ESSENTIAL HYPERTENSION: ICD-10-CM

## 2020-06-08 DIAGNOSIS — E11.9 CONTROLLED TYPE 2 DIABETES MELLITUS WITHOUT COMPLICATION, WITHOUT LONG-TERM CURRENT USE OF INSULIN (HCC): ICD-10-CM

## 2020-06-08 DIAGNOSIS — E78.2 MIXED HYPERLIPIDEMIA: ICD-10-CM

## 2020-06-08 DIAGNOSIS — R00.2 PALPITATIONS: ICD-10-CM

## 2020-06-08 LAB
BH CV STRESS BP STAGE 1: NORMAL
BH CV STRESS BP STAGE 3: NORMAL
BH CV STRESS COMMENTS STAGE 1: NORMAL
BH CV STRESS DOSE REGADENOSON STAGE 1: 0.4
BH CV STRESS DURATION MIN STAGE 1: 1
BH CV STRESS DURATION MIN STAGE 2: 1
BH CV STRESS DURATION MIN STAGE 3: 1
BH CV STRESS DURATION MIN STAGE 4: 1
BH CV STRESS DURATION SEC STAGE 1: 0
BH CV STRESS DURATION SEC STAGE 2: 0
BH CV STRESS DURATION SEC STAGE 3: 0
BH CV STRESS DURATION SEC STAGE 4: 0
BH CV STRESS HR STAGE 1: 111
BH CV STRESS HR STAGE 2: 122
BH CV STRESS HR STAGE 3: 120
BH CV STRESS HR STAGE 4: 118
BH CV STRESS O2 STAGE 1: 98
BH CV STRESS O2 STAGE 2: 98
BH CV STRESS O2 STAGE 3: 97
BH CV STRESS O2 STAGE 4: 97
BH CV STRESS PROTOCOL 1: NORMAL
BH CV STRESS RECOVERY BP: NORMAL MMHG
BH CV STRESS RECOVERY HR: 111 BPM
BH CV STRESS RECOVERY O2: 96 %
BH CV STRESS STAGE 1: 1
BH CV STRESS STAGE 2: 2
BH CV STRESS STAGE 3: 3
BH CV STRESS STAGE 4: 4
MAXIMAL PREDICTED HEART RATE: 170 BPM
PERCENT MAX PREDICTED HR: 71.76 %
STRESS BASELINE BP: NORMAL MMHG
STRESS BASELINE HR: 87 BPM
STRESS O2 SAT REST: 96 %
STRESS PERCENT HR: 84 %
STRESS POST ESTIMATED WORKLOAD: 1 METS
STRESS POST EXERCISE DUR MIN: 4 MIN
STRESS POST EXERCISE DUR SEC: 0 SEC
STRESS POST O2 SAT PEAK: 97 %
STRESS POST PEAK BP: NORMAL MMHG
STRESS POST PEAK HR: 122 BPM
STRESS TARGET HR: 145 BPM

## 2020-06-08 PROCEDURE — 78492 MYOCRD IMG PET MLT RST&STRS: CPT | Performed by: INTERNAL MEDICINE

## 2020-06-08 PROCEDURE — A9555 RB82 RUBIDIUM: HCPCS | Performed by: NURSE PRACTITIONER

## 2020-06-08 PROCEDURE — 25010000002 REGADENOSON 0.4 MG/5ML SOLUTION: Performed by: NURSE PRACTITIONER

## 2020-06-08 PROCEDURE — 76642 ULTRASOUND BREAST LIMITED: CPT | Performed by: RADIOLOGY

## 2020-06-08 PROCEDURE — 93018 CV STRESS TEST I&R ONLY: CPT | Performed by: INTERNAL MEDICINE

## 2020-06-08 PROCEDURE — 76642 ULTRASOUND BREAST LIMITED: CPT

## 2020-06-08 PROCEDURE — 77066 DX MAMMO INCL CAD BI: CPT

## 2020-06-08 PROCEDURE — G0279 TOMOSYNTHESIS, MAMMO: HCPCS

## 2020-06-08 PROCEDURE — 77062 BREAST TOMOSYNTHESIS BI: CPT | Performed by: RADIOLOGY

## 2020-06-08 PROCEDURE — 77066 DX MAMMO INCL CAD BI: CPT | Performed by: RADIOLOGY

## 2020-06-08 PROCEDURE — 0 RUBIDIUM CHLORIDE: Performed by: NURSE PRACTITIONER

## 2020-06-08 PROCEDURE — 93017 CV STRESS TEST TRACING ONLY: CPT

## 2020-06-08 PROCEDURE — 78492 MYOCRD IMG PET MLT RST&STRS: CPT

## 2020-06-08 RX ADMIN — RUBIDIUM CHLORIDE RB-82 1 DOSE: 150 INJECTION, SOLUTION INTRAVENOUS at 14:12

## 2020-06-08 RX ADMIN — REGADENOSON 0.4 MG: 0.08 INJECTION, SOLUTION INTRAVENOUS at 14:14

## 2020-06-08 RX ADMIN — RUBIDIUM CHLORIDE RB-82 1 DOSE: 150 INJECTION, SOLUTION INTRAVENOUS at 14:01

## 2020-06-09 ENCOUNTER — OFFICE VISIT (OUTPATIENT)
Dept: INTERNAL MEDICINE | Facility: CLINIC | Age: 50
End: 2020-06-09

## 2020-06-09 VITALS
OXYGEN SATURATION: 97 % | HEIGHT: 64 IN | TEMPERATURE: 97.3 F | WEIGHT: 239 LBS | HEART RATE: 95 BPM | RESPIRATION RATE: 18 BRPM | SYSTOLIC BLOOD PRESSURE: 130 MMHG | BODY MASS INDEX: 40.8 KG/M2 | DIASTOLIC BLOOD PRESSURE: 98 MMHG

## 2020-06-09 DIAGNOSIS — R07.9 CHEST PAIN, UNSPECIFIED TYPE: Primary | ICD-10-CM

## 2020-06-09 DIAGNOSIS — I10 ESSENTIAL HYPERTENSION: ICD-10-CM

## 2020-06-09 DIAGNOSIS — Z72.0 TOBACCO ABUSE: ICD-10-CM

## 2020-06-09 PROCEDURE — 99214 OFFICE O/P EST MOD 30 MIN: CPT | Performed by: NURSE PRACTITIONER

## 2020-06-09 PROCEDURE — 99406 BEHAV CHNG SMOKING 3-10 MIN: CPT | Performed by: NURSE PRACTITIONER

## 2020-06-09 NOTE — PROGRESS NOTES
Subjective   Freida Martinez is a 50 y.o. female.     Chief Complaint   Patient presents with   • Nicotine Dependence     would like to stop smoking   • Chest Pain   • Hypertension       History of Present Illness     Follow up for chest pain-patient reports that she went to the emergency department on 6/6/2028 with acute chest pain after taking 2 nitroglycerin tablets.  She reports that she had an EKG but never saw a provider and left because she was angry the way she was being treated.  She has not been having chest pain since then.  Her EKG in my office on 5/26/2020 showed some ST depression in V3 V4 and V5 with possible old infarct.  She has seen LUCIA Goins with the heart and vascular center.  Ann set her up to do a PET CT scan which was normal.  Patient still has echocardiogram scheduled on 623 and is currently wearing a 14-day Holter.  She is chest pain-free in the office today.  She has follow-up with Ann 7/10/2020.    Hypertension-chronic.  Stable.  She denies headaches, dizziness, palpitations (although she previously was having these), current chest pain, TIA or CVA symptoms, or edema.    Tobacco abuse- currently smoking 1 ppd. Ready to quit. Wants to try chantix. Has had in the past .    The following portions of the patient's history were reviewed and updated as appropriate: allergies, current medications, past family history, past medical history, past social history, past surgical history and problem list.        Review of Systems   Constitutional: Negative for appetite change, diaphoresis, fatigue, fever, unexpected weight gain and unexpected weight loss.   HENT: Negative.    Eyes: Negative for pain and visual disturbance.   Respiratory: Negative for cough, chest tightness, shortness of breath and wheezing.    Cardiovascular: Positive for chest pain ( None currently) and palpitations ( None currently). Negative for leg swelling.   Gastrointestinal: Negative for abdominal  distention, abdominal pain, constipation, diarrhea, nausea and vomiting.   Endocrine: Negative for polydipsia, polyphagia and polyuria.   Genitourinary: Negative.  Negative for difficulty urinating.   Musculoskeletal: Negative.  Negative for arthralgias and myalgias.   Skin: Negative for color change and rash.   Neurological: Negative for dizziness, facial asymmetry, weakness, light-headedness, numbness, headache and confusion.   Hematological: Does not bruise/bleed easily.   Psychiatric/Behavioral: Negative for sleep disturbance.           Outpatient Medications Marked as Taking for the 6/9/20 encounter (Office Visit) with Annia Rico APRN   Medication Sig Dispense Refill   • albuterol (PROVENTIL) (2.5 MG/3ML) 0.083% nebulizer solution      • albuterol sulfate  (90 Base) MCG/ACT inhaler Inhale 2 puffs Every 6 (Six) Hours As Needed for Wheezing or Shortness of Air. 1 inhaler 0   • amLODIPine (NORVASC) 10 MG tablet Take 1 tablet by mouth Daily. 30 tablet 0   • aspirin 325 MG tablet Take 325 mg by mouth Daily.     • atorvastatin (LIPITOR) 40 MG tablet Take 1 tablet by mouth Daily. 30 tablet 0   • diclofenac (VOLTAREN) 1 % gel gel Apply 4 g topically to the appropriate area as directed 4 (Four) Times a Day. Small amount to affected area 300 g 3   • FLUoxetine (PROzac) 40 MG capsule Take 1 capsule by mouth Daily. 30 capsule 0   • levothyroxine (SYNTHROID, LEVOTHROID) 50 MCG tablet Take 1 tablet by mouth Daily. 30 tablet 0   • nitroglycerin (Nitrostat) 0.4 MG SL tablet Place 1 tablet under the tongue Every 5 (Five) Minutes As Needed for Chest Pain. Up to 3 doses in 15 minutes. Call 911 if pain not resolved 20 tablet 0   • pantoprazole (PROTONIX) 40 MG EC tablet Take 1 tablet by mouth Daily. 30 tablet 0   • SITagliptin (JANUVIA) 50 MG tablet Take 1 tablet by mouth Daily. 30 tablet 0           Objective   Physical Exam   Constitutional: She is oriented to person, place, and time. She appears well-developed and  "well-nourished. No distress.   HENT:   Head: Normocephalic and atraumatic.   Eyes: Pupils are equal, round, and reactive to light. Conjunctivae are normal.   Neck: Normal range of motion. No JVD present.   Cardiovascular: Normal rate, regular rhythm, normal heart sounds and intact distal pulses.   No murmur heard.  Pulses:       Dorsalis pedis pulses are 2+ on the right side, and 2+ on the left side.        Posterior tibial pulses are 2+ on the right side, and 2+ on the left side.   Pulmonary/Chest: Effort normal and breath sounds normal. No respiratory distress. She exhibits no tenderness.   Abdominal: Soft. Normal appearance and bowel sounds are normal. She exhibits no distension. There is no tenderness.   Musculoskeletal: Normal range of motion. She exhibits no edema.   Neurological: She is alert and oriented to person, place, and time. Coordination normal.   Skin: Skin is warm and dry. No rash noted. She is not diaphoretic. No erythema. No pallor.   Psychiatric: She has a normal mood and affect. Her behavior is normal. Judgment and thought content normal.   Nursing note and vitals reviewed.      Vitals:    06/09/20 1237   BP: 130/98   Pulse: 95   Resp: 18   Temp: 97.3 °F (36.3 °C)   SpO2: 97%   Weight: 108 kg (239 lb)   Height: 162.6 cm (64.02\")     Body mass index is 41 kg/m².        Assessment/Plan       Diagnoses and all orders for this visit:         Chest pain, unspecified type  Patient chest pain-free in office.  She will finish wearing her Holter and will complete her echo as planned.  She had a negative PET CT.   Essential hypertension  Well-controlled.  Goal blood pressure less than 130/90.   Tobacco abuse (Primary)  -     varenicline (Chantix Starting Month Pak) 0.5 MG X 11 & 1 MG X 42 tablet; Take 0.5 mg one daily on days 1-3 and and 0.5 mg twice daily on days 4-7.Then 1 mg twice daily for a total of 12 weeks.  Dispense: 53 tablet; Refill: 0  Chantix as directed.  Adverse effects and precautions " discussed with patient at length.  She wishes to proceed with the Chantix.  I advised the patient of the risks in continuing to smoke.  I provided this patient with smoking cessation educational materials and discussed how to quit smoking and the patient has expressed willingness to quit. Time spent counseling was 4 minutes.  Quit date: 6/12/2020    Return in about 2 months (around 8/9/2020) for chronic condition follow up and keep FU with cardiology .    I discussed my findings,recommendations, and plan of care was with the patient. They verbalized understanding and agreement.  Patient was encouraged to keep me informed of any acute changes, lack of improvement, or any new concerning symptoms.       * Please note that portions of this note were completed with a voice recognition program. Efforts were made to edit the dictation but occasionally words are erroneously transcribed.

## 2020-06-19 PROCEDURE — 0298T PR EXT ECG > 48HR TO 21 DAY REVIEW AND INTERPRETATN: CPT | Performed by: INTERNAL MEDICINE

## 2020-06-23 ENCOUNTER — HOSPITAL ENCOUNTER (OUTPATIENT)
Dept: CARDIOLOGY | Facility: HOSPITAL | Age: 50
Discharge: HOME | End: 2020-06-23
Admitting: NURSE PRACTITIONER

## 2020-06-23 ENCOUNTER — APPOINTMENT (OUTPATIENT)
Dept: CARDIOLOGY | Facility: HOSPITAL | Age: 50
End: 2020-06-23

## 2020-06-23 DIAGNOSIS — R07.9 CHEST PAIN, UNSPECIFIED TYPE: ICD-10-CM

## 2020-06-23 PROCEDURE — 93306 TTE W/DOPPLER COMPLETE: CPT

## 2020-06-23 PROCEDURE — 93306 TTE W/DOPPLER COMPLETE: CPT | Performed by: INTERNAL MEDICINE

## 2020-06-25 DIAGNOSIS — E03.9 ACQUIRED HYPOTHYROIDISM: ICD-10-CM

## 2020-06-25 DIAGNOSIS — E78.2 MIXED HYPERLIPIDEMIA: ICD-10-CM

## 2020-06-25 DIAGNOSIS — I10 ESSENTIAL HYPERTENSION: ICD-10-CM

## 2020-06-25 DIAGNOSIS — K21.9 CHRONIC GERD: ICD-10-CM

## 2020-06-25 DIAGNOSIS — F33.0 MILD EPISODE OF RECURRENT MAJOR DEPRESSIVE DISORDER (HCC): ICD-10-CM

## 2020-06-25 DIAGNOSIS — E11.65 UNCONTROLLED TYPE 2 DIABETES MELLITUS WITH HYPERGLYCEMIA (HCC): ICD-10-CM

## 2020-06-25 RX ORDER — FLUOXETINE HYDROCHLORIDE 40 MG/1
40 CAPSULE ORAL DAILY
Qty: 30 CAPSULE | Refills: 0 | Status: SHIPPED | OUTPATIENT
Start: 2020-06-25 | End: 2020-07-27 | Stop reason: SDUPTHER

## 2020-06-25 RX ORDER — PANTOPRAZOLE SODIUM 40 MG/1
40 TABLET, DELAYED RELEASE ORAL DAILY
Qty: 30 TABLET | Refills: 0 | Status: SHIPPED | OUTPATIENT
Start: 2020-06-25 | End: 2020-07-27 | Stop reason: SDUPTHER

## 2020-06-25 RX ORDER — ALBUTEROL SULFATE 90 UG/1
2 AEROSOL, METERED RESPIRATORY (INHALATION) EVERY 6 HOURS PRN
Qty: 1 INHALER | Refills: 0 | Status: SHIPPED | OUTPATIENT
Start: 2020-06-25 | End: 2020-10-19

## 2020-06-25 RX ORDER — ATORVASTATIN CALCIUM 40 MG/1
40 TABLET, FILM COATED ORAL DAILY
Qty: 30 TABLET | Refills: 0 | Status: SHIPPED | OUTPATIENT
Start: 2020-06-25 | End: 2020-07-27 | Stop reason: SDUPTHER

## 2020-06-25 RX ORDER — LEVOTHYROXINE SODIUM 0.05 MG/1
50 TABLET ORAL DAILY
Qty: 30 TABLET | Refills: 0 | Status: SHIPPED | OUTPATIENT
Start: 2020-06-25 | End: 2020-07-27 | Stop reason: SDUPTHER

## 2020-06-25 RX ORDER — AMLODIPINE BESYLATE 10 MG/1
10 TABLET ORAL DAILY
Qty: 30 TABLET | Refills: 0 | Status: SHIPPED | OUTPATIENT
Start: 2020-06-25 | End: 2020-07-27 | Stop reason: SDUPTHER

## 2020-06-26 LAB
BH CV ECHO MEAS - AO ROOT AREA (BSA CORRECTED): 1.5
BH CV ECHO MEAS - AO ROOT AREA: 7.8 CM^2
BH CV ECHO MEAS - AO ROOT DIAM: 3.2 CM
BH CV ECHO MEAS - BSA(HAYCOCK): 2.3 M^2
BH CV ECHO MEAS - BSA: 2.1 M^2
BH CV ECHO MEAS - BZI_BMI: 41 KILOGRAMS/M^2
BH CV ECHO MEAS - BZI_METRIC_HEIGHT: 162.6 CM
BH CV ECHO MEAS - BZI_METRIC_WEIGHT: 108.4 KG
BH CV ECHO MEAS - EDV(CUBED): 74.5 ML
BH CV ECHO MEAS - EDV(TEICH): 78.9 ML
BH CV ECHO MEAS - EF(CUBED): 77.7 %
BH CV ECHO MEAS - EF(TEICH): 70.2 %
BH CV ECHO MEAS - ESV(CUBED): 16.6 ML
BH CV ECHO MEAS - ESV(TEICH): 23.5 ML
BH CV ECHO MEAS - FS: 39.3 %
BH CV ECHO MEAS - IVS/LVPW: 1
BH CV ECHO MEAS - IVSD: 1.2 CM
BH CV ECHO MEAS - LA DIMENSION: 3.2 CM
BH CV ECHO MEAS - LA/AO: 1
BH CV ECHO MEAS - LAD MAJOR: 4.2 CM
BH CV ECHO MEAS - LAT PEAK E' VEL: 4.8 CM/SEC
BH CV ECHO MEAS - LATERAL E/E' RATIO: 12.7
BH CV ECHO MEAS - LV MASS(C)D: 174.3 GRAMS
BH CV ECHO MEAS - LV MASS(C)DI: 82.6 GRAMS/M^2
BH CV ECHO MEAS - LVIDD: 4.2 CM
BH CV ECHO MEAS - LVIDS: 2.6 CM
BH CV ECHO MEAS - LVPWD: 1.2 CM
BH CV ECHO MEAS - MED PEAK E' VEL: 6.9 CM/SEC
BH CV ECHO MEAS - MEDIAL E/E' RATIO: 8.9
BH CV ECHO MEAS - MV A MAX VEL: 60.7 CM/SEC
BH CV ECHO MEAS - MV DEC SLOPE: 497.3 CM/SEC^2
BH CV ECHO MEAS - MV DEC TIME: 0.19 SEC
BH CV ECHO MEAS - MV E MAX VEL: 63.2 CM/SEC
BH CV ECHO MEAS - MV E/A: 1
BH CV ECHO MEAS - MV P1/2T MAX VEL: 82.8 CM/SEC
BH CV ECHO MEAS - MV P1/2T: 48.8 MSEC
BH CV ECHO MEAS - MVA P1/2T LCG: 2.7 CM^2
BH CV ECHO MEAS - MVA(P1/2T): 4.5 CM^2
BH CV ECHO MEAS - PA ACC SLOPE: 468.2 CM/SEC^2
BH CV ECHO MEAS - PA ACC TIME: 0.18 SEC
BH CV ECHO MEAS - PA PR(ACCEL): -0.22 MMHG
BH CV ECHO MEAS - RAP SYSTOLE: 3 MMHG
BH CV ECHO MEAS - RV MAX PG: 1.5 MMHG
BH CV ECHO MEAS - RV V1 MAX: 61.7 CM/SEC
BH CV ECHO MEAS - RVSP: 13 MMHG
BH CV ECHO MEAS - SI(CUBED): 27.4 ML/M^2
BH CV ECHO MEAS - SI(TEICH): 26.3 ML/M^2
BH CV ECHO MEAS - SV(CUBED): 57.9 ML
BH CV ECHO MEAS - SV(TEICH): 55.4 ML
BH CV ECHO MEAS - TAPSE (>1.6): 1.6 CM2
BH CV ECHO MEAS - TR MAX PG: 10 MMHG
BH CV ECHO MEAS - TR MAX VEL: 157.1 CM/SEC
BH CV ECHO MEASUREMENTS AVERAGE E/E' RATIO: 10.8
BH CV XLRA - RV BASE: 3.5 CM
BH CV XLRA - RV LENGTH: 6.5 CM
BH CV XLRA - RV MID: 2.8 CM
BH CV XLRA - TDI S': 13.4 CM/SEC
LEFT ATRIUM VOLUME INDEX: 14.7 ML/M^2
LEFT ATRIUM VOLUME: 31 ML
MAXIMAL PREDICTED HEART RATE: 170 BPM
STRESS TARGET HR: 145 BPM

## 2020-06-30 RX ORDER — ALBUTEROL SULFATE 90 UG/1
2 AEROSOL, METERED RESPIRATORY (INHALATION) EVERY 6 HOURS PRN
Qty: 1 INHALER | Refills: 0 | OUTPATIENT
Start: 2020-06-30

## 2020-07-03 DIAGNOSIS — Z72.0 TOBACCO ABUSE: ICD-10-CM

## 2020-07-06 RX ORDER — VARENICLINE TARTRATE 1 MG/1
1 TABLET, FILM COATED ORAL 2 TIMES DAILY
Qty: 60 TABLET | Refills: 2 | Status: SHIPPED | OUTPATIENT
Start: 2020-07-06 | End: 2020-09-03 | Stop reason: SDUPTHER

## 2020-07-08 ENCOUNTER — HOSPITAL ENCOUNTER (OUTPATIENT)
Dept: GENERAL RADIOLOGY | Facility: HOSPITAL | Age: 50
Discharge: HOME | End: 2020-07-08
Admitting: NURSE PRACTITIONER

## 2020-07-08 DIAGNOSIS — M54.41 CHRONIC MIDLINE LOW BACK PAIN WITH BILATERAL SCIATICA: ICD-10-CM

## 2020-07-08 DIAGNOSIS — G89.29 CHRONIC MIDLINE LOW BACK PAIN WITH BILATERAL SCIATICA: ICD-10-CM

## 2020-07-08 DIAGNOSIS — M54.42 CHRONIC MIDLINE LOW BACK PAIN WITH BILATERAL SCIATICA: ICD-10-CM

## 2020-07-08 PROCEDURE — 72100 X-RAY EXAM L-S SPINE 2/3 VWS: CPT

## 2020-07-09 NOTE — PROGRESS NOTES
[facility]  [facility]      07/10/2020         Freida Martinez  [address] [facility] [address]  [unfilled]    1970    Annia Rico APRN    Freida Martinez is a 50 y.o. female.      Subjective:     Chief Complaint:  Follow-up and Palpitations       HPI   50-year-old female with history of hypertension, hypothyroidism, tobacco abuse, CKD stage III, type 2 diabetes, hyperlipidemia, asthma and sleep apnea who presents today to follow up on palpitations and chest pain. She has had chest pain for years with multiple ED visits.  Patient had a normal stress PET.  Echo was unremarkable except for mild LVH. She wore an extended Holter monitor which showed symptomatic PVCs with a 2.4% burden.  Otherwise unremarkable.  She reports significant improvement in her symptoms.  She attributes this to quitting smoking.  Chest pain has improved significantly.  Palpitations are still present but less.  She says that she monitors her blood pressure but is not sure of the readings.  She does recall that most of her diastolic blood pressures are 100 or more.  She is started exercising at home with no exertional chest pain.  Cardiac risk: Hypertension, hyperlipidemia, type 2 diabetes, obesity, sedentary lifestyle, tobacco abuse    Patient Active Problem List   Diagnosis   • Essential hypertension   • Hypothyroidism   • Tobacco abuse   • CKD (chronic kidney disease) stage 3, GFR 30-59 ml/min (CMS/[facility])   • Long QT interval   • Controlled type 2 diabetes mellitus without complication, without long-term current use of insulin (CMS/[facility])   • Morbid obesity with BMI of 40.0-44.9, adult (CMS/[facility])   • Recurrent major depressive disorder, in partial remission (CMS/[facility])   • Mixed hyperlipidemia   • Snoring   • GINNY (obstructive sleep apnea)       Past Medical History:   Diagnosis Date   • Abdominal pain 4/12/2019   • JACKIE (acute kidney injury) (CMS/[facility]) 4/12/2019   • Arthritis    • Asthma    •  Breast injury    • Diabetes mellitus (CMS/HCC)    • Disease of thyroid gland    • Epigastric pain 4/12/2019    Added automatically from request for surgery [id number]   • Hypertension    • Kidney infection    • Mental disorder    • Non-compliance 7/24/2019   • Sleep apnea    • Volume depletion 4/14/2019       Past Surgical History:   Procedure Laterality Date   • ENDOSCOPY N/A 4/15/2019    Procedure: ESOPHAGOGASTRODUODENOSCOPY;  Surgeon: Jude Clinton MD;  Location: [facility] ENDOSCOPY;  Service: Gastroenterology   • TUBAL ABDOMINAL LIGATION     • VAGINAL DELIVERY      x4   • WISDOM TOOTH EXTRACTION         Family History   Problem Relation Age of Onset   • Hypertension Mother    • Diabetes Father    • Kidney disease Father    • Heart disease Father    • Hypertension Father    • Sleep apnea Father    • Cancer Maternal Aunt    • Arthritis Paternal Aunt    • Cancer Maternal Grandmother         BREAST   • Breast cancer Maternal Grandmother    • Kidney disease Paternal Grandfather    • Kidney disease Paternal Uncle        Social History     Socioeconomic History   • Marital status: Single     Spouse name: Not on file   • Number of children: Not on file   • Years of education: Not on file   • Highest education level: Not on file   Tobacco Use   • Smoking status: Current Every Day Smoker     Packs/day: 1.00     Types: Cigarettes   • Smokeless tobacco: Never Used   Substance and Sexual Activity   • Alcohol use: No     Frequency: Never   • Drug use: No   • Sexual activity: Never   Social History Narrative    Caffeine: 1 cup coffee daily, 1 1/2 of 64 ounce cup of tea       Allergies   Allergen Reactions   • Anaprox [Naproxen] Other (See Comments)     CHRONIC KIDNEY DISEASE   • Celebrex [Celecoxib] Other (See Comments)     CHRONIC KIDNEY DISEASE   • Flector [Diclofenac Epolamine] Other (See Comments)     CHRONIC KIDNEY DISEASE   • Motrin [Ibuprofen] Other (See Comments)     CHRONIC KIDNEY DISEASE   • Voltaren [Diclofenac Sodium]  Other (See Comments)     CHRONIC KIDNEY DISEASE   • Lisinopril Swelling         Current Outpatient Medications:   •  albuterol (PROVENTIL) (2.5 MG/3ML) 0.083% nebulizer solution, , Disp: , Rfl:   •  albuterol sulfate  (90 Base) MCG/ACT inhaler, Inhale 2 puffs Every 6 (Six) Hours As Needed for Wheezing or Shortness of Air., Disp: 1 inhaler, Rfl: 0  •  amLODIPine (NORVASC) 10 MG tablet, Take 1 tablet by mouth Daily., Disp: 30 tablet, Rfl: 0  •  aspirin 325 MG tablet, Take 325 mg by mouth Daily., Disp: , Rfl:   •  atorvastatin (LIPITOR) 40 MG tablet, Take 1 tablet by mouth Daily., Disp: 30 tablet, Rfl: 0  •  diclofenac (VOLTAREN) 1 % gel gel, Apply 4 g topically to the appropriate area as directed 4 (Four) Times a Day. Small amount to affected area, Disp: 300 g, Rfl: 3  •  FLUoxetine (PROzac) 40 MG capsule, Take 1 capsule by mouth Daily., Disp: 30 capsule, Rfl: 0  •  levothyroxine (SYNTHROID, LEVOTHROID) 50 MCG tablet, Take 1 tablet by mouth Daily., Disp: 30 tablet, Rfl: 0  •  nitroglycerin (Nitrostat) 0.4 MG SL tablet, Place 1 tablet under the tongue Every 5 (Five) Minutes As Needed for Chest Pain. Up to 3 doses in 15 minutes. Call 911 if pain not resolved, Disp: 20 tablet, Rfl: 0  •  pantoprazole (PROTONIX) 40 MG EC tablet, Take 1 tablet by mouth Daily., Disp: 30 tablet, Rfl: 0  •  SITagliptin (JANUVIA) 50 MG tablet, Take 1 tablet by mouth Daily., Disp: 30 tablet, Rfl: 0  •  varenicline (Chantix Continuing Month Trae) 1 MG tablet, Take 1 tablet by mouth 2 (Two) Times a Day., Disp: 60 tablet, Rfl: 2    The following portions of the patient's history were reviewed today and updated as appropriate: allergies, current medications, past family history, past medical history, past social history, past surgical history and problem list     Review of Systems   Constitution: Positive for malaise/fatigue. Negative for chills and fever.   HENT: Negative.    Eyes: Negative.    Cardiovascular: Positive for dyspnea on  "exertion, orthopnea and palpitations. Negative for chest pain, claudication, cyanosis, irregular heartbeat, leg swelling, near-syncope, paroxysmal nocturnal dyspnea and syncope.   Respiratory: Negative for cough, shortness of breath and snoring.    Endocrine: Negative.    Hematologic/Lymphatic: Does not bruise/bleed easily.   Skin: Negative for poor wound healing.   Musculoskeletal: Positive for muscle weakness.   Gastrointestinal: Negative for abdominal pain, heartburn, hematemesis, melena, nausea and vomiting.   Genitourinary: Negative.  Negative for hematuria.   Neurological: Positive for excessive daytime sleepiness.   Psychiatric/Behavioral: Negative.    Allergic/Immunologic: Negative.        Objective:     Vitals:    07/10/20 0943   BP: (!) 168/107   BP Location: Left arm   Patient Position: Sitting   Cuff Size: Large Adult   Pulse: 78   Resp: 20   Temp: 97.3 °F (36.3 °C)   TempSrc: Temporal   SpO2: 98%   Weight: 108 kg (238 lb 8 oz)   Height: 162.6 cm (64\")       Body mass index is 40.94 kg/m².    Physical Exam   Constitutional: She is oriented to person, place, and time. She appears well-developed and well-nourished. No distress.   HENT:   Head: Normocephalic.   Eyes: Pupils are equal, round, and reactive to light. Conjunctivae are normal.   Neck: Neck supple. No JVD present. No thyromegaly present.   Cardiovascular: Normal rate, regular rhythm, normal heart sounds and intact distal pulses. Exam reveals no gallop and no friction rub.   No murmur heard.  Pulmonary/Chest: Effort normal and breath sounds normal. No respiratory distress. She has no wheezes. She has no rales. She exhibits tenderness.   Abdominal: Soft. Bowel sounds are normal.   Musculoskeletal: Normal range of motion. She exhibits no edema.   Neurological: She is alert and oriented to person, place, and time.   Skin: Skin is warm and dry.   Psychiatric: She has a normal mood and affect. Her behavior is normal. Thought content normal.   Vitals " reviewed.      Lab and Diagnostic Review:  Stress PET 6/8/20  · The rest EKG is normal.  · The patient underwent a cardiac PET myocardial perfusion study per [facility] protocol.  · The patient noted rest pain prior to the test and pain throughout the test. Diagnostic EKG changes were not seen.  · The rest blood pressure was normal and abnormal changes during vasodilator infusion were absent.  · Myocardial perfusion images reveal normal distribution of isotope in all segments of all views both at rest and following vasodilator infusion.  · The rest ejection fraction is 53% and stress ejection fraction is 62%.  · This is a NORMAL cardiac PET CT.  Echo 6/26/20  · Left ventricular systolic function is normal. Estimated EF appears to be in the range of 66 - 70%.  · Left ventricular wall thickness is consistent with mild concentric hypertrophy.  · Mild MAC is present. No mitral valve regurgitation is present  · Trace tricuspid valve regurgitation is present. Estimated right ventricular systolic pressure from tricuspid regurgitation is normal (<35 mmHg).     Extended holter monitor x 10 days Avg HR 84bpm, HR ranged from 62-138bpm. 2.4% PVC burden, <1% PAC burden.  Triggered events were normal sinus rhythm and PVCs    Assessment and Plan:   1. Chest pain, atypical  Chest pain has been chronic with some reproducible tenderness.  She had a normal stress PET.  She does have multiple ASCVD risks and I have discussed cardiac prevention with her.  I have also discussed limitations of stress test and if she continues to have symptoms to let me know.    Recommend decreasing aspirin 325 mg to aspirin 81 mg for cardiac prevention.  Continue statin therapy, glycemic and blood pressure control as well as diet and exercise    2. Symptomatic PVCs  2.4% burden.  Due to elevated blood pressure we will start her on carvedilol 3.125mg twice daily and uptitrate if needed      3. Essential hypertension  Advised patient to continue monitoring daily  at home and to call if blood pressure consistently greater than 130/80.  Start carvedilol 3.125 mg twice daily.    Return visit in 4 weeks            It has been a pleasure to participate in the care of this patient.  Patient was instructed to call the [facility] with any questions, concerns, or worsening symptoms.    *Please note that portions of this note were completed with a voice recognition program. Efforts were made to edit the dictations, but occasionally words are mistranscribed.

## 2020-07-10 ENCOUNTER — OFFICE VISIT (OUTPATIENT)
Dept: CARDIOLOGY | Facility: HOSPITAL | Age: 50
End: 2020-07-10

## 2020-07-10 VITALS
OXYGEN SATURATION: 98 % | SYSTOLIC BLOOD PRESSURE: 168 MMHG | HEIGHT: 64 IN | TEMPERATURE: 97.3 F | DIASTOLIC BLOOD PRESSURE: 107 MMHG | WEIGHT: 238.5 LBS | RESPIRATION RATE: 20 BRPM | HEART RATE: 78 BPM | BODY MASS INDEX: 40.72 KG/M2

## 2020-07-10 DIAGNOSIS — R07.9 CHEST PAIN, UNSPECIFIED TYPE: ICD-10-CM

## 2020-07-10 DIAGNOSIS — I49.3 SYMPTOMATIC PVCS: Primary | ICD-10-CM

## 2020-07-10 DIAGNOSIS — I10 ESSENTIAL HYPERTENSION: ICD-10-CM

## 2020-07-10 PROCEDURE — 99214 OFFICE O/P EST MOD 30 MIN: CPT | Performed by: NURSE PRACTITIONER

## 2020-07-10 RX ORDER — ASPIRIN 81 MG/1
81 TABLET ORAL DAILY
Start: 2020-07-10

## 2020-07-10 RX ORDER — CARVEDILOL 3.12 MG/1
3.12 TABLET ORAL 2 TIMES DAILY
Qty: 60 TABLET | Refills: 3 | Status: SHIPPED | OUTPATIENT
Start: 2020-07-10 | End: 2020-08-07 | Stop reason: SDUPTHER

## 2020-07-13 ENCOUNTER — TELEPHONE (OUTPATIENT)
Dept: INTERNAL MEDICINE | Facility: CLINIC | Age: 50
End: 2020-07-13

## 2020-07-13 NOTE — TELEPHONE ENCOUNTER
----- Message from LUCIA Garcia sent at 7/10/2020  6:08 PM EDT -----  Please let her know her lumbar XR was ok except some mild spurring. No acute findings

## 2020-07-13 NOTE — TELEPHONE ENCOUNTER
PN of results. She stated understanding.  She says it has been hurting the last few days. She will see you at her next appointment next month

## 2020-07-27 DIAGNOSIS — I10 ESSENTIAL HYPERTENSION: ICD-10-CM

## 2020-07-27 DIAGNOSIS — F33.0 MILD EPISODE OF RECURRENT MAJOR DEPRESSIVE DISORDER (HCC): ICD-10-CM

## 2020-07-27 DIAGNOSIS — E11.65 UNCONTROLLED TYPE 2 DIABETES MELLITUS WITH HYPERGLYCEMIA (HCC): ICD-10-CM

## 2020-07-27 DIAGNOSIS — K21.9 CHRONIC GERD: ICD-10-CM

## 2020-07-27 DIAGNOSIS — E03.9 ACQUIRED HYPOTHYROIDISM: ICD-10-CM

## 2020-07-27 DIAGNOSIS — E78.2 MIXED HYPERLIPIDEMIA: ICD-10-CM

## 2020-07-27 RX ORDER — AMLODIPINE BESYLATE 10 MG/1
10 TABLET ORAL DAILY
Qty: 15 TABLET | Refills: 0 | Status: SHIPPED | OUTPATIENT
Start: 2020-07-27 | End: 2020-08-13 | Stop reason: SDUPTHER

## 2020-07-27 RX ORDER — FLUOXETINE HYDROCHLORIDE 40 MG/1
40 CAPSULE ORAL DAILY
Qty: 15 CAPSULE | Refills: 0 | Status: SHIPPED | OUTPATIENT
Start: 2020-07-27 | End: 2020-08-13 | Stop reason: SDUPTHER

## 2020-07-27 RX ORDER — ATORVASTATIN CALCIUM 40 MG/1
40 TABLET, FILM COATED ORAL DAILY
Qty: 15 TABLET | Refills: 0 | Status: SHIPPED | OUTPATIENT
Start: 2020-07-27 | End: 2020-08-13 | Stop reason: SDUPTHER

## 2020-07-27 RX ORDER — PANTOPRAZOLE SODIUM 40 MG/1
40 TABLET, DELAYED RELEASE ORAL DAILY
Qty: 15 TABLET | Refills: 0 | Status: SHIPPED | OUTPATIENT
Start: 2020-07-27 | End: 2020-08-13 | Stop reason: SDUPTHER

## 2020-07-27 RX ORDER — LEVOTHYROXINE SODIUM 0.05 MG/1
50 TABLET ORAL DAILY
Qty: 15 TABLET | Refills: 0 | Status: SHIPPED | OUTPATIENT
Start: 2020-07-27 | End: 2020-08-13 | Stop reason: SDUPTHER

## 2020-07-30 ENCOUNTER — TELEMEDICINE (OUTPATIENT)
Dept: SLEEP MEDICINE | Facility: HOSPITAL | Age: 50
End: 2020-07-30

## 2020-07-30 VITALS — HEIGHT: 64 IN | WEIGHT: 238 LBS | BODY MASS INDEX: 40.63 KG/M2

## 2020-07-30 DIAGNOSIS — G47.33 OBSTRUCTIVE SLEEP APNEA, ADULT: Primary | ICD-10-CM

## 2020-07-30 PROCEDURE — 99442 PR PHYS/QHP TELEPHONE EVALUATION 11-20 MIN: CPT | Performed by: NURSE PRACTITIONER

## 2020-07-30 NOTE — PROGRESS NOTES
"    Chief Complaint:   Chief Complaint   Patient presents with   • Follow-up       HPI:    Freida Martinez is a 50 y.o. female here for follow-up of sleep apnea.  Patient was last seen 10/15/2019.  Patient states she is doing \"great with CPAP.\"  Patient is sleeping anywhere from 5 to 10 hours nightly and does feel refreshed upon awakening.  It does take patient 5 minutes to go to sleep and she no longer gets up in the night.  Patient has an Sigel score of 3/24.  Patient states that this is helped huge difference in the way that she feels.  Patient and I discussed today her 90% pressure of 16.3 as well as AHI of 8.6.  I would like to increase pressure to 11 and patient does feel she can do well at this setting.  Patient overall has no concerns or complaints and wishes to continue.        Current medications are:   Current Outpatient Medications:   •  albuterol (PROVENTIL) (2.5 MG/3ML) 0.083% nebulizer solution, , Disp: , Rfl:   •  albuterol sulfate  (90 Base) MCG/ACT inhaler, Inhale 2 puffs Every 6 (Six) Hours As Needed for Wheezing or Shortness of Air., Disp: 1 inhaler, Rfl: 0  •  amLODIPine (NORVASC) 10 MG tablet, Take 1 tablet by mouth Daily., Disp: 15 tablet, Rfl: 0  •  aspirin 81 MG EC tablet, Take 1 tablet by mouth Daily., Disp: , Rfl:   •  atorvastatin (LIPITOR) 40 MG tablet, Take 1 tablet by mouth Daily., Disp: 15 tablet, Rfl: 0  •  carvedilol (COREG) 3.125 MG tablet, Take 1 tablet by mouth 2 (Two) Times a Day., Disp: 60 tablet, Rfl: 3  •  diclofenac (VOLTAREN) 1 % gel gel, Apply 4 g topically to the appropriate area as directed 4 (Four) Times a Day. Small amount to affected area, Disp: 300 g, Rfl: 3  •  FLUoxetine (PROzac) 40 MG capsule, Take 1 capsule by mouth Daily., Disp: 15 capsule, Rfl: 0  •  levothyroxine (SYNTHROID, LEVOTHROID) 50 MCG tablet, Take 1 tablet by mouth Daily., Disp: 15 tablet, Rfl: 0  •  pantoprazole (PROTONIX) 40 MG EC tablet, Take 1 tablet by mouth Daily., Disp: 15 " tablet, Rfl: 0  •  SITagliptin (JANUVIA) 50 MG tablet, Take 1 tablet by mouth Daily., Disp: 15 tablet, Rfl: 0  •  varenicline (Chantix Continuing Month Trae) 1 MG tablet, Take 1 tablet by mouth 2 (Two) Times a Day., Disp: 60 tablet, Rfl: 2.      The patient's relevant past medical, surgical, family and social history were reviewed and updated in Epic as appropriate.       Review of Systems   Eyes: Positive for visual disturbance.   Respiratory: Positive for apnea, cough, shortness of breath and wheezing.    Cardiovascular: Positive for leg swelling.   Endocrine: Positive for cold intolerance.         Objective:    Physical Exam   Constitutional: She is oriented to person, place, and time. She appears well-developed and well-nourished.   HENT:   Head: Normocephalic and atraumatic.   Class 4 airway   Pulmonary/Chest: Effort normal.   Neurological: She is alert and oriented to person, place, and time.   Psychiatric: She has a normal mood and affect. Her behavior is normal. Judgment and thought content normal.     95/97 days of use.  Greater than 4-hour use 94.890% pressure 16.3 AHI of 8.6 current settings 8-18.    ASSESSMENT/PLAN    Freida was seen today for follow-up.    Diagnoses and all orders for this visit:    Obstructive sleep apnea, adult            1. Counseled patient regarding multimodal approach with healthy nutrition, healthy sleep, regular physical activity, social activities, counseling, and medications. Encouraged to practice lateral sleep position. Avoid alcohol and sedatives close to bedtime.  2. Refill supplies x1 year.  Have now increased patient setting 11 to 20 cm H2O and I will see her back in 5 weeks to reassess.  Unable to complete visit using a video connection to the patient. A phone visit was used to complete this visits. Total time of discussion was 15 minutes.    I have reviewed the results of my evaluation and impression and discussed my recommendations in detail with the  patient.      Signed by  Diamond Henao, APRN    July 30, 2020      CC: Annia Rico, APRN          No ref. provider found

## 2020-08-03 DIAGNOSIS — Z72.0 TOBACCO ABUSE: ICD-10-CM

## 2020-08-03 RX ORDER — VARENICLINE TARTRATE 1 MG/1
1 TABLET, FILM COATED ORAL 2 TIMES DAILY
Qty: 60 TABLET | Refills: 2 | OUTPATIENT
Start: 2020-08-03

## 2020-08-06 DIAGNOSIS — Z72.0 TOBACCO ABUSE: ICD-10-CM

## 2020-08-06 RX ORDER — VARENICLINE TARTRATE 1 MG/1
1 TABLET, FILM COATED ORAL 2 TIMES DAILY
Qty: 60 TABLET | Refills: 2 | OUTPATIENT
Start: 2020-08-06

## 2020-08-06 NOTE — PROGRESS NOTES
[facility]      08/07/2020         Freida Martinez  [address] [facility] [address]  [unfilled]    1970    Annia Rico APRN    Freida Martinez is a 50 y.o. female.      Subjective:     Chief Complaint:  Hypertension and Follow-up       HPI   50-year-old female with history of hypertension, hypothyroidism, tobacco abuse, CKD stage III, type 2 diabetes, hyperlipidemia, asthma and sleep apnea who presents today to follow up on PVCs.   Patient had a normal stress PET.  Echo was unremarkable except for mild LVH. She wore an extended Holter monitor which showed symptomatic PVCs with a 2.4% burden.  Otherwise unremarkable.  Due to elevated blood pressures she was started on carvedilol 3.125 twice a day. She says she feels great.  Chest pain and palpitations have resolved.  She reports that she is monitoring her blood pressures occasionally at home and they are good but she is not sure the readings.      Cardiac risk: Hypertension, hyperlipidemia, type 2 diabetes, obesity, sedentary lifestyle, tobacco abuse    Patient Active Problem List   Diagnosis   • Essential hypertension   • Hypothyroidism   • Tobacco abuse   • CKD (chronic kidney disease) stage 3, GFR 30-59 ml/min (CMS/[facility])   • Long QT interval   • Controlled type 2 diabetes mellitus without complication, without long-term current use of insulin (CMS/[facility])   • Morbid obesity with BMI of 40.0-44.9, adult (CMS/[facility])   • Recurrent major depressive disorder, in partial remission (CMS/[facility])   • Mixed hyperlipidemia   • Snoring   • GINNY (obstructive sleep apnea)       Past Medical History:   Diagnosis Date   • Abdominal pain 4/12/2019   • JACKIE (acute kidney injury) (CMS/[facility]) 4/12/2019   • Arthritis    • Asthma    • Breast injury    • Diabetes mellitus (CMS/[facility])    • Disease of thyroid gland    • Epigastric pain 4/12/2019    Added automatically from request for surgery [id number]   • Hypertension    • Kidney  infection    • Mental disorder    • Non-compliance 7/24/2019   • Sleep apnea    • Volume depletion 4/14/2019       Past Surgical History:   Procedure Laterality Date   • ENDOSCOPY N/A 4/15/2019    Procedure: ESOPHAGOGASTRODUODENOSCOPY;  Surgeon: Jude Clinton MD;  Location: [facility] ENDOSCOPY;  Service: Gastroenterology   • TUBAL ABDOMINAL LIGATION     • VAGINAL DELIVERY      x4   • WISDOM TOOTH EXTRACTION         Family History   Problem Relation Age of Onset   • Hypertension Mother    • Diabetes Father    • Kidney disease Father    • Heart disease Father    • Hypertension Father    • Sleep apnea Father    • Cancer Maternal Aunt    • Arthritis Paternal Aunt    • Cancer Maternal Grandmother         BREAST   • Breast cancer Maternal Grandmother    • Kidney disease Paternal Grandfather    • Kidney disease Paternal Uncle        Social History     Socioeconomic History   • Marital status: Single     Spouse name: Not on file   • Number of children: Not on file   • Years of education: Not on file   • Highest education level: Not on file   Tobacco Use   • Smoking status: Current Every Day Smoker     Packs/day: 1.00     Types: Cigarettes   • Smokeless tobacco: Never Used   Substance and Sexual Activity   • Alcohol use: No     Frequency: Never   • Drug use: No   • Sexual activity: Never   Social History Narrative    Caffeine: 1 cup coffee daily, 1 1/2 of 64 ounce cup of tea       Allergies   Allergen Reactions   • Anaprox [Naproxen] Other (See Comments)     CHRONIC KIDNEY DISEASE   • Celebrex [Celecoxib] Other (See Comments)     CHRONIC KIDNEY DISEASE   • Flector [Diclofenac Epolamine] Other (See Comments)     CHRONIC KIDNEY DISEASE   • Motrin [Ibuprofen] Other (See Comments)     CHRONIC KIDNEY DISEASE   • Voltaren [Diclofenac Sodium] Other (See Comments)     CHRONIC KIDNEY DISEASE   • Lisinopril Swelling         Current Outpatient Medications:   •  albuterol (PROVENTIL) (2.5 MG/3ML) 0.083% nebulizer solution, , Disp: ,  Rfl:   •  albuterol sulfate  (90 Base) MCG/ACT inhaler, Inhale 2 puffs Every 6 (Six) Hours As Needed for Wheezing or Shortness of Air., Disp: 1 inhaler, Rfl: 0  •  amLODIPine (NORVASC) 10 MG tablet, Take 1 tablet by mouth Daily., Disp: 15 tablet, Rfl: 0  •  aspirin 81 MG EC tablet, Take 1 tablet by mouth Daily., Disp: , Rfl:   •  atorvastatin (LIPITOR) 40 MG tablet, Take 1 tablet by mouth Daily., Disp: 15 tablet, Rfl: 0  •  carvedilol (COREG) 3.125 MG tablet, Take 1 tablet by mouth 2 (Two) Times a Day., Disp: 60 tablet, Rfl: 3  •  diclofenac (VOLTAREN) 1 % gel gel, Apply 4 g topically to the appropriate area as directed 4 (Four) Times a Day. Small amount to affected area, Disp: 300 g, Rfl: 3  •  FLUoxetine (PROzac) 40 MG capsule, Take 1 capsule by mouth Daily., Disp: 15 capsule, Rfl: 0  •  levothyroxine (SYNTHROID, LEVOTHROID) 50 MCG tablet, Take 1 tablet by mouth Daily., Disp: 15 tablet, Rfl: 0  •  pantoprazole (PROTONIX) 40 MG EC tablet, Take 1 tablet by mouth Daily., Disp: 15 tablet, Rfl: 0  •  SITagliptin (JANUVIA) 50 MG tablet, Take 1 tablet by mouth Daily., Disp: 15 tablet, Rfl: 0  •  varenicline (Chantix Continuing Month Trae) 1 MG tablet, Take 1 tablet by mouth 2 (Two) Times a Day., Disp: 60 tablet, Rfl: 2    The following portions of the patient's history were reviewed today and updated as appropriate: allergies, current medications, past family history, past medical history, past social history, past surgical history and problem list     Review of Systems   Constitution: Positive for malaise/fatigue. Negative for chills and fever.   HENT: Negative.    Eyes: Negative.    Cardiovascular: Positive for dyspnea on exertion, orthopnea and palpitations. Negative for chest pain, claudication, cyanosis, irregular heartbeat, leg swelling, near-syncope, paroxysmal nocturnal dyspnea and syncope.   Respiratory: Negative for cough, shortness of breath and snoring.    Endocrine: Negative.    Hematologic/Lymphatic:  "Does not bruise/bleed easily.   Skin: Negative for poor wound healing.   Musculoskeletal: Positive for muscle weakness.   Gastrointestinal: Negative for abdominal pain, heartburn, hematemesis, melena, nausea and vomiting.   Genitourinary: Negative.  Negative for hematuria.   Neurological: Positive for excessive daytime sleepiness.   Psychiatric/Behavioral: Negative.    Allergic/Immunologic: Negative.        Objective:     Vitals:    08/07/20 0905   BP: (!) 138/108   BP Location: Left arm   Patient Position: Sitting   Cuff Size: Adult   Pulse: 83   Resp: 21   Temp: 97.8 °F (36.6 °C)   TempSrc: Temporal   SpO2: 94%   Weight: 109 kg (240 lb 6 oz)   Height: 162.6 cm (64\")       Body mass index is 41.26 kg/m².    Physical Exam   Constitutional: She is oriented to person, place, and time. She appears well-developed and well-nourished. No distress.   HENT:   Head: Normocephalic.   Eyes: Pupils are equal, round, and reactive to light. Conjunctivae are normal.   Neck: Neck supple. No JVD present. No thyromegaly present.   Cardiovascular: Normal rate, regular rhythm, normal heart sounds and intact distal pulses. Exam reveals no gallop and no friction rub.   No murmur heard.  Pulmonary/Chest: Effort normal and breath sounds normal. No respiratory distress. She has no wheezes. She has no rales. She exhibits no tenderness.   Abdominal: Soft. Bowel sounds are normal.   Musculoskeletal: Normal range of motion. She exhibits no edema.   Neurological: She is alert and oriented to person, place, and time.   Skin: Skin is warm and dry.   Psychiatric: She has a normal mood and affect. Her behavior is normal. Thought content normal.   Vitals reviewed.      Lab and Diagnostic Review:  Stress PET 6/8/20  · The rest EKG is normal.  · The patient underwent a cardiac PET myocardial perfusion study per [facility] protocol.  · The patient noted rest pain prior to the test and pain throughout the test. Diagnostic EKG changes were not seen.  · The rest " blood pressure was normal and abnormal changes during vasodilator infusion were absent.  · Myocardial perfusion images reveal normal distribution of isotope in all segments of all views both at rest and following vasodilator infusion.  · The rest ejection fraction is 53% and stress ejection fraction is 62%.  · This is a NORMAL cardiac PET CT.  Echo 6/26/20  · Left ventricular systolic function is normal. Estimated EF appears to be in the range of 66 - 70%.  · Left ventricular wall thickness is consistent with mild concentric hypertrophy.  · Mild MAC is present. No mitral valve regurgitation is present  · Trace tricuspid valve regurgitation is present. Estimated right ventricular systolic pressure from tricuspid regurgitation is normal (<35 mmHg).     Extended holter monitor x 10 days Avg HR 84bpm, HR ranged from 62-138bpm. 2.4% PVC burden, <1% PAC burden.  Triggered events were normal sinus rhythm and PVCs    Assessment and Plan:   1. Symptomatic PVCs  2.4% burden.  Significantly improved with carvedilol.  Due to elevated blood pressure and elevated resting heart rates will go ahead increased up to 6.25 mg twice a day.    2. Essential hypertension  Advised to monitor daily and bring record to next appointment.  Encouraged regular exercise.  Advised to walk 10 minutes 3 times a day after each meal.  May start with 5 minutes 3 times daily with goal of increasing to a total of 30 minutes a day    Return visit in 4 weeks            It has been a pleasure to participate in the care of this patient.  Patient was instructed to call the [facility] with any questions, concerns, or worsening symptoms.    *Please note that portions of this note were completed with a voice recognition program. Efforts were made to edit the dictations, but occasionally words are mistranscribed.

## 2020-08-07 ENCOUNTER — OFFICE VISIT (OUTPATIENT)
Dept: CARDIOLOGY | Facility: HOSPITAL | Age: 50
End: 2020-08-07

## 2020-08-07 VITALS
WEIGHT: 240.38 LBS | OXYGEN SATURATION: 94 % | RESPIRATION RATE: 21 BRPM | TEMPERATURE: 97.8 F | SYSTOLIC BLOOD PRESSURE: 138 MMHG | BODY MASS INDEX: 41.04 KG/M2 | DIASTOLIC BLOOD PRESSURE: 108 MMHG | HEIGHT: 64 IN | HEART RATE: 83 BPM

## 2020-08-07 PROCEDURE — 99213 OFFICE O/P EST LOW 20 MIN: CPT | Performed by: NURSE PRACTITIONER

## 2020-08-07 RX ORDER — CARVEDILOL 6.25 MG/1
6.25 TABLET ORAL 2 TIMES DAILY
Qty: 60 TABLET | Refills: 3 | Status: SHIPPED | OUTPATIENT
Start: 2020-08-07 | End: 2020-12-22 | Stop reason: SDUPTHER

## 2020-08-11 ENCOUNTER — LAB (OUTPATIENT)
Dept: LAB | Facility: HOSPITAL | Age: 50
End: 2020-08-11

## 2020-08-11 ENCOUNTER — OFFICE VISIT (OUTPATIENT)
Dept: INTERNAL MEDICINE | Facility: CLINIC | Age: 50
End: 2020-08-11

## 2020-08-11 VITALS
SYSTOLIC BLOOD PRESSURE: 138 MMHG | WEIGHT: 237 LBS | HEIGHT: 64 IN | DIASTOLIC BLOOD PRESSURE: 78 MMHG | RESPIRATION RATE: 22 BRPM | HEART RATE: 81 BPM | TEMPERATURE: 97.5 F | BODY MASS INDEX: 40.46 KG/M2 | OXYGEN SATURATION: 99 %

## 2020-08-11 DIAGNOSIS — E11.9 TYPE 2 DIABETES MELLITUS WITHOUT COMPLICATION, WITHOUT LONG-TERM CURRENT USE OF INSULIN (HCC): ICD-10-CM

## 2020-08-11 DIAGNOSIS — E78.2 MIXED HYPERLIPIDEMIA: ICD-10-CM

## 2020-08-11 DIAGNOSIS — G89.29 CHRONIC MIDLINE LOW BACK PAIN WITH BILATERAL SCIATICA: ICD-10-CM

## 2020-08-11 DIAGNOSIS — M54.42 CHRONIC MIDLINE LOW BACK PAIN WITH BILATERAL SCIATICA: ICD-10-CM

## 2020-08-11 DIAGNOSIS — I10 ESSENTIAL HYPERTENSION: Primary | ICD-10-CM

## 2020-08-11 DIAGNOSIS — I10 ESSENTIAL HYPERTENSION: ICD-10-CM

## 2020-08-11 DIAGNOSIS — M54.41 CHRONIC MIDLINE LOW BACK PAIN WITH BILATERAL SCIATICA: ICD-10-CM

## 2020-08-11 LAB
ALBUMIN UR-MCNC: 5.6 MG/DL
ANION GAP SERPL CALCULATED.3IONS-SCNC: 12.2 MMOL/L (ref 5–15)
BUN SERPL-MCNC: 16 MG/DL (ref 6–20)
BUN/CREAT SERPL: 13.6 (ref 7–25)
CALCIUM SPEC-SCNC: 10 MG/DL (ref 8.6–10.5)
CHLORIDE SERPL-SCNC: 105 MMOL/L (ref 98–107)
CO2 SERPL-SCNC: 21.8 MMOL/L (ref 22–29)
CREAT SERPL-MCNC: 1.18 MG/DL (ref 0.57–1)
GFR SERPL CREATININE-BSD FRML MDRD: 59 ML/MIN/1.73
GLUCOSE SERPL-MCNC: 86 MG/DL (ref 65–99)
HBA1C MFR BLD: 6.1 %
POTASSIUM SERPL-SCNC: 4.3 MMOL/L (ref 3.5–5.2)
SODIUM SERPL-SCNC: 139 MMOL/L (ref 136–145)

## 2020-08-11 PROCEDURE — 99214 OFFICE O/P EST MOD 30 MIN: CPT | Performed by: NURSE PRACTITIONER

## 2020-08-11 PROCEDURE — 82043 UR ALBUMIN QUANTITATIVE: CPT

## 2020-08-11 PROCEDURE — 83036 HEMOGLOBIN GLYCOSYLATED A1C: CPT | Performed by: NURSE PRACTITIONER

## 2020-08-11 PROCEDURE — 80048 BASIC METABOLIC PNL TOTAL CA: CPT

## 2020-08-11 NOTE — PROGRESS NOTES
Subjective   Freida Martinez is a 50 y.o. female.     Chief Complaint   Patient presents with   • Hypertension   • Hyperlipidemia   • Diabetes       History of Present Illness     HTN-chronic.  No further chest pain.Saw cards 8/7/2020, they in creased her carvedilol.  Denies headaches, dizziness, visual disturbances, chest pain, dyspnea, TIA, leg pain/claudication symptoms, and edema.     Hyperlipidemia-chronic.  Currently on atorvastatin.  Compliant with dosing denies adverse effects.  Compliance issues with exercise and diet.  Lab Results   Component Value Date    CHOL 111 05/21/2020    CHLPL 100 04/30/2015    TRIG 265 (H) 05/21/2020    HDL 30 (L) 05/21/2020    LDL 28 05/21/2020       Diabetes-  Chronic, type 2.  Her last A1C was  6.53 %. She is currently on Januvia. No metformin due to CKD.   Glucose runs about 150.   Diet- does not eat much, Has trouble regulating her glucose at work because she cannot have snack or drinks at her desk.  No significant highs or lows  Exercise- none due to back pain and arthritis in knees as well.   Eye exam: last Monday -reports was normal    Depression-chronic.  Well-controlled with fluoxetine.  Denies suicidal ideations.      GINNY- followed by sleep med. On CPAP therapy.     tobacco abuse- on chantic, has significantly cut down to 6 cigs per day.     Low back pain-bilateral, ongoing for years, radiates down both legs. Worse with sitting for any longer than 15-20 minutes. 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   When she is at home, she stands or lays down only because of the pain.   Unable to take NSAIDS due to CKD. Pain 4/10 currently.   She had a lumbar spine x-ray completed 7/9/2020 which showed no fracture or malalignment.  It did show some mild spurring anteriorly at L3  She has not completed physical therapy recently.      The following portions of the patient's  history were reviewed and updated as appropriate: allergies, current medications, past family history, past medical history, past social history, past surgical history and problem list.        Review of Systems   Constitutional: Negative for fatigue, fever and unexpected weight loss.   HENT: Negative.    Eyes: Negative for pain and visual disturbance.   Respiratory: Negative for cough, chest tightness and shortness of breath.    Cardiovascular: Negative for chest pain, palpitations and leg swelling.   Gastrointestinal: Negative for abdominal pain, constipation and diarrhea.   Genitourinary: Negative.  Negative for difficulty urinating.   Musculoskeletal: Positive for arthralgias, back pain, gait problem and myalgias.   Skin: Negative for color change and rash.   Neurological: Negative for dizziness, weakness, light-headedness, headache and confusion.   Hematological: Does not bruise/bleed easily.           Outpatient Medications Marked as Taking for the 8/11/20 encounter (Office Visit) with Annia Rico APRN   Medication Sig Dispense Refill   • albuterol (PROVENTIL) (2.5 MG/3ML) 0.083% nebulizer solution      • albuterol sulfate  (90 Base) MCG/ACT inhaler Inhale 2 puffs Every 6 (Six) Hours As Needed for Wheezing or Shortness of Air. 1 inhaler 0   • amLODIPine (NORVASC) 10 MG tablet Take 1 tablet by mouth Daily. 15 tablet 0   • aspirin 81 MG EC tablet Take 1 tablet by mouth Daily.     • atorvastatin (LIPITOR) 40 MG tablet Take 1 tablet by mouth Daily. 15 tablet 0   • carvedilol (COREG) 6.25 MG tablet Take 1 tablet by mouth 2 (Two) Times a Day. 60 tablet 3   • diclofenac (VOLTAREN) 1 % gel gel Apply 4 g topically to the appropriate area as directed 4 (Four) Times a Day. Small amount to affected area 300 g 3   • FLUoxetine (PROzac) 40 MG capsule Take 1 capsule by mouth Daily. 15 capsule 0   • levothyroxine (SYNTHROID, LEVOTHROID) 50 MCG tablet Take 1 tablet by mouth Daily. 15 tablet 0   • pantoprazole  (PROTONIX) 40 MG EC tablet Take 1 tablet by mouth Daily. 15 tablet 0   • SITagliptin (JANUVIA) 50 MG tablet Take 1 tablet by mouth Daily. 15 tablet 0   • varenicline (Chantix Continuing Month Trae) 1 MG tablet Take 1 tablet by mouth 2 (Two) Times a Day. 60 tablet 2       Objective   Physical Exam   Constitutional: She is oriented to person, place, and time. She appears well-developed and well-nourished. No distress.   HENT:   Head: Normocephalic and atraumatic.   Eyes: Pupils are equal, round, and reactive to light. Conjunctivae are normal.   Neck: Normal range of motion. No JVD present.   Cardiovascular: Normal rate, regular rhythm, normal heart sounds and intact distal pulses.   No murmur heard.  Pulses:       Dorsalis pedis pulses are 2+ on the right side, and 2+ on the left side.        Posterior tibial pulses are 2+ on the right side, and 2+ on the left side.   Pulmonary/Chest: Effort normal and breath sounds normal. No respiratory distress. She exhibits no tenderness.   Abdominal: Soft. Normal appearance and bowel sounds are normal. She exhibits no distension. There is no tenderness.   Musculoskeletal: Normal range of motion. She exhibits no edema.        Right knee: Normal.        Left knee: Normal.        Lumbar back: She exhibits tenderness and pain. She exhibits normal range of motion, no bony tenderness, no swelling, no edema, no deformity, no laceration, no spasm and normal pulse.   Crepitus to bilateral knees   Neurological: She is alert and oriented to person, place, and time. She has normal strength and normal reflexes. No cranial nerve deficit or sensory deficit. Coordination normal.   Skin: Skin is warm and dry. No rash noted. She is not diaphoretic. No erythema. No pallor.   Psychiatric: She has a normal mood and affect. Her behavior is normal. Judgment and thought content normal.   Nursing note and vitals reviewed.      Vitals:    08/11/20 0912   BP: 138/78   Pulse: 81   Resp: 22   Temp: 97.5 °F  "(36.4 °C)   TempSrc: Temporal   SpO2: 99%   Weight: 108 kg (237 lb)   Height: 162.6 cm (64\")   PainSc:   4   PainLoc: Back     Body mass index is 40.68 kg/m².        Assessment/Plan       Diagnoses and all orders for this visit:    1. Essential hypertension (Primary)  -     Basic Metabolic Panel; Future  Blood pressure slightly elevated in office today.  She just had her carvedilol increased 3 days ago..  Continue current medications.  2. Mixed hyperlipidemia  -     Basic Metabolic Panel; Future  Stable.  Continue statin therapy.  3. Type 2 diabetes mellitus without complication, without long-term current use of insulin (CMS/[facility])  -     POC Glycosylated Hemoglobin (Hb A1C)  -     MicroAlbumin, Urine, Random - Urine, Clean Catch; Future  -     Basic Metabolic Panel; Future  A1c 6.1% in office today.  We will check a microalbumin.  Glucose are well controlled per patient report.  She does report that she has difficulty eating regularly and maintaining her glucose when she is working or sitting at her desk for long periods of time.    4. Chronic midline low back pain with bilateral sciatica  -     Ambulatory Referral to Physical Therapy Evaluate and treat  Chronic pain, exacerbated by sitting for long periods of time.  Unfortunately her job requires her to sit for long periods of time.  We completed an x-ray recently which showed no concerning findings.  We will get her referred over to physical therapy for further evaluation and management.  Bring her back in in about 6-8 weeks to recheck this back pain and if still ongoing will likely get an MRI at that point.  Unable to take NSAIDs due to CKD, and she declines pain medications.      Return in about 2 months (around 10/11/2020) for Recheck back pain , Labs today.    I discussed my findings,recommendations, and plan of care was with the patient. They verbalized understanding and agreement.  Patient was encouraged to keep me informed of any acute changes, lack of " improvement, or any new concerning symptoms.       * Please note that portions of this note were completed with a voice recognition program. Efforts were made to edit the dictation but occasionally words are erroneously transcribed.

## 2020-08-12 RX ORDER — LANCETS
EACH MISCELLANEOUS
Qty: 100 EACH | Refills: 12 | Status: SHIPPED | OUTPATIENT
Start: 2020-08-12 | End: 2021-04-07

## 2020-08-13 DIAGNOSIS — K21.9 CHRONIC GERD: ICD-10-CM

## 2020-08-13 DIAGNOSIS — F33.0 MILD EPISODE OF RECURRENT MAJOR DEPRESSIVE DISORDER (HCC): ICD-10-CM

## 2020-08-13 DIAGNOSIS — E03.9 ACQUIRED HYPOTHYROIDISM: ICD-10-CM

## 2020-08-13 DIAGNOSIS — I10 ESSENTIAL HYPERTENSION: ICD-10-CM

## 2020-08-13 DIAGNOSIS — M17.0 PRIMARY OSTEOARTHRITIS OF BOTH KNEES: ICD-10-CM

## 2020-08-13 DIAGNOSIS — E78.2 MIXED HYPERLIPIDEMIA: ICD-10-CM

## 2020-08-13 DIAGNOSIS — S80.02XA CONTUSION OF LEFT KNEE, INITIAL ENCOUNTER: ICD-10-CM

## 2020-08-13 DIAGNOSIS — E11.65 UNCONTROLLED TYPE 2 DIABETES MELLITUS WITH HYPERGLYCEMIA (HCC): ICD-10-CM

## 2020-08-13 RX ORDER — SITAGLIPTIN 50 MG/1
TABLET, FILM COATED ORAL
Qty: 15 TABLET | Refills: 0 | OUTPATIENT
Start: 2020-08-13

## 2020-08-13 RX ORDER — LEVOTHYROXINE SODIUM 0.05 MG/1
50 TABLET ORAL DAILY
Qty: 30 TABLET | Refills: 2 | Status: SHIPPED | OUTPATIENT
Start: 2020-08-13 | End: 2020-11-29 | Stop reason: SDUPTHER

## 2020-08-13 RX ORDER — FLUOXETINE HYDROCHLORIDE 40 MG/1
CAPSULE ORAL
Qty: 15 CAPSULE | Refills: 0 | OUTPATIENT
Start: 2020-08-13

## 2020-08-13 RX ORDER — PANTOPRAZOLE SODIUM 40 MG/1
40 TABLET, DELAYED RELEASE ORAL DAILY
Qty: 30 TABLET | Refills: 2 | Status: SHIPPED | OUTPATIENT
Start: 2020-08-13 | End: 2020-11-03

## 2020-08-13 RX ORDER — AMLODIPINE BESYLATE 10 MG/1
TABLET ORAL
Qty: 15 TABLET | Refills: 0 | OUTPATIENT
Start: 2020-08-13

## 2020-08-13 RX ORDER — ATORVASTATIN CALCIUM 40 MG/1
TABLET, FILM COATED ORAL
Qty: 15 TABLET | Refills: 0 | OUTPATIENT
Start: 2020-08-13

## 2020-08-13 RX ORDER — PANTOPRAZOLE SODIUM 40 MG/1
TABLET, DELAYED RELEASE ORAL
Qty: 15 TABLET | Refills: 0 | OUTPATIENT
Start: 2020-08-13

## 2020-08-13 RX ORDER — LEVOTHYROXINE SODIUM 0.05 MG/1
TABLET ORAL
Qty: 15 TABLET | Refills: 0 | OUTPATIENT
Start: 2020-08-13

## 2020-08-13 RX ORDER — FLUOXETINE HYDROCHLORIDE 40 MG/1
40 CAPSULE ORAL DAILY
Qty: 30 CAPSULE | Refills: 2 | Status: SHIPPED | OUTPATIENT
Start: 2020-08-13 | End: 2020-09-29

## 2020-08-13 RX ORDER — AMLODIPINE BESYLATE 10 MG/1
10 TABLET ORAL DAILY
Qty: 30 TABLET | Refills: 2 | Status: SHIPPED | OUTPATIENT
Start: 2020-08-13 | End: 2020-11-29 | Stop reason: SDUPTHER

## 2020-08-13 RX ORDER — ATORVASTATIN CALCIUM 40 MG/1
40 TABLET, FILM COATED ORAL DAILY
Qty: 30 TABLET | Refills: 2 | Status: SHIPPED | OUTPATIENT
Start: 2020-08-13 | End: 2020-11-29 | Stop reason: SDUPTHER

## 2020-08-13 NOTE — TELEPHONE ENCOUNTER
----- Message from LUCIA Garcia sent at 8/12/2020  5:30 PM EDT -----  Please let her know that her labs actually look a little bit better on her renal function

## 2020-08-19 ENCOUNTER — HOSPITAL ENCOUNTER (OUTPATIENT)
Dept: PHYSICAL THERAPY | Facility: HOSPITAL | Age: 50
Setting detail: THERAPIES SERIES
Discharge: HOME | End: 2020-08-19

## 2020-08-19 DIAGNOSIS — M54.42 LOW BACK PAIN DUE TO BILATERAL SCIATICA: Primary | ICD-10-CM

## 2020-08-19 DIAGNOSIS — M54.41 LOW BACK PAIN DUE TO BILATERAL SCIATICA: Primary | ICD-10-CM

## 2020-08-19 PROCEDURE — 97161 PT EVAL LOW COMPLEX 20 MIN: CPT

## 2020-08-19 NOTE — THERAPY EVALUATION
Outpatient Physical Therapy Ortho Initial Evaluation  [facility]     Patient Name: Freida Martinez  : 1970  MRN: [id number]  Today's Date: 2020      Visit Date: 2020    Patient Active Problem List   Diagnosis   • Essential hypertension   • Hypothyroidism   • Tobacco abuse   • CKD (chronic kidney disease) stage 3, GFR 30-59 ml/min (CMS/[facility])   • Long QT interval   • Controlled type 2 diabetes mellitus without complication, without long-term current use of insulin (CMS/[facility])   • Morbid obesity with BMI of 40.0-44.9, adult (CMS/[facility])   • Recurrent major depressive disorder, in partial remission (CMS/[facility])   • Mixed hyperlipidemia   • Snoring   • GINNY (obstructive sleep apnea)        Past Medical History:   Diagnosis Date   • Abdominal pain 2019   • JACKIE (acute kidney injury) (CMS/[facility]) 2019   • Arthritis    • Asthma    • Breast injury    • Diabetes mellitus (CMS/[facility])    • Disease of thyroid gland    • Epigastric pain 2019    Added automatically from request for surgery [id number]   • Hypertension    • Kidney infection    • Mental disorder    • Non-compliance 2019   • Sleep apnea    • Volume depletion 2019        Past Surgical History:   Procedure Laterality Date   • ENDOSCOPY N/A 4/15/2019    Procedure: ESOPHAGOGASTRODUODENOSCOPY;  Surgeon: Jude Clinton MD;  Location: [facility] ENDOSCOPY;  Service: Gastroenterology   • TUBAL ABDOMINAL LIGATION     • VAGINAL DELIVERY      x4   • WISDOM TOOTH EXTRACTION         Visit Dx:     ICD-10-CM ICD-9-CM   1. Low back pain due to bilateral sciatica M54.41 724.3    M54.42 724.2     Patient History     Row Name 20 1300             History    Chief Complaint  Pain  -MM (r) HP (t) MM (c)      Type of Pain  Back pain  -MM (r) HP (t) MM (c)      Date Current Problem(s) Began  -- Been going on for a few years  -MM (r) HP (t) MM (c)      Brief Description of Current Complaint  Pt presented to the clinic today with complaints of back pain  in the lower lumbar region and radiateds bilaterally down both legs to the knees. She has increased pain with movement, sitting, and standing. Relief only comes from laying down and usually on her stomach.  -MM (r) HP (t) MM (c)      Patient/Caregiver Goals  Relieve pain;Improve mobility  -MM (r) HP (t) MM (c)      Hand Dominance  right-handed  -MM (r) HP (t) MM (c)         Pain     Pain Location  Back  -MM (r) HP (t) MM (c)      Pain at Present  7  -MM (r) HP (t) MM (c)      Pain at Best  0  -MM (r) HP (t) MM (c)      Pain at Worst  10  -MM (r) HP (t) MM (c)      Pain Frequency  Constant/continuous  -MM (r) HP (t) MM (c)      Pain Description  Aching;Burning;Sharp;Shooting  -MM (r) HP (t) MM (c)      What Performance Factors Make the Current Problem(s) WORSE?  Sitting, standing, cooking, activity as a whole  -MM (r) HP (t) MM (c)      What Performance Factors Make the Current Problem(s) BETTER?  laying down   -MM (r) HP (t) MM (c)      Pain Comments  Pt tender along the lumbar spine R>L   -MM (r) HP (t) MM (c)      Tolerance Time- Standing  minimal  -MM (r) HP (t) MM (c)      Tolerance Time- Sitting  minimal  -MM (r) HP (t) MM (c)      Tolerance Time- Walking  minimal   -MM (r) HP (t) MM (c)      Tolerance Time- Lying  WNL  -MM (r) HP (t) MM (c)      Is your sleep disturbed?  Yes  -MM (r) HP (t) MM (c)      Is medication used to assist with sleep?  No  -MM (r) HP (t) MM (c)         Fall Risk Assessment    Any falls in the past year:  Yes  -MM (r) HP (t) MM (c)      Number of falls reported in the last 12 months  1  -MM (r) HP (t) MM (c)      Factors that contributed to the fall:  Lost balance  -MM (r) HP (t) MM (c)         Daily Activities    Primary Language  English  -MM (r) HP (t) MM (c)      Pt Participated in POC and Goals  Yes  -MM (r) HP (t) MM (c)        User Key  (r) = Recorded By, (t) = Taken By, (c) = Cosigned By    Initials Name Provider Type    Sanjeev Green, PT Physical Therapist    HP  Branden Quintero, PT Student PT Student        PT Ortho     Row Name 08/19/20 5025       Precautions and Contraindications    Precautions/Limitations  no known precautions/limitations  -MM (r) HP (t) MM (c)       Subjective Pain    Able to rate subjective pain?  yes  -MM (r) HP (t) MM (c)       Posture/Observations    Posture/Observations Comments  Pt TTP along lumbar spine with mild to moderate tenderness along lumbar paraspinals and lumbar vertebral bodies  -MM (r) HP (t) MM (c)       Quarter Clearing    Quarter Clearing  Lower Quarter Clearing  -MM (r) HP (t) MM (c)       Sensory Screen for Light Touch- Lower Quarter Clearing    L1 (inguinal area)  Intact  -MM (r) HP (t) MM (c)    L2 (anterior mid thigh)  Intact  -MM (r) HP (t) MM (c)    L3 (distal anterior thigh)  Intact  -MM (r) HP (t) MM (c)    L4 (medial lower leg/foot)  Intact  -MM (r) HP (t) MM (c)    L5 (lateral lower leg/great toe)  Intact  -MM (r) HP (t) MM (c)    S1 (bottom of foot)  Intact  -MM (r) HP (t) MM (c)       Myotomal Screen- Lower Quarter Clearing    Hip flexion (L2)  Bilateral:;4+ (Good +)  -MM (r) HP (t) MM (c)    Knee extension (L3)  Bilateral:;4+ (Good +)  -MM (r) HP (t) MM (c)    Ankle DF (L4)  Bilateral:;4+ (Good +)  -MM (r) HP (t) MM (c)    Great toe extension (L5)  Bilateral:;4+ (Good +)  -MM (r) HP (t) MM (c)    Ankle PF (S1)  Bilateral:;4+ (Good +)  -MM (r) HP (t) MM (c)    Knee flexion (S2)  Bilateral:;4+ (Good +)  -MM (r) HP (t) MM (c)       Lumbar ROM Screen- Lower Quarter Clearing    Lumbar Extension  -- Pt able to perform 50% prone press ups  -MM (r) HP (t) MM (c)       General ROM    Head/Neck/Trunk  Trunk Extension;Trunk Flexion;Trunk Lt Lateral Flexion;Trunk Rt Lateral Flexion;Trunk Lt Rotation;Trunk Rt Rotation  -MM (r) HP (t) MM (c)       Head/Neck/Trunk    Trunk Extension AROM  Decreased due pain/soreness  -MM (r) HP (t) MM (c)    Trunk Flexion AROM  Pt stated flexing forward causes pain  -MM (r) HP (t) MM (c)        Gait/Stairs Assessment/Training    Comment (Gait/Stairs)  Pt has antaglic gait and pain with standing   -MM (r) HP (t) MM (c)      User Key  (r) = Recorded By, (t) = Taken By, (c) = Cosigned By    Initials Name Provider Type    Sanjeev Green, PT Physical Therapist    Branden Valdez, PT Student PT Student        Therapy Education  Education Details: HEP included:  Given: HEP  Program: New  How Provided: Verbal, Demonstration  Provided to: Patient  Level of Understanding: Verbalized, Demonstrated  71013 - PT Self Care/Mgmt Minutes: 5     PT OP Goals     Row Name 08/19/20 1300          PT Short Term Goals    STG Date to Achieve  09/09/20  -MM (r) HP (t) MM (c)     STG 1  Pt to improve trunk flexion by 25% from initial evaluation in order to improve functional mobility.  -MM (r) HP (t) MM (c)     STG 1 Progress  New  -MM (r) HP (t) MM (c)     STG 2  Pt to report pain at worst being </= 5/10.  -MM (r) HP (t) MM (c)     STG 2 Progress  New  -MM (r) HP (t) MM (c)     STG 3  Pt to decrease Modified Oswestry score to </= 40%.  -MM (r) HP (t) MM (c)     STG 3 Progress  New  -MM (r) HP (t) MM (c)        Long Term Goals    LTG Date to Achieve  09/30/20  -MM (r) HP (t) MM (c)     LTG 1  Pt to improve pain at worst to </=2/10.  -MM (r) HP (t) MM (c)     LTG 1 Progress  New  -MM (r) HP (t) MM (c)     LTG 2  Pt to report ability to stand/sit for >1 hour without an increase in her back pain.  -MM (r) HP (t) MM (c)     LTG 2 Progress  New  -MM (r) HP (t) MM (c)     LTG 3  Pt to decrease modified oswestry score to </= 20%.  -MM (r) HP (t) MM (c)     LTG 3 Progress  New  -MM (r) HP (t) MM (c)     LTG 4  Pt to report return to ADLs without an increase in pain or complaint of back symtpoms .  -MM (r) HP (t) MM (c)     LTG 4 Progress  New  -MM (r) HP (t) MM (c)        Time Calculation    PT Goal Re-Cert Due Date  11/17/20  -MM (r) HP (t) MM (c)       User Key  (r) = Recorded By, (t) = Taken By, (c) = Cosigned By    Initials Name  Provider Type    Sanjeev Green, PT Physical Therapist     Branden Quintero, PT Student PT Student        PT Assessment/Plan     Row Name 08/19/20 1311 08/19/20 1300       PT Assessment    Functional Limitations  --  -MM (r) HP (t) MM (c)  Impaired gait;Impaired locomotion;Limitations in community activities;Limitations in functional capacity and performance;Performance in leisure activities  -MM (r) HP (t) MM (c)    Impairments  --  -MM (r) HP (t) MM (c)  Pain  -MM (r) HP (t) MM (c)    Assessment Comments  --  Pt presented to the clinic with a low complexity case of low back pain which she has been living with for multiple years. She has mild to moderate tenderness along the lumbar paraspinals and vertebral bodies to palpation. She also presents with decreased ROM of the trunk as she has increase pain with forward flexion. Extension of the trunk provided short and mild relief of back pain once patient stood. Pt will benefit from PT services in order to decrease pain and improve functional mobility.   -MM (r) HP (t) MM (c)    Please refer to paper survey for additional self-reported information  --  -MM (r) HP (t) MM (c)  Yes  -MM (r) HP (t) MM (c)    Rehab Potential  --  -MM (r) HP (t) MM (c)  Good  -MM (r) HP (t) MM (c)    Patient/caregiver participated in establishment of treatment plan and goals  --  -MM (r) HP (t) MM (c)  Yes  -MM (r) HP (t) MM (c)    Patient would benefit from skilled therapy intervention  --  -MM (r) HP (t) MM (c)  Yes  -MM (r) HP (t) MM (c)       PT Plan    PT Frequency  --  -MM (r) HP (t) MM (c)  1x/week;2x/week  -MM (r) HP (t) MM (c)    Predicted Duration of Therapy Intervention (Therapy Eval)  --  -MM (r) HP (t) MM (c)  6-8 weeks  -MM (r) HP (t) MM (c)    Planned CPT's?  --  -MM (r) HP (t) MM (c)  PT EVAL LOW COMPLEXITY: 07656;PT THER PROC EA 15 MIN: 38782;PT THER ACT EA 15 MIN: 04997;PT MANUAL THERAPY EA 15 MIN: 98161;PT NEUROMUSC RE-EDUCATION EA 15 MIN: 01789  -MM (r) HP (t) MM  (c)    Physical Therapy Interventions (Optional Details)  --  -MM (r) HP (t) MM (c)  home exercise program;joint mobilization;lumbar stabilization;manual therapy techniques;modalities;neuromuscular re-education;patient/family education;postural re-education;ROM (Range of Motion);strengthening;stretching  -MM (r) HP (t) MM (c)    PT Plan Comments  --  Continue per plan of care and progressing pt through extension based protocol.  -MM (r) HP (t) MM (c)      User Key  (r) = Recorded By, (t) = Taken By, (c) = Cosigned By    Initials Name Provider Type    Sanjeev Green, PT Physical Therapist     Branden Quintero, PT Student PT Student        OP Exercises     Row Name 08/19/20 1300             Subjective Pain    Able to rate subjective pain?  yes  -MM (r) HP (t) MM (c)        User Key  (r) = Recorded By, (t) = Taken By, (c) = Cosigned By    Initials Name Provider Type    Sanjeev Green, PT Physical Therapist     Branden Quintero, PT Student PT Student          Outcome Measure Options: Modifed Owestry  Modified Oswestry  Modified Oswestry Score/Comments: 62% (31/50)      Time Calculation:     Start Time: 1300     Therapy Charges for Today     Code Description Service Date Service Provider Modifiers Qty    57571605555  PT EVAL LOW COMPLEXITY 3 8/19/2020 Sanjeev Serrato, PT GP 1          PT G-Codes  Outcome Measure Options: Modifed Owestry  Modified Oswestry Score/Comments: 62% (31/50)         Sanjeev Serrato, PT  8/19/2020

## 2020-08-26 ENCOUNTER — HOSPITAL ENCOUNTER (OUTPATIENT)
Dept: PHYSICAL THERAPY | Facility: HOSPITAL | Age: 50
Setting detail: THERAPIES SERIES
Discharge: HOME | End: 2020-08-26

## 2020-08-26 DIAGNOSIS — M54.42 LOW BACK PAIN DUE TO BILATERAL SCIATICA: Primary | ICD-10-CM

## 2020-08-26 DIAGNOSIS — M54.41 LOW BACK PAIN DUE TO BILATERAL SCIATICA: Primary | ICD-10-CM

## 2020-08-26 PROCEDURE — 97140 MANUAL THERAPY 1/> REGIONS: CPT

## 2020-08-26 PROCEDURE — 97110 THERAPEUTIC EXERCISES: CPT

## 2020-08-26 NOTE — THERAPY TREATMENT NOTE
Outpatient Physical Therapy Ortho Treatment Note  [facility]     Patient Name: Freida Martinez  : 1970  MRN: [id number]  Today's Date: 2020      Visit Date: 2020    Visit Dx:    ICD-10-CM ICD-9-CM   1. Low back pain due to bilateral sciatica M54.41 724.3    M54.42 724.2       Patient Active Problem List   Diagnosis   • Essential hypertension   • Hypothyroidism   • Tobacco abuse   • CKD (chronic kidney disease) stage 3, GFR 30-59 ml/min (CMS/[facility])   • Long QT interval   • Controlled type 2 diabetes mellitus without complication, without long-term current use of insulin (CMS/[facility])   • Morbid obesity with BMI of 40.0-44.9, adult (CMS/[facility])   • Recurrent major depressive disorder, in partial remission (CMS/[facility])   • Mixed hyperlipidemia   • Snoring   • GINNY (obstructive sleep apnea)        Past Medical History:   Diagnosis Date   • Abdominal pain 2019   • JACKIE (acute kidney injury) (CMS/[facility]) 2019   • Arthritis    • Asthma    • Breast injury    • Diabetes mellitus (CMS/[facility])    • Disease of thyroid gland    • Epigastric pain 2019    Added automatically from request for surgery [id number]   • Hypertension    • Kidney infection    • Mental disorder    • Non-compliance 2019   • Sleep apnea    • Volume depletion 2019        Past Surgical History:   Procedure Laterality Date   • ENDOSCOPY N/A 4/15/2019    Procedure: ESOPHAGOGASTRODUODENOSCOPY;  Surgeon: Jude Clinton MD;  Location: [facility] ENDOSCOPY;  Service: Gastroenterology   • TUBAL ABDOMINAL LIGATION     • VAGINAL DELIVERY      x4   • WISDOM TOOTH EXTRACTION       PT Assessment/Plan     Row Name 20 1300          PT Assessment    Assessment Comments  Client tolerated exercises very well. some relief noted with extension. She also reported relief with ASTYM.   -MM        PT Plan    PT Plan Comments  Continue per plan of treatment with exercises and ASTYM.   -MM       User Key  (r) = Recorded By, (t) = Taken By, (c) =  Cosigned By    Initials Name Provider Type    Sanjeev Green, PT Physical Therapist            OP Exercises     Row Name 08/26/20 1300             Subjective Pain    Able to rate subjective pain?  yes  -MM      Pre-Treatment Pain Level  4  -MM      Post-Treatment Pain Level  2  -MM      Subjective Pain Comment  Client reported pain increased up to 8/10 while sitting prior to today's appointment.   -MM         Total Minutes    79544 - PT Therapeutic Exercise Minutes  15  -MM      34877 - PT Manual Therapy Minutes  15  -MM         Exercise 1    Exercise Name 1  prone press ups/press up with OP  -MM      Sets 1  5  -MM      Reps 1  10  -MM         Exercise 2    Exercise Name 2  prone leg raise  -MM      Reps 2  10 ea  -MM         Exercise 3    Exercise Name 3  cat/camel stretch  -MM      Reps 3  10  -MM         Exercise 4    Exercise Name 4  sidelying trunk rotation  -MM      Reps 4  15 ea  -MM         Exercise 5    Exercise Name 5  bridging  -MM      Reps 5  12  -MM         Exercise 6    Exercise Name 6  HS stretch  -MM      Time 6  2 min  -MM         Exercise 7    Exercise Name 7  sit to stand-- high surface  -MM      Reps 7  10  -MM        User Key  (r) = Recorded By, (t) = Taken By, (c) = Cosigned By    Initials Name Provider Type    Sanjeev Green, PT Physical Therapist                      Manual Rx (last 36 hours)      Manual Treatments     Row Name 08/26/20 1300             Total Minutes    94237 - PT Manual Therapy Minutes  15  -MM         Manual Rx 1    Manual Rx 1 Location  Provided soft tissue mobilization using ASTYM technique for bilateral paraspinals. Client was positioned prone and moderate pressure was used with astYM.   -MM      Manual Rx 1 Duration  15  -MM        User Key  (r) = Recorded By, (t) = Taken By, (c) = Cosigned By    Initials Name Provider Type    Sanjeev Green, PT Physical Therapist          Time Calculation:   Start Time: 1300  Therapy Charges for Today     Code  Description Service Date Service Provider Modifiers Qty    [id number]  PT THER PROC EA 15 MIN 8/26/2020 Sanjeev Serrato, PT GP 1    [id number]  PT MANUAL THERAPY EA 15 MIN 8/26/2020 Sanjeev Serrato, PT GP 1        Sanjeev Serrato, PT  8/26/2020

## 2020-09-02 ENCOUNTER — HOSPITAL ENCOUNTER (OUTPATIENT)
Dept: PHYSICAL THERAPY | Facility: HOSPITAL | Age: 50
Setting detail: THERAPIES SERIES
Discharge: HOME | End: 2020-09-02

## 2020-09-02 DIAGNOSIS — M54.41 LOW BACK PAIN DUE TO BILATERAL SCIATICA: Primary | ICD-10-CM

## 2020-09-02 DIAGNOSIS — M54.42 LOW BACK PAIN DUE TO BILATERAL SCIATICA: Primary | ICD-10-CM

## 2020-09-02 PROCEDURE — 97110 THERAPEUTIC EXERCISES: CPT

## 2020-09-02 NOTE — PROGRESS NOTES
[facility]  [facility]      09/03/2020         Freida Martinez  [address] [facility] [address]  [unfilled]    1970    Annia Rico APRN    Freida Martinez is a 50 y.o. female.      Subjective:     Chief Complaint:  Hypertension and Follow-up       HPI   50-year-old female with history of hypertension, hypothyroidism, tobacco abuse, CKD stage III, type 2 diabetes, hyperlipidemia, asthma and sleep apnea who presents today to follow up on PVCs and HTN.  At her last visit her carvedilol was increased to 6.25 mg twice daily. She says she is doing great. No palpitations, no chest pain, no dizziness or lightheadedness.  Systolic blood pressures are very well controlled with most of them less than 120/80.  She continues to work to quit smoking.  She is on her 12th week of Chantix and still smoking a pack every 3 days      Cardiac risk: Hypertension, hyperlipidemia, type 2 diabetes, obesity, sedentary lifestyle, tobacco abuse    Patient Active Problem List   Diagnosis   • Essential hypertension   • Hypothyroidism   • Tobacco abuse   • CKD (chronic kidney disease) stage 3, GFR 30-59 ml/min (CMS/[facility])   • Long QT interval   • Controlled type 2 diabetes mellitus without complication, without long-term current use of insulin (CMS/[facility])   • Morbid obesity with BMI of 40.0-44.9, adult (CMS/[facility])   • Recurrent major depressive disorder, in partial remission (CMS/[facility])   • Mixed hyperlipidemia   • Snoring   • GINNY (obstructive sleep apnea)       Past Medical History:   Diagnosis Date   • Abdominal pain 4/12/2019   • JACKIE (acute kidney injury) (CMS/[facility]) 4/12/2019   • Arthritis    • Asthma    • Breast injury    • Diabetes mellitus (CMS/[facility])    • Disease of thyroid gland    • Epigastric pain 4/12/2019    Added automatically from request for surgery [id number]   • Hypertension    • Kidney infection    • Mental disorder    • Non-compliance 7/24/2019   • Sleep apnea    • Volume depletion  4/14/2019       Past Surgical History:   Procedure Laterality Date   • ENDOSCOPY N/A 4/15/2019    Procedure: ESOPHAGOGASTRODUODENOSCOPY;  Surgeon: Jude Clinton MD;  Location: [facility] ENDOSCOPY;  Service: Gastroenterology   • TUBAL ABDOMINAL LIGATION     • VAGINAL DELIVERY      x4   • WISDOM TOOTH EXTRACTION         Family History   Problem Relation Age of Onset   • Hypertension Mother    • Diabetes Father    • Kidney disease Father    • Heart disease Father    • Hypertension Father    • Sleep apnea Father    • Cancer Maternal Aunt    • Arthritis Paternal Aunt    • Cancer Maternal Grandmother         BREAST   • Breast cancer Maternal Grandmother    • Kidney disease Paternal Grandfather    • Kidney disease Paternal Uncle        Social History     Socioeconomic History   • Marital status: Single     Spouse name: Not on file   • Number of children: Not on file   • Years of education: Not on file   • Highest education level: Not on file   Tobacco Use   • Smoking status: Current Every Day Smoker     Packs/day: 1.00     Types: Cigarettes   • Smokeless tobacco: Never Used   Substance and Sexual Activity   • Alcohol use: No     Frequency: Never   • Drug use: No   • Sexual activity: Never   Social History Narrative    Caffeine: 1 cup coffee daily, 1 1/2 of 64 ounce cup of tea       Allergies   Allergen Reactions   • Anaprox [Naproxen] Other (See Comments)     CHRONIC KIDNEY DISEASE   • Celebrex [Celecoxib] Other (See Comments)     CHRONIC KIDNEY DISEASE   • Flector [Diclofenac Epolamine] Other (See Comments)     CHRONIC KIDNEY DISEASE   • Motrin [Ibuprofen] Other (See Comments)     CHRONIC KIDNEY DISEASE   • Voltaren [Diclofenac Sodium] Other (See Comments)     CHRONIC KIDNEY DISEASE   • Lisinopril Swelling         Current Outpatient Medications:   •  albuterol (PROVENTIL) (2.5 MG/3ML) 0.083% nebulizer solution, , Disp: , Rfl:   •  albuterol sulfate  (90 Base) MCG/ACT inhaler, Inhale 2 puffs Every 6 (Six) Hours As  Needed for Wheezing or Shortness of Air., Disp: 1 inhaler, Rfl: 0  •  amLODIPine (NORVASC) 10 MG tablet, Take 1 tablet by mouth Daily., Disp: 30 tablet, Rfl: 2  •  aspirin 81 MG EC tablet, Take 1 tablet by mouth Daily., Disp: , Rfl:   •  atorvastatin (LIPITOR) 40 MG tablet, Take 1 tablet by mouth Daily., Disp: 30 tablet, Rfl: 2  •  carvedilol (COREG) 6.25 MG tablet, Take 1 tablet by mouth 2 (Two) Times a Day., Disp: 60 tablet, Rfl: 3  •  diclofenac (VOLTAREN) 1 % gel gel, Apply 4 g topically to the appropriate area as directed 4 (Four) Times a Day. Small amount to affected area, Disp: 300 g, Rfl: 3  •  FLUoxetine (PROzac) 40 MG capsule, Take 1 capsule by mouth Daily., Disp: 30 capsule, Rfl: 2  •  glucose blood test strip, Used to test blood sugar for Diabetes E11.9 twice a day. Pt has One Touch Verio Flex meter, Disp: 100 each, Rfl: 12  •  Lancets (ONETOUCH ULTRASOFT) lancets, Used to test blood sugar for Diabetes E11.9 twice a day, Disp: 100 each, Rfl: 12  •  levothyroxine (SYNTHROID, LEVOTHROID) 50 MCG tablet, Take 1 tablet by mouth Daily., Disp: 30 tablet, Rfl: 2  •  pantoprazole (PROTONIX) 40 MG EC tablet, Take 1 tablet by mouth Daily., Disp: 30 tablet, Rfl: 2  •  SITagliptin (JANUVIA) 50 MG tablet, Take 1 tablet by mouth Daily., Disp: 30 tablet, Rfl: 2  •  varenicline (Chantix Continuing Month Trae) 1 MG tablet, Take 1 tablet by mouth 2 (Two) Times a Day., Disp: 60 tablet, Rfl: 2    The following portions of the patient's history were reviewed today and updated as appropriate: allergies, current medications, past family history, past medical history, past social history, past surgical history and problem list     Review of Systems   Constitution: Positive for malaise/fatigue. Negative for chills and fever.   HENT: Negative.    Eyes: Negative.    Cardiovascular: Positive for dyspnea on exertion. Negative for chest pain, claudication, cyanosis, irregular heartbeat, leg swelling, near-syncope, orthopnea,  "palpitations, paroxysmal nocturnal dyspnea and syncope.   Respiratory: Negative for cough, shortness of breath and snoring.    Endocrine: Negative.    Hematologic/Lymphatic: Does not bruise/bleed easily.   Skin: Negative for poor wound healing.   Musculoskeletal: Positive for muscle weakness.   Gastrointestinal: Negative for abdominal pain, heartburn, hematemesis, melena, nausea and vomiting.   Genitourinary: Negative.  Negative for hematuria.   Neurological: Positive for excessive daytime sleepiness.   Psychiatric/Behavioral: Negative.    Allergic/Immunologic: Negative.        Objective:     Vitals:    09/03/20 0906   BP: 120/79   BP Location: Left arm   Patient Position: Sitting   Cuff Size: Large Adult   Pulse: 68   Resp: 18   Temp: 97.3 °F (36.3 °C)   TempSrc: Temporal   SpO2: 99%   Weight: 109 kg (239 lb 6 oz)   Height: 162.6 cm (64\")       Body mass index is 41.09 kg/m².    Physical Exam   Constitutional: She is oriented to person, place, and time. She appears well-developed and well-nourished. No distress.   HENT:   Head: Normocephalic.   Eyes: Pupils are equal, round, and reactive to light. Conjunctivae are normal.   Neck: Neck supple. No JVD present. No thyromegaly present.   Cardiovascular: Normal rate, regular rhythm, normal heart sounds and intact distal pulses. Exam reveals no gallop and no friction rub.   No murmur heard.  Pulmonary/Chest: Effort normal and breath sounds normal. No respiratory distress. She has no wheezes. She has no rales. She exhibits no tenderness.   Abdominal: Soft. Bowel sounds are normal.   Musculoskeletal: Normal range of motion. She exhibits no edema.   Neurological: She is alert and oriented to person, place, and time.   Skin: Skin is warm and dry.   Psychiatric: She has a normal mood and affect. Her behavior is normal. Thought content normal.   Vitals reviewed.      Lab and Diagnostic Review:  Stress PET 6/8/20  · The rest EKG is normal.  · The patient underwent a cardiac PET " myocardial perfusion study per [facility] protocol.  · The patient noted rest pain prior to the test and pain throughout the test. Diagnostic EKG changes were not seen.  · The rest blood pressure was normal and abnormal changes during vasodilator infusion were absent.  · Myocardial perfusion images reveal normal distribution of isotope in all segments of all views both at rest and following vasodilator infusion.  · The rest ejection fraction is 53% and stress ejection fraction is 62%.  · This is a NORMAL cardiac PET CT.  Echo 6/26/20  · Left ventricular systolic function is normal. Estimated EF appears to be in the range of 66 - 70%.  · Left ventricular wall thickness is consistent with mild concentric hypertrophy.  · Mild MAC is present. No mitral valve regurgitation is present  · Trace tricuspid valve regurgitation is present. Estimated right ventricular systolic pressure from tricuspid regurgitation is normal (<35 mmHg).     Extended holter monitor x 10 days Avg HR 84bpm, HR ranged from 62-138bpm. 2.4% PVC burden, <1% PAC burden.  Triggered events were normal sinus rhythm and PVCs    Assessment and Plan:   1. Symptomatic PVCs  2.4% burden.  Significantly improved with carvedilol.   Continue coreg    2. Essential hypertension  Well controlled  Continue to monitor    3. Tobacco abuse  Advised to get rid of all cigarettes  Will send in another refill for chantix for one more month    RV 6 months        It has been a pleasure to participate in the care of this patient.  Patient was instructed to call the [facility] with any questions, concerns, or worsening symptoms.    *Please note that portions of this note were completed with a voice recognition program. Efforts were made to edit the dictations, but occasionally words are mistranscribed.

## 2020-09-02 NOTE — THERAPY TREATMENT NOTE
Outpatient Physical Therapy Ortho Treatment Note  [facility]     Patient Name: Freida Martinez  : 1970  MRN: [id number]  Today's Date: 2020      Visit Date: 2020    Visit Dx:    ICD-10-CM ICD-9-CM   1. Low back pain due to bilateral sciatica M54.41 724.3    M54.42 724.2     Patient Active Problem List   Diagnosis   • Essential hypertension   • Hypothyroidism   • Tobacco abuse   • CKD (chronic kidney disease) stage 3, GFR 30-59 ml/min (CMS/[facility])   • Long QT interval   • Controlled type 2 diabetes mellitus without complication, without long-term current use of insulin (CMS/[facility])   • Morbid obesity with BMI of 40.0-44.9, adult (CMS/[facility])   • Recurrent major depressive disorder, in partial remission (CMS/[facility])   • Mixed hyperlipidemia   • Snoring   • GINNY (obstructive sleep apnea)        Past Medical History:   Diagnosis Date   • Abdominal pain 2019   • JACKIE (acute kidney injury) (CMS/[facility]) 2019   • Arthritis    • Asthma    • Breast injury    • Diabetes mellitus (CMS/[facility])    • Disease of thyroid gland    • Epigastric pain 2019    Added automatically from request for surgery [id number]   • Hypertension    • Kidney infection    • Mental disorder    • Non-compliance 2019   • Sleep apnea    • Volume depletion 2019        Past Surgical History:   Procedure Laterality Date   • ENDOSCOPY N/A 4/15/2019    Procedure: ESOPHAGOGASTRODUODENOSCOPY;  Surgeon: Jude Clinton MD;  Location: [facility] ENDOSCOPY;  Service: Gastroenterology   • TUBAL ABDOMINAL LIGATION     • VAGINAL DELIVERY      x4   • WISDOM TOOTH EXTRACTION       PT Assessment/Plan     Row Name 20 1300          PT Assessment    Assessment Comments  Client had mild pain today. She tolerated exercises well. Held on ASTYM today because she needed to get to another appointment.   -MM        PT Plan    PT Plan Comments  Continue per plan of treatment with exercises and ASTYM.   -MM       User Key  (r) = Recorded By, (t) =  Taken By, (c) = Cosigned By    Initials Name Provider Type    Sanjeev Green, PT Physical Therapist        OP Exercises     Row Name 09/02/20 1300             Subjective Comments    Subjective Comments  Client reports that she has been working on exercises some. Pain today is mild.   -MM         Subjective Pain    Able to rate subjective pain?  yes  -MM      Pre-Treatment Pain Level  3  -MM      Post-Treatment Pain Level  3  -MM         Total Minutes    01540 - PT Therapeutic Exercise Minutes  26  -MM         Exercise 1    Exercise Name 1  prone press ups/press up with OP  -MM      Sets 1  5  -MM      Reps 1  10  -MM         Exercise 2    Exercise Name 2  prone leg raise  -MM      Reps 2  10 ea  -MM         Exercise 3    Exercise Name 3  cat/camel stretch  -MM      Reps 3  10  -MM         Exercise 4    Exercise Name 4  sidelying trunk rotation  -MM      Reps 4  15 ea  -MM         Exercise 5    Exercise Name 5  bridging w ball  -MM      Reps 5  15  -MM         Exercise 6    Exercise Name 6  HS stretch  -MM      Time 6  2 min  -MM         Exercise 7    Exercise Name 7  sit to stand-- high surface  -MM      Sets 7  2  -MM      Reps 7  10  -MM         Exercise 8    Exercise Name 8  standing ball lifts  -MM      Reps 8  15  -MM         Exercise 9    Exercise Name 9  standing trunk rotation w ball  -MM      Reps 9  15  -MM        User Key  (r) = Recorded By, (t) = Taken By, (c) = Cosigned By    Initials Name Provider Type    Sanjeev Green, PT Physical Therapist        Time Calculation:   Start Time: 1300  Therapy Charges for Today     Code Description Service Date Service Provider Modifiers Qty    [id number]  PT THER PROC EA 15 MIN 9/2/2020 Sanjeev Serrato, PT GP 2    [id number]  PT THER PROC EA 15 MIN 9/2/2020 Sanjeev Serrato, PT GP 2      Sanjeev Serrato PT  9/2/2020

## 2020-09-03 ENCOUNTER — OFFICE VISIT (OUTPATIENT)
Dept: CARDIOLOGY | Facility: HOSPITAL | Age: 50
End: 2020-09-03

## 2020-09-03 ENCOUNTER — TELEMEDICINE (OUTPATIENT)
Dept: SLEEP MEDICINE | Facility: HOSPITAL | Age: 50
End: 2020-09-03

## 2020-09-03 VITALS
HEIGHT: 64 IN | WEIGHT: 239.38 LBS | DIASTOLIC BLOOD PRESSURE: 79 MMHG | OXYGEN SATURATION: 99 % | SYSTOLIC BLOOD PRESSURE: 120 MMHG | RESPIRATION RATE: 18 BRPM | HEART RATE: 68 BPM | TEMPERATURE: 97.3 F | BODY MASS INDEX: 40.87 KG/M2

## 2020-09-03 VITALS — HEIGHT: 64 IN | BODY MASS INDEX: 40.46 KG/M2 | WEIGHT: 237 LBS

## 2020-09-03 DIAGNOSIS — G47.33 OBSTRUCTIVE SLEEP APNEA, ADULT: Primary | ICD-10-CM

## 2020-09-03 DIAGNOSIS — Z72.0 TOBACCO ABUSE: ICD-10-CM

## 2020-09-03 DIAGNOSIS — I49.3 SYMPTOMATIC PVCS: Primary | ICD-10-CM

## 2020-09-03 DIAGNOSIS — I10 ESSENTIAL HYPERTENSION: ICD-10-CM

## 2020-09-03 DIAGNOSIS — G47.34 NOCTURNAL HYPOXEMIA: ICD-10-CM

## 2020-09-03 PROCEDURE — 99212 OFFICE O/P EST SF 10 MIN: CPT | Performed by: NURSE PRACTITIONER

## 2020-09-03 PROCEDURE — 99213 OFFICE O/P EST LOW 20 MIN: CPT | Performed by: NURSE PRACTITIONER

## 2020-09-03 RX ORDER — VARENICLINE TARTRATE 1 MG/1
1 TABLET, FILM COATED ORAL 2 TIMES DAILY
Qty: 60 TABLET | Refills: 0 | Status: SHIPPED | OUTPATIENT
Start: 2020-09-03 | End: 2020-09-29 | Stop reason: SDUPTHER

## 2020-09-03 NOTE — PROGRESS NOTES
"    Chief Complaint:   Chief Complaint   Patient presents with   • Follow-up       HPI:    Freida Martinez is a 50 y.o. female here for follow-up of sleep apnea and nocturnal hypoxemia.  Patient was last seen 7/30/2020 and was feeling \"great\" on CPAP therapy.  She is sleeping 5 to 10 hours nightly and does go to sleep within 5 minutes.  Patient no longer gets up during the night.  Last appointment her 90% pressure was 16.3 with an AHI of 8.6.  We did increase settings at that time 11-20.  Patient states she is doing well with this pressure change and having no difficulty.  Patient's original sleep study showed an AHI of 127.7 that did increase to 136.4 while her left side.  We have discussed her AHI today of 6.7 and we will not be increasing her pressure anymore at this time per patient request.  Patient will continue CPAP.        Current medications are:   Current Outpatient Medications:   •  albuterol (PROVENTIL) (2.5 MG/3ML) 0.083% nebulizer solution, , Disp: , Rfl:   •  albuterol sulfate  (90 Base) MCG/ACT inhaler, Inhale 2 puffs Every 6 (Six) Hours As Needed for Wheezing or Shortness of Air., Disp: 1 inhaler, Rfl: 0  •  amLODIPine (NORVASC) 10 MG tablet, Take 1 tablet by mouth Daily., Disp: 30 tablet, Rfl: 2  •  aspirin 81 MG EC tablet, Take 1 tablet by mouth Daily., Disp: , Rfl:   •  atorvastatin (LIPITOR) 40 MG tablet, Take 1 tablet by mouth Daily., Disp: 30 tablet, Rfl: 2  •  carvedilol (COREG) 6.25 MG tablet, Take 1 tablet by mouth 2 (Two) Times a Day., Disp: 60 tablet, Rfl: 3  •  diclofenac (VOLTAREN) 1 % gel gel, Apply 4 g topically to the appropriate area as directed 4 (Four) Times a Day. Small amount to affected area, Disp: 300 g, Rfl: 3  •  FLUoxetine (PROzac) 40 MG capsule, Take 1 capsule by mouth Daily., Disp: 30 capsule, Rfl: 2  •  glucose blood test strip, Used to test blood sugar for Diabetes E11.9 twice a day. Pt has One Touch Verio Flex meter, Disp: 100 each, Rfl: 12  •  Lancets " (ONETOUCH ULTRASOFT) lancets, Used to test blood sugar for Diabetes E11.9 twice a day, Disp: 100 each, Rfl: 12  •  levothyroxine (SYNTHROID, LEVOTHROID) 50 MCG tablet, Take 1 tablet by mouth Daily., Disp: 30 tablet, Rfl: 2  •  pantoprazole (PROTONIX) 40 MG EC tablet, Take 1 tablet by mouth Daily., Disp: 30 tablet, Rfl: 2  •  SITagliptin (JANUVIA) 50 MG tablet, Take 1 tablet by mouth Daily., Disp: 30 tablet, Rfl: 2  •  varenicline (Chantix Continuing Month Trae) 1 MG tablet, Take 1 tablet by mouth 2 (Two) Times a Day., Disp: 60 tablet, Rfl: 2.      The patient's relevant past medical, surgical, family and social history were reviewed and updated in Epic as appropriate.       Review of Systems   Eyes: Positive for visual disturbance.   Respiratory: Positive for apnea, shortness of breath and wheezing.    Cardiovascular: Positive for leg swelling.   Endocrine: Positive for cold intolerance.   Psychiatric/Behavioral: Positive for sleep disturbance.   All other systems reviewed and are negative.        Objective:    Physical Exam   Constitutional: She is oriented to person, place, and time. She appears well-developed and well-nourished.   HENT:   Head: Normocephalic and atraumatic.   Mallampati 4 anatomy   Neck: No tracheal deviation present.   Pulmonary/Chest: Effort normal.   Neurological: She is alert and oriented to person, place, and time.   Skin: No erythema. No pallor.   Psychiatric: She has a normal mood and affect. Her behavior is normal. Judgment and thought content normal.     27/30 days of use.  Greater than 4-hour use 86.790% pressure 14.5 AHI of 6.7 settings 11 to 20 cm H2O.    ASSESSMENT/PLAN    Freida was seen today for follow-up.    Diagnoses and all orders for this visit:    Obstructive sleep apnea, adult  -     Overnight Sleep Oximetry Study; Future  -     PAP Therapy    Nocturnal hypoxemia  -     Overnight Sleep Oximetry Study; Future            1. Counseled patient regarding multimodal approach with  healthy nutrition, healthy sleep, regular physical activity, social activities, counseling, and medications. Encouraged to practice lateral sleep position. Avoid alcohol and sedatives close to bedtime.  2. Order placed to change to full facemask.  Overnight oximetry while wearing CPAP and patient will be called with these results.  We will see patient back in 1 year.  Patient gave verbal consent for video visit.    I have reviewed the results of my evaluation and impression and discussed my recommendations in detail with the patient.      Signed by  LUCIA Enamorado    September 3, 2020      CC: Annia Rico APRN          No ref. provider found

## 2020-09-09 ENCOUNTER — HOSPITAL ENCOUNTER (OUTPATIENT)
Dept: PHYSICAL THERAPY | Facility: HOSPITAL | Age: 50
Setting detail: THERAPIES SERIES
Discharge: HOME | End: 2020-09-09

## 2020-09-09 DIAGNOSIS — M54.41 LOW BACK PAIN DUE TO BILATERAL SCIATICA: Primary | ICD-10-CM

## 2020-09-09 DIAGNOSIS — G47.33 OBSTRUCTIVE SLEEP APNEA, ADULT: ICD-10-CM

## 2020-09-09 DIAGNOSIS — M54.42 LOW BACK PAIN DUE TO BILATERAL SCIATICA: Primary | ICD-10-CM

## 2020-09-09 DIAGNOSIS — G47.34 NOCTURNAL HYPOXEMIA: ICD-10-CM

## 2020-09-09 PROCEDURE — 97110 THERAPEUTIC EXERCISES: CPT

## 2020-09-09 NOTE — THERAPY TREATMENT NOTE
Outpatient Physical Therapy Ortho Treatment Note  [facility]     Patient Name: Freida Martinez  : 1970  MRN: [id number]  Today's Date: 2020      Visit Date: 2020    Visit Dx:    ICD-10-CM ICD-9-CM   1. Low back pain due to bilateral sciatica M54.41 724.3    M54.42 724.2     Patient Active Problem List   Diagnosis   • Essential hypertension   • Hypothyroidism   • Tobacco abuse   • CKD (chronic kidney disease) stage 3, GFR 30-59 ml/min (CMS/[facility])   • Long QT interval   • Controlled type 2 diabetes mellitus without complication, without long-term current use of insulin (CMS/[facility])   • Morbid obesity with BMI of 40.0-44.9, adult (CMS/[facility])   • Recurrent major depressive disorder, in partial remission (CMS/[facility])   • Mixed hyperlipidemia   • Snoring   • GINNY (obstructive sleep apnea)     Past Medical History:   Diagnosis Date   • Abdominal pain 2019   • JACKIE (acute kidney injury) (CMS/[facility]) 2019   • Arthritis    • Asthma    • Breast injury    • Diabetes mellitus (CMS/[facility])    • Disease of thyroid gland    • Epigastric pain 2019    Added automatically from request for surgery [id number]   • Hypertension    • Kidney infection    • Mental disorder    • Non-compliance 2019   • Sleep apnea    • Volume depletion 2019        Past Surgical History:   Procedure Laterality Date   • ENDOSCOPY N/A 4/15/2019    Procedure: ESOPHAGOGASTRODUODENOSCOPY;  Surgeon: Jude Clinton MD;  Location: [facility] ENDOSCOPY;  Service: Gastroenterology   • TUBAL ABDOMINAL LIGATION     • VAGINAL DELIVERY      x4   • WISDOM TOOTH EXTRACTION         PT Assessment/Plan     Row Name 20 1300          PT Assessment    Assessment Comments  Exercises were tolerated well today. She had moderate pain overall.  she noted some relief with extensions.   -MM        PT Plan    PT Plan Comments  Continue per plan of treatment.   -MM       User Key  (r) = Recorded By, (t) = Taken By, (c) = Cosigned By    Initials Name  Provider Type    Sanjeev Green, PT Physical Therapist        OP Exercises     Row Name 09/09/20 1300             Subjective Pain    Able to rate subjective pain?  yes  -MM      Pre-Treatment Pain Level  5  -MM      Post-Treatment Pain Level  3  -MM         Total Minutes    42432 - PT Therapeutic Exercise Minutes  28  -MM         Exercise 1    Exercise Name 1  prone press ups/press up with OP  -MM      Sets 1  2  -MM      Reps 1  10  -MM         Exercise 2    Exercise Name 2  prone leg raise  -MM      Reps 2  10 ea  -MM         Exercise 3    Exercise Name 3  standing side bends w DB  -MM      Reps 3  10/10  -MM      Additional Comments  5# ea  -MM         Exercise 4    Exercise Name 4  sidelying trunk rotation  -MM      Reps 4  15 ea  -MM         Exercise 5    Exercise Name 5  bridging w ball  -MM      Reps 5  15  -MM         Exercise 6    Exercise Name 6  LTR w ball  -MM      Reps 6  15  -MM         Exercise 7    Exercise Name 7  sit to stand and raise  -MM      Sets 7  2  -MM      Reps 7  10  -MM         Exercise 8    Exercise Name 8  kettle bell lifts  -MM      Reps 8  10/10  -MM      Additional Comments  15#  -MM         Exercise 9    Exercise Name 9  standing trunk rotation w ball  -MM      Reps 9  15  -MM      Additional Comments  4# ball  -MM         Exercise 10    Exercise Name 10  leg kicks abduction-- red band  -MM      Reps 10  10/10  -MM         Exercise 11    Exercise Name 11  seated ball outs  -MM      Reps 11  15  -MM        User Key  (r) = Recorded By, (t) = Taken By, (c) = Cosigned By    Initials Name Provider Type    Sanjeev Green, PT Physical Therapist      Time Calculation:      Therapy Charges for Today     Code Description Service Date Service Provider Modifiers Qty    [id number]  PT THER PROC EA 15 MIN 9/9/2020 Sanjeev Serrato, PT GP 2          Sanjeev Serrato, PT  9/9/2020

## 2020-09-14 ENCOUNTER — TELEPHONE (OUTPATIENT)
Dept: SLEEP MEDICINE | Facility: HOSPITAL | Age: 50
End: 2020-09-14

## 2020-09-16 ENCOUNTER — HOSPITAL ENCOUNTER (OUTPATIENT)
Dept: PHYSICAL THERAPY | Facility: HOSPITAL | Age: 50
Setting detail: THERAPIES SERIES
Discharge: HOME | End: 2020-09-16

## 2020-09-16 DIAGNOSIS — M54.42 LOW BACK PAIN DUE TO BILATERAL SCIATICA: Primary | ICD-10-CM

## 2020-09-16 DIAGNOSIS — M54.41 LOW BACK PAIN DUE TO BILATERAL SCIATICA: Primary | ICD-10-CM

## 2020-09-16 PROCEDURE — 97110 THERAPEUTIC EXERCISES: CPT

## 2020-09-16 NOTE — THERAPY PROGRESS REPORT/RE-CERT
Outpatient Physical Therapy Ortho Progress Note  [facility]     Patient Name: Freida Martinez  : 1970  MRN: [id number]  Today's Date: 2020      Visit Date: 2020    Visit Dx:    ICD-10-CM ICD-9-CM   1. Low back pain due to bilateral sciatica  M54.41 724.3    M54.42 724.2       Patient Active Problem List   Diagnosis   • Essential hypertension   • Hypothyroidism   • Tobacco abuse   • CKD (chronic kidney disease) stage 3, GFR 30-59 ml/min (CMS/[facility])   • Long QT interval   • Controlled type 2 diabetes mellitus without complication, without long-term current use of insulin (CMS/[facility])   • Morbid obesity with BMI of 40.0-44.9, adult (CMS/[facility])   • Recurrent major depressive disorder, in partial remission (CMS/[facility])   • Mixed hyperlipidemia   • Snoring   • GINNY (obstructive sleep apnea)        Past Medical History:   Diagnosis Date   • Abdominal pain 2019   • JACKIE (acute kidney injury) (CMS/[facility]) 2019   • Arthritis    • Asthma    • Breast injury    • Diabetes mellitus (CMS/[facility])    • Disease of thyroid gland    • Epigastric pain 2019    Added automatically from request for surgery [id number]   • Hypertension    • Kidney infection    • Mental disorder    • Non-compliance 2019   • Sleep apnea    • Volume depletion 2019        Past Surgical History:   Procedure Laterality Date   • ENDOSCOPY N/A 4/15/2019    Procedure: ESOPHAGOGASTRODUODENOSCOPY;  Surgeon: Jude Clinton MD;  Location: [facility] ENDOSCOPY;  Service: Gastroenterology   • TUBAL ABDOMINAL LIGATION     • VAGINAL DELIVERY      x4   • WISDOM TOOTH EXTRACTION         PT Ortho     Row Name 20 1300       Posture/Observations    Posture/Observations Comments  tenderness bilateral lower lumbar paravertebrals.  -MM       Head/Neck/Trunk    Trunk Extension AROM  30 degrees standing with some pain  -MM    Trunk Flexion AROM  75%  -MM    Trunk Lt Lateral Flexion AROM  min loss with some pain  -MM    Trunk Rt Lateral Flexion  AROM  min loss with some pain  -MM    Trunk Lt Rotation AROM  normal  -MM    Trunk Rt Rotation AROM  normal  -MM       MMT (Manual Muscle Testing)    Rt Lower Ext  Rt Knee WNL;Rt Hip Flexion;Rt Hip Extension;Rt Hip ABduction;Rt Hip ADduction;Rt Ankle WNL  -MM    Lt Lower Ext  Lt Hip Flexion;Lt Hip Extension;Lt Hip ABduction;Lt Hip ADduction;Lt Knee WNL;Lt Ankle WNL  -MM       MMT Right Lower Ext    Rt Hip Flexion MMT, Gross Movement  (4+/5) good plus  -MM    Rt Hip ABduction MMT, Gross Movement  (4+/5) good plus  -MM    Rt Hip ADduction MMT, Gross Movement  (4+/5) good plus  -MM       MMT Left Lower Ext    Lt Hip Flexion MMT, Gross Movement  (4+/5) good plus  -MM    Lt Hip ABduction MMT, Gross Movement  (4+/5) good plus  -MM    Lt Hip ADduction MMT, Gross Movement  (4+/5) good plus  -MM      User Key  (r) = Recorded By, (t) = Taken By, (c) = Cosigned By    Initials Name Provider Type    MM Sanjeev Serrato, PT Physical Therapist          PT Assessment/Plan     Row Name 09/16/20 1300          PT Assessment    Functional Limitations  Limitation in home management;Performance in leisure activities;Performance in self-care ADL  -MM     Impairments  Pain;Range of motion  -MM     Assessment Comments  Overall client is responding well to exercise. She still has some back pain with prolonged sitting or standing, but now at times she doesn't have any pain and she is moving better. Further skilled care is required to minimize pain and maximize mobility, strength, and function.  -MM     Please refer to paper survey for additional self-reported information  Yes  -MM     Rehab Potential  Good  -MM     Patient/caregiver participated in establishment of treatment plan and goals  Yes  -MM     Patient would benefit from skilled therapy intervention  Yes  -MM        PT Plan    PT Frequency  1x/week  -MM     Predicted Duration of Therapy Intervention (OT)  3-4 more weeks  -MM     Planned CPT's?  PT THER PROC EA 15 MIN: 72077;PT THER  ACT EA 15 MIN: 57043;PT MANUAL THERAPY EA 15 MIN: 97480;PT HOT/COLD PACK WC NONMCARE: 47518  -MM     PT Plan Comments  Continue per plan of treatment.   -MM       User Key  (r) = Recorded By, (t) = Taken By, (c) = Cosigned By    Initials Name Provider Type    Sanjeev Green, PT Physical Therapist        OP Exercises     Row Name 09/16/20 1300             Subjective Comments    Subjective Comments  Client reports her back feels much better after exercises. She still notices pain with prolonged sitting and standing. She does not stand for more than 15 minutes at a time.  -MM         Subjective Pain    Able to rate subjective pain?  yes  -MM      Pre-Treatment Pain Level  3  -MM      Post-Treatment Pain Level  3  -MM         Total Minutes    23489 - PT Therapeutic Exercise Minutes  30  -MM         Exercise 1    Exercise Name 1  prone press ups/press up with OP  -MM      Sets 1  2  -MM      Reps 1  10  -MM         Exercise 2    Exercise Name 2  prone leg raise  -MM      Reps 2  10 ea  -MM         Exercise 3    Exercise Name 3  standing side bends w DB  -MM      Reps 3  10/10  -MM         Exercise 4    Exercise Name 4  sidelying trunk rotation  -MM      Reps 4  15 ea  -MM         Exercise 5    Exercise Name 5  bridging w ball  -MM      Reps 5  15  -MM         Exercise 6    Exercise Name 6  LTR w ball  -MM      Reps 6  15  -MM         Exercise 7    Exercise Name 7  sit to stand and raise  -MM      Sets 7  2  -MM      Reps 7  10  -MM         Exercise 8    Exercise Name 8  kettle bell lifts  -MM      Reps 8  10/10  -MM         Exercise 9    Exercise Name 9  standing trunk rotation w ball  -MM      Reps 9  15  -MM         Exercise 10    Exercise Name 10  leg kicks abduction-- red band  -MM      Reps 10  10/10  -MM         Exercise 11    Exercise Name 11  seated ball outs  -MM      Reps 11  15  -MM        User Key  (r) = Recorded By, (t) = Taken By, (c) = Cosigned By    Initials Name Provider Type    Sanjeev Green  KRISTI, PT Physical Therapist        PT OP Goals     Row Name 09/16/20 1300          PT Short Term Goals    STG Date to Achieve  09/09/20  -MM     STG 1  Pt to improve trunk flexion by 25% from initial evaluation in order to improve functional mobility.  -MM     STG 1 Progress  Met  -MM     STG 2  Pt to report pain at worst being </= 5/10.  -MM     STG 2 Progress  Progressing  -MM     STG 3  Pt to decrease Modified Oswestry score to </= 40%.  -MM     STG 3 Progress  Ongoing  -MM        Long Term Goals    LTG Date to Achieve  09/30/20  -MM     LTG 1  Pt to improve pain at worst to </=2/10.  -MM     LTG 1 Progress  Ongoing  -MM     LTG 2  Pt to report ability to stand for 30 min.  -MM     LTG 2 Progress  Goal Revised  -MM     LTG 3  Pt to decrease modified oswestry score to </= 20%.  -MM     LTG 3 Progress  Ongoing;Progressing  -MM     LTG 4  Pt to report return to ADLs without an increase in pain or complaint of back symtpoms .  -MM     LTG 4 Progress  Ongoing;Progressing  -MM        Time Calculation    PT Goal Re-Cert Due Date  11/17/20  -MM       User Key  (r) = Recorded By, (t) = Taken By, (c) = Cosigned By    Initials Name Provider Type    Sanjeev Green, PT Physical Therapist          Outcome Measure Options: Modifed Owestry  Modified Oswestry  Modified Oswestry Score/Comments: 54%      Time Calculation:   Start Time: 1300  Therapy Charges for Today     Code Description Service Date Service Provider Modifiers Qty    08631088748 HC PT THER PROC EA 15 MIN 9/16/2020 Sanjeev Serrato, PT GP 2          PT G-Codes  Outcome Measure Options: Modifed Owestry  Modified Oswestry Score/Comments: 54%         Sanjeev Serrato, PT  9/16/2020

## 2020-09-23 ENCOUNTER — HOSPITAL ENCOUNTER (OUTPATIENT)
Dept: PHYSICAL THERAPY | Facility: HOSPITAL | Age: 50
Setting detail: THERAPIES SERIES
Discharge: HOME | End: 2020-09-23

## 2020-09-23 DIAGNOSIS — M54.42 LOW BACK PAIN DUE TO BILATERAL SCIATICA: Primary | ICD-10-CM

## 2020-09-23 DIAGNOSIS — M54.41 LOW BACK PAIN DUE TO BILATERAL SCIATICA: Primary | ICD-10-CM

## 2020-09-23 PROCEDURE — 97110 THERAPEUTIC EXERCISES: CPT

## 2020-09-23 NOTE — THERAPY TREATMENT NOTE
Outpatient Physical Therapy Ortho Treatment Note  [facility]     Patient Name: Freida Martinez  : 1970  MRN: [id number]  Today's Date: 2020      Visit Date: 2020    Visit Dx:    ICD-10-CM ICD-9-CM   1. Low back pain due to bilateral sciatica  M54.41 724.3    M54.42 724.2       Patient Active Problem List   Diagnosis   • Essential hypertension   • Hypothyroidism   • Tobacco abuse   • CKD (chronic kidney disease) stage 3, GFR 30-59 ml/min (CMS/[facility])   • Long QT interval   • Controlled type 2 diabetes mellitus without complication, without long-term current use of insulin (CMS/[facility])   • Morbid obesity with BMI of 40.0-44.9, adult (CMS/[facility])   • Recurrent major depressive disorder, in partial remission (CMS/[facility])   • Mixed hyperlipidemia   • Snoring   • GINNY (obstructive sleep apnea)        Past Medical History:   Diagnosis Date   • Abdominal pain 2019   • JACKIE (acute kidney injury) (CMS/[facility]) 2019   • Arthritis    • Asthma    • Breast injury    • Diabetes mellitus (CMS/[facility])    • Disease of thyroid gland    • Epigastric pain 2019    Added automatically from request for surgery [id number]   • Hypertension    • Kidney infection    • Mental disorder    • Non-compliance 2019   • Sleep apnea    • Volume depletion 2019        Past Surgical History:   Procedure Laterality Date   • ENDOSCOPY N/A 4/15/2019    Procedure: ESOPHAGOGASTRODUODENOSCOPY;  Surgeon: Jude Clinton MD;  Location: [facility] ENDOSCOPY;  Service: Gastroenterology   • TUBAL ABDOMINAL LIGATION     • VAGINAL DELIVERY      x4   • WISDOM TOOTH EXTRACTION         PT Assessment/Plan     Row Name 20 1300          PT Assessment    Assessment Comments  She had increased knee and foot pain today, but her back was feeling better. Exercises were tolerated well.   -MM        PT Plan    PT Plan Comments  Continue per plan of treatment for 3-4 more visits.  -MM       User Key  (r) = Recorded By, (t) = Taken By, (c) =  Cosigned By    Initials Name Provider Type    Sanjeev Green, PT Physical Therapist        OP Exercises     Row Name 09/23/20 1300             Subjective Comments    Subjective Comments  Client reports no back pain today. She has been having right foot and right knee pain. She had an injection for her right foot this morning.  -MM         Subjective Pain    Able to rate subjective pain?  yes  -MM      Pre-Treatment Pain Level  0  -MM      Post-Treatment Pain Level  0  -MM      Subjective Pain Comment  Client reports foot pain at 3/10 and knee pain at 7/10.  -MM         Total Minutes    22316 - PT Therapeutic Exercise Minutes  25  -MM         Exercise 1    Exercise Name 1  prone press ups/press up with OP  -MM      Sets 1  2  -MM      Reps 1  10  -MM         Exercise 2    Exercise Name 2  prone leg raise  -MM      Reps 2  10 ea  -MM         Exercise 3    Exercise Name 3  standing side bends w DB  -MM      Reps 3  10/10  -MM         Exercise 4    Exercise Name 4  sidelying trunk rotation  -MM      Reps 4  15 ea  -MM         Exercise 5    Exercise Name 5  bridging w ball  -MM      Reps 5  15  -MM         Exercise 6    Exercise Name 6  LTR w ball  -MM      Reps 6  15  -MM         Exercise 7    Exercise Name 7  nu-step L4  -MM      Time 7  5 min  -MM         Exercise 8    Exercise Name 8  kettle bell lifts  -MM      Reps 8  10/10  -MM      Additional Comments  15#  -MM         Exercise 9    Exercise Name 9  standing trunk rotation w ball  -MM      Reps 9  15  -MM         Exercise 10    Exercise Name 10  hooklying abduction  -MM      Reps 10  20  -MM      Additional Comments  red  -MM         Exercise 11    Exercise Name 11  seated ball outs  -MM      Reps 11  15  -MM         Exercise 12    Exercise Name 12  standing trunk extension  -MM      Reps 12  15  -MM        User Key  (r) = Recorded By, (t) = Taken By, (c) = Cosigned By    Initials Name Provider Type    Sanjeev Green, PT Physical Therapist        Time  Calculation:   Start Time: 1300  Therapy Charges for Today     Code Description Service Date Service Provider Modifiers Qty    70173408101 HC PT THER PROC EA 15 MIN 9/23/2020 Sanjeev Serrato, PT GP 2      Sanjeev Serrato, PT  9/23/2020

## 2020-09-29 ENCOUNTER — OFFICE VISIT (OUTPATIENT)
Dept: INTERNAL MEDICINE | Facility: CLINIC | Age: 50
End: 2020-09-29

## 2020-09-29 VITALS
WEIGHT: 243 LBS | OXYGEN SATURATION: 97 % | BODY MASS INDEX: 41.48 KG/M2 | DIASTOLIC BLOOD PRESSURE: 88 MMHG | TEMPERATURE: 97.3 F | HEART RATE: 82 BPM | SYSTOLIC BLOOD PRESSURE: 136 MMHG | HEIGHT: 64 IN

## 2020-09-29 DIAGNOSIS — M54.41 CHRONIC MIDLINE LOW BACK PAIN WITH BILATERAL SCIATICA: ICD-10-CM

## 2020-09-29 DIAGNOSIS — M54.42 CHRONIC MIDLINE LOW BACK PAIN WITH BILATERAL SCIATICA: ICD-10-CM

## 2020-09-29 DIAGNOSIS — Z72.0 TOBACCO ABUSE: ICD-10-CM

## 2020-09-29 DIAGNOSIS — F33.42 RECURRENT MAJOR DEPRESSIVE DISORDER, IN FULL REMISSION (HCC): ICD-10-CM

## 2020-09-29 DIAGNOSIS — E66.01 MORBID OBESITY WITH BMI OF 40.0-44.9, ADULT (HCC): ICD-10-CM

## 2020-09-29 DIAGNOSIS — G89.29 CHRONIC MIDLINE LOW BACK PAIN WITH BILATERAL SCIATICA: ICD-10-CM

## 2020-09-29 DIAGNOSIS — Z23 FLU VACCINE NEED: ICD-10-CM

## 2020-09-29 DIAGNOSIS — Z28.39 HEPATITIS A VACCINATION NOT UP TO DATE: ICD-10-CM

## 2020-09-29 DIAGNOSIS — I10 ESSENTIAL HYPERTENSION: Primary | ICD-10-CM

## 2020-09-29 DIAGNOSIS — E11.9 CONTROLLED TYPE 2 DIABETES MELLITUS WITHOUT COMPLICATION, WITHOUT LONG-TERM CURRENT USE OF INSULIN (HCC): ICD-10-CM

## 2020-09-29 DIAGNOSIS — K21.9 CHRONIC GERD: ICD-10-CM

## 2020-09-29 PROCEDURE — 90471 IMMUNIZATION ADMIN: CPT | Performed by: NURSE PRACTITIONER

## 2020-09-29 PROCEDURE — 90472 IMMUNIZATION ADMIN EACH ADD: CPT | Performed by: NURSE PRACTITIONER

## 2020-09-29 PROCEDURE — 90686 IIV4 VACC NO PRSV 0.5 ML IM: CPT | Performed by: NURSE PRACTITIONER

## 2020-09-29 PROCEDURE — 99214 OFFICE O/P EST MOD 30 MIN: CPT | Performed by: NURSE PRACTITIONER

## 2020-09-29 PROCEDURE — 90632 HEPA VACCINE ADULT IM: CPT | Performed by: NURSE PRACTITIONER

## 2020-09-29 RX ORDER — VARENICLINE TARTRATE 1 MG/1
1 TABLET, FILM COATED ORAL 2 TIMES DAILY
Qty: 60 TABLET | Refills: 0 | Status: SHIPPED | OUTPATIENT
Start: 2020-09-29 | End: 2020-11-29 | Stop reason: SDUPTHER

## 2020-09-29 NOTE — PROGRESS NOTES
Subjective   Freida Martinez is a 50 y.o. female.     Chief Complaint   Patient presents with   • Back Pain       History of Present Illness   Low back pain -pain is still mild and intermittent to the low back with intermittent radiation to down both legs..  Worse with sitting for greater than 20 minutes.  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  If she can stand or lie down the pain is better.  She is unable to take NSAIDs due to CKD.  Since her last visit she has been following with physical therapy and the pain has not changed any.  She did have a lumbar spinal x-ray completed 7/9/2020 which showed no acute fracture or malalignments.  There was some mild spurring anteriorly at L3.  She is not interested in any additional follow-up imaging or treatments at this time.      Diabetes, type 2.  Her last hemoglobin A1c was 6.1% in August 2020.  She has brought a list of her glucose readings with her today.  Glucose is averaging 97-1 30s for the most part with a rare 150 or 170.  No hypoglycemic episodes noted.  Lab Results   Component Value Date    HGBA1C 6.1 08/11/2020       Depression-chronic.  Reports symptoms are completely gone at this time.  She stopped taking her prozac a couple of weeks ago.   She reports doing great without it.       GERD-chronic.  She cannot remember the last time she had heartburn.  She is contemplating stopping her pantoprazole.    Hypertension-chronic she is followed by Ann Moya as well for some chest pain.  Work-up revealed symptomatic PVCs and she was started on carvedilol.  She has done great with the carvedilol.  She has a list of blood pressures with her and blood pressures been running 10 4-1 32 over 60s to 80s where her heart rate has been 70s to 80s.    Her physical therapy she has been less active than normal.  Her weight is slowly increasing.  She is up 6 pounds since August 11, 2020.  She is ready to get back to being a little more  active and eating better.    Still smoking, cut back to 1 pack every 5 days. Still on the chantix.  She has been on the Chantix for right at 12 weeks.    She is due for her second hepatitis A vaccination.  I discussed the flu vaccination with her and she wishes to proceed with this today as well.    The following portions of the patient's history were reviewed and updated as appropriate: allergies, current medications, past family history, past medical history, past social history, past surgical history and problem list.        Review of Systems   Constitutional: Positive for activity change and unexpected weight gain. Negative for appetite change, chills, diaphoresis, fatigue, fever and unexpected weight loss.   HENT: Negative.    Eyes: Negative for pain and visual disturbance.   Respiratory: Negative for cough and shortness of breath.    Cardiovascular: Negative for chest pain, palpitations and leg swelling.   Gastrointestinal: Negative for abdominal pain, constipation, diarrhea, GERD and indigestion.   Genitourinary: Negative.  Negative for difficulty urinating.   Musculoskeletal: Positive for arthralgias and back pain. Negative for myalgias.   Skin: Negative for color change and rash.   Neurological: Negative for dizziness, weakness and headache.   Hematological: Does not bruise/bleed easily.   Psychiatric/Behavioral: Negative for decreased concentration, sleep disturbance and depressed mood. The patient is not nervous/anxious.            Outpatient Medications Marked as Taking for the 9/29/20 encounter (Office Visit) with Annia Rico APRN   Medication Sig Dispense Refill   • albuterol (PROVENTIL) (2.5 MG/3ML) 0.083% nebulizer solution      • albuterol sulfate  (90 Base) MCG/ACT inhaler Inhale 2 puffs Every 6 (Six) Hours As Needed for Wheezing or Shortness of Air. 1 inhaler 0   • amLODIPine (NORVASC) 10 MG tablet Take 1 tablet by mouth Daily. 30 tablet 2   • aspirin 81 MG EC tablet Take 1 tablet by  mouth Daily.     • atorvastatin (LIPITOR) 40 MG tablet Take 1 tablet by mouth Daily. 30 tablet 2   • carvedilol (COREG) 6.25 MG tablet Take 1 tablet by mouth 2 (Two) Times a Day. 60 tablet 3   • diclofenac (VOLTAREN) 1 % gel gel Apply 4 g topically to the appropriate area as directed 4 (Four) Times a Day. Small amount to affected area 300 g 3   • glucose blood test strip Used to test blood sugar for Diabetes E11.9 twice a day. Pt has One Touch Verio Flex meter 100 each 12   • Lancets (ONETOUCH ULTRASOFT) lancets Used to test blood sugar for Diabetes E11.9 twice a day 100 each 12   • levothyroxine (SYNTHROID, LEVOTHROID) 50 MCG tablet Take 1 tablet by mouth Daily. 30 tablet 2   • pantoprazole (PROTONIX) 40 MG EC tablet Take 1 tablet by mouth Daily. 30 tablet 2   • SITagliptin (JANUVIA) 50 MG tablet Take 1 tablet by mouth Daily. 30 tablet 2   • varenicline (Chantix Continuing Month Pak) 1 MG tablet Take 1 tablet by mouth 2 (Two) Times a Day. 60 tablet 0   • [DISCONTINUED] FLUoxetine (PROzac) 40 MG capsule Take 1 capsule by mouth Daily. 30 capsule 2   • [DISCONTINUED] varenicline (Chantix Continuing Month Trae) 1 MG tablet Take 1 tablet by mouth 2 (Two) Times a Day. 60 tablet 0     Allergies   Allergen Reactions   • Anaprox [Naproxen] Other (See Comments)     CHRONIC KIDNEY DISEASE   • Celebrex [Celecoxib] Other (See Comments)     CHRONIC KIDNEY DISEASE   • Flector [Diclofenac Epolamine] Other (See Comments)     CHRONIC KIDNEY DISEASE   • Motrin [Ibuprofen] Other (See Comments)     CHRONIC KIDNEY DISEASE   • Voltaren [Diclofenac Sodium] Other (See Comments)     CHRONIC KIDNEY DISEASE   • Lisinopril Swelling           Objective   Physical Exam  Vitals signs and nursing note reviewed.   Constitutional:       General: She is not in acute distress.     Appearance: Normal appearance. She is well-developed. She is morbidly obese. She is not diaphoretic.   HENT:      Head: Normocephalic and atraumatic.   Eyes:       "Conjunctiva/sclera: Conjunctivae normal.      Pupils: Pupils are equal, round, and reactive to light.   Neck:      Musculoskeletal: Normal range of motion.      Vascular: No JVD.   Cardiovascular:      Rate and Rhythm: Normal rate and regular rhythm.      Heart sounds: Normal heart sounds. No murmur.   Pulmonary:      Effort: Pulmonary effort is normal. No respiratory distress.      Breath sounds: Normal breath sounds.   Chest:      Chest wall: No tenderness.   Abdominal:      General: Bowel sounds are normal. There is no distension.      Palpations: Abdomen is soft.      Tenderness: There is no abdominal tenderness.   Musculoskeletal: Normal range of motion.      Lumbar back: She exhibits pain. She exhibits normal range of motion, no tenderness, no bony tenderness, no swelling, no edema, no deformity and no laceration.   Skin:     General: Skin is warm and dry.      Coloration: Skin is not pale.      Findings: No erythema.   Neurological:      Mental Status: She is alert and oriented to person, place, and time.   Psychiatric:         Behavior: Behavior normal.         Thought Content: Thought content normal.         Judgment: Judgment normal.         Vitals:    09/29/20 0855   BP: 136/88   Pulse: 82   Temp: 97.3 °F (36.3 °C)   SpO2: 97%   Weight: 110 kg (243 lb)   Height: 162.6 cm (64\")     Body mass index is 41.71 kg/m².  Wt Readings from Last 3 Encounters:   09/29/20 110 kg (243 lb)   09/03/20 109 kg (239 lb 6 oz)   09/03/20 108 kg (237 lb)             Assessment/Plan       Diagnoses and all orders for this visit:    1. Essential hypertension (Primary)  Well-controlled.  Continue amlodipine and carvedilol.  2. Recurrent major depressive disorder, in full remission (CMS/HCC)  Well-controlled without medications.  We will keep her off the Prozac for now.  She can let me know if symptoms return and we can reconsider therapy again  3. Controlled type 2 diabetes mellitus without complication, without long-term current " use of insulin (CMS/[facility])  Well-controlled.  Continue Januvia  4. Tobacco abuse  -     varenicline (Chantix Continuing Month Trae) 1 MG tablet; Take 1 tablet by mouth 2 (Two) Times a Day.  Dispense: 60 tablet; Refill: 0  We will do 1 more month of the Chantix as she has significantly cut down on her tobacco use.  I have advised her to quit completely.  5. Chronic GERD  Symptoms resolved.  She is going to try to stop taking her pantoprazole and see how she does with this.  Patient advised on GERD precautions: Weight loss, avoid alcohol and caffeine, avoid NSAIDs, consume small frequent meals, wear loosefitting clothing, and avoid spicy, acidic, or trigger foods.  May elevate the head of bed 30 degrees at night.  6. Chronic midline low back pain with bilateral sciatica  Pain still present despite physical therapy.  She declines pain medications and is not a candidate for NSAIDs.  She does not desire any additional work-up at this time as she would not want to proceed with surgery or injections.  If she changes her mind she will let me know and next up would likely be an MRI.  7. Morbid obesity with BMI of 40.0-44.9, adult (CMS/[facility])  Encourage diet and exercise for weight reduction  8. Hepatitis A vaccination not up to date  -     Hepatitis A Vaccine Adult IM    9. Flu vaccine need  -     FluLaval Quad >6 Months ([id number])  Vaccinations updated as above        Return in about 3 months (around 12/29/2020) for chronic condition follow up.    I discussed my findings,recommendations, and plan of care was with the patient. They verbalized understanding and agreement.  Patient was encouraged to keep me informed of any acute changes, lack of improvement, or any new concerning symptoms.       * Please note that portions of this note were completed with a voice recognition program. Efforts were made to edit the dictation but occasionally words are erroneously transcribed.

## 2020-09-30 ENCOUNTER — HOSPITAL ENCOUNTER (OUTPATIENT)
Dept: PHYSICAL THERAPY | Facility: HOSPITAL | Age: 50
Setting detail: THERAPIES SERIES
Discharge: HOME | End: 2020-09-30

## 2020-09-30 DIAGNOSIS — M54.42 LOW BACK PAIN DUE TO BILATERAL SCIATICA: Primary | ICD-10-CM

## 2020-09-30 DIAGNOSIS — M54.41 LOW BACK PAIN DUE TO BILATERAL SCIATICA: Primary | ICD-10-CM

## 2020-09-30 PROCEDURE — 97110 THERAPEUTIC EXERCISES: CPT

## 2020-10-14 ENCOUNTER — APPOINTMENT (OUTPATIENT)
Dept: PHYSICAL THERAPY | Facility: HOSPITAL | Age: 50
End: 2020-10-14

## 2020-10-19 RX ORDER — ALBUTEROL SULFATE 90 UG/1
AEROSOL, METERED RESPIRATORY (INHALATION)
Qty: 18 G | Refills: 0 | Status: SHIPPED | OUTPATIENT
Start: 2020-10-19 | End: 2020-12-22 | Stop reason: SDUPTHER

## 2020-11-03 ENCOUNTER — OFFICE VISIT (OUTPATIENT)
Dept: INTERNAL MEDICINE | Facility: CLINIC | Age: 50
End: 2020-11-03

## 2020-11-03 VITALS
BODY MASS INDEX: 42.68 KG/M2 | TEMPERATURE: 96.9 F | DIASTOLIC BLOOD PRESSURE: 82 MMHG | HEIGHT: 64 IN | SYSTOLIC BLOOD PRESSURE: 124 MMHG | HEART RATE: 85 BPM | WEIGHT: 250 LBS | RESPIRATION RATE: 18 BRPM | OXYGEN SATURATION: 99 %

## 2020-11-03 DIAGNOSIS — L30.1 DYSHIDROTIC ECZEMA: ICD-10-CM

## 2020-11-03 DIAGNOSIS — M54.50 LOW BACK PAIN OF OVER 3 MONTHS DURATION: Primary | ICD-10-CM

## 2020-11-03 DIAGNOSIS — M54.16 LUMBAR RADICULOPATHY: ICD-10-CM

## 2020-11-03 PROCEDURE — 99213 OFFICE O/P EST LOW 20 MIN: CPT | Performed by: NURSE PRACTITIONER

## 2020-11-03 RX ORDER — MOMETASONE FUROATE 1 MG/G
CREAM TOPICAL 2 TIMES DAILY
Qty: 50 G | Refills: 0 | Status: SHIPPED | OUTPATIENT
Start: 2020-11-03 | End: 2021-03-23

## 2020-11-03 RX ORDER — PREDNISONE 10 MG/1
TABLET ORAL
Qty: 48 TABLET | Refills: 0 | Status: SHIPPED | OUTPATIENT
Start: 2020-11-03 | End: 2020-12-22

## 2020-11-03 NOTE — PROGRESS NOTES
Subjective   Freida Martinez is a 50 y.o. female.     Chief Complaint   Patient presents with   • Numbness     right leg numbness from hip to knee 3+weeks.  Does have pain in the middle of night when sleeping       Rash  This is a new problem. The current episode started 1 to 4 weeks ago. The problem is unchanged. The affected locations include the left hand and right hand. The rash is characterized by redness, dryness, peeling and itchiness. She was exposed to nothing. Pertinent negatives include no anorexia, congestion, cough, diarrhea, eye pain, facial edema, fatigue, fever, joint pain, nail changes, rhinorrhea, shortness of breath, sore throat or vomiting. Past treatments include nothing. The treatment provided no relief.   Back Pain  This is a new problem. The current episode started 1 to 4 weeks ago. The problem occurs constantly. The problem has been waxing and waning since onset. The pain is present in the lumbar spine. The quality of the pain is described as aching. The pain radiates to the right foot, right thigh and right knee. The pain is moderate. Associated symptoms include numbness and tingling. Pertinent negatives include no abdominal pain, bladder incontinence, bowel incontinence, chest pain, dysuria, fever, headaches, leg pain, paresis, pelvic pain, perianal numbness, weakness or weight loss. Risk factors include lack of exercise and obesity. Treatments tried: PT. The treatment provided no relief.   She did PT for 7 weeks without relief.   Lumbar XR 7/2020 was ok.       The following portions of the patient's history were reviewed and updated as appropriate: allergies, current medications, past family history, past medical history, past social history, past surgical history and problem list.        Review of Systems   Constitutional: Negative for fatigue, fever and unexpected weight loss.   HENT: Negative for congestion, rhinorrhea and sore throat.    Eyes: Negative for blurred vision, double  vision, pain and visual disturbance.   Respiratory: Negative for cough, shortness of breath and wheezing.    Cardiovascular: Negative for chest pain, palpitations and leg swelling.   Gastrointestinal: Negative for abdominal pain, anorexia, bowel incontinence, constipation, diarrhea, nausea and vomiting.   Genitourinary: Negative for difficulty urinating, dysuria, frequency, pelvic pain, urgency and urinary incontinence.   Musculoskeletal: Positive for arthralgias and back pain. Negative for joint pain and myalgias.   Skin: Positive for rash. Negative for color change and nail changes.   Neurological: Positive for tingling and numbness. Negative for dizziness, weakness and headache.   Hematological: Negative for adenopathy. Does not bruise/bleed easily.           Outpatient Medications Marked as Taking for the 11/3/20 encounter (Office Visit) with Annia Rico APRN   Medication Sig Dispense Refill   • albuterol (PROVENTIL) (2.5 MG/3ML) 0.083% nebulizer solution      • albuterol sulfate  (90 Base) MCG/ACT inhaler INHALE TWO PUFFS BY MOUTH EVERY 6 HOURS AS NEEDED FOR WHEEZING OR SHORTNESS OF AIR **MUST CALL MD FOR APPOINTMENT 18 g 0   • amLODIPine (NORVASC) 10 MG tablet Take 1 tablet by mouth Daily. 30 tablet 2   • aspirin 81 MG EC tablet Take 1 tablet by mouth Daily.     • atorvastatin (LIPITOR) 40 MG tablet Take 1 tablet by mouth Daily. 30 tablet 2   • carvedilol (COREG) 6.25 MG tablet Take 1 tablet by mouth 2 (Two) Times a Day. 60 tablet 3   • diclofenac (VOLTAREN) 1 % gel gel Apply 4 g topically to the appropriate area as directed 4 (Four) Times a Day. Small amount to affected area 300 g 3   • glucose blood test strip Used to test blood sugar for Diabetes E11.9 twice a day. Pt has One Touch Verio Flex meter 100 each 12   • Lancets (ONETOUCH ULTRASOFT) lancets Used to test blood sugar for Diabetes E11.9 twice a day 100 each 12   • levothyroxine (SYNTHROID, LEVOTHROID) 50 MCG tablet Take 1 tablet by mouth  Daily. 30 tablet 2   • SITagliptin (JANUVIA) 50 MG tablet Take 1 tablet by mouth Daily. 30 tablet 2   • varenicline (Chantix Continuing Month Trae) 1 MG tablet Take 1 tablet by mouth 2 (Two) Times a Day. 60 tablet 0   • [DISCONTINUED] pantoprazole (PROTONIX) 40 MG EC tablet Take 1 tablet by mouth Daily. 30 tablet 2     Allergies   Allergen Reactions   • Anaprox [Naproxen] Other (See Comments)     CHRONIC KIDNEY DISEASE   • Celebrex [Celecoxib] Other (See Comments)     CHRONIC KIDNEY DISEASE   • Flector [Diclofenac Epolamine] Other (See Comments)     CHRONIC KIDNEY DISEASE   • Motrin [Ibuprofen] Other (See Comments)     CHRONIC KIDNEY DISEASE   • Voltaren [Diclofenac Sodium] Other (See Comments)     CHRONIC KIDNEY DISEASE   • Lisinopril Swelling           Objective   Physical Exam  Vitals signs and nursing note reviewed.   Constitutional:       Appearance: Normal appearance. She is well-developed. She is morbidly obese.   HENT:      Head: Normocephalic and atraumatic.   Eyes:      Conjunctiva/sclera: Conjunctivae normal.      Pupils: Pupils are equal, round, and reactive to light.   Neck:      Musculoskeletal: Normal range of motion.   Cardiovascular:      Rate and Rhythm: Normal rate and regular rhythm.      Heart sounds: Normal heart sounds.   Pulmonary:      Effort: Pulmonary effort is normal.      Breath sounds: Normal breath sounds.   Abdominal:      General: Bowel sounds are normal.      Palpations: Abdomen is soft.      Tenderness: There is no abdominal tenderness.   Musculoskeletal: Normal range of motion.      Lumbar back: She exhibits tenderness and pain. She exhibits normal range of motion, no bony tenderness, no swelling, no edema, no deformity, no laceration and no spasm.   Skin:     General: Skin is warm and dry.      Findings: Rash present.      Comments: Erythema and superficial peeling noted to palmar aspects of bilateral hands.   Neurological:      Mental Status: She is alert and oriented to person,  "place, and time.      Cranial Nerves: No cranial nerve deficit.      Sensory: No sensory deficit.      Deep Tendon Reflexes: Reflexes are normal and symmetric.   Psychiatric:         Behavior: Behavior normal.         Thought Content: Thought content normal.         Judgment: Judgment normal.         Vitals:    11/03/20 0937   BP: 124/82   Pulse: 85   Resp: 18   Temp: 96.9 °F (36.1 °C)   SpO2: 99%   Weight: 113 kg (250 lb)   Height: 162.6 cm (64\")     Body mass index is 42.91 kg/m².  Wt Readings from Last 3 Encounters:   11/05/20 113 kg (249 lb 1.9 oz)   11/03/20 113 kg (250 lb)   09/29/20 110 kg (243 lb)             Assessment/Plan       Diagnoses and all orders for this visit:    1. Low back pain of over 3 months duration (Primary)  -     MRI Lumbar Spine Without Contrast; Future  -     predniSONE (DELTASONE) 10 MG (48) dose pack; Take as directed on package  Dispense: 48 tablet; Refill: 0    2. Lumbar radiculopathy  -     predniSONE (DELTASONE) 10 MG (48) dose pack; Take as directed on package  Dispense: 48 tablet; Refill: 0    3. Dyshidrotic eczema  -     mometasone (Elocon) 0.1 % cream; Apply  topically to the appropriate area as directed 2 (two) times a day. For 3-6 weeks  Dispense: 50 g; Refill: 0      We will do the prednisone as directed for the back pain with radiculopathy.  We will also proceed with an MRI as she is now interested in pursuing further interventions.  Dyshidrotic eczema can be treated with mometasone.  Adverse effects and expectations discussed.      Return for will call with results, Next scheduled follow up.    I discussed my findings,recommendations, and plan of care was with the patient. They verbalized understanding and agreement.  Patient was encouraged to keep me informed of any acute changes, lack of improvement, or any new concerning symptoms.       * Please note that portions of this note were completed with a voice recognition program. Efforts were made to edit the dictation but " occasionally words are erroneously transcribed.

## 2020-11-05 ENCOUNTER — OFFICE VISIT (OUTPATIENT)
Dept: ORTHOPEDIC SURGERY | Facility: CLINIC | Age: 50
End: 2020-11-05

## 2020-11-05 VITALS — BODY MASS INDEX: 42.53 KG/M2 | HEIGHT: 64 IN | WEIGHT: 249.12 LBS | HEART RATE: 105 BPM | OXYGEN SATURATION: 95 %

## 2020-11-05 DIAGNOSIS — M17.0 PRIMARY OSTEOARTHRITIS OF BOTH KNEES: Primary | ICD-10-CM

## 2020-11-05 DIAGNOSIS — E66.01 CLASS 3 SEVERE OBESITY WITHOUT SERIOUS COMORBIDITY WITH BODY MASS INDEX (BMI) OF 40.0 TO 44.9 IN ADULT, UNSPECIFIED OBESITY TYPE (HCC): ICD-10-CM

## 2020-11-05 PROCEDURE — 20610 DRAIN/INJ JOINT/BURSA W/O US: CPT | Performed by: ORTHOPAEDIC SURGERY

## 2020-11-05 PROCEDURE — 99213 OFFICE O/P EST LOW 20 MIN: CPT | Performed by: ORTHOPAEDIC SURGERY

## 2020-11-05 RX ORDER — TRIAMCINOLONE ACETONIDE 40 MG/ML
80 INJECTION, SUSPENSION INTRA-ARTICULAR; INTRAMUSCULAR
Status: COMPLETED | OUTPATIENT
Start: 2020-11-05 | End: 2020-11-05

## 2020-11-05 RX ORDER — BUPIVACAINE HYDROCHLORIDE 2.5 MG/ML
3 INJECTION, SOLUTION EPIDURAL; INFILTRATION; INTRACAUDAL
Status: COMPLETED | OUTPATIENT
Start: 2020-11-05 | End: 2020-11-05

## 2020-11-05 RX ORDER — LIDOCAINE HYDROCHLORIDE 10 MG/ML
3 INJECTION, SOLUTION EPIDURAL; INFILTRATION; INTRACAUDAL; PERINEURAL
Status: COMPLETED | OUTPATIENT
Start: 2020-11-05 | End: 2020-11-05

## 2020-11-05 RX ADMIN — LIDOCAINE HYDROCHLORIDE 3 ML: 10 INJECTION, SOLUTION EPIDURAL; INFILTRATION; INTRACAUDAL; PERINEURAL at 14:28

## 2020-11-05 RX ADMIN — BUPIVACAINE HYDROCHLORIDE 3 ML: 2.5 INJECTION, SOLUTION EPIDURAL; INFILTRATION; INTRACAUDAL at 14:28

## 2020-11-05 RX ADMIN — TRIAMCINOLONE ACETONIDE 80 MG: 40 INJECTION, SUSPENSION INTRA-ARTICULAR; INTRAMUSCULAR at 14:28

## 2020-11-05 NOTE — PROGRESS NOTES
Orthopaedic Clinic Note: Knee Established Patient    Chief Complaint   Patient presents with   • Follow-up     13 month follow up-  Primary osteoarthritis of both knees         HPI    It has been 13  month(s) since Ms. Martinez's last visit. She returns to clinic today for follow-up bilateral knee osteoarthritis.  She was last seen for bilateral knee arthritis and was prescribed an oral anti-inflammatory.  She returns to clinic today complaining of bilateral knee pain, right worse than left.  She is complaining of swelling and stiffness in the knee and pain is a 4/10 on the pain scale.  She is ambulatory with no assistive device.  She denies fevers chills or constitutional symptoms.  She is here today primarily due to her ongoing knee pain is limiting daily activities.  She states the left knee is only occasionally bothersome.  The right knee is constantly bothering her.    Past Medical History:   Diagnosis Date   • Abdominal pain 4/12/2019   • JACKIE (acute kidney injury) (CMS/HCC) 4/12/2019   • Arthritis    • Asthma    • Breast injury    • Diabetes mellitus (CMS/[facility])    • Disease of thyroid gland    • Epigastric pain 4/12/2019    Added automatically from request for surgery [id number]   • Hypertension    • Kidney infection    • Mental disorder    • Non-compliance 7/24/2019   • Sleep apnea    • Volume depletion 4/14/2019      Past Surgical History:   Procedure Laterality Date   • ENDOSCOPY N/A 4/15/2019    Procedure: ESOPHAGOGASTRODUODENOSCOPY;  Surgeon: Jude Clinton MD;  Location: [facility] ENDOSCOPY;  Service: Gastroenterology   • TUBAL ABDOMINAL LIGATION     • VAGINAL DELIVERY      x4   • WISDOM TOOTH EXTRACTION        Family History   Problem Relation Age of Onset   • Hypertension Mother    • Diabetes Father    • Kidney disease Father    • Heart disease Father    • Hypertension Father    • Sleep apnea Father    • Cancer Maternal Aunt    • Arthritis Paternal Aunt    • Cancer Maternal Grandmother         BREAST   •  Breast cancer Maternal Grandmother    • Kidney disease Paternal Grandfather    • Kidney disease Paternal Uncle      Social History     Socioeconomic History   • Marital status: Single     Spouse name: Not on file   • Number of children: Not on file   • Years of education: Not on file   • Highest education level: Not on file   Tobacco Use   • Smoking status: Current Every Day Smoker     Packs/day: 0.25     Types: Cigarettes   • Smokeless tobacco: Never Used   • Tobacco comment: 3 cigarettes daily   Substance and Sexual Activity   • Alcohol use: No     Frequency: Never   • Drug use: No   • Sexual activity: Never   Social History Narrative    Caffeine: 1 cup coffee daily, 1 1/2 of 64 ounce cup of tea      Current Outpatient Medications on File Prior to Visit   Medication Sig Dispense Refill   • albuterol (PROVENTIL) (2.5 MG/3ML) 0.083% nebulizer solution      • albuterol sulfate  (90 Base) MCG/ACT inhaler INHALE TWO PUFFS BY MOUTH EVERY 6 HOURS AS NEEDED FOR WHEEZING OR SHORTNESS OF AIR **MUST CALL MD FOR APPOINTMENT 18 g 0   • amLODIPine (NORVASC) 10 MG tablet Take 1 tablet by mouth Daily. 30 tablet 2   • aspirin 81 MG EC tablet Take 1 tablet by mouth Daily.     • atorvastatin (LIPITOR) 40 MG tablet Take 1 tablet by mouth Daily. 30 tablet 2   • carvedilol (COREG) 6.25 MG tablet Take 1 tablet by mouth 2 (Two) Times a Day. 60 tablet 3   • diclofenac (VOLTAREN) 1 % gel gel Apply 4 g topically to the appropriate area as directed 4 (Four) Times a Day. Small amount to affected area 300 g 3   • glucose blood test strip Used to test blood sugar for Diabetes E11.9 twice a day. Pt has One Touch Verio Flex meter 100 each 12   • Lancets (ONETOUCH ULTRASOFT) lancets Used to test blood sugar for Diabetes E11.9 twice a day 100 each 12   • levothyroxine (SYNTHROID, LEVOTHROID) 50 MCG tablet Take 1 tablet by mouth Daily. 30 tablet 2   • mometasone (Elocon) 0.1 % cream Apply  topically to the appropriate area as directed 2 (two)  "times a day. For 3-6 weeks 50 g 0   • predniSONE (DELTASONE) 10 MG (48) dose pack Take as directed on package 48 tablet 0   • SITagliptin (JANUVIA) 50 MG tablet Take 1 tablet by mouth Daily. 30 tablet 2   • varenicline (Chantix Continuing Month Trae) 1 MG tablet Take 1 tablet by mouth 2 (Two) Times a Day. 60 tablet 0     No current facility-administered medications on file prior to visit.       Allergies   Allergen Reactions   • Anaprox [Naproxen] Other (See Comments)     CHRONIC KIDNEY DISEASE   • Celebrex [Celecoxib] Other (See Comments)     CHRONIC KIDNEY DISEASE   • Flector [Diclofenac Epolamine] Other (See Comments)     CHRONIC KIDNEY DISEASE   • Motrin [Ibuprofen] Other (See Comments)     CHRONIC KIDNEY DISEASE   • Voltaren [Diclofenac Sodium] Other (See Comments)     CHRONIC KIDNEY DISEASE   • Lisinopril Swelling        Review of Systems   Constitutional: Negative.    HENT: Negative.    Eyes: Negative.    Respiratory: Negative.    Cardiovascular: Negative.    Gastrointestinal: Negative.    Endocrine: Negative.    Genitourinary: Negative.    Musculoskeletal: Positive for arthralgias and joint swelling.   Skin: Negative.    Allergic/Immunologic: Negative.    Neurological: Negative.    Hematological: Negative.    Psychiatric/Behavioral: Negative.         The patient's Review of Systems was personally reviewed and confirmed as accurate.    Physical Exam  Pulse 105, height 162.6 cm (64.02\"), weight 113 kg (249 lb 1.9 oz), SpO2 95 %, not currently breastfeeding.    Body mass index is 42.74 kg/m².    GENERAL APPEARANCE: awake, alert, oriented, in no acute distress and well developed, well nourished  LUNGS:  breathing nonlabored  EXTREMITIES: no clubbing, cyanosis  PERIPHERAL PULSES: palpable dorsalis pedis and posterior tibial pulses bilaterally.    GAIT:  Antalgic        ----------  Bilateral Knee Exam:  ----------  ALIGNMENT: neutral  ----------  RANGE OF MOTION:  Normal (0-120 degrees) with no extensor lag or " flexion contracture  LIGAMENTOUS STABILITY:   stable to varus and valgus stress at terminal extension and 30 degrees without any evidence of laxity  ----------  STRENGTH:  KNEE FLEXION 5/5  KNEE EXTENSION  5/5  ANKLE DORSIFLEXION  5/5  ANKLE PLANTARFLEXION  5/5  ----------  PAIN WITH PALPATION: Tender to palpation globally throughout the right knee, mild anterior knee pain to palpation on the left knee.    KNEE EFFUSION: yes, mild effusion right knee, trace effusion left knee  PAIN WITH KNEE ROM: yes  PATELLAR CREPITUS:  yes, painful and symptomatic  ----------  SENSATION TO LIGHT TOUCH:  DEEP PERONEAL/SUPERFICIAL PERONEAL/SURAL/SAPHENOUS/TIBIAL:    intact  ----------  EDEMA:  no  ERYTHEMA:    no  WOUNDS/INCISIONS:  no  _____________________________________________________________________  _____________________________________________________________________    RADIOGRAPHIC FINDINGS:   Indication: Bilateral knee pain    Comparison: Todays xrays were compared to previous xrays from 10/8/2019    Knee films: Moderate tricompartmental osteoarthritis with neutral alignment.  Small periarticular osteophytes visualized in all compartments.  No significant change compared to prior radiographs.    Assessment/Plan:   Diagnosis Plan   1. Primary osteoarthritis of both knees  XR Knee 4+ View Bilateral    Large Joint Arthrocentesis: R knee   2. Class 3 severe obesity without serious comorbidity with body mass index (BMI) of 40.0 to 44.9 in adult, unspecified obesity type (CMS/HCC)       The patient has failed conservative treatment measures and is a candidate for joint arthroplasty if she can get her BMI below 40. Alternative conservative treatment measures were discussed including bracing, therapy, topical/oral anti-inflammatories, activity modification, and weight loss.  The patient considered these treatment options and wishes to proceed with corticosteroid injection(s) today.  Therefore we will proceed with corticosteroid  injection(s) today in the right knee only.  She will follow-up in 3 months.    Patient has an elevated BMI greater than 40.  This places the patient at increased risk for perioperative complications as well as increased risk of accelerating the degenerative processes within the joint.  As a result, surgical intervention will be deferred until the patient can achieve a BMI less than 40.  In the interval, the patient has been instructed on weight loss avenues including diet, portion control, calorie restriction, low/no impact exercise, referral to weight loss management and/or bariatric surgery.  It was explained that weight loss can improve joint pain alone by decreasing the joint reaction forces.  For every pound of weight change, the knee and hip joints see a 4 to 5 fold multiplier affect.  Given these options, the patient will proceed with diet and low impact exercise.    Procedure Note:  I discussed with the patient the potential benefits of performing a therapeutic injection of the right knee as well as potential risks including but not limited to infection, swelling, pain, bleeding, bruising, nerve/vessel damage, skin color changes, transient elevation in blood glucose levels, and fat atrophy. After informed consent and verifying correct patient, procedure site, and type of procedure, the area was prepped with alcohol, ethyl chloride was used to numb the skin. Via the superior lateral approach, 3cc of 1% lidocaine, 3cc of 0.25% bupivicaine and 2 cc of 40mg/ml of Kenalog were injected into the right knee. The patient tolerated the procedure well. There were no complications. A sterile dressing was placed over the injection site.        Sanjeev Campbell MD  11/05/20  14:29 EST

## 2020-11-05 NOTE — PROGRESS NOTES
Procedure   Large Joint Arthrocentesis: R knee  Date/Time: 11/5/2020 2:28 PM  Consent given by: patient  Site marked: site marked  Timeout: Immediately prior to procedure a time out was called to verify the correct patient, procedure, equipment, support staff and site/side marked as required   Supporting Documentation  Indications: pain   Procedure Details  Location: knee - R knee  Preparation: Patient was prepped and draped in the usual sterile fashion  Needle size: 22 G  Approach: anterolateral  Medications administered: 80 mg triamcinolone acetonide 40 MG/ML; 3 mL bupivacaine (PF) 0.25 %; 3 mL lidocaine PF 1% 1 %  Patient tolerance: patient tolerated the procedure well with no immediate complications

## 2020-11-20 ENCOUNTER — HOSPITAL ENCOUNTER (OUTPATIENT)
Dept: MRI IMAGING | Facility: HOSPITAL | Age: 50
Discharge: HOME | End: 2020-11-20
Admitting: NURSE PRACTITIONER

## 2020-11-20 DIAGNOSIS — M54.50 LOW BACK PAIN OF OVER 3 MONTHS DURATION: ICD-10-CM

## 2020-11-20 PROCEDURE — 72148 MRI LUMBAR SPINE W/O DYE: CPT

## 2020-11-24 DIAGNOSIS — M51.26 LUMBAR HERNIATED DISC: ICD-10-CM

## 2020-11-24 DIAGNOSIS — M54.50 LOW BACK PAIN OF OVER 3 MONTHS DURATION: Primary | ICD-10-CM

## 2020-11-29 DIAGNOSIS — I10 ESSENTIAL HYPERTENSION: ICD-10-CM

## 2020-11-29 DIAGNOSIS — Z72.0 TOBACCO ABUSE: ICD-10-CM

## 2020-11-29 DIAGNOSIS — E03.9 ACQUIRED HYPOTHYROIDISM: ICD-10-CM

## 2020-11-29 DIAGNOSIS — E11.65 UNCONTROLLED TYPE 2 DIABETES MELLITUS WITH HYPERGLYCEMIA (HCC): ICD-10-CM

## 2020-11-29 DIAGNOSIS — E78.2 MIXED HYPERLIPIDEMIA: ICD-10-CM

## 2020-11-30 RX ORDER — AMLODIPINE BESYLATE 10 MG/1
10 TABLET ORAL DAILY
Qty: 30 TABLET | Refills: 2 | Status: SHIPPED | OUTPATIENT
Start: 2020-11-30 | End: 2021-03-23 | Stop reason: SDUPTHER

## 2020-11-30 RX ORDER — VARENICLINE TARTRATE 1 MG/1
1 TABLET, FILM COATED ORAL 2 TIMES DAILY
Qty: 60 TABLET | Refills: 0 | Status: SHIPPED | OUTPATIENT
Start: 2020-11-30 | End: 2020-12-22 | Stop reason: SDUPTHER

## 2020-11-30 RX ORDER — LEVOTHYROXINE SODIUM 0.05 MG/1
50 TABLET ORAL DAILY
Qty: 30 TABLET | Refills: 2 | Status: SHIPPED | OUTPATIENT
Start: 2020-11-30 | End: 2021-03-23 | Stop reason: SDUPTHER

## 2020-11-30 RX ORDER — ATORVASTATIN CALCIUM 40 MG/1
40 TABLET, FILM COATED ORAL DAILY
Qty: 30 TABLET | Refills: 2 | Status: SHIPPED | OUTPATIENT
Start: 2020-11-30 | End: 2021-03-23 | Stop reason: SDUPTHER

## 2020-12-22 ENCOUNTER — OFFICE VISIT (OUTPATIENT)
Dept: INTERNAL MEDICINE | Facility: CLINIC | Age: 50
End: 2020-12-22

## 2020-12-22 VITALS
TEMPERATURE: 97.3 F | HEART RATE: 85 BPM | BODY MASS INDEX: 44.56 KG/M2 | RESPIRATION RATE: 16 BRPM | DIASTOLIC BLOOD PRESSURE: 78 MMHG | WEIGHT: 261 LBS | SYSTOLIC BLOOD PRESSURE: 124 MMHG | HEIGHT: 64 IN | OXYGEN SATURATION: 97 %

## 2020-12-22 DIAGNOSIS — Z72.0 TOBACCO ABUSE: ICD-10-CM

## 2020-12-22 DIAGNOSIS — I10 ESSENTIAL HYPERTENSION: ICD-10-CM

## 2020-12-22 DIAGNOSIS — L29.9 ITCHING: ICD-10-CM

## 2020-12-22 DIAGNOSIS — E11.9 CONTROLLED TYPE 2 DIABETES MELLITUS WITHOUT COMPLICATION, WITHOUT LONG-TERM CURRENT USE OF INSULIN (HCC): Primary | ICD-10-CM

## 2020-12-22 DIAGNOSIS — E78.2 MIXED HYPERLIPIDEMIA: ICD-10-CM

## 2020-12-22 DIAGNOSIS — E03.9 ACQUIRED HYPOTHYROIDISM: ICD-10-CM

## 2020-12-22 LAB — HBA1C MFR BLD: 6.4 %

## 2020-12-22 PROCEDURE — 99214 OFFICE O/P EST MOD 30 MIN: CPT | Performed by: NURSE PRACTITIONER

## 2020-12-22 PROCEDURE — 83036 HEMOGLOBIN GLYCOSYLATED A1C: CPT | Performed by: NURSE PRACTITIONER

## 2020-12-22 RX ORDER — VARENICLINE TARTRATE 1 MG/1
1 TABLET, FILM COATED ORAL 2 TIMES DAILY
Qty: 60 TABLET | Refills: 0 | Status: SHIPPED | OUTPATIENT
Start: 2020-12-22 | End: 2021-03-23

## 2020-12-22 RX ORDER — CARVEDILOL 6.25 MG/1
6.25 TABLET ORAL 2 TIMES DAILY
Qty: 60 TABLET | Refills: 3 | Status: SHIPPED | OUTPATIENT
Start: 2020-12-22 | End: 2021-03-10

## 2020-12-22 RX ORDER — ALBUTEROL SULFATE 90 UG/1
2 AEROSOL, METERED RESPIRATORY (INHALATION) EVERY 6 HOURS PRN
Qty: 18 G | Refills: 0 | Status: SHIPPED | OUTPATIENT
Start: 2020-12-22 | End: 2021-03-10

## 2020-12-22 RX ORDER — HYDROXYZINE HYDROCHLORIDE 10 MG/1
10 TABLET, FILM COATED ORAL EVERY 6 HOURS PRN
Qty: 30 TABLET | Refills: 0 | Status: SHIPPED | OUTPATIENT
Start: 2020-12-22 | End: 2021-03-10

## 2020-12-22 RX ORDER — CARVEDILOL 6.25 MG/1
6.25 TABLET ORAL 2 TIMES DAILY
Qty: 60 TABLET | Refills: 3 | Status: CANCELLED | OUTPATIENT
Start: 2020-12-22

## 2020-12-22 NOTE — PROGRESS NOTES
Subjective   Freida Martinez is a 50 y.o. female.     Chief Complaint   Patient presents with   • Hypertension   • Hyperlipidemia   • Hypothyroidism   • Diabetes     LAST a1c on 8/11=6.1       History of Present Illness     Patient here today to follow-up for type 2 DIABETES.    Medications: Januvia.  No Metformin due to CKD (baseline creatinine about 1.1-1.4)  Patient states they are compliant with medications and denies adverse effects. Her last a1c was 6.1%  Patient does check glucose at home.   Current diet: eating a lot of sweets and drinking soda,   current exercise: none.   Last diabetic eye exam: 8/2020  Foot exam: 8/2020  Microalbumin: Normal 8/2020.  Ace: Had swelling with lisinopril.  STATIN:Yes-atorvastatin  Denies headaches, dizziness, visual disturbances, chest pain, dyspnea, polyuria, polyphagia, paraesthesias, and hypoglycemic episodes.     Hypertension-chronic.  Well-controlled.  Currently on amlodipine and carvedilol.  Denies headaches, dizziness, visual disturbances, palpitations chest pain, dyspnea, TIA or CVA symptoms, leg pain/claudication symptoms, and edema.   Earlier this year she was seen by cardiology for some chest pain.  Work-up showed some symptomatic PVCs and she was started on carvedilol at that time.  She has done well since being on the carvedilol.  She had a normal cardiac stress test in 6/2020 and an echo in 6/2020 showed an EF of about 66 to 70% with some mild concentric left ventricular hypertrophy and trace tricuspid valve regurgitation.    Hyperlipidemia-chronic.  Currently on statin therapy.  Compliant with dosing denies adverse effects.  Her last lipids showed a normal LDL but a slight elevation of triglycerides at 265.  Noncompliant with diet and exercise currently  Lab Results   Component Value Date    CHOL 111 05/21/2020    CHLPL 100 04/30/2015    TRIG 265 (H) 05/21/2020    HDL 30 (L) 05/21/2020    LDL 28 05/21/2020       Hypothyroidism-chronic.  Denies any hypo or  hyperthyroidism symptoms.  On levothyroxine and compliant with dosing.  Last TSH was stable in 5/2020.  Lab Results   Component Value Date    TSH 2.270 05/21/2020     Occasionally takes hydroxyzine for some itching.  Would like a refill on this while she is here.  Still smoking but has cut down greatly.  She would like to continue the Chantix as she has done well with this so far.  The following portions of the patient's history were reviewed and updated as appropriate: allergies, current medications, past family history, past medical history, past social history, past surgical history and problem list.        Review of Systems   Constitutional: Negative for activity change, appetite change, chills, diaphoresis, fatigue, fever, unexpected weight gain and unexpected weight loss.   HENT: Negative for voice change.    Eyes: Negative for blurred vision, double vision, pain and visual disturbance.   Respiratory: Negative for cough, chest tightness, shortness of breath and wheezing.    Cardiovascular: Negative for chest pain, palpitations and leg swelling.   Gastrointestinal: Negative for abdominal distention, abdominal pain, constipation, diarrhea, nausea and vomiting.   Endocrine: Negative for cold intolerance, heat intolerance, polydipsia, polyphagia and polyuria.   Genitourinary: Negative for difficulty urinating, frequency and urgency.   Musculoskeletal: Positive for arthralgias. Negative for myalgias.   Skin: Negative for color change, dry skin and rash.   Neurological: Negative for dizziness, facial asymmetry, weakness, light-headedness, numbness, headache and confusion.   Hematological: Negative for adenopathy. Does not bruise/bleed easily.   Psychiatric/Behavioral: Negative for decreased concentration and sleep disturbance. The patient is not nervous/anxious.            Outpatient Medications Marked as Taking for the 12/22/20 encounter (Office Visit) with Annia Rico APRN   Medication Sig Dispense Refill    • albuterol (PROVENTIL) (2.5 MG/3ML) 0.083% nebulizer solution      • albuterol sulfate  (90 Base) MCG/ACT inhaler Inhale 2 puffs Every 6 (Six) Hours As Needed for Wheezing. 18 g 0   • amLODIPine (NORVASC) 10 MG tablet Take 1 tablet by mouth Daily. 30 tablet 2   • aspirin 81 MG EC tablet Take 1 tablet by mouth Daily.     • atorvastatin (LIPITOR) 40 MG tablet Take 1 tablet by mouth Daily. 30 tablet 2   • carvedilol (COREG) 6.25 MG tablet Take 1 tablet by mouth 2 (Two) Times a Day. 60 tablet 3   • diclofenac (VOLTAREN) 1 % gel gel Apply 4 g topically to the appropriate area as directed 4 (Four) Times a Day. Small amount to affected area 300 g 3   • glucose blood test strip Used to test blood sugar for Diabetes E11.9 twice a day. Pt has One Touch Verio Flex meter 100 each 12   • Lancets (ONETOUCH ULTRASOFT) lancets Used to test blood sugar for Diabetes E11.9 twice a day 100 each 12   • levothyroxine (SYNTHROID, LEVOTHROID) 50 MCG tablet Take 1 tablet by mouth Daily. 30 tablet 2   • mometasone (Elocon) 0.1 % cream Apply  topically to the appropriate area as directed 2 (two) times a day. For 3-6 weeks 50 g 0   • SITagliptin (JANUVIA) 50 MG tablet Take 1 tablet by mouth Daily. 30 tablet 2   • varenicline (Chantix Continuing Month Pak) 1 MG tablet Take 1 tablet by mouth 2 (Two) Times a Day. 60 tablet 0   • [DISCONTINUED] albuterol sulfate  (90 Base) MCG/ACT inhaler INHALE TWO PUFFS BY MOUTH EVERY 6 HOURS AS NEEDED FOR WHEEZING OR SHORTNESS OF AIR **MUST CALL MD FOR APPOINTMENT 18 g 0   • [DISCONTINUED] carvedilol (COREG) 6.25 MG tablet Take 1 tablet by mouth 2 (Two) Times a Day. 60 tablet 3   • [DISCONTINUED] varenicline (Chantix Continuing Month Pak) 1 MG tablet Take 1 tablet by mouth 2 (Two) Times a Day. 60 tablet 0     Allergies   Allergen Reactions   • Anaprox [Naproxen] Other (See Comments)     CHRONIC KIDNEY DISEASE   • Celebrex [Celecoxib] Other (See Comments)     CHRONIC KIDNEY DISEASE   • Flector  "[Diclofenac Epolamine] Other (See Comments)     CHRONIC KIDNEY DISEASE   • Motrin [Ibuprofen] Other (See Comments)     CHRONIC KIDNEY DISEASE   • Voltaren [Diclofenac Sodium] Other (See Comments)     CHRONIC KIDNEY DISEASE   • Lisinopril Swelling           Objective   Physical Exam  Vitals signs and nursing note reviewed.   Constitutional:       General: She is not in acute distress.     Appearance: Normal appearance. She is well-developed. She is morbidly obese. She is not diaphoretic.   HENT:      Head: Normocephalic and atraumatic.   Eyes:      Conjunctiva/sclera: Conjunctivae normal.      Pupils: Pupils are equal, round, and reactive to light.   Neck:      Musculoskeletal: Normal range of motion.      Vascular: No JVD.   Cardiovascular:      Rate and Rhythm: Normal rate and regular rhythm.      Pulses:           Dorsalis pedis pulses are 2+ on the right side and 2+ on the left side.        Posterior tibial pulses are 2+ on the right side and 2+ on the left side.      Heart sounds: Normal heart sounds. No murmur.   Pulmonary:      Effort: Pulmonary effort is normal. No respiratory distress.      Breath sounds: Normal breath sounds.   Chest:      Chest wall: No tenderness.   Abdominal:      General: Bowel sounds are normal. There is no distension.      Palpations: Abdomen is soft.      Tenderness: There is no abdominal tenderness.   Musculoskeletal: Normal range of motion.   Skin:     General: Skin is warm and dry.      Coloration: Skin is not pale.      Findings: No erythema or rash.   Neurological:      Mental Status: She is alert and oriented to person, place, and time.      Coordination: Coordination normal.   Psychiatric:         Behavior: Behavior normal.         Thought Content: Thought content normal.         Judgment: Judgment normal.         Vitals:    12/22/20 0859   BP: 124/78   Pulse: 85   Resp: 16   Temp: 97.3 °F (36.3 °C)   SpO2: 97%   Weight: 118 kg (261 lb)   Height: 162.6 cm (64\")     Body mass " index is 44.8 kg/m².  Wt Readings from Last 3 Encounters:   12/28/20 119 kg (262 lb 12.8 oz)   12/22/20 118 kg (261 lb)   11/05/20 113 kg (249 lb 1.9 oz)             Assessment/Plan       Diagnoses and all orders for this visit:    1. Controlled type 2 diabetes mellitus without complication, without long-term current use of insulin (CMS/[facility]) (Primary)  Comments:  Controlled with A1c of 6.4%.  Continue Januvia.  No Metformin secondary to CKD.  No ACE secondary to allergy/swelling continue statin beta-blocker and aspirin  Orders:  -     POC Glycosylated Hemoglobin (Hb A1C)    2. Essential hypertension  Comments:  Stable.  Continue amlodipine, carvedilol  Orders:  -     carvedilol (COREG) 6.25 MG tablet; Take 1 tablet by mouth 2 (Two) Times a Day.  Dispense: 60 tablet; Refill: 3    3. Acquired hypothyroidism  Comments:  Stable.  Continue levothyroxine.    4. Mixed hyperlipidemia  Comments:  Stable.  Continue atorvastatin and low-fat low-cholesterol diet    5. Tobacco abuse  Comments:  Encourage cessation.  We will continue Chantix.  Orders:  -     varenicline (Chantix Continuing Month Pak) 1 MG tablet; Take 1 tablet by mouth 2 (Two) Times a Day.  Dispense: 60 tablet; Refill: 0    6. Itching  -     hydrOXYzine (ATARAX) 10 MG tablet; Take 1 tablet by mouth Every 6 (Six) Hours As Needed for Itching.  Dispense: 30 tablet; Refill: 0        Lab Results   Component Value Date    HGBA1C 6.4 12/22/2020         Return in about 3 months (around 3/22/2021) for chronic condition follow up, with fasting labs.    I discussed my findings,recommendations, and plan of care was with the patient. They verbalized understanding and agreement.  Patient was encouraged to keep me informed of any acute changes, lack of improvement, or any new concerning symptoms.       * Please note that portions of this note were completed with a voice recognition program. Efforts were made to edit the dictation but occasionally words are erroneously  transcribed.

## 2020-12-28 ENCOUNTER — OFFICE VISIT (OUTPATIENT)
Dept: NEUROSURGERY | Facility: CLINIC | Age: 50
End: 2020-12-28

## 2020-12-28 VITALS — HEIGHT: 64 IN | BODY MASS INDEX: 44.86 KG/M2 | TEMPERATURE: 97.3 F | WEIGHT: 262.8 LBS

## 2020-12-28 DIAGNOSIS — M54.9 MECHANICAL BACK PAIN: Primary | ICD-10-CM

## 2020-12-28 DIAGNOSIS — E66.01 MORBID OBESITY WITH BMI OF 40.0-44.9, ADULT (HCC): ICD-10-CM

## 2020-12-28 DIAGNOSIS — G57.11 MERALGIA PARESTHETICA OF RIGHT SIDE: ICD-10-CM

## 2020-12-28 PROCEDURE — 99203 OFFICE O/P NEW LOW 30 MIN: CPT | Performed by: NEUROLOGICAL SURGERY

## 2020-12-28 NOTE — PROGRESS NOTES
NAME: STEF SLADE   DOS: 2020  : 1970  PCP: Annia Rico APRN    Chief Complaint:    Chief Complaint   Patient presents with   • Back Pain       History of Present Illness:  50 y.o. female   This 50-year-old presents with a history of chronic axial back pain for greater than 4 years she denies any bowel and prior bladder incontinence issues and has better with rest she has some anterior thigh numbness is been present for a while it does not go below the knee and seems more  reminiscentabout meralgia paresthetica picture  PMHX  Allergies:  Allergies   Allergen Reactions   • Anaprox [Naproxen] Other (See Comments)     CHRONIC KIDNEY DISEASE   • Celebrex [Celecoxib] Other (See Comments)     CHRONIC KIDNEY DISEASE   • Flector [Diclofenac Epolamine] Other (See Comments)     CHRONIC KIDNEY DISEASE   • Motrin [Ibuprofen] Other (See Comments)     CHRONIC KIDNEY DISEASE   • Voltaren [Diclofenac Sodium] Other (See Comments)     CHRONIC KIDNEY DISEASE   • Lisinopril Swelling     Medications    Current Outpatient Medications:   •  albuterol (PROVENTIL) (2.5 MG/3ML) 0.083% nebulizer solution, , Disp: , Rfl:   •  albuterol sulfate  (90 Base) MCG/ACT inhaler, Inhale 2 puffs Every 6 (Six) Hours As Needed for Wheezing., Disp: 18 g, Rfl: 0  •  amLODIPine (NORVASC) 10 MG tablet, Take 1 tablet by mouth Daily., Disp: 30 tablet, Rfl: 2  •  aspirin 81 MG EC tablet, Take 1 tablet by mouth Daily., Disp: , Rfl:   •  atorvastatin (LIPITOR) 40 MG tablet, Take 1 tablet by mouth Daily., Disp: 30 tablet, Rfl: 2  •  carvedilol (COREG) 6.25 MG tablet, Take 1 tablet by mouth 2 (Two) Times a Day., Disp: 60 tablet, Rfl: 3  •  diclofenac (VOLTAREN) 1 % gel gel, Apply 4 g topically to the appropriate area as directed 4 (Four) Times a Day. Small amount to affected area, Disp: 300 g, Rfl: 3  •  glucose blood test strip, Used to test blood sugar for Diabetes E11.9 twice a day. Pt has One Touch Verio Flex meter, Disp:  100 each, Rfl: 12  •  hydrOXYzine (ATARAX) 10 MG tablet, Take 1 tablet by mouth Every 6 (Six) Hours As Needed for Itching., Disp: 30 tablet, Rfl: 0  •  Lancets (ONETOUCH ULTRASOFT) lancets, Used to test blood sugar for Diabetes E11.9 twice a day, Disp: 100 each, Rfl: 12  •  levothyroxine (SYNTHROID, LEVOTHROID) 50 MCG tablet, Take 1 tablet by mouth Daily., Disp: 30 tablet, Rfl: 2  •  mometasone (Elocon) 0.1 % cream, Apply  topically to the appropriate area as directed 2 (two) times a day. For 3-6 weeks, Disp: 50 g, Rfl: 0  •  SITagliptin (JANUVIA) 50 MG tablet, Take 1 tablet by mouth Daily., Disp: 30 tablet, Rfl: 2  •  varenicline (Chantix Continuing Month Trae) 1 MG tablet, Take 1 tablet by mouth 2 (Two) Times a Day., Disp: 60 tablet, Rfl: 0  Past Medical History:  Past Medical History:   Diagnosis Date   • Abdominal pain 4/12/2019   • JACKIE (acute kidney injury) (CMS/HCC) 4/12/2019   • Arthritis    • Asthma    • Breast injury    • Diabetes mellitus (CMS/HCC)    • Disease of thyroid gland    • Epigastric pain 4/12/2019    Added automatically from request for surgery [id number]   • Hypertension    • Kidney infection    • Mental disorder    • Non-compliance 7/24/2019   • Sleep apnea    • Volume depletion 4/14/2019     Past Surgical History:  Past Surgical History:   Procedure Laterality Date   • ENDOSCOPY N/A 4/15/2019    Procedure: ESOPHAGOGASTRODUODENOSCOPY;  Surgeon: Jude Clinton MD;  Location: [facility] ENDOSCOPY;  Service: Gastroenterology   • TUBAL ABDOMINAL LIGATION     • VAGINAL DELIVERY      x4   • WISDOM TOOTH EXTRACTION       Social Hx:  Social History     Tobacco Use   • Smoking status: Current Every Day Smoker     Packs/day: 0.25     Types: Cigarettes   • Smokeless tobacco: Never Used   • Tobacco comment: 3 cigarettes daily   Substance Use Topics   • Alcohol use: No     Frequency: Never   • Drug use: No     Family Hx:  Family History   Problem Relation Age of Onset   • Hypertension Mother    • Diabetes Father     • Kidney disease Father    • Heart disease Father    • Hypertension Father    • Sleep apnea Father    • Cancer Maternal Aunt    • Arthritis Paternal Aunt    • Cancer Maternal Grandmother         BREAST   • Breast cancer Maternal Grandmother    • Kidney disease Paternal Grandfather    • Kidney disease Paternal Uncle      Review of Systems:        Review of Systems   Constitutional: Positive for activity change, chills, fatigue and unexpected weight change. Negative for appetite change, diaphoresis and fever.   HENT: Positive for sneezing. Negative for congestion, dental problem, drooling, ear discharge, ear pain, facial swelling, hearing loss, mouth sores, nosebleeds, postnasal drip, rhinorrhea, sinus pressure, sinus pain, sore throat, tinnitus, trouble swallowing and voice change.    Eyes: Positive for itching. Negative for photophobia, pain, discharge, redness and visual disturbance.   Respiratory: Positive for apnea, cough, chest tightness, shortness of breath and wheezing. Negative for choking and stridor.    Cardiovascular: Positive for chest pain, palpitations and leg swelling.   Gastrointestinal: Negative for abdominal distention, abdominal pain, anal bleeding, blood in stool, constipation, diarrhea, nausea, rectal pain and vomiting.   Endocrine: Negative for cold intolerance, heat intolerance, polydipsia, polyphagia and polyuria.   Genitourinary: Negative for decreased urine volume, difficulty urinating, dyspareunia, dysuria, enuresis, flank pain, frequency, genital sores, hematuria, menstrual problem, pelvic pain, urgency, vaginal bleeding, vaginal discharge and vaginal pain.   Musculoskeletal: Positive for back pain, joint swelling, neck pain and neck stiffness. Negative for arthralgias, gait problem and myalgias.   Skin: Negative for color change, pallor, rash and wound.   Allergic/Immunologic: Positive for environmental allergies. Negative for food allergies and immunocompromised state.   Neurological:  Positive for dizziness, weakness, light-headedness, numbness and headaches. Negative for tremors, seizures, syncope, facial asymmetry and speech difficulty.   Hematological: Negative for adenopathy. Does not bruise/bleed easily.   Psychiatric/Behavioral: Negative for agitation, behavioral problems, confusion, decreased concentration, dysphoric mood, hallucinations, self-injury, sleep disturbance and suicidal ideas. The patient is not nervous/anxious and is not hyperactive.       I have reviewed this note template and all pertinent parts of the review of systems social, family history, surgical history and medication list      Physical Examination:  Vitals:    12/28/20 1332   Temp: 97.3 °F (36.3 °C)      General Appearance:   Well developed, well nourished, well groomed, alert, and cooperative.  Neurological examination:  Neurologic Exam  COVID mask was left in place speech voice for all intact    Vital signs were reviewed and documented in the chart  Patient appeared in good neurologic function with normal comprehension fluent speech  Mood and affect are normal  Sense of smell deferred    Muscle bulk and tone normal  5 out of 5 strength no motor drift  Gait normal intact  Negative Romberg  No clonus long tract signs or myelopathy  Numbness anterior lateral thigh  Reflexes symmetric  2+ edema noted and extremities skin appears normal    Straight leg raise sign absent decreased range of motion secondary to knee pain  No signs of intrinsic hip dysfunction decreased range of motion  Back is without any lesions or abnormality  Feet are warm and well perfused      Review of Imaging/DATA:  MRI looks relatively pristine she is got some lateral disc bulging at 2 3 and 3 4 but nothing that would be consider surgical  Diagnoses/Plan:    Ms. Martinez is a 50 y.o. female    I spent 30 minutes with her over half that time was face-to-face I counseled her on weight loss she weighs 262 pounds has a BMI of 44 and is a smoking diabetic  I explained the long-term outcome of chronic axial back pain with uncontrolled risk factors, additionally I explained some treatment options such as weight loss and pressure relief are suspected meralgia paresthetica of the right thigh this would less likely be from a disc rupture but I certainly do not see a large far lateral disc on her scans only disc bulging,    She is not a candidate for surgery    I offered physical therapy referral to a nutritionist, physical therapy, and interventional pain management to help manage her symptoms but she is not interested    I will see her back as needed

## 2021-02-09 ENCOUNTER — OFFICE VISIT (OUTPATIENT)
Dept: ORTHOPEDIC SURGERY | Facility: CLINIC | Age: 51
End: 2021-02-09

## 2021-02-09 VITALS — HEIGHT: 64 IN | HEART RATE: 99 BPM | WEIGHT: 254 LBS | BODY MASS INDEX: 43.36 KG/M2 | OXYGEN SATURATION: 98 %

## 2021-02-09 DIAGNOSIS — E66.01 CLASS 3 SEVERE OBESITY WITHOUT SERIOUS COMORBIDITY WITH BODY MASS INDEX (BMI) OF 40.0 TO 44.9 IN ADULT, UNSPECIFIED OBESITY TYPE (HCC): ICD-10-CM

## 2021-02-09 DIAGNOSIS — M17.0 PRIMARY OSTEOARTHRITIS OF BOTH KNEES: Primary | ICD-10-CM

## 2021-02-09 PROCEDURE — 99213 OFFICE O/P EST LOW 20 MIN: CPT | Performed by: ORTHOPAEDIC SURGERY

## 2021-02-09 NOTE — PROGRESS NOTES
Orthopaedic Clinic Note: Knee Established Patient    Chief Complaint   Patient presents with   • Follow-up     Primary osteoarthritis of both knees 3 months injection given 11/05/2020        HPI    It has been 3  month(s) since Ms. Martinez's last visit. She returns to clinic today for follow-up bilateral knee osteoarthritis.  She underwent cortisone injection both knees 3 months ago.  The injections worked well for about 3 months and are continuing to provide good pain relief.  She states that her pain is only severe when she is trying to climb stairs.  She rates that a 5/10 on the pain scale.  Otherwise she is able to ambulate with no significant limitations.  She denies fevers chills or constitutional symptoms.  Overall she is doing better.  She remains overweight with a BMI of 43.    Past Medical History:   Diagnosis Date   • Abdominal pain 4/12/2019   • JACKIE (acute kidney injury) (CMS/HCC) 4/12/2019   • Arthritis    • Asthma    • Breast injury    • Diabetes mellitus (CMS/[facility])    • Disease of thyroid gland    • Epigastric pain 4/12/2019    Added automatically from request for surgery [id number]   • Hypertension    • Kidney infection    • Mental disorder    • Non-compliance 7/24/2019   • Sleep apnea    • Volume depletion 4/14/2019      Past Surgical History:   Procedure Laterality Date   • ENDOSCOPY N/A 4/15/2019    Procedure: ESOPHAGOGASTRODUODENOSCOPY;  Surgeon: Jude Clinton MD;  Location: [facility] ENDOSCOPY;  Service: Gastroenterology   • TUBAL ABDOMINAL LIGATION     • VAGINAL DELIVERY      x4   • WISDOM TOOTH EXTRACTION        Family History   Problem Relation Age of Onset   • Hypertension Mother    • Diabetes Father    • Kidney disease Father    • Heart disease Father    • Hypertension Father    • Sleep apnea Father    • Cancer Maternal Aunt    • Arthritis Paternal Aunt    • Cancer Maternal Grandmother         BREAST   • Breast cancer Maternal Grandmother    • Kidney disease Paternal Grandfather    • Kidney  disease Paternal Uncle      Social History     Socioeconomic History   • Marital status: Single     Spouse name: Not on file   • Number of children: Not on file   • Years of education: Not on file   • Highest education level: Not on file   Tobacco Use   • Smoking status: Current Every Day Smoker     Packs/day: 0.25     Types: Cigarettes   • Smokeless tobacco: Never Used   • Tobacco comment: 3 cigarettes daily   Substance and Sexual Activity   • Alcohol use: No     Frequency: Never   • Drug use: No   • Sexual activity: Never   Social History Narrative    Caffeine: 1 cup coffee daily, 1 1/2 of 64 ounce cup of tea      Current Outpatient Medications on File Prior to Visit   Medication Sig Dispense Refill   • albuterol (PROVENTIL) (2.5 MG/3ML) 0.083% nebulizer solution      • albuterol sulfate  (90 Base) MCG/ACT inhaler Inhale 2 puffs Every 6 (Six) Hours As Needed for Wheezing. 18 g 0   • amLODIPine (NORVASC) 10 MG tablet Take 1 tablet by mouth Daily. 30 tablet 2   • aspirin 81 MG EC tablet Take 1 tablet by mouth Daily.     • atorvastatin (LIPITOR) 40 MG tablet Take 1 tablet by mouth Daily. 30 tablet 2   • carvedilol (COREG) 6.25 MG tablet Take 1 tablet by mouth 2 (Two) Times a Day. 60 tablet 3   • diclofenac (VOLTAREN) 1 % gel gel Apply 4 g topically to the appropriate area as directed 4 (Four) Times a Day. Small amount to affected area 300 g 3   • glucose blood test strip Used to test blood sugar for Diabetes E11.9 twice a day. Pt has One Touch Verio Flex meter 100 each 12   • hydrOXYzine (ATARAX) 10 MG tablet Take 1 tablet by mouth Every 6 (Six) Hours As Needed for Itching. 30 tablet 0   • Lancets (ONETOUCH ULTRASOFT) lancets Used to test blood sugar for Diabetes E11.9 twice a day 100 each 12   • levothyroxine (SYNTHROID, LEVOTHROID) 50 MCG tablet Take 1 tablet by mouth Daily. 30 tablet 2   • mometasone (Elocon) 0.1 % cream Apply  topically to the appropriate area as directed 2 (two) times a day. For 3-6 weeks  "50 g 0   • SITagliptin (JANUVIA) 50 MG tablet Take 1 tablet by mouth Daily. 30 tablet 2   • varenicline (Chantix Continuing Month Trae) 1 MG tablet Take 1 tablet by mouth 2 (Two) Times a Day. 60 tablet 0     No current facility-administered medications on file prior to visit.       Allergies   Allergen Reactions   • Anaprox [Naproxen] Other (See Comments)     CHRONIC KIDNEY DISEASE   • Celebrex [Celecoxib] Other (See Comments)     CHRONIC KIDNEY DISEASE   • Flector [Diclofenac Epolamine] Other (See Comments)     CHRONIC KIDNEY DISEASE   • Motrin [Ibuprofen] Other (See Comments)     CHRONIC KIDNEY DISEASE   • Voltaren [Diclofenac Sodium] Other (See Comments)     CHRONIC KIDNEY DISEASE   • Lisinopril Swelling        Review of Systems   Constitutional: Negative.    HENT: Negative.    Eyes: Negative.    Respiratory: Negative.    Cardiovascular: Negative.    Gastrointestinal: Negative.    Endocrine: Negative.    Genitourinary: Negative.    Musculoskeletal: Positive for arthralgias.   Skin: Negative.    Allergic/Immunologic: Negative.    Neurological: Negative.    Hematological: Negative.    Psychiatric/Behavioral: Negative.         The patient's Review of Systems was personally reviewed and confirmed as accurate.    Physical Exam  Pulse 99, height 163.2 cm (64.25\"), weight 115 kg (254 lb), SpO2 98 %, not currently breastfeeding.    Body mass index is 43.26 kg/m².    GENERAL APPEARANCE: awake, alert, oriented, in no acute distress, well developed, well nourished and obese  LUNGS:  breathing nonlabored  EXTREMITIES: no clubbing, cyanosis  PERIPHERAL PULSES: palpable dorsalis pedis and posterior tibial pulses bilaterally.    GAIT:  Normal        ----------  Bilateral Knee Exam:  ----------  ALIGNMENT: neutral  ----------  RANGE OF MOTION:  Normal (0-120 degrees) with no extensor lag or flexion contracture  LIGAMENTOUS STABILITY:   stable to varus and valgus stress at terminal extension and 30 degrees without any evidence of " laxity  ----------  STRENGTH:  KNEE FLEXION 5/5  KNEE EXTENSION  5/5  ANKLE DORSIFLEXION  5/5  ANKLE PLANTARFLEXION  5/5  ----------  PAIN WITH PALPATION:  Slight tenderness globally throughout both knees   KNEE EFFUSION: yes,  trace bilateral  PAIN WITH KNEE ROM: yes  PATELLAR CREPITUS:  yes, asymptomatic  ----------  SENSATION TO LIGHT TOUCH:  DEEP PERONEAL/SUPERFICIAL PERONEAL/SURAL/SAPHENOUS/TIBIAL:    intact  ----------  EDEMA:  no  ERYTHEMA:    no  WOUNDS/INCISIONS:  no    _____________________________________________________________________  _____________________________________________________________________    RADIOGRAPHIC FINDINGS:   No new imaging today.  Prior imaging demonstrated moderate tricompartmental osteoarthritis.    Assessment/Plan:   Diagnosis Plan   1. Primary osteoarthritis of both knees     2. Class 3 severe obesity without serious comorbidity with body mass index (BMI) of 40.0 to 44.9 in adult, unspecified obesity type (CMS/HCC)       Patient is doing well at this time.  No further intervention warranted at this time.  I recommend she continue the topical anti-inflammatory cream to help with her knee pain.  I also discussed the importance of weight loss.    Patient has an elevated BMI greater than 40.  The patient has been instructed on weight loss avenues including diet, portion control, calorie restriction, low/no impact exercise, referral to weight loss management and/or bariatric surgery.  It was explained that weight loss can improve joint pain alone by decreasing the joint reaction forces.  For every pound of weight change, the knee and hip joints see a 4 to 5 fold multiplier affect.  Given these options, the patient will proceed with diet and low impact exercise.    Patient will follow-up as needed.      Sanjeev Campbell MD  02/09/21  14:56 EST

## 2021-03-10 DIAGNOSIS — L29.9 ITCHING: ICD-10-CM

## 2021-03-10 DIAGNOSIS — I10 ESSENTIAL HYPERTENSION: ICD-10-CM

## 2021-03-10 RX ORDER — CARVEDILOL 6.25 MG/1
TABLET ORAL
Qty: 180 TABLET | Refills: 0 | Status: SHIPPED | OUTPATIENT
Start: 2021-03-10 | End: 2021-11-24 | Stop reason: SDUPTHER

## 2021-03-10 RX ORDER — HYDROXYZINE HYDROCHLORIDE 10 MG/1
TABLET, FILM COATED ORAL
Qty: 30 TABLET | Refills: 0 | Status: SHIPPED | OUTPATIENT
Start: 2021-03-10 | End: 2021-05-05

## 2021-03-10 RX ORDER — ALBUTEROL SULFATE 90 UG/1
AEROSOL, METERED RESPIRATORY (INHALATION)
Qty: 18 G | Refills: 0 | Status: SHIPPED | OUTPATIENT
Start: 2021-03-10 | End: 2021-03-23 | Stop reason: SDUPTHER

## 2021-03-11 NOTE — PROGRESS NOTES
Chief Complaint  Hypertension and Follow-up    Subjective    History of Present Illness {CC  Problem List  Visit  Diagnosis   Encounters  Notes  Medications  Labs  Result Review Imaging  Media :23}     Freida Martinez presents to [facility] CARDIOLOGY for   History of Present Illness   51-year-old female with hypertension, hypothyroidism, tobacco abuse, CKD stage III, type 2 diabetes, hyperlipidemia, asthma and sleep apnea who presents today to follow-up on symptomatic PVCs and hypertension.  Patient wore an extended Holter monitor in May 2020 which showed symptomatic PVCs with a 2.4% PVC burden.  She was started on carvedilol for PVCs and uncontrolled hypertension and her symptoms improved significantly.  She had a normal stress PET and echo around that time.  From a cardiac standpoint, she reports has been doing well.  She did not take her medications today because she could not open the bottles.  She reports that over the last 2 to 3 months she has been having worsening peeling and swelling of her bilateral hands. She reports it started after using hand .  She has been treated with topical steroids and recently was prescribed oral steroids.  She cannot get into dermatology until April.  This is her main concern today and she is tearful when describing this.  She also has some scaling of her feet.  She has been followed by podiatry who reportedly did an ultrasound of her arteries and veins and referred her to vascular surgery.  She was told she may have some mild blockage there.  She sees vascular surgery next week.  She denies any fevers, mouth sores, palpitations, shortness of breath, chest pain.  She reports her blood pressure has been well controlled. No new medications. She is still smoking      Objective     Vital Signs:   Vitals:    03/12/21 0912   BP: 139/86   BP Location: Left arm   Patient Position: Sitting   Cuff Size: Large Adult   Pulse: 111   Resp: 20  "  Temp: 97.7 °F (36.5 °C)   TempSrc: Temporal   SpO2: 99%   Weight: 117 kg (258 lb 2 oz)   Height: 163.2 cm (64.25\")     Body mass index is 43.96 kg/m².  Physical Exam  Vitals reviewed.   Constitutional:       Appearance: Normal appearance.   HENT:      Head: Normocephalic.   Eyes:      Extraocular Movements: Extraocular movements intact.      Pupils: Pupils are equal, round, and reactive to light.   Neck:      Vascular: No carotid bruit.   Cardiovascular:      Rate and Rhythm: Normal rate and regular rhythm.      Pulses: Normal pulses.      Heart sounds: Normal heart sounds, S1 normal and S2 normal. No murmur.   Pulmonary:      Effort: Pulmonary effort is normal. No respiratory distress.      Breath sounds: Normal breath sounds.   Chest:      Chest wall: No tenderness.   Abdominal:      General: Abdomen is flat. Bowel sounds are normal.      Palpations: Abdomen is soft.   Musculoskeletal:      Cervical back: Neck supple.      Right lower leg: No edema.      Left lower leg: No edema.   Skin:     General: Skin is warm and dry.      Comments: Erythematous palms with significant scaling.    Neurological:      General: No focal deficit present.      Mental Status: She is alert and oriented to person, place, and time. Mental status is at baseline.   Psychiatric:         Mood and Affect: Mood normal.         Behavior: Behavior normal.         Thought Content: Thought content normal.              Result Review  Data Reviewed:{ Labs  Result Review  Imaging  Med Tab  Media :23}   Stress Test With Pet Myocardial Perfusion (MULTI STUDY, REST AND STRESS) (06/08/2020 13:59)  Holter Monitor - 72 Hour Up To 21 Days (06/30/2020 15:24)  Basic Metabolic Panel (08/11/2020 09:52)  POC Glycosylated Hemoglobin (Hb A1C) (12/22/2020 09:21)               Assessment and Plan {CC Problem List  Visit Diagnosis  ROS  Review (Popup)  Health Maintenance  Quality  BestPractice  Medications  SmartSets  SnapShot Encounters  Media " :23}   1. Essential hypertension  Appears controlled when I reviewed other notes. She did not take medications today  Continue amlodipine, carvedilol  Continue to monitor    2. Symptomatic PVCs  Controlled with carvedilol    3. Peeling skin  Palms of hand are thickened, erythematous and peeling on today's exam.  She has no other mucocutaneous symptoms.  No lymphadenopathy. No fevers. Advised to call with any progressive symptoms. Start oral steroids as prescribed.  Will try topical antifungal to see if this improves.  Keep upcoming follow-up with dermatology.  I advised her to call on a regular basis to see if there are any cancellations so that she can get in sooner    Keep upcoming follow-up with PCP, vascular, nephrology and dermatology    Follow Up {Instructions Charge Capture  Follow-up Communications :23}   Return if symptoms worsen or fail to improve.    Patient was given instructions and counseling regarding her condition or for health maintenance advice. Please see specific information pulled into the AVS if appropriate.  Patient was instructed to call the [facility] with any questions, concerns, or worsening symptoms.    *Please note that portions of this note were completed with a voice recognition program. Efforts were made to edit the dictations, but occasionally words are mistranscribed.

## 2021-03-12 ENCOUNTER — OFFICE VISIT (OUTPATIENT)
Dept: CARDIOLOGY | Facility: HOSPITAL | Age: 51
End: 2021-03-12

## 2021-03-12 VITALS
RESPIRATION RATE: 20 BRPM | HEIGHT: 64 IN | TEMPERATURE: 97.7 F | OXYGEN SATURATION: 99 % | DIASTOLIC BLOOD PRESSURE: 86 MMHG | SYSTOLIC BLOOD PRESSURE: 139 MMHG | BODY MASS INDEX: 44.07 KG/M2 | HEART RATE: 111 BPM | WEIGHT: 258.13 LBS

## 2021-03-12 DIAGNOSIS — I49.3 SYMPTOMATIC PVCS: ICD-10-CM

## 2021-03-12 DIAGNOSIS — I10 ESSENTIAL HYPERTENSION: Primary | ICD-10-CM

## 2021-03-12 DIAGNOSIS — R23.4 PEELING SKIN: ICD-10-CM

## 2021-03-12 PROCEDURE — 99214 OFFICE O/P EST MOD 30 MIN: CPT | Performed by: NURSE PRACTITIONER

## 2021-03-12 RX ORDER — METHYLPREDNISOLONE 4 MG/1
TABLET ORAL
COMMUNITY
Start: 2021-03-10 | End: 2021-03-23

## 2021-03-12 RX ORDER — CLOTRIMAZOLE 1 %
CREAM (GRAM) TOPICAL 2 TIMES DAILY
Qty: 30 G | Refills: 0 | Status: SHIPPED | OUTPATIENT
Start: 2021-03-12 | End: 2021-03-23

## 2021-03-15 ENCOUNTER — TELEPHONE (OUTPATIENT)
Dept: INTERNAL MEDICINE | Facility: CLINIC | Age: 51
End: 2021-03-15

## 2021-03-15 NOTE — TELEPHONE ENCOUNTER
Dr. Kirkland (patient's nephrologist) called to discuss patient.  He reports that labs in office with him today show glucose of 552, 2+ protein in her urine and 1+ blood.  He reports she has diabetic nephropathy.  She is not currently on losartan and he wants to consider restarting this.  He is not sure why she is not on it and patient reports she wanted to speak with my office before restarting.  My old notes indicate that patient reported that nephrology stopped this medication.    He reports her baseline creatinine has been about 1.1-1.3 but elevated as high as 2 back in 2018.  She has an appointment to see me on 3/23/2021 and he would like for me to consider starting her losartan at that point.  He would like repeat labs at 2 weeks after starting the losartan.    Can discuss with pt at her FU on 3/23/2021

## 2021-03-23 ENCOUNTER — OFFICE VISIT (OUTPATIENT)
Dept: INTERNAL MEDICINE | Facility: CLINIC | Age: 51
End: 2021-03-23

## 2021-03-23 ENCOUNTER — LAB (OUTPATIENT)
Dept: LAB | Facility: HOSPITAL | Age: 51
End: 2021-03-23

## 2021-03-23 VITALS
HEART RATE: 113 BPM | DIASTOLIC BLOOD PRESSURE: 84 MMHG | SYSTOLIC BLOOD PRESSURE: 128 MMHG | RESPIRATION RATE: 18 BRPM | OXYGEN SATURATION: 98 % | TEMPERATURE: 96.9 F | HEIGHT: 65 IN | BODY MASS INDEX: 41.32 KG/M2 | WEIGHT: 248 LBS

## 2021-03-23 DIAGNOSIS — E11.9 CONTROLLED TYPE 2 DIABETES MELLITUS WITHOUT COMPLICATION, WITHOUT LONG-TERM CURRENT USE OF INSULIN (HCC): ICD-10-CM

## 2021-03-23 DIAGNOSIS — E78.2 MIXED HYPERLIPIDEMIA: ICD-10-CM

## 2021-03-23 DIAGNOSIS — I10 ESSENTIAL HYPERTENSION: ICD-10-CM

## 2021-03-23 DIAGNOSIS — E11.65 UNCONTROLLED TYPE 2 DIABETES MELLITUS WITH HYPERGLYCEMIA (HCC): ICD-10-CM

## 2021-03-23 DIAGNOSIS — E11.65 UNCONTROLLED TYPE 2 DIABETES MELLITUS WITH HYPERGLYCEMIA (HCC): Primary | ICD-10-CM

## 2021-03-23 DIAGNOSIS — E03.9 ACQUIRED HYPOTHYROIDISM: ICD-10-CM

## 2021-03-23 DIAGNOSIS — L29.9 ITCHING: ICD-10-CM

## 2021-03-23 DIAGNOSIS — N18.30 STAGE 3 CHRONIC KIDNEY DISEASE, UNSPECIFIED WHETHER STAGE 3A OR 3B CKD (HCC): ICD-10-CM

## 2021-03-23 DIAGNOSIS — L24.9 IRRITANT CONTACT DERMATITIS, UNSPECIFIED TRIGGER: ICD-10-CM

## 2021-03-23 DIAGNOSIS — Z72.0 TOBACCO ABUSE: ICD-10-CM

## 2021-03-23 PROBLEM — F33.41 RECURRENT MAJOR DEPRESSIVE DISORDER, IN PARTIAL REMISSION: Chronic | Status: ACTIVE | Noted: 2019-04-22

## 2021-03-23 PROBLEM — E66.01 MORBID OBESITY WITH BMI OF 40.0-44.9, ADULT: Chronic | Status: ACTIVE | Noted: 2019-04-22

## 2021-03-23 PROBLEM — G47.33 OSA (OBSTRUCTIVE SLEEP APNEA): Chronic | Status: ACTIVE | Noted: 2019-10-17

## 2021-03-23 LAB
CHOLEST SERPL-MCNC: 147 MG/DL (ref 0–200)
HBA1C MFR BLD: 10.1 % (ref 4.8–5.6)
HDLC SERPL-MCNC: 31 MG/DL (ref 40–60)
LDLC SERPL CALC-MCNC: 79 MG/DL (ref 0–100)
LDLC/HDLC SERPL: 2.33 {RATIO}
TRIGL SERPL-MCNC: 219 MG/DL (ref 0–150)
VLDLC SERPL-MCNC: 37 MG/DL (ref 5–40)

## 2021-03-23 PROCEDURE — 99406 BEHAV CHNG SMOKING 3-10 MIN: CPT | Performed by: NURSE PRACTITIONER

## 2021-03-23 PROCEDURE — 84439 ASSAY OF FREE THYROXINE: CPT

## 2021-03-23 PROCEDURE — 80048 BASIC METABOLIC PNL TOTAL CA: CPT | Performed by: NURSE PRACTITIONER

## 2021-03-23 PROCEDURE — 84443 ASSAY THYROID STIM HORMONE: CPT | Performed by: NURSE PRACTITIONER

## 2021-03-23 PROCEDURE — 83036 HEMOGLOBIN GLYCOSYLATED A1C: CPT | Performed by: NURSE PRACTITIONER

## 2021-03-23 PROCEDURE — 80061 LIPID PANEL: CPT | Performed by: NURSE PRACTITIONER

## 2021-03-23 PROCEDURE — 99214 OFFICE O/P EST MOD 30 MIN: CPT | Performed by: NURSE PRACTITIONER

## 2021-03-23 RX ORDER — AMLODIPINE BESYLATE 10 MG/1
10 TABLET ORAL DAILY
Qty: 30 TABLET | Refills: 3 | Status: SHIPPED | OUTPATIENT
Start: 2021-03-23 | End: 2021-11-24 | Stop reason: SDUPTHER

## 2021-03-23 RX ORDER — LOSARTAN POTASSIUM 25 MG/1
25 TABLET ORAL DAILY
Qty: 30 TABLET | Refills: 3 | Status: SHIPPED | OUTPATIENT
Start: 2021-03-23 | End: 2021-11-24 | Stop reason: SDUPTHER

## 2021-03-23 RX ORDER — ALBUTEROL SULFATE 90 UG/1
2 AEROSOL, METERED RESPIRATORY (INHALATION) EVERY 6 HOURS PRN
Qty: 18 G | Refills: 3 | Status: SHIPPED | OUTPATIENT
Start: 2021-03-23 | End: 2021-04-07 | Stop reason: SDUPTHER

## 2021-03-23 RX ORDER — ATORVASTATIN CALCIUM 40 MG/1
40 TABLET, FILM COATED ORAL DAILY
Qty: 30 TABLET | Refills: 3 | Status: SHIPPED | OUTPATIENT
Start: 2021-03-23 | End: 2021-11-24 | Stop reason: SDUPTHER

## 2021-03-23 RX ORDER — BUPROPION HYDROCHLORIDE 150 MG/1
150 TABLET, EXTENDED RELEASE ORAL 2 TIMES DAILY
Qty: 60 TABLET | Refills: 3 | Status: SHIPPED | OUTPATIENT
Start: 2021-03-23 | End: 2021-11-24 | Stop reason: SDUPTHER

## 2021-03-23 RX ORDER — HYDROXYZINE HYDROCHLORIDE 10 MG/1
10 TABLET, FILM COATED ORAL EVERY 6 HOURS PRN
Qty: 30 TABLET | Refills: 0 | Status: CANCELLED | OUTPATIENT
Start: 2021-03-23

## 2021-03-23 RX ORDER — LEVOTHYROXINE SODIUM 0.05 MG/1
50 TABLET ORAL DAILY
Qty: 30 TABLET | Refills: 3 | Status: SHIPPED | OUTPATIENT
Start: 2021-03-23 | End: 2021-11-24 | Stop reason: SDUPTHER

## 2021-03-23 NOTE — PROGRESS NOTES
Chief Complaint  Diabetes (last A1C on 12/22=6.4), Hypertension, Hyperlipidemia, and Hypothyroidism    Subjective          Freida Martinez presents to [facility] for   History of Present Illness    Rash - bilateral hands-she had a rash to bilateral hands in 11/2019 she had a small area of erythema and superficial peeling noted to the palmar aspect of bilateral hands.  This was treated with mometasone.  She had significant improvement in the rash but was having a lot of itching at her follow-up in December so we added some hydroxyzine..  Rash was doing okay but then she reports that rash got severe about 1 month ago with severe redness peeling, cracking, and bleeding to bilateral palms.  She went to urgent care on 3/12/2021 for this.  She was advised to continue topical emollient and see dermatology.  She cannot get into dermatology for several weeks.  She has no constitutional symptoms.      Type 2 diabetes-  She is currently on Januvia.  She is not on Metformin due to CKD.  She has a baseline creatinine about 1.1-1.4.  She reports compliance with medication denies any adverse effects.  Her last hemoglobin A1c was 6.4%.  She is not checking her glucose at home due to the severe rash on her hands.  Her current diet is unhealthy with lots of soda and sweets.  She is not exercising.  Her diabetic eye exam is up-to-date as of August 2020.  Her foot exam was completed in August 2020.  She had a normal microalbumin in August 2020.  She had a swelling with an ACE inhibitor in the past.  I have spoken with her nephrologist who would like to try her on an ARB.  She is on statin therapy.  She denies headaches, visual disturbances, chest pain, dizziness, palpitations, dyspnea, polyuria, polyphagia, paresthesias, or any hypoglycemic episodes.      Hypertension-chronic.  BP well controlled with carvedilol and amlodipine.  She failed ACE therapy in the past due to some swelling with it.Denies  headaches, dizziness, visual disturbances, palpitations chest pain, dyspnea, TIA or CVA symptoms, leg pain/claudication symptoms, and edema.  She was seen by cardiology in 2020 due to chest pain and symptomatic PVCs.  She is done well since being on the carvedilol.  She did have a normal cardiac stress test in 6/2020 and echo at that time showed EF of 66 to 70% with mild concentric left ventricular hypertrophy and trace tricuspid valve regurg.    Hyperlipidemia-chronic.  She is currently on statin therapy.  Compliant with dosing denies any adverse effects.  Last lipids were slightly elevated.  Diet is unhealthy and she is not exercising.    Hypothyroidism-chronic.  She is currently on levothyroxine.  She denies any hypo or hyperthyroidism symptoms.  Her last TSH was stable in 5/2020.      She has CKD 3.  She was seen by her nephrologist,  earlier this month.  She had glucose of greater than 500, 2+ protein in her urine and 1+ blood.  She has diabetic nephropathy and he would like to have her on losartan.    Health Maintenance:   due for 6 m FU mammogram -contact information given to patient to schedule.          Allergies   Allergen Reactions   • Anaprox [Naproxen] Other (See Comments)     CHRONIC KIDNEY DISEASE   • Celebrex [Celecoxib] Other (See Comments)     CHRONIC KIDNEY DISEASE   • Flector [Diclofenac Epolamine] Other (See Comments)     CHRONIC KIDNEY DISEASE   • Motrin [Ibuprofen] Other (See Comments)     CHRONIC KIDNEY DISEASE   • Voltaren [Diclofenac Sodium] Other (See Comments)     CHRONIC KIDNEY DISEASE   • Lisinopril Swelling     Current Outpatient Medications on File Prior to Visit   Medication Sig Dispense Refill   • albuterol (PROVENTIL) (2.5 MG/3ML) 0.083% nebulizer solution      • aspirin 81 MG EC tablet Take 1 tablet by mouth Daily.     • carvedilol (COREG) 6.25 MG tablet TAKE ONE TABLET BY MOUTH TWICE A  tablet 0   • diclofenac (VOLTAREN) 1 % gel gel Apply 4 g topically to the  "appropriate area as directed 4 (Four) Times a Day. Small amount to affected area 300 g 3   • glucose blood test strip Used to test blood sugar for Diabetes E11.9 twice a day. Pt has One Touch Verio Flex meter 100 each 12   • hydrOXYzine (ATARAX) 10 MG tablet TAKE ONE TABLET BY MOUTH EVERY 6 HOURS AS NEEDED FOR ITCHING 30 tablet 0   • Lancets (ONETOUCH ULTRASOFT) lancets Used to test blood sugar for Diabetes E11.9 twice a day 100 each 12     No current facility-administered medications on file prior to visit.         The following portions of the patient's history were reviewed and updated as appropriate: allergies, current medications, past family history, past medical history, past social history, past surgical history and problem list and are available for review within electronic record    Objective     Vital Signs:   /84   Pulse 113   Temp 96.9 °F (36.1 °C)   Resp 18   Ht 165.1 cm (65\")   Wt 112 kg (248 lb)   SpO2 98%   BMI 41.27 kg/m²         Physical Exam  Vitals and nursing note reviewed.   Constitutional:       General: She is not in acute distress.     Appearance: Normal appearance. She is well-developed. She is not diaphoretic.   HENT:      Head: Normocephalic and atraumatic.   Eyes:      Conjunctiva/sclera: Conjunctivae normal.      Pupils: Pupils are equal, round, and reactive to light.   Neck:      Vascular: No JVD.   Cardiovascular:      Rate and Rhythm: Normal rate and regular rhythm.      Pulses:           Dorsalis pedis pulses are 2+ on the right side and 2+ on the left side.        Posterior tibial pulses are 2+ on the right side and 2+ on the left side.      Heart sounds: Normal heart sounds. No murmur heard.     Pulmonary:      Effort: Pulmonary effort is normal. No respiratory distress.      Breath sounds: Normal breath sounds.   Chest:      Chest wall: No tenderness.   Abdominal:      General: Bowel sounds are normal. There is no distension.      Palpations: Abdomen is soft.      " Tenderness: There is no abdominal tenderness.   Musculoskeletal:         General: Normal range of motion.      Cervical back: Normal range of motion.   Skin:     General: Skin is warm and dry.      Coloration: Skin is not pale.      Findings: Erythema and rash present. Rash is crusting and scaling.      Comments: Entire palmar aspect of bilateral hands erythematous, swollen, and noted to have desquamation.  There is no exudate or warmth.   Neurological:      Mental Status: She is alert and oriented to person, place, and time.      Coordination: Coordination normal.   Psychiatric:         Behavior: Behavior normal.         Thought Content: Thought content normal.         Judgment: Judgment normal.          Result Review :     The following data was reviewed by: LUCIA Manzano on 03/23/2021:  CMP    CMP 5/21/20 8/11/20 3/23/21   Glucose 106 (A) 86 448 (A)   BUN 17 16 18   Creatinine 1.44 (A) 1.18 (A) 1.38 (A)   eGFR  Am 47 (A) 59 (A) 49 (A)   Sodium 138 139 138   Potassium 3.8 4.3 4.7   Chloride 105 105 103   Calcium 9.4 10.0 9.9   Albumin 4.10     Total Bilirubin 0.4     Alkaline Phosphatase 89     AST (SGOT) 9     ALT (SGPT) 13     (A) Abnormal value            CBC    CBC 5/26/20   WBC 9.32   RBC 4.75   Hemoglobin 12.7   Hematocrit 38.0   MCV 80.0   MCH 26.7   MCHC 33.4   RDW 14.5   Platelets 313           Lipid Panel    Lipid Panel 5/21/20 3/23/21   Total Cholesterol 111 147   Triglycerides 265 (A) 219 (A)   HDL Cholesterol 30 (A) 31 (A)   VLDL Cholesterol 53 (A) 37   LDL Cholesterol  28 79   LDL/HDL Ratio 0.93 2.33   (A) Abnormal value            TSH    TSH 5/21/20 3/23/21   TSH 2.270 4.950 (A)   (A) Abnormal value       Comments are available for some flowsheets but are not being displayed.           A1C Last 3 Results    HGBA1C Last 3 Results 8/11/20 12/22/20 3/23/21   Hemoglobin A1C 6.1 6.4 10.10 (A)   (A) Abnormal value            Microalbumin    Microalbumin 8/11/20   Microalbumin, Urine 5.6                            Assessment and Plan      Diagnoses and all orders for this visit:    1. Uncontrolled type 2 diabetes mellitus with hyperglycemia (CMS/[facility]) (Primary)  Assessment & Plan:  Diabetes is worsening.  A1c is 10.10% today  Reminded to bring in blood sugar diary at next visit.  Dietary recommendations for ADA diet.  Regular aerobic exercise.  Reminded to get yearly retinal exam.  Endocrinology clinic referral.  Will start her on Basaglar 10 units nightly.  We will also send in a prescription for a new meter that will allow her to test on her forearms instead of her hands due to her severe dermatitis.  Diabetes will be reassessed 2 weeks.    Orders:  -     losartan (COZAAR) 25 MG tablet; Take 1 tablet by mouth Daily.  Dispense: 30 tablet; Refill: 3  -     SITagliptin (JANUVIA) 50 MG tablet; Take 1 tablet by mouth Daily.  Dispense: 30 tablet; Refill: 3  -     Hemoglobin A1c; Future  -     Basic Metabolic Panel; Future  -     TSH Rfx On Abnormal To Free T4; Future  -     Lipid Panel; Future  -     Basic Metabolic Panel; Future  -     Insulin Glargine (BASAGLAR KWIKPEN) 100 UNIT/ML injection pen; Inject 10 Units under the skin into the appropriate area as directed Every Night.  Dispense: 9 mL; Refill: 3  -     Insulin Pen Needle (True Comfort Pro Pen Needles) 32G X 4 MM misc; 1 Device Daily.  Dispense: 100 each; Refill: 11  -     Ambulatory Referral to Endocrinology  -     glucose monitor monitoring kit; 1 each Daily.  Dispense: 1 each; Refill: 0  -     Lancets misc; 1 Device Daily.  Dispense: 100 each; Refill: 11  -     glucose blood test strip; 1 each by Other route Daily.  Dispense: 100 each; Refill: 11    2. Acquired hypothyroidism  Assessment & Plan:  Clinically euthyroid.  TSH slightly up with a normal free T4.  Continue current dose for now.  Recheck with next labs    Orders:  -     levothyroxine (SYNTHROID, LEVOTHROID) 50 MCG tablet; Take 1 tablet by mouth Daily.  Dispense: 30 tablet; Refill: 3  -      Hemoglobin A1c; Future  -     Basic Metabolic Panel; Future  -     TSH Rfx On Abnormal To Free T4; Future  -     Lipid Panel; Future    3. Mixed hyperlipidemia  Assessment & Plan:  Lipid abnormalities are unchanged.  Nutritional counseling was provided. and Pharmacotherapy as ordered.  Lipids will be reassessed Next regular follow-up.    Orders:  -     atorvastatin (LIPITOR) 40 MG tablet; Take 1 tablet by mouth Daily.  Dispense: 30 tablet; Refill: 3  -     Hemoglobin A1c; Future  -     Basic Metabolic Panel; Future  -     TSH Rfx On Abnormal To Free T4; Future  -     Lipid Panel; Future    4. Essential hypertension  Assessment & Plan:  Hypertension is Stable.  Continue current treatment regimen.  Weight loss.  Stop smoking.  Medication changes per orders.  Add losartan 25 mg daily.  Blood pressure will be reassessed at the next regular appointment.    Orders:  -     losartan (COZAAR) 25 MG tablet; Take 1 tablet by mouth Daily.  Dispense: 30 tablet; Refill: 3  -     amLODIPine (NORVASC) 10 MG tablet; Take 1 tablet by mouth Daily.  Dispense: 30 tablet; Refill: 3  -     Hemoglobin A1c; Future  -     Basic Metabolic Panel; Future  -     TSH Rfx On Abnormal To Free T4; Future  -     Lipid Panel; Future  -     Basic Metabolic Panel; Future    5. Irritant contact dermatitis, unspecified trigger  Comments:  Appointment obtained with [place] [facility] for patient for tomorrow afternoon.  Orders:  -     Ambulatory Referral to Dermatology    6. Tobacco abuse  Assessment & Plan:  Freida Martinez  reports that she has been smoking cigarettes. She has been smoking about 1.00 pack per day. She has never used smokeless tobacco.. I have educated her on the risk of diseases from using tobacco products such as cancer, COPD and heart disease.     I advised her to quit and she is willing to quit. We have discussed the following method/s for tobacco cessation:  Education Material Counseling Prescription Medicaiton.  Together  we have set a quit date for 1 week from today.  She will follow up with me in at her next regular follow-up or sooner to check on her progress.    I spent 4 minutes counseling the patient.           Orders:  -     buPROPion (Zyban) 150 MG 12 hr tablet; Take 150 mg by mouth 2 (Two) Times a Day.  Dispense: 60 tablet; Refill: 3    7. Stage 3 chronic kidney disease, unspecified whether stage 3a or 3b CKD (CMS/[facility])  Assessment & Plan:  Renal condition is worsening.  Weight loss.  We will start her on losartan 25 mg daily.  Check labs today.  Recheck labs in 2 weeks as well.  I have discussed this patient with her nephrologist Dr. Kirkland as well.   Renal condition will be reassessed 2 weeks.    Orders:  -     losartan (COZAAR) 25 MG tablet; Take 1 tablet by mouth Daily.  Dispense: 30 tablet; Refill: 3  -     Basic Metabolic Panel; Future    Other orders  -     Cancel: Basic Metabolic Panel; Future  -     albuterol sulfate  (90 Base) MCG/ACT inhaler; Inhale 2 puffs Every 6 (Six) Hours As Needed for Wheezing.  Dispense: 18 g; Refill: 3  -     Cancel: hydrOXYzine (ATARAX) 10 MG tablet; Take 1 tablet by mouth Every 6 (Six) Hours As Needed for Itching.  Dispense: 30 tablet; Refill: 0          Follow Up     Patient was given instructions and counseling regarding her condition or for health maintenance advice. Please see specific information pulled into the AVS if appropriate.   Any new medication's adverse effects have been discussed in detail with patient  Patient was encouraged to keep me informed of any acute changes, lack of improvement, or any new concerning symptoms.    Return in about 3 months (around 6/23/2021) for chronic condition follow up, collect labs today.    * Please note that portions of this note were completed with a voice recognition program. Efforts were made to edit the dictation but occasionally words are erroneously transcribed.

## 2021-03-24 ENCOUNTER — TELEPHONE (OUTPATIENT)
Dept: INTERNAL MEDICINE | Facility: CLINIC | Age: 51
End: 2021-03-24

## 2021-03-24 PROBLEM — E11.65 UNCONTROLLED TYPE 2 DIABETES MELLITUS WITH HYPERGLYCEMIA (HCC): Status: ACTIVE | Noted: 2021-03-24

## 2021-03-24 PROBLEM — E11.65 UNCONTROLLED TYPE 2 DIABETES MELLITUS WITH HYPERGLYCEMIA: Chronic | Status: ACTIVE | Noted: 2021-03-24

## 2021-03-24 PROBLEM — E11.9 CONTROLLED TYPE 2 DIABETES MELLITUS WITHOUT COMPLICATION, WITHOUT LONG-TERM CURRENT USE OF INSULIN (HCC): Chronic | Status: RESOLVED | Noted: 2019-04-22 | Resolved: 2021-03-24

## 2021-03-24 LAB
ANION GAP SERPL CALCULATED.3IONS-SCNC: 12.5 MMOL/L (ref 5–15)
BUN SERPL-MCNC: 18 MG/DL (ref 6–20)
BUN/CREAT SERPL: 13 (ref 7–25)
CALCIUM SPEC-SCNC: 9.9 MG/DL (ref 8.6–10.5)
CHLORIDE SERPL-SCNC: 103 MMOL/L (ref 98–107)
CO2 SERPL-SCNC: 22.5 MMOL/L (ref 22–29)
CREAT SERPL-MCNC: 1.38 MG/DL (ref 0.57–1)
GFR SERPL CREATININE-BSD FRML MDRD: 49 ML/MIN/1.73
GLUCOSE SERPL-MCNC: 448 MG/DL (ref 65–99)
POTASSIUM SERPL-SCNC: 4.7 MMOL/L (ref 3.5–5.2)
SODIUM SERPL-SCNC: 138 MMOL/L (ref 136–145)
T4 FREE SERPL-MCNC: 1.31 NG/DL (ref 0.93–1.7)
TSH SERPL DL<=0.05 MIU/L-ACNC: 4.95 UIU/ML (ref 0.27–4.2)

## 2021-03-24 RX ORDER — BLOOD-GLUCOSE METER
1 KIT MISCELLANEOUS DAILY
Qty: 1 EACH | Refills: 0 | Status: SHIPPED | OUTPATIENT
Start: 2021-03-24

## 2021-03-24 RX ORDER — LANCETS 30 GAUGE
1 EACH MISCELLANEOUS DAILY
Qty: 100 EACH | Refills: 11 | Status: SHIPPED | OUTPATIENT
Start: 2021-03-24

## 2021-03-24 RX ORDER — PEN NEEDLE, DIABETIC 31 GX5/16"
1 NEEDLE, DISPOSABLE MISCELLANEOUS DAILY
Qty: 100 EACH | Refills: 11 | Status: SHIPPED | OUTPATIENT
Start: 2021-03-24 | End: 2021-05-05

## 2021-03-24 RX ORDER — INSULIN GLARGINE 100 [IU]/ML
10 INJECTION, SOLUTION SUBCUTANEOUS NIGHTLY
Qty: 9 ML | Refills: 3 | Status: SHIPPED | OUTPATIENT
Start: 2021-03-24 | End: 2021-05-05

## 2021-03-24 NOTE — ASSESSMENT & PLAN NOTE
Diabetes is worsening.  A1c is 10.10% today  Reminded to bring in blood sugar diary at next visit.  Dietary recommendations for ADA diet.  Regular aerobic exercise.  Reminded to get yearly retinal exam.  Endocrinology clinic referral.  Will start her on Basaglar 10 units nightly.  We will also send in a prescription for a new meter that will allow her to test on her forearms instead of her hands due to her severe dermatitis.  Diabetes will be reassessed 2 weeks.

## 2021-03-24 NOTE — ASSESSMENT & PLAN NOTE
Hypertension is Stable.  Continue current treatment regimen.  Weight loss.  Stop smoking.  Medication changes per orders.  Add losartan 25 mg daily.  Blood pressure will be reassessed at the next regular appointment.

## 2021-03-24 NOTE — ASSESSMENT & PLAN NOTE
Renal condition is worsening.  Weight loss.  We will start her on losartan 25 mg daily.  Check labs today.  Recheck labs in 2 weeks as well.  I have discussed this patient with her nephrologist Dr. Kirkland as well.   Renal condition will be reassessed 2 weeks.

## 2021-03-24 NOTE — ASSESSMENT & PLAN NOTE
Lipid abnormalities are unchanged.  Nutritional counseling was provided. and Pharmacotherapy as ordered.  Lipids will be reassessed Next regular follow-up.

## 2021-03-24 NOTE — ASSESSMENT & PLAN NOTE
Freida Martinez  reports that she has been smoking cigarettes. She has been smoking about 1.00 pack per day. She has never used smokeless tobacco.. I have educated her on the risk of diseases from using tobacco products such as cancer, COPD and heart disease.     I advised her to quit and she is willing to quit. We have discussed the following method/s for tobacco cessation:  Education Material Counseling Prescription Medicaiton.  Together we have set a quit date for 1 week from today.  She will follow up with me in at her next regular follow-up or sooner to check on her progress.    I spent 4 minutes counseling the patient.

## 2021-03-24 NOTE — ASSESSMENT & PLAN NOTE
Clinically euthyroid.  TSH slightly up with a normal free T4.  Continue current dose for now.  Recheck with next labs

## 2021-04-02 ENCOUNTER — TRANSCRIBE ORDERS (OUTPATIENT)
Dept: ADMINISTRATIVE | Facility: HOSPITAL | Age: 51
End: 2021-04-02

## 2021-04-02 DIAGNOSIS — R92.8 ABNORMAL MAMMOGRAM: Primary | ICD-10-CM

## 2021-04-07 ENCOUNTER — OFFICE VISIT (OUTPATIENT)
Dept: INTERNAL MEDICINE | Facility: CLINIC | Age: 51
End: 2021-04-07

## 2021-04-07 VITALS
HEART RATE: 99 BPM | DIASTOLIC BLOOD PRESSURE: 82 MMHG | BODY MASS INDEX: 41.32 KG/M2 | RESPIRATION RATE: 18 BRPM | HEIGHT: 65 IN | WEIGHT: 248 LBS | OXYGEN SATURATION: 98 % | SYSTOLIC BLOOD PRESSURE: 130 MMHG | TEMPERATURE: 98.4 F

## 2021-04-07 DIAGNOSIS — J45.20 MILD INTERMITTENT ASTHMA WITHOUT COMPLICATION: ICD-10-CM

## 2021-04-07 DIAGNOSIS — E11.65 UNCONTROLLED TYPE 2 DIABETES MELLITUS WITH HYPERGLYCEMIA (HCC): Primary | ICD-10-CM

## 2021-04-07 DIAGNOSIS — E03.9 ACQUIRED HYPOTHYROIDISM: ICD-10-CM

## 2021-04-07 PROCEDURE — 99214 OFFICE O/P EST MOD 30 MIN: CPT | Performed by: NURSE PRACTITIONER

## 2021-04-07 RX ORDER — HALOBETASOL PROPIONATE 0.05 %
OINTMENT (GRAM) TOPICAL
COMMUNITY
Start: 2021-03-24 | End: 2022-09-07

## 2021-04-07 RX ORDER — ALBUTEROL SULFATE 90 UG/1
2 AEROSOL, METERED RESPIRATORY (INHALATION) EVERY 6 HOURS PRN
Qty: 18 G | Refills: 3 | Status: SHIPPED | OUTPATIENT
Start: 2021-04-07 | End: 2021-11-24 | Stop reason: SDUPTHER

## 2021-04-07 RX ORDER — TRIAMCINOLONE ACETONIDE 1 MG/G
CREAM TOPICAL
COMMUNITY
Start: 2021-03-31 | End: 2022-09-07

## 2021-04-07 NOTE — PROGRESS NOTES
Chief Complaint  Diabetes (follow up on DM last A1C on 3/23=10.1) and Hypothyroidism    Subjective          Freida Martinez presents to [facility] for   History of Present Illness    Type 2 diabetes  Currently on Januvia.  She is not on metformin due to CKD.  Baseline creatinine is 1.1-1.4.  At her last visit she was not taking meds.  Since that visit she started taking her Januvia.  Was drinking 2-3 pepsi per day eating ding dongs and twinkles.  She has stopped doing this.  She is not exercising.  Diabetic eye exam last done August 2020.  Foot exam completed August 2020  Microalbumin was normal in August 2020.  ACE inhibitor caused swelling in the past.  Nephrologist wanted her started on ARB.  We started her on this at her last visit and she has done well with it.  She is on statin.  She denies headaches, chest pain, dizziness, palpitations, polyuria, polydipsia, polyphagia, paresthesias or hypoglycemic episodes.    Hypertension-chronic.  Well-controlled with amlodipine and losartan.  She is tolerating the losartan well that was started at her last visit.    Has mild asthma.  This is well controlled.  Needs refill on albuterol.    Hypothyroidism-chronic.  Her last TSH was slightly elevated at 4.950 with a normal free T4 at 1.31.  She tells me that she was not consistently taking her medications with those labs.  She is now taking her levothyroxine 50 mcg consistently.    Since her last visit with me she has seen dermatology for her hand rash.  They have started treating this as psoriasis.  She was treated with Cosentyx injection and triamcinolone cream.  She did not completely respond to the triamcinolone so she was changed to calcipotriene ointment.  This is helped some.    Allergies   Allergen Reactions   • Anaprox [Naproxen] Other (See Comments)     CHRONIC KIDNEY DISEASE   • Celebrex [Celecoxib] Other (See Comments)     CHRONIC KIDNEY DISEASE   • Flector [Diclofenac  Epolamine] Other (See Comments)     CHRONIC KIDNEY DISEASE   • Motrin [Ibuprofen] Other (See Comments)     CHRONIC KIDNEY DISEASE   • Voltaren [Diclofenac Sodium] Other (See Comments)     CHRONIC KIDNEY DISEASE   • Lisinopril Swelling     Current Outpatient Medications on File Prior to Visit   Medication Sig Dispense Refill   • albuterol (PROVENTIL) (2.5 MG/3ML) 0.083% nebulizer solution      • amLODIPine (NORVASC) 10 MG tablet Take 1 tablet by mouth Daily. 30 tablet 3   • aspirin 81 MG EC tablet Take 1 tablet by mouth Daily.     • atorvastatin (LIPITOR) 40 MG tablet Take 1 tablet by mouth Daily. 30 tablet 3   • buPROPion (Zyban) 150 MG 12 hr tablet Take 150 mg by mouth 2 (Two) Times a Day. 60 tablet 3   • carvedilol (COREG) 6.25 MG tablet TAKE ONE TABLET BY MOUTH TWICE A  tablet 0   • diclofenac (VOLTAREN) 1 % gel gel Apply 4 g topically to the appropriate area as directed 4 (Four) Times a Day. Small amount to affected area 300 g 3   • glucose blood test strip Used to test blood sugar for Diabetes E11.9 twice a day. Pt has One Touch Verio Flex meter 100 each 12   • glucose blood test strip 1 each by Other route Daily. 100 each 11   • glucose monitor monitoring kit 1 each Daily. 1 each 0   • halobetasol (ULTRAVATE) 0.05 % ointment      • hydrOXYzine (ATARAX) 10 MG tablet TAKE ONE TABLET BY MOUTH EVERY 6 HOURS AS NEEDED FOR ITCHING 30 tablet 0   • Insulin Glargine (BASAGLAR KWIKPEN) 100 UNIT/ML injection pen Inject 10 Units under the skin into the appropriate area as directed Every Night. 9 mL 3   • Insulin Pen Needle (True Comfort Pro Pen Needles) 32G X 4 MM misc 1 Device Daily. 100 each 11   • Lancets misc 1 Device Daily. 100 each 11   • levothyroxine (SYNTHROID, LEVOTHROID) 50 MCG tablet Take 1 tablet by mouth Daily. 30 tablet 3   • losartan (COZAAR) 25 MG tablet Take 1 tablet by mouth Daily. 30 tablet 3   • SITagliptin (JANUVIA) 50 MG tablet Take 1 tablet by mouth Daily. 30 tablet 3   • triamcinolone  "(KENALOG) 0.1 % cream        No current facility-administered medications on file prior to visit.         The following portions of the patient's history were reviewed and updated as appropriate: allergies, current medications, past family history, past medical history, past social history, past surgical history and problem list and are available for review within electronic record    Objective     Vital Signs:   /82   Pulse 99   Temp 98.4 °F (36.9 °C)   Resp 18   Ht 165.1 cm (65\")   Wt 112 kg (248 lb)   SpO2 98%   BMI 41.27 kg/m²         Physical Exam  Vitals and nursing note reviewed.   Constitutional:       General: She is not in acute distress.     Appearance: Normal appearance. She is well-developed. She is not diaphoretic.   HENT:      Head: Normocephalic and atraumatic.   Eyes:      Conjunctiva/sclera: Conjunctivae normal.      Pupils: Pupils are equal, round, and reactive to light.   Neck:      Vascular: No JVD.   Cardiovascular:      Rate and Rhythm: Normal rate and regular rhythm.      Pulses:           Dorsalis pedis pulses are 2+ on the right side and 2+ on the left side.        Posterior tibial pulses are 2+ on the right side and 2+ on the left side.      Heart sounds: Normal heart sounds. No murmur heard.     Pulmonary:      Effort: Pulmonary effort is normal. No respiratory distress.      Breath sounds: Normal breath sounds.   Chest:      Chest wall: No tenderness.   Abdominal:      General: Bowel sounds are normal. There is no distension.      Palpations: Abdomen is soft.      Tenderness: There is no abdominal tenderness.   Musculoskeletal:         General: Normal range of motion.      Cervical back: Normal range of motion.   Skin:     General: Skin is warm and dry.      Coloration: Skin is not pale.      Findings: Rash present.      Comments: Rash to hands is improved since she saw dermatology.  She now has extensive flakiness but erythema, crusting, and swelling have improved "   Neurological:      Mental Status: She is alert and oriented to person, place, and time.      Coordination: Coordination normal.   Psychiatric:         Behavior: Behavior normal.         Thought Content: Thought content normal.         Judgment: Judgment normal.          Result Review :     The following data was reviewed by: ULCIA Manzano on 04/07/2021:  CMP    CMP 8/11/20 3/23/21 4/14/21   Glucose 86 448 (A) 228 (A)   BUN 16 18 20   Creatinine 1.18 (A) 1.38 (A) 1.47 (A)   eGFR  Am 59 (A) 49 (A) 45 (A)   Sodium 139 138 137   Potassium 4.3 4.7 4.6   Chloride 105 103 103   Calcium 10.0 9.9 10.1   (A) Abnormal value            CBC    CBC 5/26/20   WBC 9.32   RBC 4.75   Hemoglobin 12.7   Hematocrit 38.0   MCV 80.0   MCH 26.7   MCHC 33.4   RDW 14.5   Platelets 313           CBC w/diff    CBC w/Diff 5/26/20   WBC 9.32   RBC 4.75   Hemoglobin 12.7   Hematocrit 38.0   MCV 80.0   MCH 26.7   MCHC 33.4   RDW 14.5   Platelets 313           TSH    TSH 5/21/20 3/23/21   TSH 2.270 4.950 (A)   (A) Abnormal value       Comments are available for some flowsheets but are not being displayed.           A1C Last 3 Results    HGBA1C Last 3 Results 8/11/20 12/22/20 3/23/21   Hemoglobin A1C 6.1 6.4 10.10 (A)   (A) Abnormal value            Microalbumin    Microalbumin 8/11/20   Microalbumin, Urine 5.6             Data reviewed: Consultant notes 2 office notes from dermatology from 4/2021              Assessment and Plan      Diagnoses and all orders for this visit:    1. Uncontrolled type 2 diabetes mellitus with hyperglycemia (CMS/[facility]) (Primary)  -     dapagliflozin (FARXIGA) 5 MG tablet tablet; Take 1 tablet by mouth Daily.  Dispense: 30 tablet; Refill: 3  Continue the Januvia and we will add Farxiga for better glucose control.  Unable to take Metformin secondary to CKD.  She is going to go back strict with ADA diet.   Walk up and down steps every day.   2. Mild intermittent asthma without complication  -     albuterol  sulfate  (90 Base) MCG/ACT inhaler; Inhale 2 puffs Every 6 (Six) Hours As Needed for Wheezing.  Dispense: 18 g; Refill: 3  Stable.  Continue albuterol.  3. Acquired hypothyroidism  TSH was slightly elevated but she was noncompliant with medications when labs were drawn.  She will consistently take her current dose of levothyroxine and we can recheck this in the next visit or so            Follow Up     Patient was given instructions and counseling regarding her condition or for health maintenance advice. Please see specific information pulled into the AVS if appropriate.   Any new medication's adverse effects have been discussed in detail with patient  Patient was encouraged to keep me informed of any acute changes, lack of improvement, or any new concerning symptoms.    Return in about 1 month (around 5/7/2021) for Recheck.    * Please note that portions of this note were completed with a voice recognition program. Efforts were made to edit the dictation but occasionally words are erroneously transcribed.

## 2021-04-13 ENCOUNTER — TELEPHONE (OUTPATIENT)
Dept: INTERNAL MEDICINE | Facility: CLINIC | Age: 51
End: 2021-04-13

## 2021-04-13 NOTE — TELEPHONE ENCOUNTER
----- Message from LUCIA Garcia sent at 4/13/2021  1:18 PM EDT -----  Please let her know that I reviewed the labs from dermatology.  She needs to stop by and have her labs that we ordered here collected 1 day this week to reevaluate the abnormals that were noted on dermatology labs (elevated glucose, decreased kidney function, decrease sodium)

## 2021-04-14 ENCOUNTER — LAB (OUTPATIENT)
Dept: LAB | Facility: HOSPITAL | Age: 51
End: 2021-04-14

## 2021-04-14 DIAGNOSIS — E11.65 UNCONTROLLED TYPE 2 DIABETES MELLITUS WITH HYPERGLYCEMIA (HCC): ICD-10-CM

## 2021-04-14 DIAGNOSIS — N18.30 STAGE 3 CHRONIC KIDNEY DISEASE, UNSPECIFIED WHETHER STAGE 3A OR 3B CKD (HCC): ICD-10-CM

## 2021-04-14 DIAGNOSIS — I10 ESSENTIAL HYPERTENSION: ICD-10-CM

## 2021-04-14 LAB
ANION GAP SERPL CALCULATED.3IONS-SCNC: 11.4 MMOL/L (ref 5–15)
BUN SERPL-MCNC: 20 MG/DL (ref 6–20)
BUN/CREAT SERPL: 13.6 (ref 7–25)
CALCIUM SPEC-SCNC: 10.1 MG/DL (ref 8.6–10.5)
CHLORIDE SERPL-SCNC: 103 MMOL/L (ref 98–107)
CO2 SERPL-SCNC: 22.6 MMOL/L (ref 22–29)
CREAT SERPL-MCNC: 1.47 MG/DL (ref 0.57–1)
GFR SERPL CREATININE-BSD FRML MDRD: 45 ML/MIN/1.73
GLUCOSE SERPL-MCNC: 228 MG/DL (ref 65–99)
POTASSIUM SERPL-SCNC: 4.6 MMOL/L (ref 3.5–5.2)
SODIUM SERPL-SCNC: 137 MMOL/L (ref 136–145)

## 2021-04-14 PROCEDURE — 80048 BASIC METABOLIC PNL TOTAL CA: CPT | Performed by: NURSE PRACTITIONER

## 2021-04-16 ENCOUNTER — TELEPHONE (OUTPATIENT)
Dept: INTERNAL MEDICINE | Facility: CLINIC | Age: 51
End: 2021-04-16

## 2021-04-16 NOTE — TELEPHONE ENCOUNTER
----- Message from LUCIA Garcia sent at 4/16/2021  8:02 AM EDT -----  Please let her know that her labs are stable.  Her glucose is starting to come down since she started taking her medications.  Kidney function is still low.  Please send a copy of this lab to her nephrologist as well.

## 2021-04-17 PROBLEM — L40.9 PSORIASIS: Status: ACTIVE | Noted: 2021-04-17

## 2021-04-20 NOTE — TELEPHONE ENCOUNTER
PN of results. She stated understanding.  She will need the results sent to Derm also.    She wanted to let Annia know that she has not had a cigarette in 5 days.  I let her know that was great and to keep up the good work    Results faxed

## 2021-04-28 ENCOUNTER — HOSPITAL ENCOUNTER (OUTPATIENT)
Dept: MAMMOGRAPHY | Facility: HOSPITAL | Age: 51
Discharge: HOME | End: 2021-04-28

## 2021-04-28 ENCOUNTER — HOSPITAL ENCOUNTER (OUTPATIENT)
Dept: ULTRASOUND IMAGING | Facility: HOSPITAL | Age: 51
Discharge: HOME | End: 2021-04-28

## 2021-04-28 ENCOUNTER — TRANSCRIBE ORDERS (OUTPATIENT)
Dept: MAMMOGRAPHY | Facility: HOSPITAL | Age: 51
End: 2021-04-28

## 2021-04-28 DIAGNOSIS — R92.8 ABNORMAL MAMMOGRAM: ICD-10-CM

## 2021-04-28 DIAGNOSIS — R92.8 ABNORMAL MAMMOGRAM: Primary | ICD-10-CM

## 2021-04-28 PROCEDURE — 76642 ULTRASOUND BREAST LIMITED: CPT | Performed by: RADIOLOGY

## 2021-04-28 PROCEDURE — 77061 BREAST TOMOSYNTHESIS UNI: CPT | Performed by: RADIOLOGY

## 2021-04-28 PROCEDURE — 77065 DX MAMMO INCL CAD UNI: CPT

## 2021-04-28 PROCEDURE — 77065 DX MAMMO INCL CAD UNI: CPT | Performed by: RADIOLOGY

## 2021-04-28 PROCEDURE — 76642 ULTRASOUND BREAST LIMITED: CPT

## 2021-04-28 PROCEDURE — G0279 TOMOSYNTHESIS, MAMMO: HCPCS

## 2021-05-05 ENCOUNTER — OFFICE VISIT (OUTPATIENT)
Dept: INTERNAL MEDICINE | Facility: CLINIC | Age: 51
End: 2021-05-05

## 2021-05-05 VITALS
SYSTOLIC BLOOD PRESSURE: 132 MMHG | TEMPERATURE: 98.2 F | RESPIRATION RATE: 18 BRPM | BODY MASS INDEX: 40.98 KG/M2 | HEIGHT: 65 IN | WEIGHT: 246 LBS | OXYGEN SATURATION: 98 % | DIASTOLIC BLOOD PRESSURE: 86 MMHG | HEART RATE: 83 BPM

## 2021-05-05 DIAGNOSIS — I10 ESSENTIAL HYPERTENSION: Chronic | ICD-10-CM

## 2021-05-05 DIAGNOSIS — E11.65 UNCONTROLLED TYPE 2 DIABETES MELLITUS WITH HYPERGLYCEMIA (HCC): Primary | Chronic | ICD-10-CM

## 2021-05-05 DIAGNOSIS — Z23 NEED FOR HEPATITIS B VACCINATION: ICD-10-CM

## 2021-05-05 LAB — GLUCOSE BLDC GLUCOMTR-MCNC: 134 MG/DL (ref 70–130)

## 2021-05-05 PROCEDURE — 99214 OFFICE O/P EST MOD 30 MIN: CPT | Performed by: NURSE PRACTITIONER

## 2021-05-05 PROCEDURE — 82962 GLUCOSE BLOOD TEST: CPT | Performed by: NURSE PRACTITIONER

## 2021-05-05 PROCEDURE — 90471 IMMUNIZATION ADMIN: CPT | Performed by: NURSE PRACTITIONER

## 2021-05-05 PROCEDURE — 90746 HEPB VACCINE 3 DOSE ADULT IM: CPT | Performed by: NURSE PRACTITIONER

## 2021-05-05 RX ORDER — AMMONIUM LACTATE 12 G/100G
LOTION TOPICAL
COMMUNITY
Start: 2021-04-22 | End: 2022-09-07

## 2021-05-05 RX ORDER — CALCIPOTRIENE 50 UG/G
OINTMENT TOPICAL
COMMUNITY
Start: 2021-04-14 | End: 2022-09-07

## 2021-05-05 RX ORDER — SECUKINUMAB 150 MG/ML
INJECTION SUBCUTANEOUS
COMMUNITY
Start: 2021-04-29

## 2021-05-10 NOTE — ASSESSMENT & PLAN NOTE
Diabetes is improving with treatment.   Continue current treatment regimen.  Reminded to bring in blood sugar diary at next visit.  Dietary recommendations for ADA diet.  Diabetes will be reassessed Next regular follow-up.

## 2021-10-12 ENCOUNTER — TELEPHONE (OUTPATIENT)
Dept: INTERNAL MEDICINE | Facility: CLINIC | Age: 51
End: 2021-10-12

## 2021-10-12 NOTE — TELEPHONE ENCOUNTER
HUB: IF PATIENT RETURNS CALL TO OFFICE PLEASE CONFIRM IF AND WHY SHE NEEDS A TRANSFER BENCH AND COMMODE CHAIR. WE HAVE NO DOCUMENTED REASON WHY SHE NEEDS THESE.     Called and lvm for patient to return call, office number given.   Paperwork from [employer] and last office note printed and is sitting in my patient paperwork folder at my desk.

## 2021-10-15 NOTE — TELEPHONE ENCOUNTER
Called and spoke with patient, she states that she isn't for sure why we are receiving orders for these. She states that she has been speaking with a Medicare nurse about them. Informed her that if she needs us to sign these orders she needs an appointment as we have no documentation in Annia's last office note why she needs these. She was agreeable and stated that she would call the Medicare nurse that she has been speaking with before making the appointment.

## 2021-11-24 DIAGNOSIS — E11.65 UNCONTROLLED TYPE 2 DIABETES MELLITUS WITH HYPERGLYCEMIA (HCC): ICD-10-CM

## 2021-11-24 DIAGNOSIS — E03.9 ACQUIRED HYPOTHYROIDISM: ICD-10-CM

## 2021-11-24 DIAGNOSIS — E78.2 MIXED HYPERLIPIDEMIA: ICD-10-CM

## 2021-11-24 DIAGNOSIS — I10 ESSENTIAL HYPERTENSION: ICD-10-CM

## 2021-11-24 DIAGNOSIS — J45.20 MILD INTERMITTENT ASTHMA WITHOUT COMPLICATION: ICD-10-CM

## 2021-11-24 DIAGNOSIS — Z72.0 TOBACCO ABUSE: ICD-10-CM

## 2021-11-24 DIAGNOSIS — N18.30 STAGE 3 CHRONIC KIDNEY DISEASE, UNSPECIFIED WHETHER STAGE 3A OR 3B CKD (HCC): ICD-10-CM

## 2021-11-24 RX ORDER — AMLODIPINE BESYLATE 10 MG/1
10 TABLET ORAL DAILY
Qty: 30 TABLET | Refills: 0 | Status: SHIPPED | OUTPATIENT
Start: 2021-11-24 | End: 2021-12-08 | Stop reason: SDUPTHER

## 2021-11-24 RX ORDER — LOSARTAN POTASSIUM 25 MG/1
25 TABLET ORAL DAILY
Qty: 30 TABLET | Refills: 0 | Status: SHIPPED | OUTPATIENT
Start: 2021-11-24 | End: 2021-12-08 | Stop reason: SDUPTHER

## 2021-11-24 RX ORDER — BUPROPION HYDROCHLORIDE 150 MG/1
150 TABLET, EXTENDED RELEASE ORAL 2 TIMES DAILY
Qty: 60 TABLET | Refills: 0 | Status: SHIPPED | OUTPATIENT
Start: 2021-11-24 | End: 2022-03-09

## 2021-11-24 RX ORDER — ATORVASTATIN CALCIUM 40 MG/1
40 TABLET, FILM COATED ORAL DAILY
Qty: 30 TABLET | Refills: 0 | Status: SHIPPED | OUTPATIENT
Start: 2021-11-24 | End: 2021-12-08 | Stop reason: SDUPTHER

## 2021-11-24 RX ORDER — ALBUTEROL SULFATE 90 UG/1
2 AEROSOL, METERED RESPIRATORY (INHALATION) EVERY 6 HOURS PRN
Qty: 18 G | Refills: 0 | Status: SHIPPED | OUTPATIENT
Start: 2021-11-24 | End: 2022-02-07

## 2021-11-24 RX ORDER — LEVOTHYROXINE SODIUM 0.05 MG/1
50 TABLET ORAL DAILY
Qty: 30 TABLET | Refills: 0 | Status: SHIPPED | OUTPATIENT
Start: 2021-11-24 | End: 2022-03-04

## 2021-11-24 RX ORDER — CARVEDILOL 6.25 MG/1
6.25 TABLET ORAL 2 TIMES DAILY
Qty: 60 TABLET | Refills: 0 | Status: SHIPPED | OUTPATIENT
Start: 2021-11-24 | End: 2021-12-08 | Stop reason: SDUPTHER

## 2021-11-24 NOTE — TELEPHONE ENCOUNTER
Rx Refill Note  Requested Prescriptions     Pending Prescriptions Disp Refills   • carvedilol (COREG) 6.25 MG tablet 180 tablet 0     Sig: Take 1 tablet by mouth 2 (Two) Times a Day.   • losartan (COZAAR) 25 MG tablet 30 tablet 3     Sig: Take 1 tablet by mouth Daily.   • SITagliptin (JANUVIA) 50 MG tablet 30 tablet 3     Sig: Take 1 tablet by mouth Daily.   • levothyroxine (SYNTHROID, LEVOTHROID) 50 MCG tablet 30 tablet 3     Sig: Take 1 tablet by mouth Daily.   • atorvastatin (LIPITOR) 40 MG tablet 30 tablet 3     Sig: Take 1 tablet by mouth Daily.   • amLODIPine (NORVASC) 10 MG tablet 30 tablet 3     Sig: Take 1 tablet by mouth Daily.   • buPROPion (Zyban) 150 MG 12 hr tablet 60 tablet 3     Sig: Take 150 mg by mouth 2 (Two) Times a Day.   • albuterol sulfate  (90 Base) MCG/ACT inhaler 18 g 3     Sig: Inhale 2 puffs Every 6 (Six) Hours As Needed for Wheezing.   • dapagliflozin (FARXIGA) 5 MG tablet tablet 30 tablet 3     Sig: Take 1 tablet by mouth Daily.      Last office visit with prescribing clinician: 5/5/2021      Next office visit with prescribing clinician: Visit date not found     April KRISTI Avila MA  11/24/21, 10:52 EST     Patient hasn't been seen in over 6 months, notified her of this and scheduled an appointment on 12/08/2021. Scheduled out due to needing to get patient set up with a Lyft to get her to this appointment. Can we refill this medication till the appointment so she isn't out?

## 2021-12-08 ENCOUNTER — LAB (OUTPATIENT)
Dept: LAB | Facility: HOSPITAL | Age: 51
End: 2021-12-08

## 2021-12-08 ENCOUNTER — OFFICE VISIT (OUTPATIENT)
Dept: INTERNAL MEDICINE | Facility: CLINIC | Age: 51
End: 2021-12-08

## 2021-12-08 VITALS
OXYGEN SATURATION: 96 % | DIASTOLIC BLOOD PRESSURE: 84 MMHG | HEART RATE: 101 BPM | BODY MASS INDEX: 41.65 KG/M2 | WEIGHT: 250 LBS | HEIGHT: 65 IN | SYSTOLIC BLOOD PRESSURE: 130 MMHG | TEMPERATURE: 97.1 F | RESPIRATION RATE: 18 BRPM

## 2021-12-08 DIAGNOSIS — F33.41 RECURRENT MAJOR DEPRESSIVE DISORDER, IN PARTIAL REMISSION (HCC): ICD-10-CM

## 2021-12-08 DIAGNOSIS — E11.65 UNCONTROLLED TYPE 2 DIABETES MELLITUS WITH HYPERGLYCEMIA (HCC): ICD-10-CM

## 2021-12-08 DIAGNOSIS — E03.9 ACQUIRED HYPOTHYROIDISM: ICD-10-CM

## 2021-12-08 DIAGNOSIS — E66.01 MORBID OBESITY WITH BMI OF 40.0-44.9, ADULT (HCC): Chronic | ICD-10-CM

## 2021-12-08 DIAGNOSIS — E78.2 MIXED HYPERLIPIDEMIA: ICD-10-CM

## 2021-12-08 DIAGNOSIS — I10 ESSENTIAL HYPERTENSION: Primary | ICD-10-CM

## 2021-12-08 DIAGNOSIS — I10 ESSENTIAL HYPERTENSION: ICD-10-CM

## 2021-12-08 DIAGNOSIS — E66.01 MORBID OBESITY WITH BMI OF 40.0-44.9, ADULT (HCC): ICD-10-CM

## 2021-12-08 DIAGNOSIS — N18.30 STAGE 3 CHRONIC KIDNEY DISEASE, UNSPECIFIED WHETHER STAGE 3A OR 3B CKD (HCC): ICD-10-CM

## 2021-12-08 DIAGNOSIS — Z23 FLU VACCINE NEED: ICD-10-CM

## 2021-12-08 DIAGNOSIS — F33.0 MILD EPISODE OF RECURRENT MAJOR DEPRESSIVE DISORDER (HCC): ICD-10-CM

## 2021-12-08 LAB
BASOPHILS # BLD AUTO: 0.07 10*3/MM3 (ref 0–0.2)
BASOPHILS NFR BLD AUTO: 0.7 % (ref 0–1.5)
DEPRECATED RDW RBC AUTO: 41.6 FL (ref 37–54)
EOSINOPHIL # BLD AUTO: 0.24 10*3/MM3 (ref 0–0.4)
EOSINOPHIL NFR BLD AUTO: 2.3 % (ref 0.3–6.2)
ERYTHROCYTE [DISTWIDTH] IN BLOOD BY AUTOMATED COUNT: 14 % (ref 12.3–15.4)
HBA1C MFR BLD: 11.28 % (ref 4.8–5.6)
HCT VFR BLD AUTO: 45.3 % (ref 34–46.6)
HGB BLD-MCNC: 15.3 G/DL (ref 12–15.9)
IMM GRANULOCYTES # BLD AUTO: 0.06 10*3/MM3 (ref 0–0.05)
IMM GRANULOCYTES NFR BLD AUTO: 0.6 % (ref 0–0.5)
LYMPHOCYTES # BLD AUTO: 2.05 10*3/MM3 (ref 0.7–3.1)
LYMPHOCYTES NFR BLD AUTO: 19.4 % (ref 19.6–45.3)
MCH RBC QN AUTO: 28.2 PG (ref 26.6–33)
MCHC RBC AUTO-ENTMCNC: 33.8 G/DL (ref 31.5–35.7)
MCV RBC AUTO: 83.6 FL (ref 79–97)
MONOCYTES # BLD AUTO: 0.46 10*3/MM3 (ref 0.1–0.9)
MONOCYTES NFR BLD AUTO: 4.3 % (ref 5–12)
NEUTROPHILS NFR BLD AUTO: 7.71 10*3/MM3 (ref 1.7–7)
NEUTROPHILS NFR BLD AUTO: 72.7 % (ref 42.7–76)
NRBC BLD AUTO-RTO: 0 /100 WBC (ref 0–0.2)
PLATELET # BLD AUTO: 290 10*3/MM3 (ref 140–450)
PMV BLD AUTO: 12 FL (ref 6–12)
RBC # BLD AUTO: 5.42 10*6/MM3 (ref 3.77–5.28)
WBC NRBC COR # BLD: 10.59 10*3/MM3 (ref 3.4–10.8)

## 2021-12-08 PROCEDURE — 90686 IIV4 VACC NO PRSV 0.5 ML IM: CPT | Performed by: NURSE PRACTITIONER

## 2021-12-08 PROCEDURE — 90471 IMMUNIZATION ADMIN: CPT | Performed by: NURSE PRACTITIONER

## 2021-12-08 PROCEDURE — 80061 LIPID PANEL: CPT | Performed by: NURSE PRACTITIONER

## 2021-12-08 PROCEDURE — 99214 OFFICE O/P EST MOD 30 MIN: CPT | Performed by: NURSE PRACTITIONER

## 2021-12-08 PROCEDURE — 82570 ASSAY OF URINE CREATININE: CPT | Performed by: NURSE PRACTITIONER

## 2021-12-08 PROCEDURE — 83036 HEMOGLOBIN GLYCOSYLATED A1C: CPT | Performed by: NURSE PRACTITIONER

## 2021-12-08 PROCEDURE — 80050 GENERAL HEALTH PANEL: CPT | Performed by: NURSE PRACTITIONER

## 2021-12-08 PROCEDURE — 84439 ASSAY OF FREE THYROXINE: CPT | Performed by: NURSE PRACTITIONER

## 2021-12-08 PROCEDURE — 82043 UR ALBUMIN QUANTITATIVE: CPT | Performed by: NURSE PRACTITIONER

## 2021-12-08 RX ORDER — LOSARTAN POTASSIUM 25 MG/1
25 TABLET ORAL DAILY
Qty: 30 TABLET | Refills: 3 | Status: SHIPPED | OUTPATIENT
Start: 2021-12-08 | End: 2022-06-08 | Stop reason: SDUPTHER

## 2021-12-08 RX ORDER — FLUOXETINE HYDROCHLORIDE 20 MG/1
20 CAPSULE ORAL DAILY
Qty: 30 CAPSULE | Refills: 3 | Status: SHIPPED | OUTPATIENT
Start: 2021-12-08 | End: 2022-06-08 | Stop reason: SDUPTHER

## 2021-12-08 RX ORDER — ATORVASTATIN CALCIUM 40 MG/1
40 TABLET, FILM COATED ORAL DAILY
Qty: 30 TABLET | Refills: 3 | Status: SHIPPED | OUTPATIENT
Start: 2021-12-08 | End: 2022-06-08 | Stop reason: SDUPTHER

## 2021-12-08 RX ORDER — CARVEDILOL 6.25 MG/1
6.25 TABLET ORAL 2 TIMES DAILY
Qty: 60 TABLET | Refills: 3 | Status: SHIPPED | OUTPATIENT
Start: 2021-12-08 | End: 2022-06-08 | Stop reason: SDUPTHER

## 2021-12-08 RX ORDER — AMLODIPINE BESYLATE 10 MG/1
10 TABLET ORAL DAILY
Qty: 30 TABLET | Refills: 3 | Status: SHIPPED | OUTPATIENT
Start: 2021-12-08 | End: 2022-06-08 | Stop reason: SDUPTHER

## 2021-12-08 NOTE — PROGRESS NOTES
Freida Martinez presents to [facility] for     Chief Complaint  Chief Complaint   Patient presents with   • Diabetes   • Hypothyroidism   • Hypertension   • Hyperlipidemia         Subjective        History of Present Illness      DM-   she has not been checking her glucose  She has taken her medications intermittently.  She supposed to be on Januvia, farxiga.We are unable to use Metformin due to CKD.  Baseline creatinine 1.1-1.4.  Her last hemoglobin A1c was 10.1% on 3/23/2021.    Hyperlipidemia-chronic.  She is on statin therapy.  Reports she has been taking this routinely.  Diet is unhealthy.  Exercise is limited.    HTN-chronic.  Currently on amlodipine, carvedilol, and losartan.  Reports she is compliant with her medications and denies any adverse effects.  Denies headaches, dizziness, visual disturbances, palpitations chest pain, dyspnea, TIA or CVA symptoms, leg pain/claudication symptoms, and edema.    Hypothyroidism   Current symptoms: change in energy level . Patient denies diarrhea, heat / cold intolerance, nervousness, palpitations and weight changes. Symptoms have waxed and waned.    Depression -chronic.  Was on prozac in the past and was doing well so stopped. She is feeling more depressed.   No HI/SI    Seeing dermatology for psoriatic rash to bilateral hands.  She stopped using her medications from dermatology and rash is slightly worse.  Palms are flaking/peeling.           Health Maintenance:   Due for flu vaccination-we will update today    Review of Systems   Constitutional: Positive for fatigue. Negative for activity change, appetite change, chills, diaphoresis, fever, unexpected weight gain and unexpected weight loss.   HENT: Negative for voice change.    Eyes: Negative for blurred vision, double vision, pain and visual disturbance.   Respiratory: Negative for cough, chest tightness, shortness of breath and wheezing.    Cardiovascular: Negative for chest  pain, palpitations and leg swelling.   Gastrointestinal: Negative for abdominal distention, abdominal pain, constipation, diarrhea, nausea and vomiting.   Endocrine: Negative for cold intolerance, heat intolerance, polydipsia, polyphagia and polyuria.   Genitourinary: Negative for difficulty urinating, frequency and urgency.   Musculoskeletal: Positive for arthralgias. Negative for myalgias.   Skin: Positive for rash. Negative for color change and dry skin.   Neurological: Negative for dizziness, facial asymmetry, weakness, light-headedness, numbness, headache and confusion.   Hematological: Negative for adenopathy. Does not bruise/bleed easily.   Psychiatric/Behavioral: Positive for decreased concentration, dysphoric mood, sleep disturbance, depressed mood and stress. Negative for self-injury and suicidal ideas. The patient is nervous/anxious.          Allergies   Allergen Reactions   • Anaprox [Naproxen] Other (See Comments)     CHRONIC KIDNEY DISEASE   • Celebrex [Celecoxib] Other (See Comments)     CHRONIC KIDNEY DISEASE   • Flector [Diclofenac Epolamine] Other (See Comments)     CHRONIC KIDNEY DISEASE   • Motrin [Ibuprofen] Other (See Comments)     CHRONIC KIDNEY DISEASE   • Voltaren [Diclofenac Sodium] Other (See Comments)     CHRONIC KIDNEY DISEASE   • Lisinopril Swelling     Current Outpatient Medications on File Prior to Visit   Medication Sig Dispense Refill   • albuterol (PROVENTIL) (2.5 MG/3ML) 0.083% nebulizer solution      • albuterol sulfate  (90 Base) MCG/ACT inhaler Inhale 2 puffs Every 6 (Six) Hours As Needed for Wheezing. 18 g 0   • ammonium lactate (LAC-HYDRIN) 12 % lotion      • aspirin 81 MG EC tablet Take 1 tablet by mouth Daily.     • calcipotriene (DOVONOX) 0.005 % ointment      • diclofenac (VOLTAREN) 1 % gel gel Apply 4 g topically to the appropriate area as directed 4 (Four) Times a Day. Small amount to affected area 300 g 3   • glucose blood test strip Used to test blood sugar for  "Diabetes E11.9 twice a day. Pt has One Touch Verio Flex meter 100 each 12   • glucose monitor monitoring kit 1 each Daily. 1 each 0   • halobetasol (ULTRAVATE) 0.05 % ointment      • Lancets misc 1 Device Daily. 100 each 11   • levothyroxine (SYNTHROID, LEVOTHROID) 50 MCG tablet Take 1 tablet by mouth Daily. 30 tablet 0   • triamcinolone (KENALOG) 0.1 % cream      • buPROPion (Zyban) 150 MG 12 hr tablet Take 150 mg by mouth 2 (Two) Times a Day. 60 tablet 0   • Cosentyx Sensoready, 300 MG, 150 MG/ML solution auto-injector        No current facility-administered medications on file prior to visit.         The following portions of the patient's history were reviewed and updated as appropriate: allergies, current medications, past family history, past medical history, past social history, past surgical history and problem list and are available for review within electronic record    Objective     Result Review :                    Vital Signs:   /84   Pulse 101   Temp 97.1 °F (36.2 °C)   Resp 18   Ht 165.1 cm (65\")   Wt 113 kg (250 lb)   SpO2 96%   BMI 41.60 kg/m²         Physical Exam  Vitals and nursing note reviewed.   Constitutional:       General: She is not in acute distress.     Appearance: Normal appearance. She is well-developed and well-groomed. She is morbidly obese. She is not ill-appearing or diaphoretic.   HENT:      Head: Normocephalic and atraumatic.   Eyes:      Conjunctiva/sclera: Conjunctivae normal.      Pupils: Pupils are equal, round, and reactive to light.   Neck:      Vascular: No JVD.   Cardiovascular:      Rate and Rhythm: Normal rate and regular rhythm.      Pulses:           Dorsalis pedis pulses are 2+ on the right side and 2+ on the left side.        Posterior tibial pulses are 2+ on the right side and 2+ on the left side.      Heart sounds: Normal heart sounds. No murmur heard.      Pulmonary:      Effort: Pulmonary effort is normal. No respiratory distress.      Breath " sounds: Normal breath sounds.   Chest:      Chest wall: No tenderness.   Abdominal:      General: Bowel sounds are normal. There is no distension.      Palpations: Abdomen is soft.      Tenderness: There is no abdominal tenderness.   Musculoskeletal:         General: Normal range of motion.      Cervical back: Normal range of motion.      Right foot: Normal range of motion. No deformity.      Left foot: No deformity.   Feet:      Right foot:      Protective Sensation: 8 sites tested. 8 sites sensed.      Skin integrity: Callus and dry skin present. No ulcer, blister, skin breakdown, erythema, warmth or fissure.      Toenail Condition: Right toenails are abnormally thick. Fungal disease present.     Left foot:      Protective Sensation: 8 sites tested. 8 sites sensed.      Skin integrity: Callus and dry skin present. No ulcer, blister, skin breakdown, erythema, warmth or fissure.      Toenail Condition: Left toenails are abnormally thick. Fungal disease present.     Comments: Diabetic Foot Exam Performed and Monofilament Test Performed    Skin:     General: Skin is warm and dry.      Coloration: Skin is not pale.      Findings: Rash ( No erythema, has superficial peeling to bilateral palmar regions) present. No erythema.   Neurological:      Mental Status: She is alert and oriented to person, place, and time.      Coordination: Coordination normal.   Psychiatric:         Behavior: Behavior normal.         Thought Content: Thought content normal.         Judgment: Judgment normal.                 Assessment and Plan      Diagnoses and all orders for this visit:    1. Essential hypertension (Primary)  -     CBC & Differential; Future  -     Comprehensive Metabolic Panel; Future  -     TSH Rfx On Abnormal To Free T4; Future  -     Lipid Panel; Future  -     Hemoglobin A1c; Future  -     losartan (COZAAR) 25 MG tablet; Take 1 tablet by mouth Daily.  Dispense: 30 tablet; Refill: 3  -     carvedilol (COREG) 6.25 MG tablet;  Take 1 tablet by mouth 2 (Two) Times a Day.  Dispense: 60 tablet; Refill: 3  -     amLODIPine (NORVASC) 10 MG tablet; Take 1 tablet by mouth Daily.  Dispense: 30 tablet; Refill: 3  Well-controlled.  Continue losartan, carvedilol, and amlodipine.  2. Uncontrolled type 2 diabetes mellitus with hyperglycemia (HCC)  -     CBC & Differential; Future  -     Comprehensive Metabolic Panel; Future  -     TSH Rfx On Abnormal To Free T4; Future  -     Lipid Panel; Future  -     Hemoglobin A1c; Future  -     Microalbumin / Creatinine Urine Ratio - Urine, Clean Catch; Future  -     dapagliflozin (FARXIGA) 5 MG tablet tablet; Take 1 tablet by mouth Daily.  Dispense: 30 tablet; Refill: 3  -     SITagliptin (JANUVIA) 50 MG tablet; Take 1 tablet by mouth Daily.  Dispense: 30 tablet; Refill: 3  -     losartan (COZAAR) 25 MG tablet; Take 1 tablet by mouth Daily.  Dispense: 30 tablet; Refill: 3  Uncontrolled her last A1c.  Per her own self reports she has been noncompliant with her diabetic medications.  Discussed the importance of compliance.  We will get her back on her Januvia and farxiga.  We will check her A1c in May consider insulin if unable to get her A1c improved.  No Metformin due to CKD.  3. Stage 3 chronic kidney disease, unspecified whether stage 3a or 3b CKD (HCC)  -     CBC & Differential; Future  -     Comprehensive Metabolic Panel; Future  -     TSH Rfx On Abnormal To Free T4; Future  -     Lipid Panel; Future  -     Hemoglobin A1c; Future  -     losartan (COZAAR) 25 MG tablet; Take 1 tablet by mouth Daily.  Dispense: 30 tablet; Refill: 3  Recheck labs.  Avoid nephrotoxic agents  4. Acquired hypothyroidism  -     CBC & Differential; Future  -     Comprehensive Metabolic Panel; Future  -     TSH Rfx On Abnormal To Free T4; Future  -     Lipid Panel; Future  -     Hemoglobin A1c; Future  Symptoms stable.  Recheck labs.  Continue levothyroxine.  5. Mixed hyperlipidemia  -     CBC & Differential; Future  -      Comprehensive Metabolic Panel; Future  -     TSH Rfx On Abnormal To Free T4; Future  -     Lipid Panel; Future  -     Hemoglobin A1c; Future  -     atorvastatin (LIPITOR) 40 MG tablet; Take 1 tablet by mouth Daily.  Dispense: 30 tablet; Refill: 3  Continue atorvastatin.  Encourage diet and exercise for weight reduction.  6. Morbid obesity with BMI of 40.0-44.9, adult (HCC)  -     CBC & Differential; Future  -     Comprehensive Metabolic Panel; Future  -     TSH Rfx On Abnormal To Free T4; Future  -     Lipid Panel; Future  -     Hemoglobin A1c; Future  Patient's (Body mass index is 41.6 kg/m².) indicates that they are morbidly obese (BMI > 40 or > 35 with obesity - related health condition) with health related conditions that include hypertension, diabetes mellitus and dyslipidemias . Weight is worsening. BMI is is above average; BMI management plan is completed. We discussed low calorie, low carb based diet program, portion control and increasing exercise.     7.Mild episode of recurrent major depressive disorder (HCC)  -     CBC & Differential; Future  -     Comprehensive Metabolic Panel; Future  -     TSH Rfx On Abnormal To Free T4; Future  -     Lipid Panel; Future  -     FLUoxetine (PROzac) 20 MG capsule; Take 1 capsule by mouth Daily.  Dispense: 30 capsule; Refill: 3  -     Hemoglobin A1c; Future  Depression is recurrent, active, and without psychosis.  We will go ahead and get her restarted on her Prozac.  I suspect some of her noncompliance is due to her depression.  We will have her follow-up in about 4 to 6 weeks to reevaluate.  8.  Flu vaccine need   -     FluLaval/Fluarix/Fluzone >6 Months ([id number])        Follow Up {Instructions Charge Capture  Follow-up Communications :23}    Patient was given instructions and counseling regarding her condition or for health maintenance advice. Please see specific information pulled into the AVS if appropriate.   Any new medication's adverse effects have been  discussed in detail with patient  Patient was encouraged to keep me informed of any acute changes, lack of improvement, or any new concerning symptoms.    Return in about 6 weeks (around 1/19/2022).          Dictated Utilizing Dragon Dictation   Please note that portions of this note were completed with a voice recognition program.   Part of this note may be an electronic transcription/translation of spoken language to printed text using the Dragon Dictation System.

## 2021-12-09 LAB
ALBUMIN SERPL-MCNC: 3.9 G/DL (ref 3.5–5.2)
ALBUMIN UR-MCNC: 35.9 MG/DL
ALBUMIN/GLOB SERPL: 1 G/DL
ALP SERPL-CCNC: 104 U/L (ref 39–117)
ALT SERPL W P-5'-P-CCNC: 11 U/L (ref 1–33)
ANION GAP SERPL CALCULATED.3IONS-SCNC: 10.7 MMOL/L (ref 5–15)
AST SERPL-CCNC: 9 U/L (ref 1–32)
BILIRUB SERPL-MCNC: 0.4 MG/DL (ref 0–1.2)
BUN SERPL-MCNC: 16 MG/DL (ref 6–20)
BUN/CREAT SERPL: 11.6 (ref 7–25)
CALCIUM SPEC-SCNC: 10.1 MG/DL (ref 8.6–10.5)
CHLORIDE SERPL-SCNC: 105 MMOL/L (ref 98–107)
CHOLEST SERPL-MCNC: 108 MG/DL (ref 0–200)
CO2 SERPL-SCNC: 24.3 MMOL/L (ref 22–29)
CREAT SERPL-MCNC: 1.38 MG/DL (ref 0.57–1)
CREAT UR-MCNC: 70.6 MG/DL
GFR SERPL CREATININE-BSD FRML MDRD: 49 ML/MIN/1.73
GLOBULIN UR ELPH-MCNC: 3.8 GM/DL
GLUCOSE SERPL-MCNC: 235 MG/DL (ref 65–99)
HDLC SERPL-MCNC: 29 MG/DL (ref 40–60)
LDLC SERPL CALC-MCNC: 50 MG/DL (ref 0–100)
LDLC/HDLC SERPL: 1.52 {RATIO}
MICROALBUMIN/CREAT UR: 508.5 MG/G
POTASSIUM SERPL-SCNC: 4.3 MMOL/L (ref 3.5–5.2)
PROT SERPL-MCNC: 7.7 G/DL (ref 6–8.5)
SODIUM SERPL-SCNC: 140 MMOL/L (ref 136–145)
T4 FREE SERPL-MCNC: 1.28 NG/DL (ref 0.93–1.7)
TRIGL SERPL-MCNC: 175 MG/DL (ref 0–150)
TSH SERPL DL<=0.05 MIU/L-ACNC: 5.01 UIU/ML (ref 0.27–4.2)
VLDLC SERPL-MCNC: 29 MG/DL (ref 5–40)

## 2022-01-30 ENCOUNTER — HOSPITAL ENCOUNTER (INPATIENT)
Facility: HOSPITAL | Age: 52
LOS: 2 days | Discharge: HOME | End: 2022-02-01
Attending: EMERGENCY MEDICINE | Admitting: FAMILY MEDICINE

## 2022-01-30 ENCOUNTER — APPOINTMENT (OUTPATIENT)
Dept: CT IMAGING | Facility: HOSPITAL | Age: 52
End: 2022-01-30

## 2022-01-30 DIAGNOSIS — N28.9 ACUTE ON CHRONIC RENAL INSUFFICIENCY: ICD-10-CM

## 2022-01-30 DIAGNOSIS — E11.69 DIABETES MELLITUS TYPE 2 IN OBESE: ICD-10-CM

## 2022-01-30 DIAGNOSIS — N18.9 ACUTE ON CHRONIC RENAL INSUFFICIENCY: ICD-10-CM

## 2022-01-30 DIAGNOSIS — E66.9 DIABETES MELLITUS TYPE 2 IN OBESE: ICD-10-CM

## 2022-01-30 DIAGNOSIS — U07.1 COVID-19: ICD-10-CM

## 2022-01-30 DIAGNOSIS — I26.99 ACUTE PULMONARY EMBOLISM WITHOUT ACUTE COR PULMONALE, UNSPECIFIED PULMONARY EMBOLISM TYPE: Primary | ICD-10-CM

## 2022-01-30 DIAGNOSIS — I10 ELEVATED BLOOD PRESSURE READING WITH DIAGNOSIS OF HYPERTENSION: ICD-10-CM

## 2022-01-30 LAB
ALBUMIN SERPL-MCNC: 3.2 G/DL (ref 3.5–5.2)
ALBUMIN/GLOB SERPL: 0.7 G/DL
ALP SERPL-CCNC: 79 U/L (ref 39–117)
ALT SERPL W P-5'-P-CCNC: 15 U/L (ref 1–33)
ANION GAP SERPL CALCULATED.3IONS-SCNC: 12 MMOL/L (ref 5–15)
APTT PPP: 32.1 SECONDS (ref 50–95)
AST SERPL-CCNC: 16 U/L (ref 1–32)
BASOPHILS # BLD AUTO: 0.12 10*3/MM3 (ref 0–0.2)
BASOPHILS NFR BLD AUTO: 1 % (ref 0–1.5)
BILIRUB SERPL-MCNC: 0.6 MG/DL (ref 0–1.2)
BUN SERPL-MCNC: 29 MG/DL (ref 6–20)
BUN/CREAT SERPL: 15.9 (ref 7–25)
CALCIUM SPEC-SCNC: 9.7 MG/DL (ref 8.6–10.5)
CHLORIDE SERPL-SCNC: 101 MMOL/L (ref 98–107)
CO2 SERPL-SCNC: 24 MMOL/L (ref 22–29)
CREAT SERPL-MCNC: 1.82 MG/DL (ref 0.57–1)
CRP SERPL-MCNC: 3.59 MG/DL (ref 0–0.5)
D DIMER PPP FEU-MCNC: 11.71 MCGFEU/ML (ref 0–0.56)
D-LACTATE SERPL-SCNC: 1.3 MMOL/L (ref 0.5–2)
D-LACTATE SERPL-SCNC: 2.6 MMOL/L (ref 0.5–2)
DEPRECATED RDW RBC AUTO: 41.4 FL (ref 37–54)
EOSINOPHIL # BLD AUTO: 0.5 10*3/MM3 (ref 0–0.4)
EOSINOPHIL NFR BLD AUTO: 4.2 % (ref 0.3–6.2)
ERYTHROCYTE [DISTWIDTH] IN BLOOD BY AUTOMATED COUNT: 13.2 % (ref 12.3–15.4)
FERRITIN SERPL-MCNC: 540.4 NG/ML (ref 13–150)
FLUAV RNA RESP QL NAA+PROBE: NOT DETECTED
FLUBV RNA RESP QL NAA+PROBE: NOT DETECTED
GFR SERPL CREATININE-BSD FRML MDRD: 35 ML/MIN/1.73
GLOBULIN UR ELPH-MCNC: 4.8 GM/DL
GLUCOSE BLDC GLUCOMTR-MCNC: 177 MG/DL (ref 70–130)
GLUCOSE BLDC GLUCOMTR-MCNC: 199 MG/DL (ref 70–130)
GLUCOSE BLDC GLUCOMTR-MCNC: 323 MG/DL (ref 70–130)
GLUCOSE SERPL-MCNC: 266 MG/DL (ref 65–99)
HCT VFR BLD AUTO: 46 % (ref 34–46.6)
HGB BLD-MCNC: 14.6 G/DL (ref 12–15.9)
IMM GRANULOCYTES # BLD AUTO: 0.29 10*3/MM3 (ref 0–0.05)
IMM GRANULOCYTES NFR BLD AUTO: 2.5 % (ref 0–0.5)
INR PPP: 0.99 (ref 0.85–1.16)
LDH SERPL-CCNC: 284 U/L (ref 135–214)
LIPASE SERPL-CCNC: 31 U/L (ref 13–60)
LYMPHOCYTES # BLD AUTO: 1.8 10*3/MM3 (ref 0.7–3.1)
LYMPHOCYTES NFR BLD AUTO: 15.2 % (ref 19.6–45.3)
MAGNESIUM SERPL-MCNC: 2.2 MG/DL (ref 1.6–2.6)
MCH RBC QN AUTO: 27.4 PG (ref 26.6–33)
MCHC RBC AUTO-ENTMCNC: 31.7 G/DL (ref 31.5–35.7)
MCV RBC AUTO: 86.5 FL (ref 79–97)
MONOCYTES # BLD AUTO: 0.75 10*3/MM3 (ref 0.1–0.9)
MONOCYTES NFR BLD AUTO: 6.3 % (ref 5–12)
NEUTROPHILS NFR BLD AUTO: 70.8 % (ref 42.7–76)
NEUTROPHILS NFR BLD AUTO: 8.37 10*3/MM3 (ref 1.7–7)
NRBC BLD AUTO-RTO: 0 /100 WBC (ref 0–0.2)
NT-PROBNP SERPL-MCNC: 429.2 PG/ML (ref 0–900)
PHOSPHATE SERPL-MCNC: 1.9 MG/DL (ref 2.5–4.5)
PLATELET # BLD AUTO: 501 10*3/MM3 (ref 140–450)
PMV BLD AUTO: 10.2 FL (ref 6–12)
POTASSIUM SERPL-SCNC: 4.5 MMOL/L (ref 3.5–5.2)
PROCALCITONIN SERPL-MCNC: 0.1 NG/ML (ref 0–0.25)
PROT SERPL-MCNC: 8 G/DL (ref 6–8.5)
PROTHROMBIN TIME: 12.8 SECONDS (ref 11.4–14.4)
RBC # BLD AUTO: 5.32 10*6/MM3 (ref 3.77–5.28)
SARS-COV-2 RNA RESP QL NAA+PROBE: DETECTED
SODIUM SERPL-SCNC: 137 MMOL/L (ref 136–145)
TROPONIN T SERPL-MCNC: <0.01 NG/ML (ref 0–0.03)
TSH SERPL DL<=0.05 MIU/L-ACNC: 6.9 UIU/ML (ref 0.27–4.2)
UFH PPP CHRO-ACNC: 0.1 IU/ML (ref 0.3–0.7)
WBC NRBC COR # BLD: 11.83 10*3/MM3 (ref 3.4–10.8)

## 2022-01-30 PROCEDURE — 84484 ASSAY OF TROPONIN QUANT: CPT | Performed by: EMERGENCY MEDICINE

## 2022-01-30 PROCEDURE — 93010 ELECTROCARDIOGRAM REPORT: CPT | Performed by: INTERNAL MEDICINE

## 2022-01-30 PROCEDURE — 63710000001 DEXAMETHASONE PER 0.25 MG: Performed by: INTERNAL MEDICINE

## 2022-01-30 PROCEDURE — 86140 C-REACTIVE PROTEIN: CPT | Performed by: EMERGENCY MEDICINE

## 2022-01-30 PROCEDURE — 83615 LACTATE (LD) (LDH) ENZYME: CPT | Performed by: EMERGENCY MEDICINE

## 2022-01-30 PROCEDURE — 82728 ASSAY OF FERRITIN: CPT | Performed by: EMERGENCY MEDICINE

## 2022-01-30 PROCEDURE — 85610 PROTHROMBIN TIME: CPT | Performed by: EMERGENCY MEDICINE

## 2022-01-30 PROCEDURE — 83880 ASSAY OF NATRIURETIC PEPTIDE: CPT | Performed by: EMERGENCY MEDICINE

## 2022-01-30 PROCEDURE — 80050 GENERAL HEALTH PANEL: CPT | Performed by: EMERGENCY MEDICINE

## 2022-01-30 PROCEDURE — 83605 ASSAY OF LACTIC ACID: CPT | Performed by: EMERGENCY MEDICINE

## 2022-01-30 PROCEDURE — 25010000002 HEPARIN (PORCINE) PER 1000 UNITS: Performed by: EMERGENCY MEDICINE

## 2022-01-30 PROCEDURE — 99223 1ST HOSP IP/OBS HIGH 75: CPT | Performed by: INTERNAL MEDICINE

## 2022-01-30 PROCEDURE — 83735 ASSAY OF MAGNESIUM: CPT | Performed by: EMERGENCY MEDICINE

## 2022-01-30 PROCEDURE — 93005 ELECTROCARDIOGRAM TRACING: CPT

## 2022-01-30 PROCEDURE — 84100 ASSAY OF PHOSPHORUS: CPT | Performed by: EMERGENCY MEDICINE

## 2022-01-30 PROCEDURE — 83690 ASSAY OF LIPASE: CPT | Performed by: EMERGENCY MEDICINE

## 2022-01-30 PROCEDURE — 84145 PROCALCITONIN (PCT): CPT | Performed by: EMERGENCY MEDICINE

## 2022-01-30 PROCEDURE — 93005 ELECTROCARDIOGRAM TRACING: CPT | Performed by: EMERGENCY MEDICINE

## 2022-01-30 PROCEDURE — 85379 FIBRIN DEGRADATION QUANT: CPT | Performed by: EMERGENCY MEDICINE

## 2022-01-30 PROCEDURE — 71275 CT ANGIOGRAPHY CHEST: CPT

## 2022-01-30 PROCEDURE — 63710000001 INSULIN LISPRO (HUMAN) PER 5 UNITS: Performed by: INTERNAL MEDICINE

## 2022-01-30 PROCEDURE — 85730 THROMBOPLASTIN TIME PARTIAL: CPT | Performed by: EMERGENCY MEDICINE

## 2022-01-30 PROCEDURE — 63710000001 INSULIN DETEMIR PER 5 UNITS: Performed by: INTERNAL MEDICINE

## 2022-01-30 PROCEDURE — 99284 EMERGENCY DEPT VISIT MOD MDM: CPT

## 2022-01-30 PROCEDURE — 82962 GLUCOSE BLOOD TEST: CPT

## 2022-01-30 PROCEDURE — 85520 HEPARIN ASSAY: CPT | Performed by: EMERGENCY MEDICINE

## 2022-01-30 PROCEDURE — 0 IOPAMIDOL PER 1 ML: Performed by: EMERGENCY MEDICINE

## 2022-01-30 PROCEDURE — 87636 SARSCOV2 & INF A&B AMP PRB: CPT | Performed by: EMERGENCY MEDICINE

## 2022-01-30 RX ORDER — NICOTINE POLACRILEX 4 MG
15 LOZENGE BUCCAL
Status: DISCONTINUED | OUTPATIENT
Start: 2022-01-30 | End: 2022-02-01 | Stop reason: HOSPADM

## 2022-01-30 RX ORDER — LOSARTAN POTASSIUM 50 MG/1
25 TABLET ORAL DAILY
Status: DISCONTINUED | OUTPATIENT
Start: 2022-01-31 | End: 2022-02-01 | Stop reason: HOSPADM

## 2022-01-30 RX ORDER — ATORVASTATIN CALCIUM 40 MG/1
40 TABLET, FILM COATED ORAL DAILY
Status: DISCONTINUED | OUTPATIENT
Start: 2022-01-31 | End: 2022-02-01 | Stop reason: HOSPADM

## 2022-01-30 RX ORDER — FLUOXETINE HYDROCHLORIDE 20 MG/1
20 CAPSULE ORAL DAILY
Status: DISCONTINUED | OUTPATIENT
Start: 2022-01-30 | End: 2022-02-01 | Stop reason: HOSPADM

## 2022-01-30 RX ORDER — LEVOTHYROXINE SODIUM 0.05 MG/1
50 TABLET ORAL
Status: DISCONTINUED | OUTPATIENT
Start: 2022-01-31 | End: 2022-02-01 | Stop reason: HOSPADM

## 2022-01-30 RX ORDER — ACETAMINOPHEN 160 MG/5ML
650 SOLUTION ORAL EVERY 4 HOURS PRN
Status: DISCONTINUED | OUTPATIENT
Start: 2022-01-30 | End: 2022-02-01 | Stop reason: HOSPADM

## 2022-01-30 RX ORDER — AMMONIUM LACTATE 12 G/100G
LOTION TOPICAL 2 TIMES DAILY PRN
Status: DISCONTINUED | OUTPATIENT
Start: 2022-01-30 | End: 2022-02-01 | Stop reason: HOSPADM

## 2022-01-30 RX ORDER — MAGNESIUM SULFATE HEPTAHYDRATE 40 MG/ML
4 INJECTION, SOLUTION INTRAVENOUS AS NEEDED
Status: DISCONTINUED | OUTPATIENT
Start: 2022-01-30 | End: 2022-02-01 | Stop reason: HOSPADM

## 2022-01-30 RX ORDER — HEPARIN SODIUM 1000 [USP'U]/ML
10000 INJECTION, SOLUTION INTRAVENOUS; SUBCUTANEOUS ONCE
Status: COMPLETED | OUTPATIENT
Start: 2022-01-30 | End: 2022-01-30

## 2022-01-30 RX ORDER — CLOBETASOL PROPIONATE 0.5 MG/G
CREAM TOPICAL EVERY 12 HOURS SCHEDULED
Status: DISCONTINUED | OUTPATIENT
Start: 2022-01-30 | End: 2022-02-01 | Stop reason: HOSPADM

## 2022-01-30 RX ORDER — POTASSIUM CHLORIDE 750 MG/1
40 CAPSULE, EXTENDED RELEASE ORAL AS NEEDED
Status: DISCONTINUED | OUTPATIENT
Start: 2022-01-30 | End: 2022-02-01 | Stop reason: HOSPADM

## 2022-01-30 RX ORDER — DEXAMETHASONE SODIUM PHOSPHATE 4 MG/ML
6 INJECTION, SOLUTION INTRA-ARTICULAR; INTRALESIONAL; INTRAMUSCULAR; INTRAVENOUS; SOFT TISSUE DAILY
Status: DISCONTINUED | OUTPATIENT
Start: 2022-01-30 | End: 2022-02-01 | Stop reason: HOSPADM

## 2022-01-30 RX ORDER — ONDANSETRON 4 MG/1
4 TABLET, FILM COATED ORAL EVERY 6 HOURS PRN
Status: DISCONTINUED | OUTPATIENT
Start: 2022-01-30 | End: 2022-02-01 | Stop reason: HOSPADM

## 2022-01-30 RX ORDER — AMLODIPINE BESYLATE 10 MG/1
10 TABLET ORAL DAILY
Status: DISCONTINUED | OUTPATIENT
Start: 2022-01-31 | End: 2022-02-01 | Stop reason: HOSPADM

## 2022-01-30 RX ORDER — DEXTROSE MONOHYDRATE 25 G/50ML
25 INJECTION, SOLUTION INTRAVENOUS
Status: DISCONTINUED | OUTPATIENT
Start: 2022-01-30 | End: 2022-02-01 | Stop reason: HOSPADM

## 2022-01-30 RX ORDER — HEPARIN SOD,PORCINE/0.9 % NACL 25000/250
11 INTRAVENOUS SOLUTION INTRAVENOUS
Status: DISCONTINUED | OUTPATIENT
Start: 2022-01-30 | End: 2022-01-30

## 2022-01-30 RX ORDER — POTASSIUM CHLORIDE 7.45 MG/ML
10 INJECTION INTRAVENOUS
Status: DISCONTINUED | OUTPATIENT
Start: 2022-01-30 | End: 2022-02-01 | Stop reason: HOSPADM

## 2022-01-30 RX ORDER — HEPARIN SODIUM 1000 [USP'U]/ML
10000 INJECTION, SOLUTION INTRAVENOUS; SUBCUTANEOUS AS NEEDED
Status: DISCONTINUED | OUTPATIENT
Start: 2022-01-30 | End: 2022-01-30 | Stop reason: ALTCHOICE

## 2022-01-30 RX ORDER — ONDANSETRON 2 MG/ML
4 INJECTION INTRAMUSCULAR; INTRAVENOUS EVERY 6 HOURS PRN
Status: DISCONTINUED | OUTPATIENT
Start: 2022-01-30 | End: 2022-02-01 | Stop reason: HOSPADM

## 2022-01-30 RX ORDER — SODIUM CHLORIDE 0.9 % (FLUSH) 0.9 %
10 SYRINGE (ML) INJECTION EVERY 12 HOURS SCHEDULED
Status: DISCONTINUED | OUTPATIENT
Start: 2022-01-30 | End: 2022-02-01 | Stop reason: HOSPADM

## 2022-01-30 RX ORDER — CALCIPOTRIENE 50 UG/G
CREAM TOPICAL
Status: DISCONTINUED | OUTPATIENT
Start: 2022-01-30 | End: 2022-02-01 | Stop reason: HOSPADM

## 2022-01-30 RX ORDER — SODIUM CHLORIDE 0.9 % (FLUSH) 0.9 %
10 SYRINGE (ML) INJECTION AS NEEDED
Status: DISCONTINUED | OUTPATIENT
Start: 2022-01-30 | End: 2022-02-01 | Stop reason: HOSPADM

## 2022-01-30 RX ORDER — POTASSIUM CHLORIDE 1.5 G/1.77G
40 POWDER, FOR SOLUTION ORAL AS NEEDED
Status: DISCONTINUED | OUTPATIENT
Start: 2022-01-30 | End: 2022-02-01 | Stop reason: HOSPADM

## 2022-01-30 RX ORDER — ALBUTEROL SULFATE 90 UG/1
2 AEROSOL, METERED RESPIRATORY (INHALATION) EVERY 4 HOURS PRN
Status: DISCONTINUED | OUTPATIENT
Start: 2022-01-30 | End: 2022-02-01 | Stop reason: HOSPADM

## 2022-01-30 RX ORDER — ASPIRIN 81 MG/1
81 TABLET ORAL DAILY
Status: DISCONTINUED | OUTPATIENT
Start: 2022-01-31 | End: 2022-02-01 | Stop reason: HOSPADM

## 2022-01-30 RX ORDER — HEPARIN SODIUM 1000 [USP'U]/ML
40 INJECTION, SOLUTION INTRAVENOUS; SUBCUTANEOUS AS NEEDED
Status: DISCONTINUED | OUTPATIENT
Start: 2022-01-30 | End: 2022-01-30 | Stop reason: ALTCHOICE

## 2022-01-30 RX ORDER — MAGNESIUM SULFATE HEPTAHYDRATE 40 MG/ML
2 INJECTION, SOLUTION INTRAVENOUS AS NEEDED
Status: DISCONTINUED | OUTPATIENT
Start: 2022-01-30 | End: 2022-02-01 | Stop reason: HOSPADM

## 2022-01-30 RX ORDER — CARVEDILOL 6.25 MG/1
6.25 TABLET ORAL 2 TIMES DAILY
Status: DISCONTINUED | OUTPATIENT
Start: 2022-01-30 | End: 2022-02-01 | Stop reason: HOSPADM

## 2022-01-30 RX ORDER — ACETAMINOPHEN 325 MG/1
650 TABLET ORAL EVERY 4 HOURS PRN
Status: DISCONTINUED | OUTPATIENT
Start: 2022-01-30 | End: 2022-02-01 | Stop reason: HOSPADM

## 2022-01-30 RX ORDER — ACETAMINOPHEN 650 MG/1
650 SUPPOSITORY RECTAL EVERY 4 HOURS PRN
Status: DISCONTINUED | OUTPATIENT
Start: 2022-01-30 | End: 2022-02-01 | Stop reason: HOSPADM

## 2022-01-30 RX ADMIN — FLUOXETINE 20 MG: 20 CAPSULE ORAL at 13:16

## 2022-01-30 RX ADMIN — CARVEDILOL 6.25 MG: 6.25 TABLET, FILM COATED ORAL at 21:48

## 2022-01-30 RX ADMIN — ACETAMINOPHEN 650 MG: 325 TABLET, FILM COATED ORAL at 17:23

## 2022-01-30 RX ADMIN — SODIUM PHOSPHATE, MONOBASIC, MONOHYDRATE AND SODIUM PHOSPHATE, DIBASIC, ANHYDROUS 15 MMOL: 276; 142 INJECTION, SOLUTION INTRAVENOUS at 17:14

## 2022-01-30 RX ADMIN — HEPARIN SODIUM 11 UNITS/KG/HR: 5000 INJECTION INTRAVENOUS; SUBCUTANEOUS at 09:08

## 2022-01-30 RX ADMIN — CALCIPOTRIENE: 50 CREAM TOPICAL at 13:16

## 2022-01-30 RX ADMIN — CARVEDILOL 6.25 MG: 6.25 TABLET, FILM COATED ORAL at 13:16

## 2022-01-30 RX ADMIN — SODIUM CHLORIDE, POTASSIUM CHLORIDE, SODIUM LACTATE AND CALCIUM CHLORIDE 1000 ML: 600; 310; 30; 20 INJECTION, SOLUTION INTRAVENOUS at 08:04

## 2022-01-30 RX ADMIN — APIXABAN 10 MG: 5 TABLET, FILM COATED ORAL at 21:48

## 2022-01-30 RX ADMIN — SODIUM CHLORIDE, PRESERVATIVE FREE 10 ML: 5 INJECTION INTRAVENOUS at 10:14

## 2022-01-30 RX ADMIN — INSULIN DETEMIR 10 UNITS: 100 INJECTION, SOLUTION SUBCUTANEOUS at 10:31

## 2022-01-30 RX ADMIN — SODIUM CHLORIDE, POTASSIUM CHLORIDE, SODIUM LACTATE AND CALCIUM CHLORIDE 1000 ML: 600; 310; 30; 20 INJECTION, SOLUTION INTRAVENOUS at 06:23

## 2022-01-30 RX ADMIN — APIXABAN 10 MG: 5 TABLET, FILM COATED ORAL at 10:13

## 2022-01-30 RX ADMIN — INSULIN LISPRO 2 UNITS: 100 INJECTION, SOLUTION INTRAVENOUS; SUBCUTANEOUS at 10:13

## 2022-01-30 RX ADMIN — IOPAMIDOL 82 ML: 755 INJECTION, SOLUTION INTRAVENOUS at 08:17

## 2022-01-30 RX ADMIN — HEPARIN SODIUM 10000 UNITS: 1000 INJECTION, SOLUTION INTRAVENOUS; SUBCUTANEOUS at 09:06

## 2022-01-30 RX ADMIN — DEXAMETHASONE 6 MG: 2 TABLET ORAL at 10:13

## 2022-01-30 RX ADMIN — SODIUM CHLORIDE, PRESERVATIVE FREE 10 ML: 5 INJECTION INTRAVENOUS at 21:48

## 2022-01-30 RX ADMIN — CLOBETASOL PROPIONATE: 0.5 CREAM TOPICAL at 13:15

## 2022-01-30 RX ADMIN — INSULIN LISPRO 5 UNITS: 100 INJECTION, SOLUTION INTRAVENOUS; SUBCUTANEOUS at 17:14

## 2022-01-30 RX ADMIN — CLOBETASOL PROPIONATE: 0.5 CREAM TOPICAL at 21:49

## 2022-01-31 LAB
ALBUMIN SERPL-MCNC: 3.1 G/DL (ref 3.5–5.2)
ALBUMIN/GLOB SERPL: 0.9 G/DL
ALP SERPL-CCNC: 70 U/L (ref 39–117)
ALT SERPL W P-5'-P-CCNC: 17 U/L (ref 1–33)
ANION GAP SERPL CALCULATED.3IONS-SCNC: 11 MMOL/L (ref 5–15)
AST SERPL-CCNC: 22 U/L (ref 1–32)
BASOPHILS # BLD AUTO: 0.07 10*3/MM3 (ref 0–0.2)
BASOPHILS NFR BLD AUTO: 0.5 % (ref 0–1.5)
BILIRUB SERPL-MCNC: 0.5 MG/DL (ref 0–1.2)
BUN SERPL-MCNC: 23 MG/DL (ref 6–20)
BUN/CREAT SERPL: 13 (ref 7–25)
CALCIUM SPEC-SCNC: 8.9 MG/DL (ref 8.6–10.5)
CHLORIDE SERPL-SCNC: 101 MMOL/L (ref 98–107)
CO2 SERPL-SCNC: 25 MMOL/L (ref 22–29)
CREAT SERPL-MCNC: 1.77 MG/DL (ref 0.57–1)
CRP SERPL-MCNC: 2.03 MG/DL (ref 0–0.5)
DEPRECATED RDW RBC AUTO: 40.6 FL (ref 37–54)
EOSINOPHIL # BLD AUTO: 0.12 10*3/MM3 (ref 0–0.4)
EOSINOPHIL NFR BLD AUTO: 0.9 % (ref 0.3–6.2)
ERYTHROCYTE [DISTWIDTH] IN BLOOD BY AUTOMATED COUNT: 13.2 % (ref 12.3–15.4)
FERRITIN SERPL-MCNC: 482.9 NG/ML (ref 13–150)
GFR SERPL CREATININE-BSD FRML MDRD: 37 ML/MIN/1.73
GLOBULIN UR ELPH-MCNC: 3.3 GM/DL
GLUCOSE BLDC GLUCOMTR-MCNC: 197 MG/DL (ref 70–130)
GLUCOSE BLDC GLUCOMTR-MCNC: 283 MG/DL (ref 70–130)
GLUCOSE BLDC GLUCOMTR-MCNC: 334 MG/DL (ref 70–130)
GLUCOSE BLDC GLUCOMTR-MCNC: 366 MG/DL (ref 70–130)
GLUCOSE SERPL-MCNC: 281 MG/DL (ref 65–99)
HCT VFR BLD AUTO: 37.2 % (ref 34–46.6)
HGB BLD-MCNC: 12.1 G/DL (ref 12–15.9)
IMM GRANULOCYTES # BLD AUTO: 0.34 10*3/MM3 (ref 0–0.05)
IMM GRANULOCYTES NFR BLD AUTO: 2.4 % (ref 0–0.5)
LYMPHOCYTES # BLD AUTO: 1.46 10*3/MM3 (ref 0.7–3.1)
LYMPHOCYTES NFR BLD AUTO: 10.5 % (ref 19.6–45.3)
MCH RBC QN AUTO: 27.4 PG (ref 26.6–33)
MCHC RBC AUTO-ENTMCNC: 32.5 G/DL (ref 31.5–35.7)
MCV RBC AUTO: 84.2 FL (ref 79–97)
MONOCYTES # BLD AUTO: 0.57 10*3/MM3 (ref 0.1–0.9)
MONOCYTES NFR BLD AUTO: 4.1 % (ref 5–12)
NEUTROPHILS NFR BLD AUTO: 11.38 10*3/MM3 (ref 1.7–7)
NEUTROPHILS NFR BLD AUTO: 81.6 % (ref 42.7–76)
NRBC BLD AUTO-RTO: 0 /100 WBC (ref 0–0.2)
PHOSPHATE SERPL-MCNC: 2.7 MG/DL (ref 2.5–4.5)
PLATELET # BLD AUTO: 427 10*3/MM3 (ref 140–450)
PMV BLD AUTO: 10 FL (ref 6–12)
POTASSIUM SERPL-SCNC: 4.6 MMOL/L (ref 3.5–5.2)
PROT SERPL-MCNC: 6.4 G/DL (ref 6–8.5)
QT INTERVAL: 320 MS
QTC INTERVAL: 448 MS
RBC # BLD AUTO: 4.42 10*6/MM3 (ref 3.77–5.28)
SODIUM SERPL-SCNC: 137 MMOL/L (ref 136–145)
WBC NRBC COR # BLD: 13.94 10*3/MM3 (ref 3.4–10.8)

## 2022-01-31 PROCEDURE — 63710000001 INSULIN LISPRO (HUMAN) PER 5 UNITS: Performed by: INTERNAL MEDICINE

## 2022-01-31 PROCEDURE — 99233 SBSQ HOSP IP/OBS HIGH 50: CPT | Performed by: FAMILY MEDICINE

## 2022-01-31 PROCEDURE — 63710000001 INSULIN DETEMIR PER 5 UNITS: Performed by: INTERNAL MEDICINE

## 2022-01-31 PROCEDURE — 97165 OT EVAL LOW COMPLEX 30 MIN: CPT

## 2022-01-31 PROCEDURE — 82728 ASSAY OF FERRITIN: CPT | Performed by: INTERNAL MEDICINE

## 2022-01-31 PROCEDURE — 63710000001 DEXAMETHASONE PER 0.25 MG: Performed by: INTERNAL MEDICINE

## 2022-01-31 PROCEDURE — 82962 GLUCOSE BLOOD TEST: CPT

## 2022-01-31 PROCEDURE — 84100 ASSAY OF PHOSPHORUS: CPT | Performed by: INTERNAL MEDICINE

## 2022-01-31 PROCEDURE — 80053 COMPREHEN METABOLIC PANEL: CPT | Performed by: INTERNAL MEDICINE

## 2022-01-31 PROCEDURE — 86140 C-REACTIVE PROTEIN: CPT | Performed by: INTERNAL MEDICINE

## 2022-01-31 PROCEDURE — 85025 COMPLETE CBC W/AUTO DIFF WBC: CPT | Performed by: INTERNAL MEDICINE

## 2022-01-31 PROCEDURE — 97110 THERAPEUTIC EXERCISES: CPT

## 2022-01-31 RX ADMIN — CARVEDILOL 6.25 MG: 6.25 TABLET, FILM COATED ORAL at 08:35

## 2022-01-31 RX ADMIN — AMLODIPINE BESYLATE 10 MG: 10 TABLET ORAL at 08:35

## 2022-01-31 RX ADMIN — CALCIPOTRIENE: 50 CREAM TOPICAL at 08:36

## 2022-01-31 RX ADMIN — SODIUM CHLORIDE, PRESERVATIVE FREE 10 ML: 5 INJECTION INTRAVENOUS at 08:37

## 2022-01-31 RX ADMIN — ATORVASTATIN CALCIUM 40 MG: 40 TABLET, FILM COATED ORAL at 08:36

## 2022-01-31 RX ADMIN — FLUOXETINE 20 MG: 20 CAPSULE ORAL at 08:35

## 2022-01-31 RX ADMIN — INSULIN DETEMIR 10 UNITS: 100 INJECTION, SOLUTION SUBCUTANEOUS at 09:35

## 2022-01-31 RX ADMIN — CARVEDILOL 6.25 MG: 6.25 TABLET, FILM COATED ORAL at 21:43

## 2022-01-31 RX ADMIN — INSULIN LISPRO 4 UNITS: 100 INJECTION, SOLUTION INTRAVENOUS; SUBCUTANEOUS at 16:54

## 2022-01-31 RX ADMIN — DEXAMETHASONE 6 MG: 2 TABLET ORAL at 08:36

## 2022-01-31 RX ADMIN — APIXABAN 10 MG: 5 TABLET, FILM COATED ORAL at 21:43

## 2022-01-31 RX ADMIN — CLOBETASOL PROPIONATE: 0.5 CREAM TOPICAL at 08:36

## 2022-01-31 RX ADMIN — INSULIN LISPRO 2 UNITS: 100 INJECTION, SOLUTION INTRAVENOUS; SUBCUTANEOUS at 08:36

## 2022-01-31 RX ADMIN — APIXABAN 10 MG: 5 TABLET, FILM COATED ORAL at 08:35

## 2022-01-31 RX ADMIN — LEVOTHYROXINE SODIUM 50 MCG: 50 TABLET ORAL at 05:10

## 2022-01-31 RX ADMIN — LOSARTAN POTASSIUM 25 MG: 50 TABLET, FILM COATED ORAL at 08:35

## 2022-01-31 RX ADMIN — INSULIN LISPRO 4 UNITS: 100 INJECTION, SOLUTION INTRAVENOUS; SUBCUTANEOUS at 11:51

## 2022-01-31 RX ADMIN — ACETAMINOPHEN 650 MG: 325 TABLET, FILM COATED ORAL at 02:53

## 2022-01-31 RX ADMIN — ASPIRIN 81 MG: 81 TABLET, COATED ORAL at 08:35

## 2022-02-01 ENCOUNTER — READMISSION MANAGEMENT (OUTPATIENT)
Dept: CALL CENTER | Facility: HOSPITAL | Age: 52
End: 2022-02-01

## 2022-02-01 VITALS
BODY MASS INDEX: 39.44 KG/M2 | HEIGHT: 64 IN | DIASTOLIC BLOOD PRESSURE: 95 MMHG | OXYGEN SATURATION: 86 % | TEMPERATURE: 97.2 F | WEIGHT: 231 LBS | HEART RATE: 82 BPM | SYSTOLIC BLOOD PRESSURE: 120 MMHG | RESPIRATION RATE: 20 BRPM

## 2022-02-01 PROBLEM — U07.1 COVID-19: Status: RESOLVED | Noted: 2022-01-30 | Resolved: 2022-02-01

## 2022-02-01 LAB
GLUCOSE BLDC GLUCOMTR-MCNC: 197 MG/DL (ref 70–130)
GLUCOSE BLDC GLUCOMTR-MCNC: 297 MG/DL (ref 70–130)

## 2022-02-01 PROCEDURE — 97535 SELF CARE MNGMENT TRAINING: CPT

## 2022-02-01 PROCEDURE — 82962 GLUCOSE BLOOD TEST: CPT

## 2022-02-01 PROCEDURE — 99239 HOSP IP/OBS DSCHRG MGMT >30: CPT | Performed by: FAMILY MEDICINE

## 2022-02-01 PROCEDURE — 63710000001 INSULIN DETEMIR PER 5 UNITS: Performed by: INTERNAL MEDICINE

## 2022-02-01 PROCEDURE — 97161 PT EVAL LOW COMPLEX 20 MIN: CPT

## 2022-02-01 PROCEDURE — 97116 GAIT TRAINING THERAPY: CPT

## 2022-02-01 PROCEDURE — 63710000001 INSULIN LISPRO (HUMAN) PER 5 UNITS: Performed by: INTERNAL MEDICINE

## 2022-02-01 PROCEDURE — 63710000001 DEXAMETHASONE PER 0.25 MG: Performed by: INTERNAL MEDICINE

## 2022-02-01 RX ADMIN — INSULIN LISPRO 4 UNITS: 100 INJECTION, SOLUTION INTRAVENOUS; SUBCUTANEOUS at 12:31

## 2022-02-01 RX ADMIN — AMLODIPINE BESYLATE 10 MG: 10 TABLET ORAL at 09:10

## 2022-02-01 RX ADMIN — APIXABAN 10 MG: 5 TABLET, FILM COATED ORAL at 09:11

## 2022-02-01 RX ADMIN — DEXAMETHASONE 6 MG: 2 TABLET ORAL at 09:11

## 2022-02-01 RX ADMIN — CARVEDILOL 6.25 MG: 6.25 TABLET, FILM COATED ORAL at 09:11

## 2022-02-01 RX ADMIN — LOSARTAN POTASSIUM 25 MG: 50 TABLET, FILM COATED ORAL at 09:10

## 2022-02-01 RX ADMIN — ATORVASTATIN CALCIUM 40 MG: 40 TABLET, FILM COATED ORAL at 09:11

## 2022-02-01 RX ADMIN — LEVOTHYROXINE SODIUM 50 MCG: 50 TABLET ORAL at 07:03

## 2022-02-01 RX ADMIN — CLOBETASOL PROPIONATE: 0.5 CREAM TOPICAL at 09:12

## 2022-02-01 RX ADMIN — INSULIN LISPRO 2 UNITS: 100 INJECTION, SOLUTION INTRAVENOUS; SUBCUTANEOUS at 09:10

## 2022-02-01 RX ADMIN — FLUOXETINE 20 MG: 20 CAPSULE ORAL at 09:11

## 2022-02-01 RX ADMIN — ASPIRIN 81 MG: 81 TABLET, COATED ORAL at 09:10

## 2022-02-01 RX ADMIN — INSULIN DETEMIR 10 UNITS: 100 INJECTION, SOLUTION SUBCUTANEOUS at 09:12

## 2022-02-01 RX ADMIN — CALCIPOTRIENE: 50 CREAM TOPICAL at 09:11

## 2022-02-01 NOTE — CASE MANAGEMENT/SOCIAL WORK
Case Management Discharge Note      Final Note: Spoke with patient via telephone regarding discharge plan.  Discussed  that she has qualified for home O2 and discussed DME providers.  Patient reports she uses [facility] for CPAP.  CM advised that they will be contacted and provided orders for O2 and they will deliver a tank to nurse today.  Pulse Oximeter has been given to RN to provide to patient today with discharge.  No other discharge needs verbalized.  Called [facility] [phone] who advises they can provide an O2 tank to nurses station today.  Orders and qualifying O2 sats faxed to [phone].  Patient plan is to discharge home today via car with family to transport.         Selected Continued Care - Admitted Since 1/30/2022     Destination    No services have been selected for the patient.              Durable Medical Equipment Coordination complete.    Service Provider Selected Services Address Phone Fax Patient Preferred    [facility] [place]  [facility] [address], formerly [facility] [address] [phone] --          Dialysis/Infusion    No services have been selected for the patient.              Home Medical Care    No services have been selected for the patient.              Therapy    No services have been selected for the patient.              Community Resources    No services have been selected for the patient.              Community & DME    No services have been selected for the patient.                       Final Discharge Disposition Code: 01 - home or self-care

## 2022-02-01 NOTE — THERAPY EVALUATION
Patient Name: Freida Martinez  : 1970    MRN: [id number]                              Today's Date: 2022       Admit Date: 2022    Visit Dx:     ICD-10-CM ICD-9-CM   1. Acute pulmonary embolism without acute cor pulmonale, unspecified pulmonary embolism type ([facility])  I26.99 415.19   2. COVID-19  U07.1 079.89   3. Acute on chronic renal insufficiency  N28.9 593.9    N18.9 585.9   4. Elevated blood pressure reading with diagnosis of hypertension  I10 401.9   5. Diabetes mellitus type 2 in obese ([facility])  E11.69 250.00    E66.9 278.00     Patient Active Problem List   Diagnosis   • Essential hypertension   • Hypothyroidism   • Tobacco abuse   • CKD (chronic kidney disease) stage 3, GFR 30-59 ml/min ([facility])   • Long QT interval   • Obesity (BMI 30-39.9)   • Recurrent major depressive disorder, in partial remission ([facility])   • Mixed hyperlipidemia   • Snoring   • GINNY (obstructive sleep apnea)   • Uncontrolled type 2 diabetes mellitus with hyperglycemia ([facility])   • Psoriasis   • Acute pulmonary embolism ([facility])   • COVID-19 virus infection     Past Medical History:   Diagnosis Date   • Abdominal pain 2019   • JACKIE (acute kidney injury) ([facility]) 2019   • Arthritis    • Asthma    • Breast injury    • Diabetes mellitus ([facility])    • Disease of thyroid gland    • Epigastric pain 2019    Added automatically from request for surgery [id number]   • Hypertension    • Kidney infection    • Mental disorder    • Non-compliance 2019   • Sleep apnea    • Volume depletion 2019     Past Surgical History:   Procedure Laterality Date   • ENDOSCOPY N/A 4/15/2019    Procedure: ESOPHAGOGASTRODUODENOSCOPY;  Surgeon: Jude Clinton MD;  Location: [facility] ENDOSCOPY;  Service: Gastroenterology   • TUBAL ABDOMINAL LIGATION     • VAGINAL DELIVERY      x4   • WISDOM TOOTH EXTRACTION        General Information     Row Name 22 1547          Physical Therapy Time and Intention    Document Type evaluation  -HP     Mode  of Treatment physical therapy  -     Row Name 02/01/22 1547          General Information    Patient Profile Reviewed yes  -     Prior Level of Function independent:; all household mobility; community mobility; bed mobility; ADL's  -     Existing Precautions/Restrictions fall  -     Barriers to Rehab previous functional deficit  -     Row Name 02/01/22 1547          Living Environment    Lives With child(scotty), adult; parent(s)  -     Row Name 02/01/22 1547          Home Main Entrance    Number of Stairs, Main Entrance four  -     Row Name 02/01/22 1547          Stairs Within Home, Primary    Stairs, Within Home, Primary per pt. one flight up to bedroom, bathoom upstairs does not work and uses a BSC or has to travel downstairs.  -     Row Name 02/01/22 1547          Cognition    Orientation Status (Cognition) oriented x 4  -HP     Row Name 02/01/22 1547          Safety Issues, Functional Mobility    Safety Issues Affecting Function (Mobility) insight into deficits/self-awareness; awareness of need for assistance; safety precaution awareness  -     Impairments Affecting Function (Mobility) balance; endurance/activity tolerance; shortness of breath  -           User Key  (r) = Recorded By, (t) = Taken By, (c) = Cosigned By    Initials Name Provider Type    HP Kelly Salinas, PT Physical Therapist               Mobility     Row Name 02/01/22 1548          Bed Mobility    Bed Mobility supine-sit; sit-supine; scooting/bridging  -     Scooting/Bridging [place] (Bed Mobility) modified independence  -     Supine-Sit [place] (Bed Mobility) modified independence  -     Sit-Supine [place] (Bed Mobility) modified independence  -     Assistive Device (Bed Mobility) bed rails; head of bed elevated  -     Comment (Bed Mobility) no assist needed  -     Row Name 02/01/22 1548          Transfers    Comment (Transfers) Pt performed STS without assist needed  -     Row Name 02/01/22 1548           Sit-Stand Transfer    Sit-Stand [place] (Transfers) standby assist  -     Row Name 02/01/22 1548          Gait/Stairs (Locomotion)    [place] Level (Gait) standby assist  -     Distance in Feet (Gait) 60  -HP     Deviations/Abnormal Patterns (Gait) bilateral deviations; maximus decreased; gait speed decreased; base of support, wide  -     [place] Level (Stairs) not tested  -HP     Comment (Gait/Stairs) pt amb 60' in room with good safety awareness and no LOB noted.  -           User Key  (r) = Recorded By, (t) = Taken By, (c) = Cosigned By    Initials Name Provider Type     Kelly Salinas PT Physical Therapist               Obj/Interventions     Row Name 02/01/22 1549          Range of Motion Comprehensive    General Range of Motion no range of motion deficits identified  -     Row Name 02/01/22 1549          Strength Comprehensive (MMT)    General Manual Muscle Testing (MMT) Assessment lower extremity strength deficits identified  -     Comment, General Manual Muscle Testing (MMT) Assessment BLE grossly 4+/5  -     Row Name 02/01/22 1549          Balance    Balance Assessment sitting static balance; sitting dynamic balance; standing static balance; standing dynamic balance  -     Static Sitting Balance WFL; sitting, edge of bed  -HP     Dynamic Sitting Balance WFL; sitting, edge of bed  -HP     Static Standing Balance WFL; unsupported  -HP     Dynamic Standing Balance mild impairment; unsupported  -HP           User Key  (r) = Recorded By, (t) = Taken By, (c) = Cosigned By    Initials Name Provider Type     Kelly Salinas PT Physical Therapist               Goals/Plan     Row Name 02/01/22 1552          Bed Mobility Goal 1 (PT)    Activity/Assistive Device (Bed Mobility Goal 1, PT) sit to supine/supine to sit  -     [place] Level/Cues Needed (Bed Mobility Goal 1, PT) independent  -HP     Time Frame (Bed Mobility Goal 1, PT) 5 days; long term goal (LTG)  -      Progress/Outcomes (Bed Mobility Goal 1, PT) goal ongoing  -     Row Name 02/01/22 1552          Transfer Goal 1 (PT)    Activity/Assistive Device (Transfer Goal 1, PT) sit-to-stand/stand-to-sit  -HP     [place] Level/Cues Needed (Transfer Goal 1, PT) modified independence  -HP     Time Frame (Transfer Goal 1, PT) long term goal (LTG); 5 days  -HP     Progress/Outcome (Transfer Goal 1, PT) goal ongoing  -HP     Row Name 02/01/22 1552          Gait Training Goal 1 (PT)    Activity/Assistive Device (Gait Training Goal 1, PT) gait (walking locomotion)  -HP     [place] Level (Gait Training Goal 1, PT) modified independence  -HP     Distance (Gait Training Goal 1, PT) 150  -HP     Time Frame (Gait Training Goal 1, PT) long term goal (LTG); 5 days  -HP     Progress/Outcome (Gait Training Goal 1, PT) goal ongoing  -           User Key  (r) = Recorded By, (t) = Taken By, (c) = Cosigned By    Initials Name Provider Type    HP Kelly Salinas, PT Physical Therapist               Clinical Impression     Row Name 02/01/22 8875          Pain    Additional Documentation Pain Scale: Numbers Pre/Post-Treatment (Group)  -     Row Name 02/01/22 9110          Pain Scale: Numbers Pre/Post-Treatment    Pretreatment Pain Rating 0/10 - no pain  -     Posttreatment Pain Rating 0/10 - no pain  -     Row Name 02/01/22 8194          Plan of Care Review    Plan of Care Reviewed With patient  -     Progress improving  -     Outcome Summary PT eval complete. Pt presented with generalized weakness and SOA limiting her functional mobility. Pt was able to perform bed mobility and mab 60' with SBA. Pt able to get dress with increased time. Recommend home with assist at discharge.  -     Row Name 02/01/22 1042          Therapy Assessment/Plan (PT)    Rehab Potential (PT) good, to achieve stated therapy goals  -     Criteria for Skilled Interventions Met (PT) yes; meets criteria; skilled treatment is necessary  -      Predicted Duration of Therapy Intervention (PT) two weeks  -HP     Row Name 02/01/22 1550          Vital Signs    Pre Systolic BP Rehab --  VSS; RN cleared for therapy  -HP     Pre Patient Position Supine  -HP     Intra Patient Position Standing  -HP     Post Patient Position Supine  -HP     Row Name 02/01/22 1550          Positioning and Restraints    Pre-Treatment Position in bed  -HP     Post Treatment Position bed  -HP     In Bed notified nsg; supine; fowlers; call light within reach; encouraged to call for assist; exit alarm on; side rails up x2  -HP           User Key  (r) = Recorded By, (t) = Taken By, (c) = Cosigned By    Initials Name Provider Type    Kelly Warren, PT Physical Therapist               Outcome Measures     Row Name 02/01/22 1552 02/01/22 1200       How much help from another person do you currently need...    Turning from your back to your side while in flat bed without using bedrails? 4  -HP --    Moving from lying on back to sitting on the side of a flat bed without bedrails? 4  -HP --    Moving to and from a bed to a chair (including a wheelchair)? 4  -HP --    Standing up from a chair using your arms (e.g., wheelchair, bedside chair)? 4  -HP --    Climbing 3-5 steps with a railing? 3  -HP 4  -CD    To walk in hospital room? 4  -HP --    AM-PAC 6 Clicks Score (PT) 23  -HP --    Row Name 02/01/22 0800          How much help from another person do you currently need...    Turning from your back to your side while in flat bed without using bedrails? 4  -CD     Moving from lying on back to sitting on the side of a flat bed without bedrails? 4  -CD     Moving to and from a bed to a chair (including a wheelchair)? 4  -CD     Standing up from a chair using your arms (e.g., wheelchair, bedside chair)? 4  -CD     Climbing 3-5 steps with a railing? 4  -CD     To walk in hospital room? 4  -CD     AM-PAC 6 Clicks Score (PT) 24  -CD     Row Name 02/01/22 1552          Functional Assessment     Outcome Measure Options AM-PAC 6 Clicks Basic Mobility (PT)  -HP           User Key  (r) = Recorded By, (t) = Taken By, (c) = Cosigned By    Initials Name Provider Type    Tamy Garza RN Registered Nurse    Kelly Warren, PT Physical Therapist                               PT Recommendation and Plan  Planned Therapy Interventions (PT): balance training, bed mobility training, gait training, home exercise program, patient/family education, strengthening, stretching, transfer training, stair training  Plan of Care Reviewed With: patient  Progress: improving  Outcome Summary: PT eval complete. Pt presented with generalized weakness and SOA limiting her functional mobility. Pt was able to perform bed mobility and mab 60' with SBA. Pt able to get dress with increased time. Recommend home with assist at discharge.     Time Calculation:    PT Charges     Row Name 02/01/22 1300             Time Calculation    Start Time 1300  -HP      PT Received On 02/01/22  -      PT Goal Re-Cert Due Date 02/11/22  -              Timed Charges    56124 - Gait Training Minutes  10  -      17917 - PT Self Care/Mgmt Minutes 14  -HP              Untimed Charges    PT Eval/Re-eval Minutes 33  -HP              Total Minutes    Timed Charges Total Minutes 24  -HP      Untimed Charges Total Minutes 33  -HP       Total Minutes 57  -HP            User Key  (r) = Recorded By, (t) = Taken By, (c) = Cosigned By    Initials Name Provider Type    Kelly Warren PT Physical Therapist              Therapy Charges for Today     Code Description Service Date Service Provider Modifiers Qty    69419785003 HC GAIT TRAINING EA 15 MIN 2/1/2022 Kelly Salinas, PT GP 1    63198990000 HC PT SELF CARE/MGMT/TRAIN EA 15 MIN 2/1/2022 Kelly Salinas, PT GP 1    13213615642 HC PT EVAL LOW COMPLEXITY 3 2/1/2022 Kelly Salinas, PT GP 1          PT G-Codes  Outcome Measure Options: AM-PAC 6 Clicks Basic Mobility (PT)  AM-PAC 6 Clicks Score (PT):  23  AM-PAC 6 Clicks Score (OT): 20    Kelly Salinas, PT  2/1/2022

## 2022-02-01 NOTE — PLAN OF CARE
"Goal Outcome Evaluation:  Plan of Care Reviewed With: patient   Progress: no change          No significant changes overnight. SpO2 90% on room air. Placed on 1LNC with SpO2 >94% while asleep and side-lying. Possible sleep apnea episode this AM while lying flat on back, SpO2 dropped to 80%. Increased to 3L NC and encouraged lying on side to sleep, no recurrence. Denies SOA overnight.        Patient becomes irritable with nursing staff frequently and remains overall noncompliant. Provided positive reinforcement and repeated education as appropriate. Care clustered to prevent unnecessary disturbances. Reports she is \"leaving this place\" today and that she is \"tired of you people bothering me.\"          "

## 2022-02-01 NOTE — PLAN OF CARE
Goal Outcome Evaluation:  Plan of Care Reviewed With: patient           Outcome Summary: patient in an agitated state requests to go home as soon as possible  patient requires 3L 02 to [place] 02 Sat >94  Problem: Fall Injury Risk  Goal: Absence of Fall and Fall-Related Injury  Outcome: Ongoing, Progressing  Intervention: Promote Injury-Free Environment  Recent Flowsheet Documentation  Taken 2/1/2022 1200 by Tamy Jackson RN  Safety Promotion/Fall Prevention: fall prevention program maintained  Taken 2/1/2022 1010 by Tamy Jackson RN  Safety Promotion/Fall Prevention:   activity supervised   fall prevention program maintained   safety round/check completed  Taken 2/1/2022 0800 by Tamy Jackson RN  Safety Promotion/Fall Prevention:   activity supervised   fall prevention program maintained     Problem: Adult Inpatient Plan of Care  Goal: Plan of Care Review  Outcome: Ongoing, Progressing  Flowsheets (Taken 2/1/2022 1323)  Plan of Care Reviewed With: patient  Outcome Summary: patient in an agitated state requests to go home as soon as possible  patient requires 3L 02 to [place] 02 Sat >94  Goal: Patient-Specific Goal (Individualized)  Outcome: Ongoing, Progressing  Goal: Absence of Hospital-Acquired Illness or Injury  Outcome: Ongoing, Progressing  Intervention: Identify and Manage Fall Risk  Recent Flowsheet Documentation  Taken 2/1/2022 1200 by Tamy Jackson RN  Safety Promotion/Fall Prevention: fall prevention program maintained  Taken 2/1/2022 1010 by Tamy Jackson RN  Safety Promotion/Fall Prevention:   activity supervised   fall prevention program maintained   safety round/check completed  Taken 2/1/2022 0800 by Tamy Jackson RN  Safety Promotion/Fall Prevention:   activity supervised   fall prevention program maintained  Intervention: Prevent Skin Injury  Recent Flowsheet Documentation  Taken 2/1/2022 1200 by Tamy Jackson RN  Body Position: supine, legs elevated  Taken  2/1/2022 1010 by Tamy Jackson RN  Body Position: position changed independently  Taken 2/1/2022 0800 by Tamy Jackson RN  Body Position: position changed independently  Intervention: Prevent and Manage VTE (venous thromboembolism) Risk  Recent Flowsheet Documentation  Taken 2/1/2022 0800 by Tamy Jackson RN  VTE Prevention/Management:   bilateral   patient refused intervention  Intervention: Prevent Infection  Recent Flowsheet Documentation  Taken 2/1/2022 1200 by Tamy Jackson RN  Infection Prevention: environmental surveillance performed  Taken 2/1/2022 1010 by Tamy Jackson RN  Infection Prevention: environmental surveillance performed  Taken 2/1/2022 0800 by Tamy Jackson RN  Infection Prevention: rest/sleep promoted  Goal: Optimal Comfort and Wellbeing  Outcome: Ongoing, Progressing  Intervention: Provide Person-Centered Care  Recent Flowsheet Documentation  Taken 2/1/2022 0800 by Tamy Jackson RN  Trust Relationship/Rapport: care explained  Goal: Readiness for Transition of Care  Outcome: Ongoing, Progressing     Problem: Diabetes Comorbidity  Goal: Blood Glucose Level Within Desired Range  Outcome: Ongoing, Progressing  Intervention: Maintain Glycemic Control  Recent Flowsheet Documentation  Taken 2/1/2022 0800 by Tamy Jackson RN  Glycemic Management: blood glucose monitoring

## 2022-02-01 NOTE — PLAN OF CARE
Goal Outcome Evaluation:  Plan of Care Reviewed With: patient        Progress: improving  Outcome Summary: PT eval complete. Pt presented with generalized weakness and SOA limiting her functional mobility. Pt was able to perform bed mobility and mab 60' with SBA. Pt able to get dress with increased time. Recommend home with assist at discharge.

## 2022-02-01 NOTE — OUTREACH NOTE
Prep Survey      Responses   [facility] patient discharged from? [facility]   Is LACE score < 7 ? No   Emergency Room discharge w/ pulse ox? No   Eligibility [facility]   Date of Admission 01/30/22   Date of Discharge 02/01/22   Discharge Disposition Home or Self Care   Discharge diagnosis COVID-19 with pneumonia   Does the patient have one of the following disease processes/diagnoses(primary or secondary)? COVID-19   Does the patient have Home health ordered? No   Is there a DME ordered? Yes   What DME was ordered? PATIENT AIDS - O2   Prep survey completed? Yes          Ellen German RN

## 2022-02-01 NOTE — DISCHARGE PLACEMENT REQUEST
"Freida Slade (51 y.o. Female)             Date of Birth Social Security Number Address Home Phone MRN    1970  [address]  [place] [address] [phone] [id number]    [employer] Marital Status             Hindu Single       Admission Date Admission Type Admitting Provider Attending Provider Department, Room/Bed    1/30/22 Emergency Kate Barfield DO [place], Sarah Kathleen, DO [facility] 6B, N634/1    Discharge Date Discharge Disposition Discharge Destination           Home or Self Care              Attending Provider: Kate Barfield DO    Allergies: Anaprox [Naproxen], Celebrex [Celecoxib], Flector [Diclofenac Epolamine], Motrin [Ibuprofen], Voltaren [Diclofenac Sodium], Lisinopril    Isolation: Contact Air   Infection: COVID (confirmed) (01/30/22)   Code Status: CPR   Advance Care Planning Activity    Ht: 162.6 cm (64\")   Wt: 105 kg (231 lb)    Admission Cmt: None   Principal Problem: Acute pulmonary embolism (HCC) [I26.99]                 Active Insurance as of 1/30/2022     Primary Coverage     Payor Plan Insurance Group Employer/Plan Group    [employer] [place] [employer] [place]ICA[place]      Payor Plan Address Payor Plan Phone Number Payor Plan Fax Number Effective Dates    [address] [address] [phone]  10/28/2016 - None Entered    [facility] [id number]       Subscriber Name Subscriber Birth Date Member ID       FREIDA SLADE 1970 [id number]                 Emergency Contacts      (Rel.) Home Phone Work Phone Mobile Phone    Maribel Rico (Daughter) [phone] -- --    karina rico (Son) [phone] -- --            Insurance Information                [facility] MEDICAID Phone: [phone]    Subscriber: Freida Slade Subscriber#: [id number]    Group#: -- Precert#: --            [address]  [address] [facility] [address]  Dept. Phone:  [phone]  Dept. " "Fax:  [phone] Date Ordered: 2022         Patient:  Freida Martinez MRN:  [id number]   [address] [facility] [address] :  1970  SSN:    Phone: [phone] Sex:  F     Weight: 105 kg (231 lb)         Ht Readings from Last 1 Encounters:   22 162.6 cm (64\")         Oxygen Therapy         (Order ID: [id number])    Diagnosis:  COVID-19 (U07.1 [ICD-10-CM] 079.89 [ICD-9-CM])  Acute pulmonary embolism without acute cor pulmonale, unspecified pulmonary embolism type (HCC) (I26.99 [ICD-10-CM] 415.19 [ICD-9-CM])   Quantity:  1     Delivery Modality: Nasal Cannula  Liters Per Minute: 3  Duration: Continuous  Equipment:  Oxygen Concentrator &  &  Portable Gaseous Oxygen System & Portable Oxygen Contents Gaseous &  Conserving Regulator  Length of Need (99 Months = Lifetime): 99 Months = Lifetime        Authorizing Provider's Phone: [phone]  Verbal Order Mode: Telephone with readback   Authorizing Provider: Kate Barfield DO  Authorizing Provider's NPI: [id number]     Order Entered By: Annia Lindo RN 2022 12:07 PM     Electronically signed by: Kate Barfield DO 2022 12:17 PM               Freida Martinez #[id number] (CSN:[id number]) (51 y.o. F) (Adm: 22)   JUDI [id number]    Patient Admission Status Report    Has Admission Order? PTA Med Review Complete? Home Meds Reconciled? Current Orders Reconciled?   Yes Yes No No     Admission Orders    Start   Ordered   22 0925  Inpatient Admission  Once         22 0924   22 0843  Initiate Observation Status  Once         22 0842          Vital Signs  Report  View Graph        07 0659  07 0659  07 0659  0700    1219  Most Recent    Temp (°F)       98 96.7-97.5 96.7-97.4 97.4  97.4 (36.3)    Heart Rate       130 Important   68-88 77-83  83    Resp       18 18-20 19-20 20  20    BP "       151/109 Important -166/111 Important  138/103 Important -171/126 Important  127//96 145/107 Important   145/107 Important     Weight (kg)       133 105             Sticky Notes from Clinical Staff  Comment             Vital Signs (last 2 days)    Date/Time Temp Pulse Resp BP Patient Position Device (Oxygen Therapy) Flow (L/min) SpO2   02/01/22 1010 -- -- -- -- -- nasal cannula 3 --   02/01/22 0740 97.4 (36.3) 83 20 145/107 Abnormal  Lying -- -- 95   02/01/22 0700 -- 77 -- -- -- -- -- 96   02/01/22 0600 -- 68 -- -- -- nasal cannula 3 95   02/01/22 0500 -- 76 -- -- -- nasal cannula 3 88 Abnormal    02/01/22 0420 96.8 (36) 75 20 135/94 Lying nasal cannula 3 94   02/01/22 0400 -- 80 -- -- -- -- -- --   02/01/22 0300 -- 72 -- -- -- -- -- 88 Abnormal    02/01/22 0200 -- 70 -- -- -- room air -- 89 Abnormal    02/01/22 0100 -- 75 -- -- -- -- -- 89 Abnormal    02/01/22 0000 -- 78 -- -- -- nasal cannula 1 94   01/31/22 2300 96.9 (36.1) 76 20 139/85 Lying -- -- 94   01/31/22 2200 -- -- -- -- -- room air -- --   01/31/22 2116 96.7 (35.9) 83 20 147/96 Lying -- -- 91   01/31/22 2000 -- -- -- -- -- room air -- --   01/31/22 1815 -- -- -- -- -- nasal cannula 2 --   01/31/22 1615 -- -- -- -- -- nasal cannula 2 --   01/31/22 1448 97.4 (36.3) 86 19 127/89 Lying -- -- --   01/31/22 1415 -- -- -- -- -- nasal cannula 2 --   01/31/22 1212 -- -- -- -- -- nasal cannula 2 --   01/31/22 1143 97.1 (36.2) -- 20 129/87 Lying -- -- --   01/31/22 1015 -- -- -- -- -- nasal cannula 2 --   01/31/22 0815 -- -- -- -- -- nasal cannula 2 --   01/31/22 0727 96.9 (36.1) 88 19 146/116 Abnormal  Lying -- -- --   01/31/22 0600 -- 82 -- -- -- nasal cannula 2 91   01/31/22 0400 -- 73 -- -- -- nasal cannula 2 93   01/31/22 0200 -- 78 -- -- -- nasal cannula 2 91   01/31/22 0045 96.7 (35.9) 81 20 143/103 Abnormal  Lying nasal cannula 2 92   01/30/22 2200 -- 82 -- -- -- nasal cannula 2 94   01/30/22 2147 97.1 (36.2) 81 18 141/91 Lying nasal cannula 2 92    01/30/22 2130 -- 85 -- -- -- nasal cannula -- 90   01/30/22 2000 -- 100 -- -- -- nasal cannula 2 94   01/30/22 1838 -- -- -- -- -- nasal cannula 2 --   01/30/22 1729 97.5 (36.4) 97 18 149/100 Sitting nasal cannula 2 91   01/30/22 1723 -- -- -- -- -- nasal cannula 2 --   01/30/22 1715 -- -- -- -- -- nasal cannula 2 --   01/30/22 1705 -- -- -- 138/103 Abnormal  -- -- -- --   01/30/22 1435 -- 107 -- -- -- nasal cannula 1 100   01/30/22 1300 -- 100 -- -- -- room air -- 97   01/30/22 1012 -- -- -- -- -- nasal cannula 2 --   01/30/22 0930 -- -- -- 171/126 Abnormal  -- -- -- 97   01/30/22 0912 -- -- -- 161/125 Abnormal  -- -- -- 98   01/30/22 08:33:48 -- 104 -- -- -- -- -- --   01/30/22 0830 -- -- -- 155/107 Abnormal  -- -- -- 100   01/30/22 0730 -- -- -- 151/123 Abnormal  -- -- -- 99   01/30/22 0708 -- -- -- -- -- nasal cannula 2 --   01/30/22 0703 -- -- -- 148/121 Abnormal  -- -- -- 99   01/30/22 0626 -- -- -- 154/107 Abnormal  -- -- -- 96   01/30/22 0600 -- -- -- 157/129 Abnormal  -- -- -- 95   01/30/22 0555 -- -- -- 166/111 Abnormal  -- -- -- 95   01/30/22 0522 98 (36.7) 130 Abnormal  18 151/109 Abnormal  Sitting -- -- 93

## 2022-02-02 ENCOUNTER — TRANSITIONAL CARE MANAGEMENT TELEPHONE ENCOUNTER (OUTPATIENT)
Dept: CALL CENTER | Facility: HOSPITAL | Age: 52
End: 2022-02-02

## 2022-02-02 NOTE — OUTREACH NOTE
"Call Center TCM Note      Responses   [facility] patient discharged from? [place]   Does the patient have one of the following disease processes/diagnoses(primary or secondary)? COVID-19   COVID-19 underlying condition? None   TCM attempt successful? Yes   Call start time 1601   Call end time 1609   Discharge diagnosis COVID-19 with pneumonia   Medication alerts for this patient NORMAIS----bleeding precautions reviewed   Meds reviewed with patient/caregiver? Yes   Is the patient having any side effects they believe may be caused by any medication additions or changes? No   Does the patient have all medications ordered at discharge? Yes   Is the patient taking all medications as directed (includes completed medication regime)? Yes   Does the patient have a primary care provider?  Yes   Comments regarding PCP Hospital PCP FOLLOW UP APPOINTMENT IS 2/7/22@1130am   Does the patient have an appointment with their PCP or specialist within 7 days of discharge? Yes   Has the patient kept scheduled appointments due by today? N/A   What DME was ordered? PATIENT AIDS - O2   Has all DME been delivered? No   DME comments Pt missed the door for her oxygen delivery and does not know how to hook up her tank from hospital, she will call [employer] as soon as we finish call to arrange delivery and troubleshoot current tank--she is active with company with DME so she is familiar with processes   Psychosocial issues? No   Did the patient receive a copy of their discharge instructions? Yes   Did the patient receive a copy of COVID-19 specific instructions? Yes   Nursing interventions Reviewed instructions with patient   What is the patient's perception of their health status since discharge? Same   Does the patient have any of the following symptoms? Cough,  Loss of taste/smell  [\"a little cough\"]   Nursing Interventions Nurse provided patient education  [Encouraged activity, deep breathing, frequent repositioning and " monitoring sats ]   Pulse Ox monitoring Intermittent   Pulse Ox device source Patient   O2 Sat comments 93% on room air ---education provided on reading pulse ox as pt having difficulty obtaining readings earlier in day-   O2 Sat: education provided Sat levels,  Monitoring frequency,  When to seek care   O2 Sat education comments Advised to seek immediate medical attention for oxygen sats less than 88%-90% that do not improve with rest and oxygen   Is the patient/caregiver able to teach back steps to recovery at home? Set small, achievable goals for return to baseline health,  Rest and rebuild strength, gradually increase activity,  Eat a well-balance diet   If the patient is a current smoker, are they able to teach back resources for cessation? --  [Pt not smoking right right now]   Is the patient/caregiver able to teach back the hierarchy of who to call/visit for symptoms/problems? PCP, Specialist, Home health nurse, Urgent Care, ED, 911 Yes   TCM call completed? Yes          Lavinia Deleon RN    2/2/2022, 16:13 EST

## 2022-02-03 ENCOUNTER — READMISSION MANAGEMENT (OUTPATIENT)
Dept: CALL CENTER | Facility: HOSPITAL | Age: 52
End: 2022-02-03

## 2022-02-03 NOTE — OUTREACH NOTE
COVID-19 Week 1 Survey      Responses   [facility] patient discharged from? [place]   Does the patient have one of the following disease processes/diagnoses(primary or secondary)? COVID-19   COVID-19 underlying condition? None   Call Number Call 2   Week 1 Call successful? Yes   Call start time 1223   Call end time 1224   Meds reviewed with patient/caregiver? Yes   Is the patient having any side effects they believe may be caused by any medication additions or changes? No   Is the patient taking all medications as directed (includes completed medication regime)? Yes   Does the patient have a primary care provider?  Yes   Comments regarding PCP Hospital PCP FOLLOW UP APPOINTMENT IS 2/7/22@1130am   Has the patient kept scheduled appointments due by today? N/A   Has home health visited the patient within 72 hours of discharge? N/A   What DME was ordered? PATIENT AIDS - O2   Has all DME been delivered? No   DME comments Pt missed the door for her oxygen delivery and does not know how to hook up her tank from hospital, she will call [employer] as soon as we finish call to arrange delivery and troubleshoot current tank--she is active with company with DME so she is familiar with processes  [will be delivered today, per patient]   Psychosocial issues? No   What is the patient's perception of their health status since discharge? Improving   Does the patient have any of the following symptoms? Cough  [slight]   Nursing Interventions Nurse provided patient education   Pulse Ox monitoring Intermittent   Pulse Ox device source Patient   O2 Sat comments 93% on RA   O2 Sat: education provided Sat levels,  Monitoring frequency   Is the patient/caregiver able to teach back steps to recovery at home? Set small, achievable goals for return to baseline health,  Rest and rebuild strength, gradually increase activity,  Eat a well-balance diet   COVID-19 call completed? Yes          MIHIR FINCH RN

## 2022-02-04 ENCOUNTER — READMISSION MANAGEMENT (OUTPATIENT)
Dept: CALL CENTER | Facility: HOSPITAL | Age: 52
End: 2022-02-04

## 2022-02-04 NOTE — OUTREACH NOTE
COVID-19 Week 1 Survey      Responses   [facility] patient discharged from? [place]   Does the patient have one of the following disease processes/diagnoses(primary or secondary)? COVID-19   COVID-19 underlying condition? None   Call Number Call 3   Week 1 Call successful? Yes   Call start time 1105   Call end time 1106   Discharge diagnosis COVID-19 with pneumonia   Is the patient taking all medications as directed (includes completed medication regime)? Yes   Does the patient have a primary care provider?  Yes   Comments regarding PCP Hospital PCP FOLLOW UP APPOINTMENT IS 2/7/22@1130am   Has the patient kept scheduled appointments due by today? N/A   Has home health visited the patient within 72 hours of discharge? N/A   Psychosocial issues? No   What is the patient's perception of their health status since discharge? Improving   Is the patient/caregiver able to teach back the hierarchy of who to call/visit for symptoms/problems? PCP, Specialist, Home health nurse, Urgent Care, ED, 911 Yes   COVID-19 call completed? Yes   Wrap up additional comments Quick call, patient sounded like she was half asleep, says she is fine, no questions at this time.          Amy Mehta RN

## 2022-02-05 DIAGNOSIS — J45.20 MILD INTERMITTENT ASTHMA WITHOUT COMPLICATION: ICD-10-CM

## 2022-02-05 NOTE — DISCHARGE SUMMARY
"    [facility]  DISCHARGE SUMMARY    Patient Name: Freida Martinez  : 1970  MRN: [id number]    Date of Admission: 2022  5:25 AM  Date of Discharge:  22  Primary Care Physician: Annia Rico, APRN    Consults     No orders found from 2022 to 2022.          Hospital Course     Presenting Problem:   Acute pulmonary embolism without acute cor pulmonale, unspecified pulmonary embolism type (HCC) [I26.99]  COVID-19 [U07.1]    Active Hospital Problems    Diagnosis  POA   • **Acute pulmonary embolism (HCC) [I26.99]  Yes   • COVID-19 virus infection [U07.1]  Yes   • Uncontrolled type 2 diabetes mellitus with hyperglycemia (HCC) [E11.65]  Yes   • Obesity (BMI 30-39.9) [E66.9]  Unknown   • Tobacco abuse [Z72.0]  Yes   • CKD (chronic kidney disease) stage 3, GFR 30-59 ml/min ([facility]) [N18.30]  Yes      Resolved Hospital Problems    Diagnosis Date Resolved POA   • COVID-19 [U07.1] 2022 Yes          Hospital Course:  Freida Martinez is a 51 y.o. female with morbid obesity, tobacco abuse, CKD3, uncontrolled diabetes who presents with 3 weeks of upper respiratory symptoms with subsequent loss of taste and smell who presents for evaluation of shortness of breath found to be positive for COVID-19 and CTA with multiple pulmonary emboli        Acute hypoxic respiratory Failure  COVID-19 with pneumonia  Acute segmental/subsegmental pulmonary emboli  Hx tobacco abuse  -Symptom onset \" about 3 weeks ago,\" CT chest consistent with COVID-19 changes  -will consider her out of isolation   -CTA with emphysematous changes and PEs, no RT heart strain  -Outside the window for remdesivir  - dexamethasone stopped at discharge as her symptoms have improved and there are issues with non compliance with diabetes medication   - Eliquis for acute PE protocol        DM type 2, A1c 11.3%, without long-term use of insulin  -Per previous documentation has history of " nonadherence     CKD stage 3  -Baseline GFR 40s, creatinine 1.2-1.5     Hypothyroidism  Hypertension  -Home amlodipine, aspirin, atorvastatin, carvedilol, levothyroxine, losartan     Morbid obesity, BMI 39.65 kg/M2  GINNY  -Increased BMI is an independent risk factor for worse outcome in COVID-19 pneumonia      Discharge Follow Up Recommendations for outpatient labs/diagnostics:   PCP 1-2 weeks     Day of Discharge     HPI:   Pt is ready to go home. No pain or shortness of breath     Review of Systems  Gen- No fevers, chills  CV- No chest pain, palpitations  Resp- No cough, dyspnea  GI- No N/V/D, abd pain          Vital Signs:          Physical Exam:  Constitutional: No acute distress, awake, alert, obese female sitting up in bed   HENT: NCAT, mucous membranes moist  Respiratory: Clear to auscultation bilaterally, respiratory effort normal   Cardiovascular: RRR, no murmurs, rubs, or gallops  Gastrointestinal: Positive bowel sounds, soft, nontender, nondistended  Musculoskeletal: No bilateral ankle edema  Psychiatric: flat  affect, cooperative  Neurologic: Oriented x 3,  speech clear  Skin: No rashes    Pertinent  and/or Most Recent Results     LAB RESULTS:      Lab 01/31/22  1121 01/30/22  1119 01/30/22  0615   WBC 13.94*  --  11.83*   HEMOGLOBIN 12.1  --  14.6   HEMATOCRIT 37.2  --  46.0   PLATELETS 427  --  501*   NEUTROS ABS 11.38*  --  8.37*   IMMATURE GRANS (ABS) 0.34*  --  0.29*   LYMPHS ABS 1.46  --  1.80   MONOS ABS 0.57  --  0.75   EOS ABS 0.12  --  0.50*   MCV 84.2  --  86.5   CRP 2.03*  --  3.59*   PROCALCITONIN  --   --  0.10   LACTATE  --  1.3 2.6*   LDH  --   --  284*   PROTIME  --   --  12.8   APTT  --   --  32.1*   HEPARIN ANTI-XA  --   --  0.10*   D DIMER QUANT  --   --  11.71*         Lab 01/31/22  1121 01/30/22  0615   SODIUM 137 137   POTASSIUM 4.6 4.5   CHLORIDE 101 101   CO2 25.0 24.0   ANION GAP 11.0 12.0   BUN 23* 29*   CREATININE 1.77* 1.82*   GLUCOSE 281* 266*   CALCIUM 8.9 9.7   MAGNESIUM   --  2.2   PHOSPHORUS 2.7 1.9*   TSH  --  6.900*         Lab 01/31/22  1121 01/30/22  0615   TOTAL PROTEIN 6.4 8.0   ALBUMIN 3.10* 3.20*   GLOBULIN 3.3 4.8   ALT (SGPT) 17 15   AST (SGOT) 22 16   BILIRUBIN 0.5 0.6   ALK PHOS 70 79   LIPASE  --  31         Lab 01/30/22  0615   PROBNP 429.2   TROPONIN T <0.010   PROTIME 12.8   INR 0.99             Lab 01/31/22  1121   FERRITIN 482.90*         Brief Urine Lab Results  (Last result in the past 365 days)      Color   Clarity   Blood   Leuk Est   Nitrite   Protein   CREAT   Urine HCG        12/08/21 1353             70.6             Microbiology Results (last 10 days)     Procedure Component Value - Date/Time    COVID PRE-OP / PRE-PROCEDURE SCREENING ORDER (NO ISOLATION) - Swab, Nasopharynx [[id number]]  (Abnormal) Collected: 01/30/22 0618    Lab Status: Final result Specimen: Swab from Nasopharynx Updated: 01/30/22 0659    Narrative:      The following orders were created for panel order COVID PRE-OP / PRE-PROCEDURE SCREENING ORDER (NO ISOLATION) - Swab, Nasopharynx.  Procedure                               Abnormality         Status                     ---------                               -----------         ------                     COVID-19 and FLU A/B PCR...[[id number]]  Abnormal            Final result                 Please view results for these tests on the individual orders.    COVID-19 and FLU A/B PCR - Swab, Nasopharynx [[id number]]  (Abnormal) Collected: 01/30/22 0618    Lab Status: Final result Specimen: Swab from Nasopharynx Updated: 01/30/22 0659     COVID19 Detected     Influenza A PCR Not Detected     Influenza B PCR Not Detected    Narrative:      Fact sheet for providers: https://www.fda.gov/media/389636/download    Fact sheet for patients: https://www.fda.gov/media/670435/download    Test performed by PCR.  Influenza A and Influenza B negative results should be considered presumptive in samples that have a positive SARS-CoV-2 result.    Competitive  inhibition studies showed that SARS-CoV-2 virus, when present at concentrations above 3.6E+04 copies/mL, can inhibit the detection and amplification of influenza A and influenza B virus RNA if present at or below 1.8E+02 copies/mL or 4.9E+02 copies/mL, respectively, and may lead to false negative influenza virus results. If co-infection with influenza A or influenza B virus is suspected in samples with a positive SARS-CoV-2 result, the sample should be re-tested with another FDA cleared, approved, or authorized influenza test, if influenza virus detection would change clinical management.          CT Angiogram Chest    Result Date: 1/30/2022  EXAMINATION: CT ANGIOGRAM CHEST-  INDICATION: COVID w/ elevated d-dimer. Aware of GFR. Fluids being administered.  TECHNIQUE: CTA of the chest (PE protocol)  COMPARISON: CT chest 4/12/2019  FINDINGS:  Segmental and subsegmental pulmonary emboli within the pulmonary artery supplying the right lower lobe (series 4 image 47). No significant enlargement of the main pulmonary trunk or CT evidence of right heart strain. The cardiac chambers appear unremarkable. No pericardial effusion. Unremarkable appearance of the thoracic aorta. Chest wall soft tissues are unremarkable. No thoracic adenopathy. The trachea and mainstem bronchi are clear. Patchy subpleural-predominant groundglass and consolidative opacities and interlobular thickening bilaterally, worst in the left upper lobe, compatible with multifocal infection. Background of mild centrilobular and paraseptal emphysema. No pleural effusion. No pneumothorax. No acute osseous findings. No acute findings in the partially imaged upper abdomen. Prominent size of the spleen only partially imaged.        Segmental and subsegmental pulmonary emboli in the pulmonary artery supplying the right lower lobe. No CT evidence of right heart strain.  Patchy and relatively diffuse subpleural-predominant groundglass and consolidative opacities  compatible with infection including Covid-19 pneumonia. Background emphysema.   Findings communicated to Dr. Erick Dupont by Dr. Erick Gomez via telephone at 8:40 AM 1/30/2022.  This report was finalized on 1/30/2022 8:43 AM by Erick Gomez MD.        Results for orders placed during the hospital encounter of 04/12/19    Duplex Mesenteric Complete CAR    Interpretation Summary  EXAMINATION:  DUPLEX MESENTERIC ULTRASOUND - 04/17/2019    HISTORY: Generalized abdominal pain    COMPARISON: Abdomen and pelvis CT scan without contrast 04/12/2019.    FINDINGS: Concurrent CT scan does not show more than minimal  calcification of the abdominal aorta or mesenteric vasculature. Today's  exam, however, shows evidence of hemodynamically significant stenosis of  the celiac artery, maximal peak systolic velocities of 249 at the  origin, 282 cm/s proximally, and 310 cm/s distally. Values of the SMA  range from normal to upper limits of normal, with maximal peak systolic  velocity in the proximal SMA of 199 cm/s. Peak systolic SONI velocity is  shown as 174 cm/s, although criteria for stenosis are not well  established.    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 No evidence of hemodynamically significant SMA stenosis.    D:  04/18/2019  E:  04/18/2019    This report was finalized on 4/19/2019 9:42 PM by DR. Helio Izquierdo MD.      Results for orders placed during the hospital encounter of 04/12/19    Duplex Mesenteric Complete CAR    Interpretation Summary  EXAMINATION:  DUPLEX MESENTERIC ULTRASOUND - 04/17/2019    HISTORY: Generalized abdominal pain    COMPARISON: Abdomen and pelvis CT scan without contrast 04/12/2019.    FINDINGS: Concurrent CT scan does not show more than minimal  calcification of the abdominal aorta or mesenteric vasculature. Today's  exam, however, shows evidence of hemodynamically significant stenosis of  the celiac artery, maximal peak systolic velocities of 249 at  the  origin, 282 cm/s proximally, and 310 cm/s distally. Values of the SMA  range from normal to upper limits of normal, with maximal peak systolic  velocity in the proximal SMA of 199 cm/s. Peak systolic SONI velocity is  shown as 174 cm/s, although criteria for stenosis are not well  established.    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 No evidence of hemodynamically significant SMA stenosis.    D:  04/18/2019  E:  04/18/2019    This report was finalized on 4/19/2019 9:42 PM by DR. Helio Izquierdo MD.      Results for orders placed during the hospital encounter of 06/23/20    Adult Transthoracic Echo Complete W/ Cont if Necessary Per Protocol    Interpretation Summary  · Left ventricular systolic function is normal. Estimated EF appears to be in the range of 66 - 70%.  · Left ventricular wall thickness is consistent with mild concentric hypertrophy.  · Mild MAC is present. No mitral valve regurgitation is present  · Trace tricuspid valve regurgitation is present. Estimated right ventricular systolic pressure from tricuspid regurgitation is normal (<35 mmHg).      Plan for Follow-up of Pending Labs/Results:     Discharge Details        Discharge Medications      New Medications      Instructions Start Date   Eliquis 5 MG tablet tablet  Generic drug: apixaban   10 mg, Oral, Every 12 Hours Scheduled      Eliquis 5 MG tablet tablet  Generic drug: apixaban   5 mg, Oral, Every 12 Hours Scheduled   Start Date: February 6, 2022        Continue These Medications      Instructions Start Date   albuterol (2.5 MG/3ML) 0.083% nebulizer solution  Commonly known as: PROVENTIL   No dose, route, or frequency recorded.      albuterol sulfate  (90 Base) MCG/ACT inhaler  Commonly known as: PROVENTIL HFA;VENTOLIN HFA;PROAIR HFA   2 puffs, Inhalation, Every 6 Hours PRN      amLODIPine 10 MG tablet  Commonly known as: NORVASC   10 mg, Oral, Daily      ammonium lactate 12 % lotion  Commonly known  as: LAC-HYDRIN   No dose, route, or frequency recorded.      aspirin 81 MG EC tablet   81 mg, Oral, Daily      atorvastatin 40 MG tablet  Commonly known as: LIPITOR   40 mg, Oral, Daily      buPROPion 150 MG 12 hr tablet  Commonly known as: Zyban   150 mg, Oral, 2 Times Daily      calcipotriene 0.005 % ointment  Commonly known as: DOVONOX   No dose, route, or frequency recorded.      carvedilol 6.25 MG tablet  Commonly known as: COREG   6.25 mg, Oral, 2 Times Daily      Cosentyx Sensoready (300 MG) 150 MG/ML solution auto-injector  Generic drug: Secukinumab (300 MG Dose)   No dose, route, or frequency recorded.      dapagliflozin 5 MG tablet tablet  Commonly known as: FARXIGA   5 mg, Oral, Daily      diclofenac 1 % gel gel  Commonly known as: VOLTAREN   4 g, Topical, 4 Times Daily, Small amount to affected area      FLUoxetine 20 MG capsule  Commonly known as: PROzac   20 mg, Oral, Daily      glucose blood test strip   Used to test blood sugar for Diabetes E11.9 twice a day. Pt has One Touch Verio Flex meter      glucose monitor monitoring kit   1 each, Does not apply, Daily      halobetasol 0.05 % ointment  Commonly known as: ULTRAVATE   No dose, route, or frequency recorded.      Lancets misc   1 Device, Does not apply, Daily      levothyroxine 50 MCG tablet  Commonly known as: SYNTHROID, LEVOTHROID   50 mcg, Oral, Daily      losartan 25 MG tablet  Commonly known as: COZAAR   25 mg, Oral, Daily      SITagliptin 50 MG tablet  Commonly known as: JANUVIA   50 mg, Oral, Daily      triamcinolone 0.1 % cream  Commonly known as: KENALOG   No dose, route, or frequency recorded.             Allergies   Allergen Reactions   • Anaprox [Naproxen] Other (See Comments)     CHRONIC KIDNEY DISEASE   • Celebrex [Celecoxib] Other (See Comments)     CHRONIC KIDNEY DISEASE   • Flector [Diclofenac Epolamine] Other (See Comments)     CHRONIC KIDNEY DISEASE   • Motrin [Ibuprofen] Other (See Comments)     CHRONIC KIDNEY DISEASE   •  Voltaren [Diclofenac Sodium] Other (See Comments)     CHRONIC KIDNEY DISEASE   • Lisinopril Swelling         Discharge Disposition:  Home or Self Care    Diet:  Hospital:No active diet order      Activity:  Activity Instructions     Activity as Tolerated            Restrictions or Other Recommendations:         CODE STATUS:    Code Status and Medical Interventions:   Ordered at: 01/30/22 0921     Code Status (Patient has no pulse and is not breathing):    CPR (Attempt to Resuscitate)     Medical Interventions (Patient has pulse or is breathing):    Full Support       Future Appointments   Date Time Provider Department Center   2/7/2022 11:30 AM Annia Rico APRN [facility]       Additional Instructions for the Follow-ups that You Need to Schedule     Discharge Follow-up with PCP   As directed       Currently Documented PCP:    Annia Rico APRN    PCP Phone Number:    [phone]     Follow Up Details: 1-2 weeks                     Kate Barfield DO  02/04/22      Time Spent on Discharge:  I spent  40  minutes on this discharge activity which included: face-to-face encounter with the patient, reviewing the data in the system, coordination of the care with the nursing staff as well as consultants, documentation, and entering orders.

## 2022-02-07 ENCOUNTER — READMISSION MANAGEMENT (OUTPATIENT)
Dept: CALL CENTER | Facility: HOSPITAL | Age: 52
End: 2022-02-07

## 2022-02-07 ENCOUNTER — TELEMEDICINE (OUTPATIENT)
Dept: INTERNAL MEDICINE | Facility: CLINIC | Age: 52
End: 2022-02-07

## 2022-02-07 DIAGNOSIS — N18.30 STAGE 3 CHRONIC KIDNEY DISEASE, UNSPECIFIED WHETHER STAGE 3A OR 3B CKD: ICD-10-CM

## 2022-02-07 DIAGNOSIS — U07.1 COVID: Primary | ICD-10-CM

## 2022-02-07 DIAGNOSIS — I10 ESSENTIAL HYPERTENSION: ICD-10-CM

## 2022-02-07 DIAGNOSIS — E78.2 MIXED HYPERLIPIDEMIA: ICD-10-CM

## 2022-02-07 DIAGNOSIS — E03.9 ACQUIRED HYPOTHYROIDISM: ICD-10-CM

## 2022-02-07 DIAGNOSIS — E11.65 UNCONTROLLED TYPE 2 DIABETES MELLITUS WITH HYPERGLYCEMIA: ICD-10-CM

## 2022-02-07 DIAGNOSIS — I26.99 ACUTE PULMONARY EMBOLISM WITHOUT ACUTE COR PULMONALE, UNSPECIFIED PULMONARY EMBOLISM TYPE: ICD-10-CM

## 2022-02-07 PROCEDURE — 99214 OFFICE O/P EST MOD 30 MIN: CPT | Performed by: NURSE PRACTITIONER

## 2022-02-07 NOTE — TELEPHONE ENCOUNTER
Rx Refill Note  Requested Prescriptions     Pending Prescriptions Disp Refills   • ProAir  (90 Base) MCG/ACT inhaler [Pharmacy Med Name: PROAIR HFA 90 MCG INHALER] 8.5 g      Sig: INHALE TWO PUFFS BY MOUTH EVERY 6 HOURS AS NEEDED FOR WHEEZING      Last office visit with prescribing clinician: 12/8/2021      Next office visit with prescribing clinician: 2/7/2022 April KRISTI Avila MA  02/07/22, 07:39 EST     Patient has an appointment with you today Please advise, I don't see where the Proair inhaler was prescribed to her before.

## 2022-02-07 NOTE — PROGRESS NOTES
"Transitional Care Follow Up Visit  You have chosen to receive care through a telehealth visit.  Do you consent to use a video/audio connection for your medical care today? Yes  This was an audio and video enabled telemedicine encounter.  Patient location : home       Subjective     Freida Martinez is a 52 y.o. female who presents for a transitional care management visit.    Within 48 business hours after discharge our office contacted her via telephone to coordinate her care and needs.      I reviewed and discussed the details of that call along with the discharge summary, hospital problems, inpatient lab results, inpatient diagnostic studies, and consultation reports with Freida.     Current outpatient and discharge medications have been reconciled for the patient.  Reviewed by: LUCIA Garcia      Date of TCM Phone Call 2/1/2022   [facility]   Date of Admission 1/30/2022   Date of Discharge 2/1/2022   Discharge Disposition Home or Self Care     Risk for Readmission (LACE) No data recorded      History of Present Illness   Course During Hospital Stay:      Freida Martinez is a 52-year-old female who is being seen via video visit for hospital follow-up.  She has a history of tobacco abuse, CKD 3, uncontrolled diabetes, obesity, and she presented to [facility] ER with a 3-week onset of upper respiratory symptoms with subsequent loss of taste and smell and an increase in shortness of breath.  She was found to be positive for COVID-19 and CTA showed multiple pulmonary emboli.       Acute hypoxic respiratory Failure  COVID-19 with pneumonia  Acute segmental/subsegmental pulmonary emboli  Hx tobacco abuse  -Symptom onset \" about 3 weeks prior to admission,\" CT chest consistent with COVID-19 changes  -Considered out of isolation   -CTA with emphysematous changes and PEs, no RT heart strain  --She was the window for remdesivir   - dexamethasone stopped at discharge as her symptoms " improved and there are issues with non compliance with diabetes medication   - Eliquis for acute PE protocol        DM type 2, A1c 11.3%, without long-term use of insulin  -She does have a history of noncompliance of her medication over the last several months.  She tells me today that she is taking her medications again     CKD stage 3  -Stable-Baseline GFR 40s, creatinine 1.2-1.5     Hypothyroidism  Hypertension  Currently on amlodipine, aspirin, atorvastatin, carvedilol, levothyroxine, losartan     Morbid obesity, BMI 39.65 kg/M2  GINNY  -Increased BMI is an independent risk factor for worse outcome in COVID-19 pneumonia     She tells me that she is feeling quite well.  She has no shortness of breath or cough.  She endorses that she is taking all of her medications currently not smoking     The following portions of the patient's history were reviewed and updated as appropriate: allergies, current medications, past family history, past medical history, past social history, past surgical history and problem list.    Review of Systems   Constitutional: Negative for appetite change, chills, diaphoresis, fatigue and fever.   HENT: Negative for congestion, ear pain, rhinorrhea, sinus pressure and sore throat.    Eyes: Negative for itching and visual disturbance.   Respiratory: Negative for cough, shortness of breath and wheezing.    Cardiovascular: Negative for chest pain, palpitations and leg swelling.   Gastrointestinal: Negative for abdominal pain, constipation, diarrhea, nausea and vomiting.   Endocrine: Negative for cold intolerance and heat intolerance.   Genitourinary: Negative for difficulty urinating, dysuria and hematuria.   Musculoskeletal: Positive for arthralgias and back pain. Negative for joint swelling and myalgias.   Skin: Negative for rash and wound.   Allergic/Immunologic: Negative for environmental allergies and food allergies.   Neurological: Negative for dizziness, weakness, numbness and headaches.    Hematological: Negative for adenopathy. Does not bruise/bleed easily.   Psychiatric/Behavioral: Negative for dysphoric mood and sleep disturbance. The patient is not nervous/anxious.        Objective   Physical Exam   Constitutional: She is oriented to person, place, and time. She appears well-developed and well-nourished. No distress.   HENT:   Head: Normocephalic and atraumatic.   Right Ear: External ear normal.   Left Ear: External ear normal.   Mouth/Throat: Oropharynx is clear and moist.   Eyes: Right eye exhibits no discharge. Left eye exhibits no discharge. No scleral icterus.   Neck: Neck normal appearance.  Cardiovascular: Normal pulse (patient directed exam).      Pulmonary/Chest: Effort normal. No accessory muscle usage.  No respiratory distress.No use of oxygen by nasal cannula  Abdominal: Abdomen appears normal.   Neurological: She is alert and oriented to person, place, and time.   Skin: Skin is dry. She is not diaphoretic. No erythema. No pallor.   Psychiatric: She has a normal mood and affect. Her speech is normal and behavior is normal. Judgment normal. She is attentive.             Assessment/Plan   Diagnoses and all orders for this visit:    1. COVID (Primary)    2. Acute pulmonary embolism without acute cor pulmonale, unspecified pulmonary embolism type (HCC)  Continue Eliquis.  3. Essential hypertension  Continue current medications.  We will bring her in the office in a couple of weeks to further evaluate.  4. Uncontrolled type 2 diabetes mellitus with hyperglycemia (HCC)  Continue current medications.  We will check a glucose when she comes in the office in a couple of weeks.  Recommend ADA diet.  5. Stage 3 chronic kidney disease, unspecified whether stage 3a or 3b CKD (HCC)  Stable.  Recheck labs with future visit  6. Acquired hypothyroidism  Symptoms stable.  Recheck TSH in the future  7. Mixed hyperlipidemia  Continue statin.  Recheck lipids with future visit         Return in about 2  weeks (around 2/21/2022) for Recheck.  I discussed the findings and my recommendations with patient.  Patient was encouraged to keep me informed of any acute changes, lack of improvement, or any new concerning symptoms.  Dictated Utilizing Dragon Dictation   Please note that portions of this note were completed with a voice recognition program.   Part of this note may be an electronic transcription/translation of spoken language to printed text using the Dragon Dictation System.

## 2022-02-07 NOTE — OUTREACH NOTE
COVID-19 Week 2 Survey      Responses   [facility] patient discharged from? [place]   Does the patient have one of the following disease processes/diagnoses(primary or secondary)? COVID-19   COVID-19 underlying condition? None   Call Number Call 1   COVID-19 Week 2: Call 1 attempt successful? Yes   Call start time 0837   Call end time 0849   Discharge diagnosis COVID-19 with pneumonia   Meds reviewed with patient/caregiver? Yes   Is the patient having any side effects they believe may be caused by any medication additions or changes? No   Is the patient taking all medications as directed (includes completed medication regime)? Yes   Does the patient have a primary care provider?  Yes   Comments regarding PCP Hospital PCP FOLLOW UP APPOINTMENT IS 2/7/22@1130am via [employer] video   Has the patient kept scheduled appointments due by today? N/A   Has home health visited the patient within 72 hours of discharge? N/A   Has all DME been delivered? Yes   Psychosocial issues? No   What is the patient's perception of their health status since discharge? Same   Does the patient have any of the following symptoms? Cough,  Loss of taste/smell  [slight, dry cough]   Nursing Interventions Nurse provided patient education   Pulse Ox monitoring Intermittent   Pulse Ox device source Patient   O2 Sat comments 95% on RA   O2 Sat: education provided Sat levels,  Monitoring frequency   Is the patient/caregiver able to teach back steps to recovery at home? Set small, achievable goals for return to baseline health,  Rest and rebuild strength, gradually increase activity,  Eat a well-balance diet   Is the patient/caregiver able to teach back the hierarchy of who to call/visit for symptoms/problems? PCP, Specialist, Home health nurse, Urgent Care, ED, 911 Yes   COVID-19 call completed? Yes          MIHIR FINCH RN

## 2022-02-10 ENCOUNTER — READMISSION MANAGEMENT (OUTPATIENT)
Dept: CALL CENTER | Facility: HOSPITAL | Age: 52
End: 2022-02-10

## 2022-02-10 NOTE — OUTREACH NOTE
COVID-19 Week 2 Survey      Responses   [facility] patient discharged from? [place]   Does the patient have one of the following disease processes/diagnoses(primary or secondary)? COVID-19   COVID-19 underlying condition? None   Call Number Call 2   COVID-19 Week 2: Call 1 attempt successful? No   Discharge diagnosis COVID-19 with pneumonia          Barbara Alicea RN

## 2022-02-17 ENCOUNTER — READMISSION MANAGEMENT (OUTPATIENT)
Dept: CALL CENTER | Facility: HOSPITAL | Age: 52
End: 2022-02-17

## 2022-02-17 NOTE — OUTREACH NOTE
COVID-19 Week 3 Survey      Responses   [facility] patient discharged from? [place]   Does the patient have one of the following disease processes/diagnoses(primary or secondary)? COVID-19   COVID-19 underlying condition? None   Call Number Call 1   COVID-19 Week 3: Call 1 attempt successful? No   Discharge diagnosis COVID-19 with pneumonia          Barbara Alicea RN

## 2022-02-25 ENCOUNTER — READMISSION MANAGEMENT (OUTPATIENT)
Dept: CALL CENTER | Facility: HOSPITAL | Age: 52
End: 2022-02-25

## 2022-02-25 DIAGNOSIS — E03.9 ACQUIRED HYPOTHYROIDISM: ICD-10-CM

## 2022-02-25 NOTE — OUTREACH NOTE
COVID-19 Week 4 Survey      Responses   [facility] patient discharged from? [place]   Does the patient have one of the following disease processes/diagnoses(primary or secondary)? COVID-19   COVID-19 underlying condition? None   Call Number Call 1   COVID-19 Week 4: Call 1 attempt successful? No   Discharge diagnosis COVID-19 with pneumonia          Maira Mckenzie RN

## 2022-03-04 DIAGNOSIS — J45.20 MILD INTERMITTENT ASTHMA WITHOUT COMPLICATION: ICD-10-CM

## 2022-03-04 RX ORDER — LEVOTHYROXINE SODIUM 0.05 MG/1
TABLET ORAL
Qty: 30 TABLET | Refills: 0 | Status: SHIPPED | OUTPATIENT
Start: 2022-03-04 | End: 2022-05-06

## 2022-03-04 NOTE — TELEPHONE ENCOUNTER
Appt has been scheduled. Please determine how much you would like to send in since she is a little ways behind on follow-up.

## 2022-03-07 RX ORDER — ALBUTEROL SULFATE 90 UG/1
2 AEROSOL, METERED RESPIRATORY (INHALATION) EVERY 6 HOURS PRN
Qty: 18 G | Refills: 0 | Status: SHIPPED | OUTPATIENT
Start: 2022-03-07 | End: 2022-03-09 | Stop reason: SDUPTHER

## 2022-03-09 ENCOUNTER — OFFICE VISIT (OUTPATIENT)
Dept: INTERNAL MEDICINE | Facility: CLINIC | Age: 52
End: 2022-03-09

## 2022-03-09 VITALS
TEMPERATURE: 98.4 F | SYSTOLIC BLOOD PRESSURE: 140 MMHG | BODY MASS INDEX: 41.15 KG/M2 | HEART RATE: 108 BPM | RESPIRATION RATE: 18 BRPM | WEIGHT: 241 LBS | OXYGEN SATURATION: 97 % | DIASTOLIC BLOOD PRESSURE: 98 MMHG | HEIGHT: 64 IN

## 2022-03-09 DIAGNOSIS — E11.65 UNCONTROLLED TYPE 2 DIABETES MELLITUS WITH HYPERGLYCEMIA: ICD-10-CM

## 2022-03-09 DIAGNOSIS — N18.32 STAGE 3B CHRONIC KIDNEY DISEASE: ICD-10-CM

## 2022-03-09 DIAGNOSIS — E78.2 MIXED HYPERLIPIDEMIA: ICD-10-CM

## 2022-03-09 DIAGNOSIS — E03.9 ACQUIRED HYPOTHYROIDISM: ICD-10-CM

## 2022-03-09 DIAGNOSIS — J45.20 MILD INTERMITTENT ASTHMA WITHOUT COMPLICATION: ICD-10-CM

## 2022-03-09 DIAGNOSIS — U07.1 COVID: Primary | ICD-10-CM

## 2022-03-09 DIAGNOSIS — I26.99 ACUTE PULMONARY EMBOLISM WITHOUT ACUTE COR PULMONALE, UNSPECIFIED PULMONARY EMBOLISM TYPE: ICD-10-CM

## 2022-03-09 DIAGNOSIS — E66.01 MORBID OBESITY WITH BMI OF 40.0-44.9, ADULT: ICD-10-CM

## 2022-03-09 DIAGNOSIS — F33.41 RECURRENT MAJOR DEPRESSIVE DISORDER, IN PARTIAL REMISSION: ICD-10-CM

## 2022-03-09 DIAGNOSIS — I10 ESSENTIAL HYPERTENSION: ICD-10-CM

## 2022-03-09 PROCEDURE — 99214 OFFICE O/P EST MOD 30 MIN: CPT | Performed by: NURSE PRACTITIONER

## 2022-03-09 RX ORDER — ALBUTEROL SULFATE 90 UG/1
2 AEROSOL, METERED RESPIRATORY (INHALATION) EVERY 6 HOURS PRN
Qty: 18 G | Refills: 3 | Status: SHIPPED | OUTPATIENT
Start: 2022-03-09 | End: 2022-06-08 | Stop reason: SDUPTHER

## 2022-03-09 NOTE — PROGRESS NOTES
Freida Martinez presents to [facility] for     Chief Complaint  Chief Complaint   Patient presents with   • Pulmonary Embolism     Follow up on pul embolism/covid         Subjective        History of Present Illness  Freida Martinez is a 52-year-old female who has tobacco abuse, CKD 3, hypothyroidism, hyperlipidemia, uncontrolled diabetes, and obesity.  She was admitted to [facility] 1/30/2020 through 2/1/2020 after she had a 3-week onset of upper respiratory symptoms, loss of taste or smell, and increasing shortness of breath.  Diagnostics revealed positive COVID-19 and multiplesubsegmental pulmonary emboli.   she is anticoagulated with eliquis. Denies any bleeding.   T2 DM has been uncontrolled with her last a1c of 11.28% in 12/2021  She has not taken her farxiga  Or januvia in over a week.   She has not had BP meds yet today.   Reports she has been taking her medications more regularly  Still does not have full taste back from COVID.   She has not been smoking    has psoriatic rash to bilateral hands which significantly limits her ability to check her glucose via fingerstick.     CKD stage 3 has been Stable-Baseline GFR 40s, creatinine 1.2-1.5        Review of Systems   Constitutional: Positive for fatigue. Negative for activity change, appetite change, chills, diaphoresis, fever, unexpected weight gain and unexpected weight loss.   HENT: Negative for voice change.    Eyes: Negative for blurred vision, double vision, pain and visual disturbance.   Respiratory: Negative for cough, chest tightness, shortness of breath and wheezing.    Cardiovascular: Negative for chest pain, palpitations and leg swelling.   Gastrointestinal: Negative for abdominal distention, abdominal pain, constipation, diarrhea, nausea and vomiting.   Endocrine: Negative for cold intolerance, heat intolerance, polydipsia, polyphagia and polyuria.   Genitourinary: Negative for difficulty  urinating, frequency and urgency.   Musculoskeletal: Positive for arthralgias. Negative for myalgias.   Skin: Positive for rash. Negative for color change and dry skin.   Neurological: Negative for dizziness, facial asymmetry, weakness, light-headedness, numbness, headache and confusion.   Hematological: Negative for adenopathy. Does not bruise/bleed easily.   Psychiatric/Behavioral: Positive for decreased concentration, dysphoric mood, sleep disturbance, depressed mood and stress. Negative for self-injury and suicidal ideas. The patient is nervous/anxious.          Allergies   Allergen Reactions   • Anaprox [Naproxen] Other (See Comments)     CHRONIC KIDNEY DISEASE   • Celebrex [Celecoxib] Other (See Comments)     CHRONIC KIDNEY DISEASE   • Flector [Diclofenac Epolamine] Other (See Comments)     CHRONIC KIDNEY DISEASE   • Motrin [Ibuprofen] Other (See Comments)     CHRONIC KIDNEY DISEASE   • Voltaren [Diclofenac Sodium] Other (See Comments)     CHRONIC KIDNEY DISEASE   • Lisinopril Swelling     Current Outpatient Medications on File Prior to Visit   Medication Sig Dispense Refill   • albuterol (PROVENTIL) (2.5 MG/3ML) 0.083% nebulizer solution      • amLODIPine (NORVASC) 10 MG tablet Take 1 tablet by mouth Daily. 30 tablet 3   • ammonium lactate (LAC-HYDRIN) 12 % lotion      • apixaban (ELIQUIS) 5 MG tablet tablet Take 1 tablet by mouth Every 12 (Twelve) Hours. Indications: DVT/PE (active thrombosis) 60 tablet 2   • aspirin 81 MG EC tablet Take 1 tablet by mouth Daily.     • atorvastatin (LIPITOR) 40 MG tablet Take 1 tablet by mouth Daily. 30 tablet 3   • calcipotriene (DOVONOX) 0.005 % ointment      • carvedilol (COREG) 6.25 MG tablet Take 1 tablet by mouth 2 (Two) Times a Day. 60 tablet 3   • Cosentyx Sensoready, 300 MG, 150 MG/ML solution auto-injector      • dapagliflozin (FARXIGA) 5 MG tablet tablet Take 1 tablet by mouth Daily. 30 tablet 3   • diclofenac (VOLTAREN) 1 % gel gel Apply 4 g topically to the  appropriate area as directed 4 (Four) Times a Day. Small amount to affected area 300 g 3   • FLUoxetine (PROzac) 20 MG capsule Take 1 capsule by mouth Daily. 30 capsule 3   • glucose blood test strip Used to test blood sugar for Diabetes E11.9 twice a day. Pt has One Touch Verio Flex meter 100 each 12   • glucose monitor monitoring kit 1 each Daily. 1 each 0   • halobetasol (ULTRAVATE) 0.05 % ointment      • Lancets misc 1 Device Daily. 100 each 11   • levothyroxine (SYNTHROID, LEVOTHROID) 50 MCG tablet TAKE ONE TABLET BY MOUTH DAILY 30 tablet 0   • losartan (COZAAR) 25 MG tablet Take 1 tablet by mouth Daily. 30 tablet 3   • SITagliptin (JANUVIA) 50 MG tablet Take 1 tablet by mouth Daily. 30 tablet 3   • triamcinolone (KENALOG) 0.1 % cream        No current facility-administered medications on file prior to visit.         The following portions of the patient's history were reviewed and updated as appropriate: allergies, current medications, past family history, past medical history, past social history, past surgical history and problem list and are available for review within electronic record    Objective     Result Review :     The following data was reviewed by: LUCIA Manzano on 03/09/2022:  CMP    CMP 12/8/21 1/30/22 1/31/22   Glucose 235 (A) 266 (A) 281 (A)   BUN 16 29 (A) 23 (A)   Creatinine 1.38 (A) 1.82 (A) 1.77 (A)   eGFR  Am 49 (A) 35 (A) 37 (A)   Sodium 140 137 137   Potassium 4.3 4.5 4.6   Chloride 105 101 101   Calcium 10.1 9.7 8.9   Albumin 3.90 3.20 (A) 3.10 (A)   Total Bilirubin 0.4 0.6 0.5   Alkaline Phosphatase 104 79 70   AST (SGOT) 9 16 22   ALT (SGPT) 11 15 17   (A) Abnormal value       Comments are available for some flowsheets but are not being displayed.           CBC    CBC 12/8/21 1/30/22 1/31/22   WBC 10.59 11.83 (A) 13.94 (A)   RBC 5.42 (A) 5.32 (A) 4.42   Hemoglobin 15.3 14.6 12.1   Hematocrit 45.3 46.0 37.2   MCV 83.6 86.5 84.2   MCH 28.2 27.4 27.4   MCHC 33.8 31.7  "32.5   RDW 14.0 13.2 13.2   Platelets 290 501 (A) 427   (A) Abnormal value            Lipid Panel    Lipid Panel 3/23/21 12/8/21   Total Cholesterol 147 108   Triglycerides 219 (A) 175 (A)   HDL Cholesterol 31 (A) 29 (A)   VLDL Cholesterol 37 29   LDL Cholesterol  79 50   LDL/HDL Ratio 2.33 1.52   (A) Abnormal value            TSH    TSH 3/23/21 12/8/21 1/30/22   TSH 4.950 (A) 5.010 (A) 6.900 (A)   (A) Abnormal value            A1C Last 3 Results    HGBA1C Last 3 Results 3/23/21 12/8/21   Hemoglobin A1C 10.10 (A) 11.28 (A)   (A) Abnormal value            Microalbumin    Microalbumin 12/8/21   Microalbumin, Urine 35.9                      Vital Signs:   /98   Pulse 108   Temp 98.4 °F (36.9 °C)   Resp 18   Ht 162.6 cm (64\")   Wt 109 kg (241 lb)   SpO2 97%   BMI 41.37 kg/m²         Physical Exam  Vitals and nursing note reviewed.   Constitutional:       General: She is not in acute distress.     Appearance: Normal appearance. She is well-developed. She is not diaphoretic.   HENT:      Head: Normocephalic and atraumatic.   Eyes:      Conjunctiva/sclera: Conjunctivae normal.      Pupils: Pupils are equal, round, and reactive to light.   Neck:      Vascular: No JVD.   Cardiovascular:      Rate and Rhythm: Normal rate and regular rhythm.      Pulses:           Dorsalis pedis pulses are 2+ on the right side and 2+ on the left side.        Posterior tibial pulses are 2+ on the right side and 2+ on the left side.      Heart sounds: Normal heart sounds. No murmur heard.  Pulmonary:      Effort: Pulmonary effort is normal. No respiratory distress.      Breath sounds: Normal breath sounds.   Chest:      Chest wall: No tenderness.   Abdominal:      General: Bowel sounds are normal. There is no distension.      Palpations: Abdomen is soft.      Tenderness: There is no abdominal tenderness.   Musculoskeletal:         General: Normal range of motion.      Cervical back: Normal range of motion.   Skin:     General: Skin " is warm and dry.      Coloration: Skin is not pale.      Findings: Rash present.      Comments: Rash to hands is improved.  She does have some mild erythema and some superficial peeling.  There are no signs and symptoms of infection.     Neurological:      Mental Status: She is alert and oriented to person, place, and time.      Coordination: Coordination normal.   Psychiatric:         Behavior: Behavior normal.         Thought Content: Thought content normal.         Judgment: Judgment normal.             Assessment and Plan      Diagnoses and all orders for this visit:    1. COVID (Primary)  -     Hemoglobin A1c; Future  -     Lipid Panel; Future  -     Comprehensive Metabolic Panel; Future  -     CBC (No Diff); Future  -     TSH Rfx On Abnormal To Free T4; Future  Recovering well.   2. Essential hypertension  -     Hemoglobin A1c; Future  -     Lipid Panel; Future  -     Comprehensive Metabolic Panel; Future  -     CBC (No Diff); Future  -     TSH Rfx On Abnormal To Free T4; Future  Elevated in office. HAs not had meds yet today.   Continue current RX  3. Uncontrolled type 2 diabetes mellitus with hyperglycemia (HCC)  -     Hemoglobin A1c; Future  -     Lipid Panel; Future  -     Comprehensive Metabolic Panel; Future  -     CBC (No Diff); Future  -     TSH Rfx On Abnormal To Free T4; Future  -     Continuous Blood Gluc  (FreeStyle Luiza 2 [place]) device; 1 Device 1 (One) Time for 1 dose.  Dispense: 1 each; Refill: 0  -     Continuous Blood Gluc Sensor (FreeStyle Luiza 2 Sensor) misc; 1 Device Every 14 (Fourteen) Days.  Dispense: 2 each; Refill: 11  Uncontrolled.   Encouraged med compliance.   Will get her started on CGM since hand rash limits ability to check glucose.   4. Mixed hyperlipidemia  -     Hemoglobin A1c; Future  -     Lipid Panel; Future  -     Comprehensive Metabolic Panel; Future  -     CBC (No Diff); Future  -     TSH Rfx On Abnormal To Free T4; Future  Stable. Cont statin. Recheck  lipids  5. Acquired hypothyroidism  -     Hemoglobin A1c; Future  -     Lipid Panel; Future  -     Comprehensive Metabolic Panel; Future  -     CBC (No Diff); Future  -     TSH Rfx On Abnormal To Free T4; Future    6. Acute pulmonary embolism without acute cor pulmonale, unspecified pulmonary embolism type (HCC)  -     Hemoglobin A1c; Future  -     Lipid Panel; Future  -     Comprehensive Metabolic Panel; Future  -     CBC (No Diff); Future  -     TSH Rfx On Abnormal To Free T4; Future  Cont antcoag.   7. Stage 3b chronic kidney disease (HCC)  -     Hemoglobin A1c; Future  -     Lipid Panel; Future  -     Comprehensive Metabolic Panel; Future  -     CBC (No Diff); Future  -     TSH Rfx On Abnormal To Free T4; Future  Recheck renal function.   Avoid nephrotoxic agents  8. Mild intermittent asthma without complication  -     albuterol sulfate HFA (ProAir HFA) 108 (90 Base) MCG/ACT inhaler; Inhale 2 puffs Every 6 (Six) Hours As Needed for Wheezing or Shortness of Air.  Dispense: 18 g; Refill: 3  Well controlled.cont  Current rx  9. Recurrent major depressive disorder, in partial remission ([facility])  Well controlled. Cont current meds.   10. Morbid obesity with BMI of 40.0-44.9, adult ([facility])  Patient's (Body mass index is 41.37 kg/m².) indicates that they are morbidly obese (BMI > 40 or > 35 with obesity - related health condition) with health related conditions that include hypertension, diabetes mellitus and dyslipidemias . Weight is worsening. BMI is is above average; BMI management plan is completed. We discussed low calorie, low carb based diet program, portion control and increasing exercise.         Follow Up     Patient was given instructions and counseling regarding her condition or for health maintenance advice. Please see specific information pulled into the AVS if appropriate.   Any new medication's adverse effects have been discussed in detail with patient  Patient was encouraged to keep me informed of any acute  changes, lack of improvement, or any new concerning symptoms.    Return in about 3 months (around 6/9/2022) for chronic condition follow up, and needs to return for future labs ordered.          Dictated Utilizing Dragon Dictation   Please note that portions of this note were completed with a voice recognition program.   Part of this note may be an electronic transcription/translation of spoken language to printed text using the Dragon Dictation System.

## 2022-03-14 ENCOUNTER — PRIOR AUTHORIZATION (OUTPATIENT)
Dept: INTERNAL MEDICINE | Facility: CLINIC | Age: 52
End: 2022-03-14

## 2022-03-14 NOTE — TELEPHONE ENCOUNTER
Received request today for PA on Freestyle Luiza [place] and Sensor. Please confirm if we need to attempt PA.     [place]:   Key- [id number]    Sensor:   Key- [id number]

## 2022-03-16 DIAGNOSIS — E11.65 UNCONTROLLED TYPE 2 DIABETES MELLITUS WITH HYPERGLYCEMIA: Primary | ICD-10-CM

## 2022-03-16 RX ORDER — PROCHLORPERAZINE 25 MG/1
SUPPOSITORY RECTAL
Qty: 3 EACH | Refills: 11 | Status: SHIPPED | OUTPATIENT
Start: 2022-03-16 | End: 2022-12-21

## 2022-03-16 RX ORDER — PROCHLORPERAZINE 25 MG/1
1 SUPPOSITORY RECTAL TAKE AS DIRECTED
Qty: 1 EACH | Refills: 3 | Status: SHIPPED | OUTPATIENT
Start: 2022-03-16 | End: 2022-12-21

## 2022-03-16 RX ORDER — PROCHLORPERAZINE 25 MG/1
1 SUPPOSITORY RECTAL ONCE
Qty: 1 EACH | Refills: 0 | Status: SHIPPED | OUTPATIENT
Start: 2022-03-16 | End: 2022-03-16

## 2022-03-23 ENCOUNTER — LAB (OUTPATIENT)
Dept: LAB | Facility: HOSPITAL | Age: 52
End: 2022-03-23

## 2022-03-23 DIAGNOSIS — N18.32 STAGE 3B CHRONIC KIDNEY DISEASE: ICD-10-CM

## 2022-03-23 DIAGNOSIS — E03.9 ACQUIRED HYPOTHYROIDISM: ICD-10-CM

## 2022-03-23 DIAGNOSIS — I26.99 ACUTE PULMONARY EMBOLISM WITHOUT ACUTE COR PULMONALE, UNSPECIFIED PULMONARY EMBOLISM TYPE: ICD-10-CM

## 2022-03-23 DIAGNOSIS — U07.1 COVID: ICD-10-CM

## 2022-03-23 DIAGNOSIS — I10 ESSENTIAL HYPERTENSION: ICD-10-CM

## 2022-03-23 DIAGNOSIS — E78.2 MIXED HYPERLIPIDEMIA: ICD-10-CM

## 2022-03-23 DIAGNOSIS — E11.65 UNCONTROLLED TYPE 2 DIABETES MELLITUS WITH HYPERGLYCEMIA: ICD-10-CM

## 2022-03-23 LAB
ALBUMIN SERPL-MCNC: 4.1 G/DL (ref 3.5–5.2)
ALBUMIN/GLOB SERPL: 1.2 G/DL
ALP SERPL-CCNC: 112 U/L (ref 39–117)
ALT SERPL W P-5'-P-CCNC: 12 U/L (ref 1–33)
ANION GAP SERPL CALCULATED.3IONS-SCNC: 14 MMOL/L (ref 5–15)
AST SERPL-CCNC: 10 U/L (ref 1–32)
BILIRUB SERPL-MCNC: 0.5 MG/DL (ref 0–1.2)
BUN SERPL-MCNC: 20 MG/DL (ref 6–20)
BUN/CREAT SERPL: 13.1 (ref 7–25)
CALCIUM SPEC-SCNC: 9.7 MG/DL (ref 8.6–10.5)
CHLORIDE SERPL-SCNC: 103 MMOL/L (ref 98–107)
CHOLEST SERPL-MCNC: 114 MG/DL (ref 0–200)
CO2 SERPL-SCNC: 21 MMOL/L (ref 22–29)
CREAT SERPL-MCNC: 1.53 MG/DL (ref 0.57–1)
DEPRECATED RDW RBC AUTO: 43.8 FL (ref 37–54)
EGFRCR SERPLBLD CKD-EPI 2021: 40.8 ML/MIN/1.73
ERYTHROCYTE [DISTWIDTH] IN BLOOD BY AUTOMATED COUNT: 14.7 % (ref 12.3–15.4)
GLOBULIN UR ELPH-MCNC: 3.4 GM/DL
GLUCOSE SERPL-MCNC: 247 MG/DL (ref 65–99)
HBA1C MFR BLD: 9.2 % (ref 4.8–5.6)
HCT VFR BLD AUTO: 38 % (ref 34–46.6)
HDLC SERPL-MCNC: 38 MG/DL (ref 40–60)
HGB BLD-MCNC: 12.6 G/DL (ref 12–15.9)
LDLC SERPL CALC-MCNC: 39 MG/DL (ref 0–100)
LDLC/HDLC SERPL: 0.76 {RATIO}
MCH RBC QN AUTO: 27.5 PG (ref 26.6–33)
MCHC RBC AUTO-ENTMCNC: 33.2 G/DL (ref 31.5–35.7)
MCV RBC AUTO: 83 FL (ref 79–97)
PLATELET # BLD AUTO: 287 10*3/MM3 (ref 140–450)
PMV BLD AUTO: 11.7 FL (ref 6–12)
POTASSIUM SERPL-SCNC: 4.2 MMOL/L (ref 3.5–5.2)
PROT SERPL-MCNC: 7.5 G/DL (ref 6–8.5)
RBC # BLD AUTO: 4.58 10*6/MM3 (ref 3.77–5.28)
SODIUM SERPL-SCNC: 138 MMOL/L (ref 136–145)
TRIGL SERPL-MCNC: 236 MG/DL (ref 0–150)
TSH SERPL DL<=0.05 MIU/L-ACNC: 3.67 UIU/ML (ref 0.27–4.2)
VLDLC SERPL-MCNC: 37 MG/DL (ref 5–40)
WBC NRBC COR # BLD: 10.17 10*3/MM3 (ref 3.4–10.8)

## 2022-03-23 PROCEDURE — 80061 LIPID PANEL: CPT | Performed by: NURSE PRACTITIONER

## 2022-03-23 PROCEDURE — 80050 GENERAL HEALTH PANEL: CPT | Performed by: NURSE PRACTITIONER

## 2022-03-23 PROCEDURE — 83036 HEMOGLOBIN GLYCOSYLATED A1C: CPT | Performed by: NURSE PRACTITIONER

## 2022-04-13 ENCOUNTER — OFFICE VISIT (OUTPATIENT)
Dept: SLEEP MEDICINE | Facility: HOSPITAL | Age: 52
End: 2022-04-13

## 2022-04-13 VITALS
SYSTOLIC BLOOD PRESSURE: 130 MMHG | HEIGHT: 64 IN | BODY MASS INDEX: 41.93 KG/M2 | HEART RATE: 105 BPM | WEIGHT: 245.6 LBS | OXYGEN SATURATION: 96 % | DIASTOLIC BLOOD PRESSURE: 82 MMHG

## 2022-04-13 DIAGNOSIS — G47.33 OBSTRUCTIVE SLEEP APNEA, ADULT: Primary | ICD-10-CM

## 2022-04-13 DIAGNOSIS — R09.02 HYPOXIA: ICD-10-CM

## 2022-04-13 PROCEDURE — 99213 OFFICE O/P EST LOW 20 MIN: CPT | Performed by: NURSE PRACTITIONER

## 2022-04-13 NOTE — PROGRESS NOTES
Chief Complaint:   Chief Complaint   Patient presents with   • Sleeping Problem       HPI:    Freida Martinez is a 52 y.o. female here for follow-up of sleep apnea.  Patient has a history of hyperlipidemia, hypertension, acute PE, uncontrolled type 2 diabetes, obesity, hypothyroidism, CKD, depression, psoriasis, and sleep apnea.  Patient was last seen 9/3/2020.  Patient's original AHI was 127.7 and did start CPAP therapy.  Patient states she only used it very sparingly due to the recall and has not used at all for the past several months.  She is not sleeping well and awakens 6-7 times a night to use the restroom.  Without CPAP she has an Lehigh score of 19/24.  She is also on oxygen at 3 L ATC after being hospitalized for PE and Covid back in January.  We will get her set up with pulmonary to reassess.  They will also assess need for daytime oxygen and then we will be able to do overnight oximetry while wearing CPAP as far as nighttime oxygen is concerned.  We will get her supply order sent over to patient aids and get her restarted on CPAP.  We will also give her the number to [employer] so that she may register her machine.  She understands the consequences of untreated sleep apnea and now feels comfortable using after we have spoken about this.        Current medications are:   Current Outpatient Medications:   •  albuterol (PROVENTIL) (2.5 MG/3ML) 0.083% nebulizer solution, , Disp: , Rfl:   •  albuterol sulfate HFA (ProAir HFA) 108 (90 Base) MCG/ACT inhaler, Inhale 2 puffs Every 6 (Six) Hours As Needed for Wheezing or Shortness of Air., Disp: 18 g, Rfl: 3  •  amLODIPine (NORVASC) 10 MG tablet, Take 1 tablet by mouth Daily., Disp: 30 tablet, Rfl: 3  •  ammonium lactate (LAC-HYDRIN) 12 % lotion, , Disp: , Rfl:   •  apixaban (ELIQUIS) 5 MG tablet tablet, Take 1 tablet by mouth Every 12 (Twelve) Hours. Indications: DVT/PE (active thrombosis), Disp: 60 tablet, Rfl: 2  •  aspirin 81 MG EC tablet, Take 1 tablet  by mouth Daily., Disp: , Rfl:   •  atorvastatin (LIPITOR) 40 MG tablet, Take 1 tablet by mouth Daily., Disp: 30 tablet, Rfl: 3  •  calcipotriene (DOVONOX) 0.005 % ointment, , Disp: , Rfl:   •  carvedilol (COREG) 6.25 MG tablet, Take 1 tablet by mouth 2 (Two) Times a Day., Disp: 60 tablet, Rfl: 3  •  Continuous Blood Gluc Sensor (Dexcom G6 Sensor), Every 10 (Ten) Days., Disp: 3 each, Rfl: 11  •  Continuous Blood Gluc Transmit (Dexcom G6 Transmitter) misc, 1 Device Take As Directed., Disp: 1 each, Rfl: 3  •  Cosentyx Sensoready, 300 MG, 150 MG/ML solution auto-injector, , Disp: , Rfl:   •  dapagliflozin (FARXIGA) 5 MG tablet tablet, Take 1 tablet by mouth Daily., Disp: 30 tablet, Rfl: 3  •  diclofenac (VOLTAREN) 1 % gel gel, Apply 4 g topically to the appropriate area as directed 4 (Four) Times a Day. Small amount to affected area, Disp: 300 g, Rfl: 3  •  FLUoxetine (PROzac) 20 MG capsule, Take 1 capsule by mouth Daily., Disp: 30 capsule, Rfl: 3  •  glucose blood test strip, Used to test blood sugar for Diabetes E11.9 twice a day. Pt has One Touch Verio Flex meter, Disp: 100 each, Rfl: 12  •  glucose monitor monitoring kit, 1 each Daily., Disp: 1 each, Rfl: 0  •  halobetasol (ULTRAVATE) 0.05 % ointment, , Disp: , Rfl:   •  Lancets misc, 1 Device Daily., Disp: 100 each, Rfl: 11  •  levothyroxine (SYNTHROID, LEVOTHROID) 50 MCG tablet, TAKE ONE TABLET BY MOUTH DAILY, Disp: 30 tablet, Rfl: 0  •  losartan (COZAAR) 25 MG tablet, Take 1 tablet by mouth Daily., Disp: 30 tablet, Rfl: 3  •  SITagliptin (JANUVIA) 50 MG tablet, Take 1 tablet by mouth Daily., Disp: 30 tablet, Rfl: 3  •  triamcinolone (KENALOG) 0.1 % cream, , Disp: , Rfl: .      The patient's relevant past medical, surgical, family and social history were reviewed and updated in Epic as appropriate.       Review of Systems   Constitutional: Positive for fatigue.   Eyes: Positive for visual disturbance.   Respiratory: Positive for apnea, cough, chest tightness,  "shortness of breath and wheezing.    Cardiovascular: Positive for leg swelling.   Endocrine: Positive for cold intolerance.   Psychiatric/Behavioral: Positive for sleep disturbance.         Objective:    Physical Exam  Constitutional:       Appearance: Normal appearance.   HENT:      Head: Normocephalic and atraumatic.      Mouth/Throat:      Comments: Mallampati 4 anatomy  Cardiovascular:      Rate and Rhythm: Regular rhythm. Tachycardia present.   Pulmonary:      Effort: Pulmonary effort is normal.      Breath sounds: Normal breath sounds.      Comments: Decreased throughout  Skin:     General: Skin is warm and dry.   Neurological:      Mental Status: She is alert and oriented to person, place, and time.   Psychiatric:         Mood and Affect: Mood normal.         Behavior: Behavior normal.         Thought Content: Thought content normal.         Judgment: Judgment normal.       /82 (BP Location: Left arm, Patient Position: Sitting, Cuff Size: Adult)   Pulse 105   Ht 163.2 cm (64.25\")   Wt 111 kg (245 lb 9.6 oz)   SpO2 96%   BMI 41.83 kg/m²       ASSESSMENT/PLAN    Diagnoses and all orders for this visit:    1. Obstructive sleep apnea, adult (Primary)  -     PAP Therapy    2. Hypoxia  -     Ambulatory Referral to Pulmonology            1. Counseled patient regarding multimodal approach with healthy nutrition, healthy sleep, regular physical activity, social activities, counseling, and medications. Encouraged to practice lateral sleep position. Avoid alcohol and sedatives close to bedtime.  2. Order for supplies sent to patient's DME she will restart CPAP tonight and register her machine with [employer].  We will also refer her to pulmonology to reassess hypoxia.  I will see her back in 6 to 8 weeks or sooner symptoms warrant.    I have reviewed the results of my evaluation and impression and discussed my recommendations in detail with the patient.      Signed by  Diamond Henao, APRN    April 13, " 2022      CC: Annia Rico, APRN          No ref. provider found

## 2022-05-05 DIAGNOSIS — E03.9 ACQUIRED HYPOTHYROIDISM: ICD-10-CM

## 2022-05-06 RX ORDER — LEVOTHYROXINE SODIUM 0.05 MG/1
TABLET ORAL
Qty: 30 TABLET | Refills: 0 | Status: SHIPPED | OUTPATIENT
Start: 2022-05-06 | End: 2022-06-08 | Stop reason: SDUPTHER

## 2022-05-06 NOTE — TELEPHONE ENCOUNTER
Rx Refill Note  Requested Prescriptions     Pending Prescriptions Disp Refills   • levothyroxine (SYNTHROID, LEVOTHROID) 50 MCG tablet [Pharmacy Med Name: LEVOTHYROXINE 50 MCG TABLET] 30 tablet 0     Sig: TAKE ONE TABLET BY MOUTH DAILY      Last filled: 03/04/2022  Last office visit with prescribing clinician: 3/9/2022      Next office visit with prescribing clinician: 6/8/2022 April KRISTI Avila MA  05/06/22, 07:35 EDT

## 2022-05-18 ENCOUNTER — OFFICE VISIT (OUTPATIENT)
Dept: PULMONOLOGY | Facility: CLINIC | Age: 52
End: 2022-05-18

## 2022-05-18 VITALS
HEART RATE: 102 BPM | WEIGHT: 247.6 LBS | SYSTOLIC BLOOD PRESSURE: 128 MMHG | BODY MASS INDEX: 42.27 KG/M2 | OXYGEN SATURATION: 98 % | TEMPERATURE: 97.1 F | DIASTOLIC BLOOD PRESSURE: 84 MMHG | HEIGHT: 64 IN

## 2022-05-18 DIAGNOSIS — U07.1 COVID-19 VIRUS INFECTION: ICD-10-CM

## 2022-05-18 DIAGNOSIS — R06.02 SHORTNESS OF BREATH: Primary | ICD-10-CM

## 2022-05-18 DIAGNOSIS — I26.99 OTHER ACUTE PULMONARY EMBOLISM WITHOUT ACUTE COR PULMONALE: ICD-10-CM

## 2022-05-18 DIAGNOSIS — J96.11 CHRONIC RESPIRATORY FAILURE WITH HYPOXIA: ICD-10-CM

## 2022-05-18 PROCEDURE — 94618 PULMONARY STRESS TESTING: CPT | Performed by: INTERNAL MEDICINE

## 2022-05-18 PROCEDURE — 99214 OFFICE O/P EST MOD 30 MIN: CPT | Performed by: INTERNAL MEDICINE

## 2022-05-18 NOTE — PROGRESS NOTES
New Patient Pulmonary Office Visit      Patient Name: Freida Martinez    Referring Physician: Diamond Henao APRN    Chief Complaint:    Chief Complaint   Patient presents with   • Shortness of Breath     New patient       History of Present Illness: Freida Martinez is a 52 y.o. female who is here today to establish care with Pulmonary.  Patient is a past medical history significant for CKD stage III, psoriasis, ocular sleep apnea, hypothyroidism, obesity, hypertension, and hyperlipidemia.  She was referred to pulmonary for evaluation of shortness of breath and hypoxemia.  The patient states that she was hospitalized back in January 2022 with acute hypoxic respiratory failure secondary to COVID-19 pneumonia and pulmonary embolism.  She has been on Eliquis now for the last 5 months.  She was treated in the hospital and was discharged on 3 L nasal cannula has been on 3 L of oxygen ever since that time.  She also has very severe struct sleep apnea with an AHI of over 100 and she is trying to get started on her CPAP which she does not have quite yet.  She is utilizing the oxygen at night.  She denies any nausea, vomiting, fever, or chills.  She has quit smoking since she got out of the hospital.    Review of Systems:   Review of Systems   Constitutional: Negative for activity change, appetite change, chills and diaphoresis.   HENT: Negative for congestion, postnasal drip, sinus pressure and voice change.    Eyes: Negative for blurred vision.   Respiratory: Negative for cough, shortness of breath and wheezing.    Cardiovascular: Negative for chest pain.   Gastrointestinal: Negative for abdominal pain.   Musculoskeletal: Negative for myalgias.   Skin: Negative for color change and dry skin.   Allergic/Immunologic: Negative for environmental allergies.   Neurological: Negative for weakness and confusion.   Hematological: Negative for adenopathy.   Psychiatric/Behavioral: Negative for sleep disturbance and  depressed mood.       Past Medical History:   Past Medical History:   Diagnosis Date   • Abdominal pain 2019   • JACKIE (acute kidney injury) (HCC) 2019   • Arthritis    • Asthma    • Breast injury    • Diabetes mellitus (HCC)    • Disease of thyroid gland    • Epigastric pain 2019    Added automatically from request for surgery [id number]   • Hypertension    • Kidney infection    • Mental disorder    • Non-compliance 2019   • Sleep apnea    • Volume depletion 2019       Past Surgical History:   Past Surgical History:   Procedure Laterality Date   • ENDOSCOPY N/A 4/15/2019    Procedure: ESOPHAGOGASTRODUODENOSCOPY;  Surgeon: Jude Clinton MD;  Location: [facility] ENDOSCOPY;  Service: Gastroenterology   • TUBAL ABDOMINAL LIGATION     • VAGINAL DELIVERY      x4   • WISDOM TOOTH EXTRACTION         Family History:   Family History   Problem Relation Age of Onset   • Hypertension Mother    • Diabetes Father    • Kidney disease Father    • Heart disease Father    • Hypertension Father    • Sleep apnea Father    • Cancer Maternal Aunt    • Arthritis Paternal Aunt    • Cancer Maternal Grandmother         BREAST   • Breast cancer Maternal Grandmother    • Kidney disease Paternal Grandfather    • Kidney disease Paternal Uncle        Social History:   Social History     Socioeconomic History   • Marital status: Single   Tobacco Use   • Smoking status: Former Smoker     Packs/day: 1.00     Types: Cigarettes     Quit date: 2021     Years since quittin.2   • Smokeless tobacco: Never Used   Vaping Use   • Vaping Use: Never used   Substance and Sexual Activity   • Alcohol use: No   • Drug use: No   • Sexual activity: Never       Medications:     Current Outpatient Medications:   •  albuterol (PROVENTIL) (2.5 MG/3ML) 0.083% nebulizer solution, , Disp: , Rfl:   •  albuterol sulfate HFA (ProAir HFA) 108 (90 Base) MCG/ACT inhaler, Inhale 2 puffs Every 6 (Six) Hours As Needed for Wheezing or Shortness of Air.,  Disp: 18 g, Rfl: 3  •  amLODIPine (NORVASC) 10 MG tablet, Take 1 tablet by mouth Daily., Disp: 30 tablet, Rfl: 3  •  ammonium lactate (LAC-HYDRIN) 12 % lotion, , Disp: , Rfl:   •  apixaban (ELIQUIS) 5 MG tablet tablet, Take 1 tablet by mouth Every 12 (Twelve) Hours. Indications: DVT/PE (active thrombosis), Disp: 60 tablet, Rfl: 2  •  aspirin 81 MG EC tablet, Take 1 tablet by mouth Daily., Disp: , Rfl:   •  atorvastatin (LIPITOR) 40 MG tablet, Take 1 tablet by mouth Daily., Disp: 30 tablet, Rfl: 3  •  calcipotriene (DOVONOX) 0.005 % ointment, , Disp: , Rfl:   •  carvedilol (COREG) 6.25 MG tablet, Take 1 tablet by mouth 2 (Two) Times a Day., Disp: 60 tablet, Rfl: 3  •  Continuous Blood Gluc Sensor (Dexcom G6 Sensor), Every 10 (Ten) Days., Disp: 3 each, Rfl: 11  •  Continuous Blood Gluc Transmit (Dexcom G6 Transmitter) misc, 1 Device Take As Directed., Disp: 1 each, Rfl: 3  •  Cosentyx Sensoready, 300 MG, 150 MG/ML solution auto-injector, , Disp: , Rfl:   •  dapagliflozin (FARXIGA) 5 MG tablet tablet, Take 1 tablet by mouth Daily., Disp: 30 tablet, Rfl: 3  •  diclofenac (VOLTAREN) 1 % gel gel, Apply 4 g topically to the appropriate area as directed 4 (Four) Times a Day. Small amount to affected area, Disp: 300 g, Rfl: 3  •  FLUoxetine (PROzac) 20 MG capsule, Take 1 capsule by mouth Daily., Disp: 30 capsule, Rfl: 3  •  glucose blood test strip, Used to test blood sugar for Diabetes E11.9 twice a day. Pt has One Touch Verio Flex meter, Disp: 100 each, Rfl: 12  •  glucose monitor monitoring kit, 1 each Daily., Disp: 1 each, Rfl: 0  •  halobetasol (ULTRAVATE) 0.05 % ointment, , Disp: , Rfl:   •  Lancets misc, 1 Device Daily., Disp: 100 each, Rfl: 11  •  levothyroxine (SYNTHROID, LEVOTHROID) 50 MCG tablet, TAKE ONE TABLET BY MOUTH DAILY, Disp: 30 tablet, Rfl: 0  •  losartan (COZAAR) 25 MG tablet, Take 1 tablet by mouth Daily., Disp: 30 tablet, Rfl: 3  •  SITagliptin (JANUVIA) 50 MG tablet, Take 1 tablet by mouth Daily.,  "Disp: 30 tablet, Rfl: 3  •  triamcinolone (KENALOG) 0.1 % cream, , Disp: , Rfl:     Allergies:   Allergies   Allergen Reactions   • Anaprox [Naproxen] Other (See Comments)     CHRONIC KIDNEY DISEASE   • Celebrex [Celecoxib] Other (See Comments)     CHRONIC KIDNEY DISEASE   • Flector [Diclofenac Epolamine] Other (See Comments)     CHRONIC KIDNEY DISEASE   • Motrin [Ibuprofen] Other (See Comments)     CHRONIC KIDNEY DISEASE   • Voltaren [Diclofenac Sodium] Other (See Comments)     CHRONIC KIDNEY DISEASE   • Lisinopril Swelling       Physical Exam:  Vital Signs:   Vitals:    05/18/22 1451   BP: 128/84   BP Location: Right arm   Patient Position: Sitting   Cuff Size: Adult   Pulse: 102   Temp: 97.1 °F (36.2 °C)   TempSrc: Infrared   SpO2: 98%   Weight: 112 kg (247 lb 9.6 oz)   Height: 163.2 cm (64.25\")   PF: (!) 3 L/min       Physical Exam  Vitals and nursing note reviewed.   Constitutional:       General: She is not in acute distress.     Appearance: She is well-developed. She is obese. She is not ill-appearing or toxic-appearing.   HENT:      Head: Normocephalic and atraumatic.   Cardiovascular:      Rate and Rhythm: Normal rate and regular rhythm.      Pulses: Normal pulses.      Heart sounds: Normal heart sounds. No murmur heard.    No friction rub. No gallop.   Pulmonary:      Effort: Pulmonary effort is normal. No respiratory distress.      Breath sounds: Normal breath sounds. No wheezing, rhonchi or rales.   Musculoskeletal:      Right lower leg: No edema.      Left lower leg: No edema.   Skin:     General: Skin is warm and dry.   Neurological:      Mental Status: She is alert and oriented to person, place, and time.         Immunization History   Administered Date(s) Administered   • COVID-19 (MODERNA) 1st, 2nd, 3rd Dose Only 02/27/2021, 03/27/2021   • Flu Vaccine Quad PF >36MO 03/27/2018, 04/04/2019   • FluLaval/Fluarix/Fluzone >6 09/29/2020, 12/08/2021   • Hepatitis A 04/04/2019, 09/29/2020   • Hepatitis B " Vaccine Adult IM 05/05/2021   • Influenza Quad Vaccine (Inpatient) 02/23/2016   • Influenza TIV (IM) 01/07/2011   • Influenza, Unspecified 10/25/2019, 08/17/2020   • Pneumococcal Conjugate 13-Valent (PCV13) 05/26/2020   • Pneumococcal Polysaccharide (PPSV23) 04/04/2019   • Shingrix 08/17/2020, 12/01/2020, 02/06/2021, 02/12/2021   • Tdap 01/07/2011, 08/03/2017   • flucelvax quad pfs =>4 YRS 10/25/2019       Results Review:   - I personally reviewed the pts imaging from CT scan of the chest from 1/30/2022 shows segmental and subsegmental pulmonary emboli, with significant left-sided airspace disease.  Also present on the right although much more minimal.  Also reviewed her chest x-ray from 5/18/2022 which shows no acute cardiopulmonary process.  - I personally reviewed the pts Echo report from 6/26/2020 showed EF of 66 to 70% mild LVH with a normal RVSP.  - I personally reviewed the pts minute walk test from 5/18/2022 which showed she was able to walk 259 m which is 57% of predicted, oxygen saturation ranging 99 to 94% heart rate started at 97 bpm and went up to 125 bpm dyspnea scale range from 2 all the way up to 4 there was no signs of any desaturations.    Assessment / Plan:   1. Shortness of breath (Primary)  2. Chronic respiratory failure with hypoxia (HCC)  - Shortness of breath is likely due to deconditioning in addition to possible underlying COPD.  For now want her to go ahead and continue on the albuterol.  I am going to have her get a full set of PFTs with the next follow-up for better evaluation.  Continue with regular diet and exercise.    3. Other acute pulmonary embolism without acute cor pulmonale (HCC)  -6-minute walk test today showed no signs of desaturation therefore we will discuss having daytime oxygen.  I want her to utilize the nighttime oxygen with her CPAP for now.  I will go ahead and make sure that she has the adapter so that her oxygen can plug into her CPAP.  And then eventually I will do  an overnight oximetry while she is utilizing her CPAP determine if she needs nighttime oxygen long-term.    4. COVID-19 virus infection  -With regards to the pulmonary embolism this is likely secondary to COVID-19 pneumonia and I would recommend 6 months of anticoagulation before she is discontinued.  This would put her with the discontinuation date in July 2022.  If she has not discontinued it by next follow-up I would discontinue it at that time.    Follow Up:   Return in about 6 weeks (around 6/29/2022) for PFTs.     MIGUELANGEL Santos, DO  Pulmonary and Critical Care Medicine  Note Electronically Signed    Part of this note may be an electronic transcription/translation of spoken language to printed text using the Dragon Dictation System.

## 2022-06-08 ENCOUNTER — OFFICE VISIT (OUTPATIENT)
Dept: INTERNAL MEDICINE | Facility: CLINIC | Age: 52
End: 2022-06-08

## 2022-06-08 VITALS
HEART RATE: 85 BPM | OXYGEN SATURATION: 94 % | HEIGHT: 64 IN | DIASTOLIC BLOOD PRESSURE: 86 MMHG | WEIGHT: 249 LBS | TEMPERATURE: 97.9 F | SYSTOLIC BLOOD PRESSURE: 122 MMHG | RESPIRATION RATE: 24 BRPM | BODY MASS INDEX: 42.51 KG/M2

## 2022-06-08 DIAGNOSIS — J45.20 MILD INTERMITTENT ASTHMA WITHOUT COMPLICATION: ICD-10-CM

## 2022-06-08 DIAGNOSIS — E03.9 ACQUIRED HYPOTHYROIDISM: ICD-10-CM

## 2022-06-08 DIAGNOSIS — F33.0 MILD EPISODE OF RECURRENT MAJOR DEPRESSIVE DISORDER: ICD-10-CM

## 2022-06-08 DIAGNOSIS — I10 ESSENTIAL HYPERTENSION: ICD-10-CM

## 2022-06-08 DIAGNOSIS — E11.65 UNCONTROLLED TYPE 2 DIABETES MELLITUS WITH HYPERGLYCEMIA: Primary | ICD-10-CM

## 2022-06-08 DIAGNOSIS — N18.30 STAGE 3 CHRONIC KIDNEY DISEASE, UNSPECIFIED WHETHER STAGE 3A OR 3B CKD: ICD-10-CM

## 2022-06-08 DIAGNOSIS — E78.2 MIXED HYPERLIPIDEMIA: ICD-10-CM

## 2022-06-08 LAB
EXPIRATION DATE: ABNORMAL
HBA1C MFR BLD: 8.7 %
Lab: ABNORMAL

## 2022-06-08 PROCEDURE — 83036 HEMOGLOBIN GLYCOSYLATED A1C: CPT | Performed by: NURSE PRACTITIONER

## 2022-06-08 PROCEDURE — 99214 OFFICE O/P EST MOD 30 MIN: CPT | Performed by: NURSE PRACTITIONER

## 2022-06-08 PROCEDURE — 3052F HG A1C>EQUAL 8.0%<EQUAL 9.0%: CPT | Performed by: NURSE PRACTITIONER

## 2022-06-08 RX ORDER — LEVOTHYROXINE SODIUM 0.05 MG/1
50 TABLET ORAL DAILY
Qty: 30 TABLET | Refills: 3 | Status: SHIPPED | OUTPATIENT
Start: 2022-06-08 | End: 2022-09-07 | Stop reason: SDUPTHER

## 2022-06-08 RX ORDER — AMLODIPINE BESYLATE 10 MG/1
10 TABLET ORAL DAILY
Qty: 30 TABLET | Refills: 3 | Status: SHIPPED | OUTPATIENT
Start: 2022-06-08 | End: 2022-09-07 | Stop reason: SDUPTHER

## 2022-06-08 RX ORDER — LOSARTAN POTASSIUM 25 MG/1
25 TABLET ORAL DAILY
Qty: 30 TABLET | Refills: 3 | Status: SHIPPED | OUTPATIENT
Start: 2022-06-08 | End: 2022-09-07 | Stop reason: SDUPTHER

## 2022-06-08 RX ORDER — FLUOXETINE HYDROCHLORIDE 20 MG/1
20 CAPSULE ORAL DAILY
Qty: 30 CAPSULE | Refills: 3 | Status: SHIPPED | OUTPATIENT
Start: 2022-06-08 | End: 2022-09-07 | Stop reason: SDUPTHER

## 2022-06-08 RX ORDER — ATORVASTATIN CALCIUM 40 MG/1
40 TABLET, FILM COATED ORAL DAILY
Qty: 30 TABLET | Refills: 3 | Status: SHIPPED | OUTPATIENT
Start: 2022-06-08 | End: 2022-09-07 | Stop reason: SDUPTHER

## 2022-06-08 RX ORDER — CARVEDILOL 6.25 MG/1
6.25 TABLET ORAL 2 TIMES DAILY
Qty: 60 TABLET | Refills: 3 | Status: SHIPPED | OUTPATIENT
Start: 2022-06-08 | End: 2022-09-07 | Stop reason: SDUPTHER

## 2022-06-08 RX ORDER — ALBUTEROL SULFATE 90 UG/1
2 AEROSOL, METERED RESPIRATORY (INHALATION) EVERY 6 HOURS PRN
Qty: 18 G | Refills: 3 | Status: SHIPPED | OUTPATIENT
Start: 2022-06-08 | End: 2022-09-21 | Stop reason: SDUPTHER

## 2022-06-08 NOTE — PROGRESS NOTES
Freida Martinez presents to [facility] for     Chief Complaint  Chief Complaint   Patient presents with   • Diabetes     3 month f/u   • Hypertension   • Hypothyroidism         Subjective        History of Present Illness      Type II DM    she used the dexcom. She tells me that she broke her sensor and did not try another.   She tells me she knows how to use and plans on placing another one  No glucose readings for me to review   She drinks soda and juice and is not willing to stop them.   Her last hemoglobin A1c was 9.2%.  She is currently taking Januvia and farxiga.  She is compliant with dosing and denies any adverse effects.   has psoriatic rash to bilateral hands which significantly limits her ability to check her glucose via fingerstick.  Following rheumatology and rash seems to be slowly improving with her interventions.    HTN-chronic.  Currently on losartan, carvedilol, and amlodipine.  Compliant with dosing and denies any adverse effects.  Denies headaches, dizziness, visual disturbances, palpitations chest pain, dyspnea, TIA or CVA symptoms, leg pain/claudication symptoms, and edema.       Hypothyroidism-chronic.  Denies any hypo or hyperthyroidism symptoms.  She is currently on levothyroxine and compliant with dosing.      COPD/PE/recent COVID-she was hospitalized in January 2022 for COVID and multiple subsegmental pulmonary emboli.  She is currently anticoagulated with Eliquis.  She denies any bleeding issues.  Saw pulm-they recommended  6 m of anticoag which will be thru July 2022        CKD stage 3 has been Stable-Baseline GFR 40s, creatinine 1.2-1.5         Review of Systems  12 point ROS negative except as noted in HPI above    Allergies   Allergen Reactions   • Anaprox [Naproxen] Other (See Comments)     CHRONIC KIDNEY DISEASE   • Celebrex [Celecoxib] Other (See Comments)     CHRONIC KIDNEY DISEASE   • Flector [Diclofenac Epolamine] Other (See Comments)      CHRONIC KIDNEY DISEASE   • Motrin [Ibuprofen] Other (See Comments)     CHRONIC KIDNEY DISEASE   • Voltaren [Diclofenac Sodium] Other (See Comments)     CHRONIC KIDNEY DISEASE   • Lisinopril Swelling     Current Outpatient Medications on File Prior to Visit   Medication Sig Dispense Refill   • albuterol (PROVENTIL) (2.5 MG/3ML) 0.083% nebulizer solution      • ammonium lactate (LAC-HYDRIN) 12 % lotion      • apixaban (ELIQUIS) 5 MG tablet tablet Take 1 tablet by mouth Every 12 (Twelve) Hours. Indications: DVT/PE (active thrombosis) 60 tablet 2   • aspirin 81 MG EC tablet Take 1 tablet by mouth Daily.     • calcipotriene (DOVONOX) 0.005 % ointment      • Continuous Blood Gluc Sensor (Dexcom G6 Sensor) Every 10 (Ten) Days. 3 each 11   • Continuous Blood Gluc Transmit (Dexcom G6 Transmitter) misc 1 Device Take As Directed. 1 each 3   • Cosentyx Sensoready, 300 MG, 150 MG/ML solution auto-injector      • diclofenac (VOLTAREN) 1 % gel gel Apply 4 g topically to the appropriate area as directed 4 (Four) Times a Day. Small amount to affected area 300 g 3   • glucose blood test strip Used to test blood sugar for Diabetes E11.9 twice a day. Pt has One Touch Verio Flex meter 100 each 12   • glucose monitor monitoring kit 1 each Daily. 1 each 0   • halobetasol (ULTRAVATE) 0.05 % ointment      • Lancets misc 1 Device Daily. 100 each 11   • triamcinolone (KENALOG) 0.1 % cream        No current facility-administered medications on file prior to visit.         The following portions of the patient's history were reviewed and updated as appropriate: allergies, current medications, past family history, past medical history, past social history, past surgical history and problem list and are available for review within electronic record    Objective     Result Review :     The following data was reviewed by: LUCIA Manzano on 06/08/2022:  CMP    CMP 1/30/22 1/31/22 3/23/22   Glucose 266 (A) 281 (A) 247 (A)   BUN 29 (A) 23 (A)  "20   Creatinine 1.82 (A) 1.77 (A) 1.53 (A)   eGFR  Am 35 (A) 37 (A)    Sodium 137 137 138   Potassium 4.5 4.6 4.2   Chloride 101 101 103   Calcium 9.7 8.9 9.7   Albumin 3.20 (A) 3.10 (A) 4.10   Total Bilirubin 0.6 0.5 0.5   Alkaline Phosphatase 79 70 112   AST (SGOT) 16 22 10   ALT (SGPT) 15 17 12   (A) Abnormal value       Comments are available for some flowsheets but are not being displayed.           CBC    CBC 1/30/22 1/31/22 3/23/22   WBC 11.83 (A) 13.94 (A) 10.17   RBC 5.32 (A) 4.42 4.58   Hemoglobin 14.6 12.1 12.6   Hematocrit 46.0 37.2 38.0   MCV 86.5 84.2 83.0   MCH 27.4 27.4 27.5   MCHC 31.7 32.5 33.2   RDW 13.2 13.2 14.7   Platelets 501 (A) 427 287   (A) Abnormal value            Lipid Panel    Lipid Panel 12/8/21 3/23/22   Total Cholesterol 108 114   Triglycerides 175 (A) 236 (A)   HDL Cholesterol 29 (A) 38 (A)   VLDL Cholesterol 29 37   LDL Cholesterol  50 39   LDL/HDL Ratio 1.52 0.76   (A) Abnormal value            TSH    TSH 12/8/21 1/30/22 3/23/22   TSH 5.010 (A) 6.900 (A) 3.670   (A) Abnormal value            A1C Last 3 Results    HGBA1C Last 3 Results 12/8/21 3/23/22 6/8/22   Hemoglobin A1C 11.28 (A) 9.20 (A) 8.7   (A) Abnormal value            Microalbumin    Microalbumin 12/8/21   Microalbumin, Urine 35.9                      Vital Signs:   /86 (Cuff Size: Large Adult)   Pulse 85   Temp 97.9 °F (36.6 °C) (Infrared)   Resp 24   Ht 163.2 cm (64.25\")   Wt 113 kg (249 lb)   SpO2 94%   BMI 42.41 kg/m²         Physical Exam  Vitals and nursing note reviewed.   Constitutional:       General: She is not in acute distress.     Appearance: Normal appearance. She is well-developed. She is not diaphoretic.   HENT:      Head: Normocephalic and atraumatic.   Eyes:      Conjunctiva/sclera: Conjunctivae normal.      Pupils: Pupils are equal, round, and reactive to light.   Neck:      Vascular: No JVD.   Cardiovascular:      Rate and Rhythm: Normal rate and regular rhythm.      Pulses:        "    Dorsalis pedis pulses are 2+ on the right side and 2+ on the left side.        Posterior tibial pulses are 2+ on the right side and 2+ on the left side.      Heart sounds: Normal heart sounds. No murmur heard.  Pulmonary:      Effort: Pulmonary effort is normal. No respiratory distress.      Breath sounds: Normal breath sounds.   Chest:      Chest wall: No tenderness.   Abdominal:      General: Bowel sounds are normal. There is no distension.      Palpations: Abdomen is soft.      Tenderness: There is no abdominal tenderness.   Musculoskeletal:         General: Normal range of motion.      Cervical back: Normal range of motion.   Skin:     General: Skin is warm and dry.      Coloration: Skin is not pale.      Findings: Rash present.      Comments: Rash to hands is improved.  She does have some superficial peeling. No further erythema. There are no signs and symptoms of infection.     Neurological:      Mental Status: She is alert and oriented to person, place, and time.      Coordination: Coordination normal.   Psychiatric:         Behavior: Behavior normal.         Thought Content: Thought content normal.         Judgment: Judgment normal.             Assessment and Plan      Diagnoses and all orders for this visit:    1. Uncontrolled type 2 diabetes mellitus with hyperglycemia (HCC) (Primary)  -     POC Glycosylated Hemoglobin (Hb A1C)  -     SITagliptin (JANUVIA) 100 MG tablet; Take 1 tablet by mouth Daily.  Dispense: 30 tablet; Refill: 3  -     dapagliflozin Propanediol 10 MG tablet; Take 10 mg by mouth Daily.  Dispense: 30 tablet; Refill: 3  -     losartan (COZAAR) 25 MG tablet; Take 1 tablet by mouth Daily.  Dispense: 30 tablet; Refill: 3  A1c is slowly improving.  We will have her continue the Januvia and increase the dose of her farxiga to 10 mg.  2. Essential hypertension  -     amLODIPine (NORVASC) 10 MG tablet; Take 1 tablet by mouth Daily.  Dispense: 30 tablet; Refill: 3  -     carvedilol (COREG) 6.25  MG tablet; Take 1 tablet by mouth 2 (Two) Times a Day.  Dispense: 60 tablet; Refill: 3  -     losartan (COZAAR) 25 MG tablet; Take 1 tablet by mouth Daily.  Dispense: 30 tablet; Refill: 3  BP stable.  Continue current antihypertensives.  Refills above  3. Mixed hyperlipidemia  -     atorvastatin (LIPITOR) 40 MG tablet; Take 1 tablet by mouth Daily.  Dispense: 30 tablet; Refill: 3  Stable.  Continue statin.  Low-fat low-cholesterol diet.   4. Mild episode of recurrent major depressive disorder (HCC)  -     FLUoxetine (PROzac) 20 MG capsule; Take 1 capsule by mouth Daily.  Dispense: 30 capsule; Refill: 3  Symptoms stable.  Continue fluoxetine.  5. Acquired hypothyroidism  -     levothyroxine (SYNTHROID, LEVOTHROID) 50 MCG tablet; Take 1 tablet by mouth Daily.  Dispense: 30 tablet; Refill: 3  Clinically euthyroid.  Continue levothyroxine  6. Stage 3 chronic kidney disease, unspecified whether stage 3a or 3b CKD (HCC)  -     losartan (COZAAR) 25 MG tablet; Take 1 tablet by mouth Daily.  Dispense: 30 tablet; Refill: 3  Continue ARB.  Avoid nephrotoxic agents.  7. Mild intermittent asthma without complication  -     albuterol sulfate HFA (ProAir HFA) 108 (90 Base) MCG/ACT inhaler; Inhale 2 puffs Every 6 (Six) Hours As Needed for Wheezing or Shortness of Air.  Dispense: 18 g; Refill: 3        Follow Up     Patient was given instructions and counseling regarding her condition or for health maintenance advice. Please see specific information pulled into the AVS if appropriate.   Any new medication's adverse effects have been discussed in detail with patient  Patient was encouraged to keep me informed of any acute changes, lack of improvement, or any new concerning symptoms.    Return in about 3 months (around 9/8/2022) for chronic condition follow up.          Dictated Utilizing Dragon Dictation   Please note that portions of this note were completed with a voice recognition program.   Part of this note may be an electronic  transcription/translation of spoken language to printed text using the Dragon Dictation System.

## 2022-06-10 ENCOUNTER — PRIOR AUTHORIZATION (OUTPATIENT)
Dept: INTERNAL MEDICINE | Facility: CLINIC | Age: 52
End: 2022-06-10

## 2022-06-10 NOTE — TELEPHONE ENCOUNTER
PA was submitted for the patient's Januvia 100 mg Tabets via [employer] on 06/10/2022.   Key: [id number] - PA Case ID: [id number] - Rx #: [id number]    Clinical questions were answered and sent to plan. Waiting on a determination.     Medications not requiring a PA include: Glipizide, Glipizide ER, Lantus Solostar, Metformin HCl, Metformin HCl ER, Pioglitazone HCl.

## 2022-07-11 ENCOUNTER — OFFICE VISIT (OUTPATIENT)
Dept: SLEEP MEDICINE | Facility: HOSPITAL | Age: 52
End: 2022-07-11

## 2022-07-11 VITALS
HEIGHT: 64 IN | DIASTOLIC BLOOD PRESSURE: 82 MMHG | WEIGHT: 246.6 LBS | OXYGEN SATURATION: 95 % | BODY MASS INDEX: 42.1 KG/M2 | HEART RATE: 76 BPM | SYSTOLIC BLOOD PRESSURE: 127 MMHG

## 2022-07-11 DIAGNOSIS — G47.33 OBSTRUCTIVE SLEEP APNEA, ADULT: Primary | ICD-10-CM

## 2022-07-11 PROCEDURE — 99213 OFFICE O/P EST LOW 20 MIN: CPT | Performed by: NURSE PRACTITIONER

## 2022-07-11 NOTE — PROGRESS NOTES
"Chief Complaint:   Chief Complaint   Patient presents with   • Sleeping Problem       HPI:    Freida Martinez is a 52 y.o. female here for follow-up of sleep apnea.  Patient was last seen 4/13/2022 and was having difficulty using CPAP as she was scared due to the [employer] recall.  We did discuss the consequences of untreated sleep apnea and that her original AHI was 127.7.  Patient did feel more comfortable using it and did restart after that appointment.  Patient states she is doing well.  She does sleep 5 hours nightly because she is \"addicted to the games on my phone.\"  She then gets up at 6 AM to take care of her grandchildren.  We have talked about putting the phone down and going to bed earlier and patient declines.  Patient states she is rested upon awakening but does get tired throughout the day but an [place] score of 18/24.  Otherwise she feels she is doing well.  She has no concerns or complaints and will continue therapy.        Current medications are:   Current Outpatient Medications:   •  albuterol (PROVENTIL) (2.5 MG/3ML) 0.083% nebulizer solution, , Disp: , Rfl:   •  albuterol sulfate HFA (ProAir HFA) 108 (90 Base) MCG/ACT inhaler, Inhale 2 puffs Every 6 (Six) Hours As Needed for Wheezing or Shortness of Air., Disp: 18 g, Rfl: 3  •  amLODIPine (NORVASC) 10 MG tablet, Take 1 tablet by mouth Daily., Disp: 30 tablet, Rfl: 3  •  ammonium lactate (LAC-HYDRIN) 12 % lotion, , Disp: , Rfl:   •  apixaban (ELIQUIS) 5 MG tablet tablet, Take 1 tablet by mouth Every 12 (Twelve) Hours. Indications: DVT/PE (active thrombosis), Disp: 60 tablet, Rfl: 2  •  aspirin 81 MG EC tablet, Take 1 tablet by mouth Daily., Disp: , Rfl:   •  atorvastatin (LIPITOR) 40 MG tablet, Take 1 tablet by mouth Daily., Disp: 30 tablet, Rfl: 3  •  calcipotriene (DOVONOX) 0.005 % ointment, , Disp: , Rfl:   •  carvedilol (COREG) 6.25 MG tablet, Take 1 tablet by mouth 2 (Two) Times a Day., Disp: 60 tablet, Rfl: 3  •  Continuous Blood Gluc " Sensor (Dexcom G6 Sensor), Every 10 (Ten) Days., Disp: 3 each, Rfl: 11  •  Continuous Blood Gluc Transmit (Dexcom G6 Transmitter) misc, 1 Device Take As Directed., Disp: 1 each, Rfl: 3  •  Cosentyx Sensoready, 300 MG, 150 MG/ML solution auto-injector, , Disp: , Rfl:   •  dapagliflozin Propanediol 10 MG tablet, Take 10 mg by mouth Daily., Disp: 30 tablet, Rfl: 3  •  diclofenac (VOLTAREN) 1 % gel gel, Apply 4 g topically to the appropriate area as directed 4 (Four) Times a Day. Small amount to affected area, Disp: 300 g, Rfl: 3  •  FLUoxetine (PROzac) 20 MG capsule, Take 1 capsule by mouth Daily., Disp: 30 capsule, Rfl: 3  •  glucose blood test strip, Used to test blood sugar for Diabetes E11.9 twice a day. Pt has One Touch Verio Flex meter, Disp: 100 each, Rfl: 12  •  glucose monitor monitoring kit, 1 each Daily., Disp: 1 each, Rfl: 0  •  halobetasol (ULTRAVATE) 0.05 % ointment, , Disp: , Rfl:   •  Lancets misc, 1 Device Daily., Disp: 100 each, Rfl: 11  •  levothyroxine (SYNTHROID, LEVOTHROID) 50 MCG tablet, Take 1 tablet by mouth Daily., Disp: 30 tablet, Rfl: 3  •  losartan (COZAAR) 25 MG tablet, Take 1 tablet by mouth Daily., Disp: 30 tablet, Rfl: 3  •  SITagliptin (JANUVIA) 100 MG tablet, Take 1 tablet by mouth Daily., Disp: 30 tablet, Rfl: 3  •  triamcinolone (KENALOG) 0.1 % cream, , Disp: , Rfl: .      The patient's relevant past medical, surgical, family and social history were reviewed and updated in Epic as appropriate.       Review of Systems   Eyes: Positive for visual disturbance.   Respiratory: Positive for apnea, cough, chest tightness, shortness of breath and wheezing.    Cardiovascular: Positive for leg swelling.   Endocrine: Positive for cold intolerance.   Psychiatric/Behavioral: Positive for sleep disturbance.   All other systems reviewed and are negative.        Objective:    Physical Exam  Constitutional:       Appearance: Normal appearance.   HENT:      Head: Normocephalic and atraumatic.       Mouth/Throat:      Comments: Mallampati 4 anatomy  Cardiovascular:      Rate and Rhythm: Normal rate and regular rhythm.   Pulmonary:      Effort: Pulmonary effort is normal.      Breath sounds: Normal breath sounds.   Skin:     General: Skin is warm and dry.   Neurological:      Mental Status: She is alert and oriented to person, place, and time.   Psychiatric:         Mood and Affect: Mood normal.         Behavior: Behavior normal.         Thought Content: Thought content normal.         Judgment: Judgment normal.         CPAP Report  48/53 days of use  Greater than 4-hour use 90.6  AHI of 4.4  90% pressure 18.2  Settings 11-20    The patient continues to use and benefit from CPAP therapy.    ASSESSMENT/PLAN    Diagnoses and all orders for this visit:    1. Obstructive sleep apnea, adult (Primary)  -     PAP Therapy        1. Counseled patient regarding multimodal approach with healthy nutrition, healthy sleep, regular physical activity, social activities, counseling, and medications. Encouraged to practice lateral sleep position. Avoid alcohol and sedatives close to bedtime.  2.   Refill supplies x1 year.  Return to clinic 1 year or sooner symptoms warrant.    I have reviewed the results of my evaluation and impression and discussed my recommendations in detail with the patient.      Signed by  LUCIA Enamorado    July 11, 2022      CC: Annia Rico APRN         No ref. provider found

## 2022-09-07 ENCOUNTER — OFFICE VISIT (OUTPATIENT)
Dept: INTERNAL MEDICINE | Facility: CLINIC | Age: 52
End: 2022-09-07

## 2022-09-07 ENCOUNTER — PRIOR AUTHORIZATION (OUTPATIENT)
Dept: INTERNAL MEDICINE | Facility: CLINIC | Age: 52
End: 2022-09-07

## 2022-09-07 VITALS
DIASTOLIC BLOOD PRESSURE: 84 MMHG | BODY MASS INDEX: 41.83 KG/M2 | TEMPERATURE: 97.5 F | RESPIRATION RATE: 18 BRPM | OXYGEN SATURATION: 96 % | SYSTOLIC BLOOD PRESSURE: 120 MMHG | HEIGHT: 64 IN | HEART RATE: 74 BPM | WEIGHT: 245 LBS

## 2022-09-07 DIAGNOSIS — N18.32 STAGE 3B CHRONIC KIDNEY DISEASE: ICD-10-CM

## 2022-09-07 DIAGNOSIS — E11.9 CONTROLLED TYPE 2 DIABETES MELLITUS WITHOUT COMPLICATION, WITHOUT LONG-TERM CURRENT USE OF INSULIN: Primary | ICD-10-CM

## 2022-09-07 DIAGNOSIS — E03.9 ACQUIRED HYPOTHYROIDISM: ICD-10-CM

## 2022-09-07 DIAGNOSIS — I10 ESSENTIAL HYPERTENSION: ICD-10-CM

## 2022-09-07 DIAGNOSIS — J45.20 MILD INTERMITTENT ASTHMA WITHOUT COMPLICATION: ICD-10-CM

## 2022-09-07 DIAGNOSIS — E78.2 MIXED HYPERLIPIDEMIA: ICD-10-CM

## 2022-09-07 DIAGNOSIS — F33.0 MILD EPISODE OF RECURRENT MAJOR DEPRESSIVE DISORDER: ICD-10-CM

## 2022-09-07 LAB
EXPIRATION DATE: NORMAL
HBA1C MFR BLD: 6.3 %
Lab: NORMAL

## 2022-09-07 PROCEDURE — 3044F HG A1C LEVEL LT 7.0%: CPT | Performed by: NURSE PRACTITIONER

## 2022-09-07 PROCEDURE — 83036 HEMOGLOBIN GLYCOSYLATED A1C: CPT | Performed by: NURSE PRACTITIONER

## 2022-09-07 PROCEDURE — 99214 OFFICE O/P EST MOD 30 MIN: CPT | Performed by: NURSE PRACTITIONER

## 2022-09-07 RX ORDER — FLUOXETINE HYDROCHLORIDE 20 MG/1
20 CAPSULE ORAL DAILY
Qty: 30 CAPSULE | Refills: 3 | Status: SHIPPED | OUTPATIENT
Start: 2022-09-07 | End: 2022-12-21 | Stop reason: SDUPTHER

## 2022-09-07 RX ORDER — AMLODIPINE BESYLATE 10 MG/1
10 TABLET ORAL DAILY
Qty: 30 TABLET | Refills: 3 | Status: SHIPPED | OUTPATIENT
Start: 2022-09-07 | End: 2022-12-21 | Stop reason: SDUPTHER

## 2022-09-07 RX ORDER — CARVEDILOL 6.25 MG/1
6.25 TABLET ORAL 2 TIMES DAILY
Qty: 60 TABLET | Refills: 3 | Status: SHIPPED | OUTPATIENT
Start: 2022-09-07 | End: 2022-12-21 | Stop reason: SDUPTHER

## 2022-09-07 RX ORDER — LOSARTAN POTASSIUM 25 MG/1
25 TABLET ORAL DAILY
Qty: 30 TABLET | Refills: 3 | Status: SHIPPED | OUTPATIENT
Start: 2022-09-07 | End: 2022-12-21 | Stop reason: SDUPTHER

## 2022-09-07 RX ORDER — LEVOTHYROXINE SODIUM 0.05 MG/1
50 TABLET ORAL DAILY
Qty: 30 TABLET | Refills: 3 | Status: SHIPPED | OUTPATIENT
Start: 2022-09-07 | End: 2022-12-21 | Stop reason: SDUPTHER

## 2022-09-07 RX ORDER — ATORVASTATIN CALCIUM 40 MG/1
40 TABLET, FILM COATED ORAL DAILY
Qty: 30 TABLET | Refills: 3 | Status: SHIPPED | OUTPATIENT
Start: 2022-09-07 | End: 2022-12-21 | Stop reason: SDUPTHER

## 2022-09-07 NOTE — PROGRESS NOTES
Freida Martinez presents to [facility] PRIMARY CARE for     Chief Complaint  Chief Complaint   Patient presents with   • Diabetes     3M FU (Last A1C 8.7)    • Hypertension   • Hyperlipidemia   • Anxiety         Subjective        History of Present Illness  Type II DM    she used the dexcom. She tells me that she broke her sensor and did not try another.  No glucose readings for me to review  She drinks soda and juice- has cut to zero/diet versions and stopped the juice  Her last hemoglobin A1c was 9.2%.  She is currently taking Januvia and farxiga.  She is compliant with dosing and denies any adverse effects.   has psoriatic rash to bilateral hands which significantly limits her ability to check her glucose via fingerstick.  Following rheumatology and rash seems to be slowly improving with her interventions.     HTN-chronic.  Currently on losartan, carvedilol, and amlodipine.  Compliant with dosing and denies any adverse effects.  Denies headaches, dizziness, visual disturbances, palpitations chest pain, dyspnea, TIA or CVA symptoms, leg pain/claudication symptoms, and edema.    Hypothyroidism-chronic.  Denies any hypo or hyperthyroidism symptoms.  She is currently on levothyroxine and compliant with dosing.    COPD/PE/recent COVID-she was hospitalized in January 2022 for COVID and multiple subsegmental pulmonary emboli.  She was anticoagulated with Eliquis.  She denies any bleeding issues. Saw pulm-they recommended  6 m of anticoag which was thru July 2022       CKD stage 3 has been Stable-Baseline GFR 40s, creatinine 1.2-1.5     Health Maintenance:   Health Maintenance   Topic Date Due   • ANNUAL PHYSICAL  05/27/2021   • Hepatitis B (2 of 3 - Risk 3-dose series) 06/02/2021   • DIABETIC EYE EXAM  08/03/2021   • COVID-19 Vaccine (3 - Booster for Moderna series) 08/27/2021   • INFLUENZA VACCINE  10/01/2022   • DIABETIC FOOT EXAM  12/08/2022   • URINE MICROALBUMIN  12/08/2022   • HEMOGLOBIN  A1C  03/07/2023   • LIPID PANEL  03/23/2023   • MAMMOGRAM  04/28/2023   • PAP SMEAR  05/26/2023   • TDAP/TD VACCINES (3 - Td or Tdap) 08/03/2027   • COLORECTAL CANCER SCREENING  06/20/2029   • Pneumococcal Vaccine 0-64 (3 - PPSV23 or PCV20) 02/13/2035   • HEPATITIS C SCREENING  Completed   • HEPATITIS A VACCINE ADULT  Completed   • ZOSTER VACCINE  Completed   Encouraged to schedule eye exam.  Encouraged COVID booster.  Encouraged flu vaccination this fall    Review of Systems  12 point ROS negative except as noted in HPI above    Allergies   Allergen Reactions   • Anaprox [Naproxen] Other (See Comments)     CHRONIC KIDNEY DISEASE   • Celebrex [Celecoxib] Other (See Comments)     CHRONIC KIDNEY DISEASE   • Flector [Diclofenac Epolamine] Other (See Comments)     CHRONIC KIDNEY DISEASE   • Motrin [Ibuprofen] Other (See Comments)     CHRONIC KIDNEY DISEASE   • Voltaren [Diclofenac Sodium] Other (See Comments)     CHRONIC KIDNEY DISEASE   • Lisinopril Swelling     Current Outpatient Medications on File Prior to Visit   Medication Sig Dispense Refill   • albuterol (PROVENTIL) (2.5 MG/3ML) 0.083% nebulizer solution      • albuterol sulfate HFA (ProAir HFA) 108 (90 Base) MCG/ACT inhaler Inhale 2 puffs Every 6 (Six) Hours As Needed for Wheezing or Shortness of Air. 18 g 3   • aspirin 81 MG EC tablet Take 1 tablet by mouth Daily.     • Continuous Blood Gluc Sensor (Dexcom G6 Sensor) Every 10 (Ten) Days. 3 each 11   • Continuous Blood Gluc Transmit (Dexcom G6 Transmitter) misc 1 Device Take As Directed. 1 each 3   • Cosentyx Sensoready, 300 MG, 150 MG/ML solution auto-injector      • diclofenac (VOLTAREN) 1 % gel gel Apply 4 g topically to the appropriate area as directed 4 (Four) Times a Day. Small amount to affected area 300 g 3   • glucose blood test strip Used to test blood sugar for Diabetes E11.9 twice a day. Pt has One Touch Verio Flex meter 100 each 12   • glucose monitor monitoring kit 1 each Daily. 1 each 0   •  "Lancets misc 1 Device Daily. 100 each 11     No current facility-administered medications on file prior to visit.         The following portions of the patient's history were reviewed and updated as appropriate: allergies, current medications, past family history, past medical history, past social history, past surgical history and problem list and are available for review within electronic record    Objective     Result Review :                    Vital Signs:   /84   Pulse 74   Temp 97.5 °F (36.4 °C) (Infrared)   Resp 18   Ht 163.2 cm (64.25\")   Wt 111 kg (245 lb)   SpO2 96%   BMI 41.73 kg/m²         Physical Exam  Vitals and nursing note reviewed.   Constitutional:       General: She is not in acute distress.     Appearance: Normal appearance. She is well-developed. She is not diaphoretic.   HENT:      Head: Normocephalic and atraumatic.   Eyes:      Conjunctiva/sclera: Conjunctivae normal.      Pupils: Pupils are equal, round, and reactive to light.   Neck:      Vascular: No JVD.   Cardiovascular:      Rate and Rhythm: Normal rate and regular rhythm.      Pulses:           Dorsalis pedis pulses are 2+ on the right side and 2+ on the left side.        Posterior tibial pulses are 2+ on the right side and 2+ on the left side.      Heart sounds: Normal heart sounds. No murmur heard.  Pulmonary:      Effort: Pulmonary effort is normal. No respiratory distress.      Breath sounds: Normal breath sounds.   Chest:      Chest wall: No tenderness.   Abdominal:      General: Bowel sounds are normal. There is no distension.      Palpations: Abdomen is soft.      Tenderness: There is no abdominal tenderness.   Musculoskeletal:         General: Normal range of motion.      Cervical back: Normal range of motion.   Skin:     General: Skin is warm and dry.      Coloration: Skin is not pale.      Findings: Rash present.      Comments: Rash to hands is improved.  She does have some superficial peeling. No further " erythema. There are no signs and symptoms of infection.     Neurological:      Mental Status: She is alert and oriented to person, place, and time.      Coordination: Coordination normal.   Psychiatric:         Behavior: Behavior normal.         Thought Content: Thought content normal.         Judgment: Judgment normal.                 Assessment and Plan      Diagnoses and all orders for this visit:    1. Controlled type 2 diabetes mellitus without complication, without long-term current use of insulin ([facility]) (Primary)  -     POC Glycosylated Hemoglobin (Hb A1C)  -     dapagliflozin Propanediol 10 MG tablet; Take 10 mg by mouth Daily.  Dispense: 30 tablet; Refill: 3  -     losartan (COZAAR) 25 MG tablet; Take 1 tablet by mouth Daily.  Dispense: 30 tablet; Refill: 3  -     SITagliptin (JANUVIA) 100 MG tablet; Take 1 tablet by mouth Daily.  Dispense: 30 tablet; Refill: 3  Significant improvement in hemoglobin A1c.  We will continue her current medications.  Encouraged adherence to ADA diet and increase physical activity as tolerated.  Encouraged her to use her Dexcom.  Office staff assisted her with placing device today.  2. Essential hypertension  -     amLODIPine (NORVASC) 10 MG tablet; Take 1 tablet by mouth Daily.  Dispense: 30 tablet; Refill: 3  -     carvedilol (COREG) 6.25 MG tablet; Take 1 tablet by mouth 2 (Two) Times a Day.  Dispense: 60 tablet; Refill: 3  -     losartan (COZAAR) 25 MG tablet; Take 1 tablet by mouth Daily.  Dispense: 30 tablet; Refill: 3  Well-controlled.  Continue current medications.  Refills as above.  Goal BP less than 130/80 consistently.  Low-sodium diet.  3. Mixed hyperlipidemia  -     atorvastatin (LIPITOR) 40 MG tablet; Take 1 tablet by mouth Daily.  Dispense: 30 tablet; Refill: 3  Stable.  Continue atorvastatin.  Low-fat low-cholesterol diet.  4. Mild episode of recurrent major depressive disorder (HCC)  -     FLUoxetine (PROzac) 20 MG capsule; Take 1 capsule by mouth Daily.   Dispense: 30 capsule; Refill: 3  Well-controlled.  Continue fluoxetine  5. Acquired hypothyroidism  -     levothyroxine (SYNTHROID, LEVOTHROID) 50 MCG tablet; Take 1 tablet by mouth Daily.  Dispense: 30 tablet; Refill: 3  Clinically euthyroid.  Continue levothyroxine well-controlled.  6. Mild intermittent asthma without complication    7. Stage 3b chronic kidney disease (HCC)  Well-controlled.  We will continue to monitor.  Avoid nephrotoxic agents.    She declined labs today.     Follow Up     Patient was given instructions and counseling regarding her condition or for health maintenance advice. Please see specific information pulled into the AVS if appropriate.   Any new medication's adverse effects have been discussed in detail with patient  Patient was encouraged to keep me informed of any acute changes, lack of improvement, or any new concerning symptoms.    Return in about 3 months (around 12/7/2022) for chronic condition follow up.          Dictated Utilizing Dragon Dictation   Please note that portions of this note were completed with a voice recognition program.   Part of this note may be an electronic transcription/translation of spoken language to printed text using the Dragon Dictation System.

## 2022-09-07 NOTE — TELEPHONE ENCOUNTER
PA was submitted for the patient's [facility] via [employer].   Key: [id number] - PA Case ID: [id number] - Rx #: [id number]    The request has been approved. The authorization is effective for a maximum of 12 fills from 09/07/2022 to 09/06/2023, as long as the member is enrolled in their current health plan. The request was approved with a quantity restriction. This has been approved for a max daily dosage of 1. A written notification letter will follow with additional details.    Patient's pharmacy has been notified.

## 2022-09-21 DIAGNOSIS — J45.20 MILD INTERMITTENT ASTHMA WITHOUT COMPLICATION: ICD-10-CM

## 2022-09-21 RX ORDER — ALBUTEROL SULFATE 90 UG/1
2 AEROSOL, METERED RESPIRATORY (INHALATION) EVERY 6 HOURS PRN
Qty: 18 G | Refills: 3 | Status: SHIPPED | OUTPATIENT
Start: 2022-09-21 | End: 2022-12-21 | Stop reason: SDUPTHER

## 2022-09-21 NOTE — TELEPHONE ENCOUNTER
Rx Refill Note  Requested Prescriptions     Pending Prescriptions Disp Refills   • albuterol sulfate HFA (ProAir HFA) 108 (90 Base) MCG/ACT inhaler 18 g 3     Sig: Inhale 2 puffs Every 6 (Six) Hours As Needed for Wheezing or Shortness of Air.      Last filled:  Last office visit with prescribing clinician: 9/7/2022      Next office visit with prescribing clinician: 12/14/2022 April KRISTI Avila MA  09/21/22, 08:57 EDT

## 2022-12-21 ENCOUNTER — OFFICE VISIT (OUTPATIENT)
Dept: INTERNAL MEDICINE | Facility: CLINIC | Age: 52
End: 2022-12-21

## 2022-12-21 ENCOUNTER — LAB (OUTPATIENT)
Dept: LAB | Facility: HOSPITAL | Age: 52
End: 2022-12-21

## 2022-12-21 VITALS
SYSTOLIC BLOOD PRESSURE: 126 MMHG | TEMPERATURE: 97.1 F | HEART RATE: 98 BPM | RESPIRATION RATE: 18 BRPM | DIASTOLIC BLOOD PRESSURE: 74 MMHG | BODY MASS INDEX: 40.29 KG/M2 | HEIGHT: 64 IN | OXYGEN SATURATION: 98 % | WEIGHT: 236 LBS

## 2022-12-21 DIAGNOSIS — N18.32 STAGE 3B CHRONIC KIDNEY DISEASE: ICD-10-CM

## 2022-12-21 DIAGNOSIS — F33.0 MILD EPISODE OF RECURRENT MAJOR DEPRESSIVE DISORDER: ICD-10-CM

## 2022-12-21 DIAGNOSIS — E11.9 CONTROLLED TYPE 2 DIABETES MELLITUS WITHOUT COMPLICATION, WITHOUT LONG-TERM CURRENT USE OF INSULIN: Primary | ICD-10-CM

## 2022-12-21 DIAGNOSIS — I10 ESSENTIAL HYPERTENSION: ICD-10-CM

## 2022-12-21 DIAGNOSIS — Z23 NEED FOR COVID-19 VACCINE: ICD-10-CM

## 2022-12-21 DIAGNOSIS — E78.2 MIXED HYPERLIPIDEMIA: ICD-10-CM

## 2022-12-21 DIAGNOSIS — E03.9 ACQUIRED HYPOTHYROIDISM: ICD-10-CM

## 2022-12-21 DIAGNOSIS — J45.20 MILD INTERMITTENT ASTHMA WITHOUT COMPLICATION: ICD-10-CM

## 2022-12-21 DIAGNOSIS — E11.9 CONTROLLED TYPE 2 DIABETES MELLITUS WITHOUT COMPLICATION, WITHOUT LONG-TERM CURRENT USE OF INSULIN: ICD-10-CM

## 2022-12-21 DIAGNOSIS — Z23 FLU VACCINE NEED: ICD-10-CM

## 2022-12-21 LAB
ALBUMIN UR-MCNC: 34.6 MG/DL
CREAT UR-MCNC: 139.5 MG/DL
EXPIRATION DATE: NORMAL
HBA1C MFR BLD: 6.4 %
Lab: NORMAL
MICROALBUMIN/CREAT UR: 248 MG/G

## 2022-12-21 PROCEDURE — 91312 COVID-19 (PFIZER) BIVALENT BOOSTER 12+YRS: CPT | Performed by: NURSE PRACTITIONER

## 2022-12-21 PROCEDURE — 99214 OFFICE O/P EST MOD 30 MIN: CPT | Performed by: NURSE PRACTITIONER

## 2022-12-21 PROCEDURE — 0124A COVID-19 (PFIZER) BIVALENT BOOSTER 12+YRS: CPT | Performed by: NURSE PRACTITIONER

## 2022-12-21 PROCEDURE — 80061 LIPID PANEL: CPT

## 2022-12-21 PROCEDURE — 82043 UR ALBUMIN QUANTITATIVE: CPT

## 2022-12-21 PROCEDURE — 84439 ASSAY OF FREE THYROXINE: CPT

## 2022-12-21 PROCEDURE — 84443 ASSAY THYROID STIM HORMONE: CPT

## 2022-12-21 PROCEDURE — 83036 HEMOGLOBIN GLYCOSYLATED A1C: CPT | Performed by: NURSE PRACTITIONER

## 2022-12-21 PROCEDURE — 3044F HG A1C LEVEL LT 7.0%: CPT | Performed by: NURSE PRACTITIONER

## 2022-12-21 PROCEDURE — 90471 IMMUNIZATION ADMIN: CPT | Performed by: NURSE PRACTITIONER

## 2022-12-21 PROCEDURE — 90686 IIV4 VACC NO PRSV 0.5 ML IM: CPT | Performed by: NURSE PRACTITIONER

## 2022-12-21 PROCEDURE — 82570 ASSAY OF URINE CREATININE: CPT

## 2022-12-21 PROCEDURE — 80053 COMPREHEN METABOLIC PANEL: CPT

## 2022-12-21 RX ORDER — FLUOXETINE HYDROCHLORIDE 20 MG/1
20 CAPSULE ORAL DAILY
Qty: 30 CAPSULE | Refills: 6 | Status: SHIPPED | OUTPATIENT
Start: 2022-12-21

## 2022-12-21 RX ORDER — HYDROCODONE BITARTRATE AND ACETAMINOPHEN 5; 325 MG/1; MG/1
TABLET ORAL AS NEEDED
COMMUNITY
Start: 2022-12-13

## 2022-12-21 RX ORDER — LOSARTAN POTASSIUM 25 MG/1
25 TABLET ORAL DAILY
Qty: 30 TABLET | Refills: 6 | Status: SHIPPED | OUTPATIENT
Start: 2022-12-21

## 2022-12-21 RX ORDER — LEVOTHYROXINE SODIUM 0.05 MG/1
50 TABLET ORAL DAILY
Qty: 30 TABLET | Refills: 6 | Status: SHIPPED | OUTPATIENT
Start: 2022-12-21 | End: 2022-12-29 | Stop reason: SDUPTHER

## 2022-12-21 RX ORDER — CARVEDILOL 6.25 MG/1
6.25 TABLET ORAL 2 TIMES DAILY
Qty: 60 TABLET | Refills: 6 | Status: SHIPPED | OUTPATIENT
Start: 2022-12-21

## 2022-12-21 RX ORDER — ALBUTEROL SULFATE 90 UG/1
2 AEROSOL, METERED RESPIRATORY (INHALATION) EVERY 6 HOURS PRN
Qty: 18 G | Refills: 6 | Status: SHIPPED | OUTPATIENT
Start: 2022-12-21

## 2022-12-21 RX ORDER — AMLODIPINE BESYLATE 10 MG/1
10 TABLET ORAL DAILY
Qty: 30 TABLET | Refills: 6 | Status: SHIPPED | OUTPATIENT
Start: 2022-12-21

## 2022-12-21 RX ORDER — AMOXICILLIN 500 MG/1
500 CAPSULE ORAL
COMMUNITY
Start: 2022-12-13 | End: 2023-03-22

## 2022-12-21 RX ORDER — ATORVASTATIN CALCIUM 40 MG/1
40 TABLET, FILM COATED ORAL DAILY
Qty: 30 TABLET | Refills: 6 | Status: SHIPPED | OUTPATIENT
Start: 2022-12-21

## 2022-12-21 NOTE — PROGRESS NOTES
Freida Martinez presents to [facility] PRIMARY [facility]ARE for     Chief Complaint  Chief Complaint   Patient presents with   • Diabetes     3m FU    • Hypertension     3M FU    • Hyperlipidemia     3M FU    • Anxiety     3M FU      PCP:  Annia Rico APRN    Subjective        History of Present Illness  Type II DM    she used the dexcom. She tells me that she broke her sensor and did not try another.  She is not interested in using the continuous glucose monitor anymore  No glucose readings for me to review  She drinks soda and juice- has cut to zero/diet versions and stopped the juice  Her last hemoglobin A1c was 9.2% in March 2022.  It is down to 6.4% in office today.   She is currently taking Januvia and farxiga.  She is compliant with dosing and denies any adverse effects.  She had a significant psoriatic rash to bilateral hands which significantly limited her ability to check her glucose via fingerstick but this is resolved now       HTN-chronic.  Currently on losartan, carvedilol, and amlodipine.  Compliant with dosing and denies any adverse effects.  Denies headaches, dizziness, visual disturbances, palpitations chest pain, dyspnea, TIA or CVA symptoms, leg pain/claudication symptoms, and edema.     Hypothyroidism-chronic.  Denies any hypo or hyperthyroidism symptoms.  She is currently on levothyroxine and compliant with dosing.     COPD/PE/COVID-she was hospitalized in January 2022 for COVID and multiple subsegmental pulmonary emboli.  She was anticoagulated with Eliquis.  She denies any bleeding issues. Saw pulm-they recommended  6 m of anticoag which was thru July 2022.  She has done well since being off of the anticoagulation.  She reports that she is not having any chest pain or shortness of breath.      CKD stage 3 has been Stable-Baseline GFR 40s, creatinine 1.2-1.5    Depression-chronic.  Reports symptoms are well controlled with fluoxetine.  No HI/SI.    Health Maintenance:    Health Maintenance   Topic Date Due   • COVID-19 Vaccine (3 - Booster for Moderna series) 05/22/2021   • ANNUAL PHYSICAL  05/26/2021   • Hepatitis B (2 of 3 - 3-dose series) 06/02/2021   • DIABETIC EYE EXAM  08/03/2021   • INFLUENZA VACCINE  08/01/2022   • DIABETIC FOOT EXAM  12/08/2022   • URINE MICROALBUMIN  12/08/2022   • LIPID PANEL  03/23/2023   • MAMMOGRAM  04/28/2023   • PAP SMEAR  05/26/2023   • HEMOGLOBIN A1C  06/21/2023   • TDAP/TD VACCINES (3 - Td or Tdap) 08/03/2027   • COLORECTAL CANCER SCREENING  06/20/2029   • Pneumococcal Vaccine 0-64 (3 - PPSV23 if available, else PCV20) 02/13/2035   • HEPATITIS C SCREENING  Completed   • HEPATITIS A VACCINE ADULT  Completed   • ZOSTER VACCINE  Completed     Due for COVID booster and flu vaccination-we will update today    Review of Systems  12 point ROS negative except as noted in HPI above.    Allergies   Allergen Reactions   • Anaprox [Naproxen] Other (See Comments)     CHRONIC KIDNEY DISEASE   • Celebrex [Celecoxib] Other (See Comments)     CHRONIC KIDNEY DISEASE   • Flector [Diclofenac Epolamine] Other (See Comments)     CHRONIC KIDNEY DISEASE   • Motrin [Ibuprofen] Other (See Comments)     CHRONIC KIDNEY DISEASE   • Voltaren [Diclofenac Sodium] Other (See Comments)     CHRONIC KIDNEY DISEASE   • Lisinopril Swelling     Current Outpatient Medications on File Prior to Visit   Medication Sig Dispense Refill   • albuterol (PROVENTIL) (2.5 MG/3ML) 0.083% nebulizer solution      • albuterol sulfate HFA (ProAir HFA) 108 (90 Base) MCG/ACT inhaler Inhale 2 puffs Every 6 (Six) Hours As Needed for Wheezing or Shortness of Air. 18 g 3   • amLODIPine (NORVASC) 10 MG tablet Take 1 tablet by mouth Daily. 30 tablet 3   • amoxicillin (AMOXIL) 500 MG capsule Take 500 mg by mouth.     • aspirin 81 MG EC tablet Take 1 tablet by mouth Daily.     • atorvastatin (LIPITOR) 40 MG tablet Take 1 tablet by mouth Daily. 30 tablet 3   • carvedilol (COREG) 6.25 MG tablet Take 1 tablet by  mouth 2 (Two) Times a Day. 60 tablet 3   • Cosentyx Sensoready, 300 MG, 150 MG/ML solution auto-injector      • dapagliflozin Propanediol 10 MG tablet Take 10 mg by mouth Daily. 30 tablet 3   • diclofenac (VOLTAREN) 1 % gel gel Apply 4 g topically to the appropriate area as directed 4 (Four) Times a Day. Small amount to affected area 300 g 3   • FLUoxetine (PROzac) 20 MG capsule Take 1 capsule by mouth Daily. 30 capsule 3   • glucose blood test strip Used to test blood sugar for Diabetes E11.9 twice a day. Pt has One Touch Verio Flex meter 100 each 12   • glucose monitor monitoring kit 1 each Daily. 1 each 0   • HYDROcodone-acetaminophen (NORCO) 5-325 MG per tablet As Needed.     • Lancets misc 1 Device Daily. 100 each 11   • levothyroxine (SYNTHROID, LEVOTHROID) 50 MCG tablet Take 1 tablet by mouth Daily. 30 tablet 3   • losartan (COZAAR) 25 MG tablet Take 1 tablet by mouth Daily. 30 tablet 3   • SITagliptin (JANUVIA) 100 MG tablet Take 1 tablet by mouth Daily. 30 tablet 3   • Continuous Blood Gluc Sensor (Dexcom G6 Sensor) Every 10 (Ten) Days. 3 each 11   • Continuous Blood Gluc Transmit (Dexcom G6 Transmitter) misc 1 Device Take As Directed. 1 each 3     No current facility-administered medications on file prior to visit.         The following portions of the patient's history were reviewed and updated as appropriate: allergies, current medications, past family history, past medical history, past social history, past surgical history and problem list and are available for review within electronic record    Objective     Result Review :     The following data was reviewed by: LUCIA Manzano on 12/21/2022:  CMP    CMP 1/30/22 1/31/22 3/23/22   Glucose 266 (A) 281 (A) 247 (A)   BUN 29 (A) 23 (A) 20   Creatinine 1.82 (A) 1.77 (A) 1.53 (A)   eGFR  Am 35 (A) 37 (A)    Sodium 137 137 138   Potassium 4.5 4.6 4.2   Chloride 101 101 103   Calcium 9.7 8.9 9.7   Albumin 3.20 (A) 3.10 (A) 4.10   Total Bilirubin  "0.6 0.5 0.5   Alkaline Phosphatase 79 70 112   AST (SGOT) 16 22 10   ALT (SGPT) 15 17 12   (A) Abnormal value       Comments are available for some flowsheets but are not being displayed.           CBC    CBC 1/30/22 1/31/22 3/23/22   WBC 11.83 (A) 13.94 (A) 10.17   RBC 5.32 (A) 4.42 4.58   Hemoglobin 14.6 12.1 12.6   Hematocrit 46.0 37.2 38.0   MCV 86.5 84.2 83.0   MCH 27.4 27.4 27.5   MCHC 31.7 32.5 33.2   RDW 13.2 13.2 14.7   Platelets 501 (A) 427 287   (A) Abnormal value            Lipid Panel    Lipid Panel 3/23/22   Total Cholesterol 114   Triglycerides 236 (A)   HDL Cholesterol 38 (A)   VLDL Cholesterol 37   LDL Cholesterol  39   LDL/HDL Ratio 0.76   (A) Abnormal value            TSH    TSH 1/30/22 3/23/22   TSH 6.900 (A) 3.670   (A) Abnormal value            A1C Last 3 Results    HGBA1C Last 3 Results 6/8/22 9/7/22 12/21/22   Hemoglobin A1C 8.7 6.3 6.4                          Vital Signs:   /74   Pulse 98   Temp 97.1 °F (36.2 °C) (Infrared)   Resp 18   Ht 163.2 cm (64.25\")   Wt 107 kg (236 lb)   SpO2 98%   BMI 40.19 kg/m²         Physical Exam  Vitals and nursing note reviewed.   Constitutional:       General: She is not in acute distress.     Appearance: Normal appearance. She is well-developed. She is not ill-appearing or diaphoretic.   HENT:      Head: Normocephalic and atraumatic.   Eyes:      General: No scleral icterus.     Conjunctiva/sclera: Conjunctivae normal.   Neck:      Vascular: No JVD.   Cardiovascular:      Rate and Rhythm: Normal rate and regular rhythm.      Chest Wall: PMI is not displaced. No thrill.      Heart sounds: Normal heart sounds. No murmur heard.  Pulmonary:      Effort: Pulmonary effort is normal. No accessory muscle usage or respiratory distress.      Breath sounds: Normal breath sounds.   Chest:      Chest wall: No tenderness.   Abdominal:      General: Bowel sounds are normal. There is no distension.      Palpations: Abdomen is soft.      Tenderness: There is " no abdominal tenderness.   Musculoskeletal:         General: Normal range of motion.      Cervical back: Normal range of motion.      Right lower leg: No edema.      Left lower leg: No edema.   Skin:     General: Skin is warm and dry.      Coloration: Skin is not ashen, jaundiced, mottled or pale.      Findings: No erythema or rash.   Neurological:      General: No focal deficit present.      Mental Status: She is alert and oriented to person, place, and time.      Coordination: Coordination normal.   Psychiatric:         Attention and Perception: Attention normal.         Mood and Affect: Mood and affect normal.         Behavior: Behavior normal. Behavior is cooperative.         Thought Content: Thought content normal.         Cognition and Memory: Cognition normal.         Judgment: Judgment normal.                 Assessment and Plan      Diagnoses and all orders for this visit:    1. Controlled type 2 diabetes mellitus without complication, without long-term current use of insulin ([facility]) (Primary)  -     POC Glycosylated Hemoglobin (Hb A1C)  -     Microalbumin / Creatinine Urine Ratio - Urine, Clean Catch; Future  -     Comprehensive Metabolic Panel; Future  -     Lipid Panel; Future  -     TSH Rfx On Abnormal To Free T4; Future  -     dapagliflozin Propanediol 10 MG tablet; Take 10 mg by mouth Daily.  Dispense: 30 tablet; Refill: 6  -     losartan (COZAAR) 25 MG tablet; Take 1 tablet by mouth Daily.  Dispense: 30 tablet; Refill: 6  -     SITagliptin (JANUVIA) 100 MG tablet; Take 1 tablet by mouth Daily.  Dispense: 30 tablet; Refill: 6  Well-controlled.  Continue Januvia and farxiga.  ADA diet.  Well-controlled.  Continue losartan, amlodipine, and carvedilol.  Goal BP less than 130/80 consistently.    2. Essential hypertension  -     Comprehensive Metabolic Panel; Future  -     Lipid Panel; Future  -     TSH Rfx On Abnormal To Free T4; Future  -     amLODIPine (NORVASC) 10 MG tablet; Take 1 tablet by mouth Daily.   Dispense: 30 tablet; Refill: 6  -     carvedilol (COREG) 6.25 MG tablet; Take 1 tablet by mouth 2 (Two) Times a Day.  Dispense: 60 tablet; Refill: 6  -     losartan (COZAAR) 25 MG tablet; Take 1 tablet by mouth Daily.  Dispense: 30 tablet; Refill: 6    3. Mixed hyperlipidemia  -     Comprehensive Metabolic Panel; Future  -     Lipid Panel; Future  -     TSH Rfx On Abnormal To Free T4; Future  -     atorvastatin (LIPITOR) 40 MG tablet; Take 1 tablet by mouth Daily.  Dispense: 30 tablet; Refill: 6  Stable.  Continue statin.  Recheck lipids.  Low-fat low-cholesterol diet.  4. Mild episode of recurrent major depressive disorder (HCC)  -     Comprehensive Metabolic Panel; Future  -     Lipid Panel; Future  -     TSH Rfx On Abnormal To Free T4; Future  -     FLUoxetine (PROzac) 20 MG capsule; Take 1 capsule by mouth Daily.  Dispense: 30 capsule; Refill: 6  Well-controlled.  Continue fluoxetine.    5. Acquired hypothyroidism  -     Comprehensive Metabolic Panel; Future  -     Lipid Panel; Future  -     TSH Rfx On Abnormal To Free T4; Future  -     levothyroxine (SYNTHROID, LEVOTHROID) 50 MCG tablet; Take 1 tablet by mouth Daily.  Dispense: 30 tablet; Refill: 6  Clinically euthyroid.  Continue levothyroxine.  Recheck TSH.  6. Mild intermittent asthma without complication  -     Comprehensive Metabolic Panel; Future  -     Lipid Panel; Future  -     TSH Rfx On Abnormal To Free T4; Future  -     albuterol sulfate HFA (ProAir HFA) 108 (90 Base) MCG/ACT inhaler; Inhale 2 puffs Every 6 (Six) Hours As Needed for Wheezing or Shortness of Air.  Dispense: 18 g; Refill: 6  Well-controlled.  Continue as needed albuterol.    7. Stage 3b chronic kidney disease (HCC)  -     Comprehensive Metabolic Panel; Future  -     Lipid Panel; Future  -     TSH Rfx On Abnormal To Free T4; Future  Stable.  Avoid nephrotoxic agents.  Recheck renal function.  8. Flu vaccine need  -     FluLaval/Fluzone >6 mos ([id number])    9. Need for COVID-19  vaccine  -     COVID-19 Bivalent Booster (Pfizer) 12+yrs        Follow Up     Patient was given instructions and counseling regarding her condition or for health maintenance advice. Please see specific information pulled into the AVS if appropriate.   Any new medication's adverse effects have been discussed in detail with patient  Patient was encouraged to keep me informed of any acute changes, lack of improvement, or any new concerning symptoms.    No follow-ups on file.          Dictated Utilizing Dragon Dictation   Please note that portions of this note were completed with a voice recognition program.   Part of this note may be an electronic transcription/translation of spoken language to printed text using the Dragon Dictation System.

## 2022-12-22 LAB
ALBUMIN SERPL-MCNC: 4.6 G/DL (ref 3.5–5.2)
ALBUMIN/GLOB SERPL: 1.5 G/DL
ALP SERPL-CCNC: 96 U/L (ref 39–117)
ALT SERPL W P-5'-P-CCNC: 15 U/L (ref 1–33)
ANION GAP SERPL CALCULATED.3IONS-SCNC: 12.1 MMOL/L (ref 5–15)
AST SERPL-CCNC: 13 U/L (ref 1–32)
BILIRUB SERPL-MCNC: 0.4 MG/DL (ref 0–1.2)
BUN SERPL-MCNC: 14 MG/DL (ref 6–20)
BUN/CREAT SERPL: 8 (ref 7–25)
CALCIUM SPEC-SCNC: 10.2 MG/DL (ref 8.6–10.5)
CHLORIDE SERPL-SCNC: 104 MMOL/L (ref 98–107)
CHOLEST SERPL-MCNC: 123 MG/DL (ref 0–200)
CO2 SERPL-SCNC: 24.9 MMOL/L (ref 22–29)
CREAT SERPL-MCNC: 1.74 MG/DL (ref 0.57–1)
EGFRCR SERPLBLD CKD-EPI 2021: 34.9 ML/MIN/1.73
GLOBULIN UR ELPH-MCNC: 3.1 GM/DL
GLUCOSE SERPL-MCNC: 92 MG/DL (ref 65–99)
HDLC SERPL-MCNC: 36 MG/DL (ref 40–60)
LDLC SERPL CALC-MCNC: 54 MG/DL (ref 0–100)
LDLC/HDLC SERPL: 1.3 {RATIO}
POTASSIUM SERPL-SCNC: 4.2 MMOL/L (ref 3.5–5.2)
PROT SERPL-MCNC: 7.7 G/DL (ref 6–8.5)
SODIUM SERPL-SCNC: 141 MMOL/L (ref 136–145)
T4 FREE SERPL-MCNC: 1.1 NG/DL (ref 0.93–1.7)
TRIGL SERPL-MCNC: 201 MG/DL (ref 0–150)
TSH SERPL DL<=0.05 MIU/L-ACNC: 8.23 UIU/ML (ref 0.27–4.2)
VLDLC SERPL-MCNC: 33 MG/DL (ref 5–40)

## 2022-12-29 DIAGNOSIS — E03.9 ACQUIRED HYPOTHYROIDISM: ICD-10-CM

## 2022-12-29 RX ORDER — LEVOTHYROXINE SODIUM 0.07 MG/1
75 TABLET ORAL DAILY
Qty: 30 TABLET | Refills: 3 | Status: SHIPPED | OUTPATIENT
Start: 2022-12-29

## 2023-03-22 ENCOUNTER — OFFICE VISIT (OUTPATIENT)
Dept: INTERNAL MEDICINE | Facility: CLINIC | Age: 53
End: 2023-03-22
Payer: COMMERCIAL

## 2023-03-22 VITALS
SYSTOLIC BLOOD PRESSURE: 112 MMHG | BODY MASS INDEX: 38.58 KG/M2 | WEIGHT: 226 LBS | RESPIRATION RATE: 18 BRPM | DIASTOLIC BLOOD PRESSURE: 84 MMHG | OXYGEN SATURATION: 98 % | TEMPERATURE: 96.9 F | HEIGHT: 64 IN | HEART RATE: 82 BPM

## 2023-03-22 DIAGNOSIS — L98.9 SKIN LESION: ICD-10-CM

## 2023-03-22 DIAGNOSIS — K59.00 CONSTIPATION, UNSPECIFIED CONSTIPATION TYPE: ICD-10-CM

## 2023-03-22 DIAGNOSIS — F33.0 MILD EPISODE OF RECURRENT MAJOR DEPRESSIVE DISORDER: ICD-10-CM

## 2023-03-22 DIAGNOSIS — I10 ESSENTIAL HYPERTENSION: ICD-10-CM

## 2023-03-22 DIAGNOSIS — E11.9 CONTROLLED TYPE 2 DIABETES MELLITUS WITHOUT COMPLICATION, WITHOUT LONG-TERM CURRENT USE OF INSULIN: Primary | ICD-10-CM

## 2023-03-22 DIAGNOSIS — E03.9 ACQUIRED HYPOTHYROIDISM: ICD-10-CM

## 2023-03-22 DIAGNOSIS — E78.2 MIXED HYPERLIPIDEMIA: ICD-10-CM

## 2023-03-22 DIAGNOSIS — J45.20 MILD INTERMITTENT ASTHMA WITHOUT COMPLICATION: ICD-10-CM

## 2023-03-22 DIAGNOSIS — N18.32 STAGE 3B CHRONIC KIDNEY DISEASE: ICD-10-CM

## 2023-03-22 LAB
EXPIRATION DATE: NORMAL
HBA1C MFR BLD: 5.9 %
Lab: NORMAL

## 2023-03-22 PROCEDURE — 99214 OFFICE O/P EST MOD 30 MIN: CPT | Performed by: NURSE PRACTITIONER

## 2023-03-22 PROCEDURE — 1159F MED LIST DOCD IN RCRD: CPT | Performed by: NURSE PRACTITIONER

## 2023-03-22 PROCEDURE — 83036 HEMOGLOBIN GLYCOSYLATED A1C: CPT | Performed by: NURSE PRACTITIONER

## 2023-03-22 PROCEDURE — 3044F HG A1C LEVEL LT 7.0%: CPT | Performed by: NURSE PRACTITIONER

## 2023-03-22 PROCEDURE — 3074F SYST BP LT 130 MM HG: CPT | Performed by: NURSE PRACTITIONER

## 2023-03-22 PROCEDURE — 1160F RVW MEDS BY RX/DR IN RCRD: CPT | Performed by: NURSE PRACTITIONER

## 2023-03-22 PROCEDURE — 3079F DIAST BP 80-89 MM HG: CPT | Performed by: NURSE PRACTITIONER

## 2023-03-22 RX ORDER — POLYETHYLENE GLYCOL 3350 17 G/17G
17 POWDER, FOR SOLUTION ORAL DAILY
Qty: 30 EACH | Refills: 6 | Status: SHIPPED | OUTPATIENT
Start: 2023-03-22

## 2023-03-22 NOTE — PROGRESS NOTES
Freida Martinez presents to [facility] for     Chief Complaint  Chief Complaint   Patient presents with   • Diabetes     3 month f/u (last A1C 6.4)    • Hypertension     3 month f/u    • Hyperlipidemia     3 month f/u    • Hypothyroidism     3 month f/u        PCP:  Annia Rico F, APRN    Subjective        History of Present Illness    No full BM oin 3 weeks. Only small amounts.   Feels full/bloated/constipated.   Some nausea and mild pain.   She has not tried anything OTC yet.     Has an area on her arm that is slightly red.    She is still not smoking.     Has been having some sneezing and more drainage/mucous   using inhaler some for wheezing.       Type II DM    she previously used the dexcom. She tells me that she broke her sensor and did not try another.  She is not interested in using the continuous glucose monitor anymore  No glucose readings for me to review  She drinks soda and juice- has cut to zero/diet versions and stopped the juice  Her last hemoglobin A1c was 6.4% -down to 5.9% today  Weight trending down-10 pound since December  Walking more at work.    She is currently taking Januvia and farxiga.  She is compliant with dosing and denies any adverse effects.       HTN-chronic.  Currently on losartan, carvedilol, and amlodipine.  Compliant with dosing and denies any adverse effects.  Denies headaches, dizziness, visual disturbances, palpitations chest pain, dyspnea, TIA or CVA symptoms, leg pain/claudication symptoms, and edema.     Hypothyroidism-chronic.  Denies any hypo or hyperthyroidism symptoms.  She is currently on levothyroxine and compliant with dosing.     COPD/PE/COVID-she was hospitalized in January 2022 for COVID and multiple subsegmental pulmonary emboli.  She was anticoagulated with Eliquis.  She denies any bleeding issues. Saw pulm-they recommended  6 m of anticoag which was thru July 2022.  She has done well since being off of the  anticoagulation.  She reports that she is not having any chest pain or shortness of breath.      CKD stage 3 has been Stable-Baseline GFR 40s, creatinine 1.2-1.5     Depression-chronic.  Reports symptoms are well controlled with fluoxetine.  No HI/SI.      Health Maintenance:   Health Maintenance   Topic Date Due   • ANNUAL PHYSICAL  05/26/2021   • Hepatitis B (2 of 3 - 3-dose series) 06/02/2021   • DIABETIC EYE EXAM  08/03/2021   • DIABETIC FOOT EXAM  12/08/2022   • MAMMOGRAM  04/28/2023   • PAP SMEAR  05/26/2023   • INFLUENZA VACCINE  08/01/2023   • HEMOGLOBIN A1C  09/22/2023   • LIPID PANEL  12/21/2023   • URINE MICROALBUMIN  12/21/2023   • TDAP/TD VACCINES (3 - Td or Tdap) 08/03/2027   • COLORECTAL CANCER SCREENING  06/20/2029   • Pneumococcal Vaccine 0-64 (3 - PPSV23 if available, else PCV20) 02/13/2035   • HEPATITIS C SCREENING  Completed   • COVID-19 Vaccine  Completed   • HEPATITIS A VACCINE ADULT  Completed   • ZOSTER VACCINE  Completed   Encouraged her to schedule her annual exam.      Encouraged to schedule eye exam    Review of Systems   Constitutional: Negative for fatigue, fever and unexpected weight loss.   Eyes: Negative for blurred vision, double vision and visual disturbance.   Respiratory: Negative for cough, shortness of breath and wheezing.    Cardiovascular: Negative for chest pain, palpitations and leg swelling.   Gastrointestinal: Negative for abdominal pain, constipation, diarrhea, nausea and vomiting.   Genitourinary: Negative for difficulty urinating, frequency and urgency.   Musculoskeletal: Negative for arthralgias and myalgias.   Skin: Negative for color change and rash.   Neurological: Negative for dizziness, weakness and headache.   Hematological: Negative for adenopathy. Does not bruise/bleed easily.         Allergies   Allergen Reactions   • Anaprox [Naproxen] Other (See Comments)     CHRONIC KIDNEY DISEASE   • Celebrex [Celecoxib] Other (See Comments)     CHRONIC KIDNEY DISEASE   •  Flector [Diclofenac Epolamine] Other (See Comments)     CHRONIC KIDNEY DISEASE   • Motrin [Ibuprofen] Other (See Comments)     CHRONIC KIDNEY DISEASE   • Voltaren [Diclofenac Sodium] Other (See Comments)     CHRONIC KIDNEY DISEASE   • Lisinopril Swelling     Current Outpatient Medications on File Prior to Visit   Medication Sig Dispense Refill   • albuterol (PROVENTIL) (2.5 MG/3ML) 0.083% nebulizer solution      • albuterol sulfate HFA (ProAir HFA) 108 (90 Base) MCG/ACT inhaler Inhale 2 puffs Every 6 (Six) Hours As Needed for Wheezing or Shortness of Air. 18 g 6   • amLODIPine (NORVASC) 10 MG tablet Take 1 tablet by mouth Daily. 30 tablet 6   • aspirin 81 MG EC tablet Take 1 tablet by mouth Daily.     • atorvastatin (LIPITOR) 40 MG tablet Take 1 tablet by mouth Daily. 30 tablet 6   • dapagliflozin Propanediol 10 MG tablet Take 10 mg by mouth Daily. 30 tablet 6   • diclofenac (VOLTAREN) 1 % gel gel Apply 4 g topically to the appropriate area as directed 4 (Four) Times a Day. Small amount to affected area 300 g 3   • FLUoxetine (PROzac) 20 MG capsule Take 1 capsule by mouth Daily. 30 capsule 6   • glucose blood test strip Used to test blood sugar for Diabetes E11.9 twice a day. Pt has One Touch Verio Flex meter 100 each 12   • glucose monitor monitoring kit 1 each Daily. 1 each 0   • Lancets misc 1 Device Daily. 100 each 11   • levothyroxine (SYNTHROID, LEVOTHROID) 75 MCG tablet Take 1 tablet by mouth Daily. 30 tablet 3   • losartan (COZAAR) 25 MG tablet Take 1 tablet by mouth Daily. 30 tablet 6   • SITagliptin (JANUVIA) 100 MG tablet Take 1 tablet by mouth Daily. 30 tablet 6   • carvedilol (COREG) 6.25 MG tablet Take 1 tablet by mouth 2 (Two) Times a Day. 60 tablet 6   • Cosentyx Sensoready, 300 MG, 150 MG/ML solution auto-injector  (Patient not taking: Reported on 3/22/2023)     • HYDROcodone-acetaminophen (NORCO) 5-325 MG per tablet As Needed. (Patient not taking: Reported on 3/22/2023)       No current  "facility-administered medications on file prior to visit.         The following portions of the patient's history were reviewed and updated as appropriate: allergies, current medications, past family history, past medical history, past social history, past surgical history and problem list and are available for review within electronic record    Objective     Result Review :     The following data was reviewed by: LUCIA Manzano on 03/22/2023:  CMP    CMP 12/21/22   Glucose 92   BUN 14   Creatinine 1.74 (A)   eGFR 34.9 (A)   Sodium 141   Potassium 4.2   Chloride 104   Calcium 10.2   Total Protein 7.7   Albumin 4.60   Globulin 3.1   Total Bilirubin 0.4   Alkaline Phosphatase 96   AST (SGOT) 13   ALT (SGPT) 15   Albumin/Globulin Ratio 1.5   BUN/Creatinine Ratio 8.0   Anion Gap 12.1   (A) Abnormal value       Comments are available for some flowsheets but are not being displayed.               Lipid Panel    Lipid Panel 12/21/22   Total Cholesterol 123   Triglycerides 201 (A)   HDL Cholesterol 36 (A)   VLDL Cholesterol 33   LDL Cholesterol  54   LDL/HDL Ratio 1.30   (A) Abnormal value            TSH    TSH 12/21/22   TSH 8.230 (A)   (A) Abnormal value            A1C Last 3 Results    HGBA1C Last 3 Results 9/7/22 12/21/22 3/22/23   Hemoglobin A1C 6.3 6.4 5.9           Microalbumin    Microalbumin 12/21/22   Microalbumin, Urine 34.6                      Vital Signs:   /84 (BP Location: Right arm, Patient Position: Sitting, Cuff Size: Adult)   Pulse 82   Temp 96.9 °F (36.1 °C) (Infrared)   Resp 18   Ht 163.2 cm (64.25\")   Wt 103 kg (226 lb)   SpO2 98%   BMI 38.49 kg/m²         Physical Exam  Vitals and nursing note reviewed.   Constitutional:       General: She is not in acute distress.     Appearance: Normal appearance. She is well-developed. She is not ill-appearing or diaphoretic.   HENT:      Head: Normocephalic and atraumatic.   Eyes:      General: No scleral icterus.     Conjunctiva/sclera: " Conjunctivae normal.   Neck:      Vascular: No JVD.   Cardiovascular:      Rate and Rhythm: Normal rate and regular rhythm.      Chest Wall: PMI is not displaced. No thrill.      Heart sounds: Normal heart sounds. No murmur heard.  Pulmonary:      Effort: Pulmonary effort is normal. No accessory muscle usage or respiratory distress.      Breath sounds: Normal breath sounds.   Chest:      Chest wall: No tenderness.   Abdominal:      General: Bowel sounds are normal. There is no distension.      Palpations: Abdomen is soft.      Tenderness: There is no abdominal tenderness.   Musculoskeletal:         General: Normal range of motion.      Cervical back: Normal range of motion.      Right lower leg: No edema.      Left lower leg: No edema.   Skin:     General: Skin is warm and dry.      Coloration: Skin is not ashen, jaundiced, mottled or pale.      Findings: Erythema (arm lesion) present.   Neurological:      General: No focal deficit present.      Mental Status: She is alert and oriented to person, place, and time.   Psychiatric:         Attention and Perception: Attention normal.         Mood and Affect: Mood and affect normal.         Behavior: Behavior normal. Behavior is cooperative.         Thought Content: Thought content normal.         Cognition and Memory: Cognition normal.         Judgment: Judgment normal.                 Assessment and Plan      Diagnoses and all orders for this visit:    1. Controlled type 2 diabetes mellitus without complication, without long-term current use of insulin (Primary)  -     POC Glycosylated Hemoglobin (Hb A1C)  Diabetes is well controlled with a hemoglobin A1c 5.9%.  Continue Januvia and farxiga  2. Skin lesion  -     mupirocin (BACTROBAN) 2 % ointment; Apply 1 application topically to the appropriate area as directed 3 (Three) Times a Day.  Dispense: 15 g; Refill: 0  Mupirocin as directed.  Follow-up feels resolved  3. Constipation, unspecified constipation type  -      polyethylene glycol (MIRALAX) 17 g packet; Take 17 g by mouth Daily.  Dispense: 30 each; Refill: 6  We will start some MiraLAX.  She has not had a BM in next couple days she will reach out to me know  4. Acquired hypothyroidism  -     TSH Rfx On Abnormal To Free T4; Future  Stable.  Recheck TSH.  Continue levothyroxine  5. Essential hypertension  Well-controlled.  Continue amlodipine, losartan, and carvedilol  6. Mixed hyperlipidemia  Stable.  Continue statin.  Low-fat low-cholesterol diet  7. Mild episode of recurrent major depressive disorder  Stable.  Continue fluoxetine  8. Mild intermittent asthma without complication  Stable.  Has albuterol for as needed use.  Well-versed in respiratory distress symptoms seek emergent care for  9. Stage 3b chronic kidney disease  Avoid nephrotoxic agents.  Monitor routinely.  Stable.      Follow Up     Patient was given instructions and counseling regarding her condition or for health maintenance advice. Please see specific information pulled into the AVS if appropriate.   Any new medication's adverse effects have been discussed in detail with patient  Patient was encouraged to keep me informed of any acute changes, lack of improvement, or any new concerning symptoms.    Return if symptoms worsen or fail to improve.          Dictated Utilizing Dragon Dictation   Please note that portions of this note were completed with a voice recognition program.   Part of this note may be an electronic transcription/translation of spoken language to printed text using the Dragon Dictation System.

## 2023-06-30 DIAGNOSIS — E03.9 ACQUIRED HYPOTHYROIDISM: ICD-10-CM

## 2023-08-01 RX ORDER — LEVOTHYROXINE SODIUM 0.07 MG/1
TABLET ORAL
Qty: 30 TABLET | Refills: 0 | Status: SHIPPED | OUTPATIENT
Start: 2023-08-01

## 2023-08-31 ENCOUNTER — LAB (OUTPATIENT)
Dept: INTERNAL MEDICINE | Facility: CLINIC | Age: 53
End: 2023-08-31
Payer: COMMERCIAL

## 2023-08-31 DIAGNOSIS — E03.9 ACQUIRED HYPOTHYROIDISM: ICD-10-CM

## 2023-08-31 LAB — TSH SERPL DL<=0.05 MIU/L-ACNC: 3.11 UIU/ML (ref 0.27–4.2)

## 2023-08-31 PROCEDURE — 84443 ASSAY THYROID STIM HORMONE: CPT | Performed by: NURSE PRACTITIONER

## 2023-08-31 PROCEDURE — 36415 COLL VENOUS BLD VENIPUNCTURE: CPT | Performed by: NURSE PRACTITIONER

## 2023-09-08 ENCOUNTER — OFFICE VISIT (OUTPATIENT)
Dept: INTERNAL MEDICINE | Facility: CLINIC | Age: 53
End: 2023-09-08
Payer: COMMERCIAL

## 2023-09-08 VITALS
HEART RATE: 74 BPM | TEMPERATURE: 97.1 F | RESPIRATION RATE: 18 BRPM | SYSTOLIC BLOOD PRESSURE: 130 MMHG | DIASTOLIC BLOOD PRESSURE: 78 MMHG | HEIGHT: 64 IN | BODY MASS INDEX: 38.45 KG/M2 | WEIGHT: 225.2 LBS | OXYGEN SATURATION: 96 %

## 2023-09-08 DIAGNOSIS — E78.2 MIXED HYPERLIPIDEMIA: ICD-10-CM

## 2023-09-08 DIAGNOSIS — I10 ESSENTIAL HYPERTENSION: ICD-10-CM

## 2023-09-08 DIAGNOSIS — F33.0 MILD EPISODE OF RECURRENT MAJOR DEPRESSIVE DISORDER: ICD-10-CM

## 2023-09-08 DIAGNOSIS — E03.9 ACQUIRED HYPOTHYROIDISM: ICD-10-CM

## 2023-09-08 DIAGNOSIS — E11.9 CONTROLLED TYPE 2 DIABETES MELLITUS WITHOUT COMPLICATION, WITHOUT LONG-TERM CURRENT USE OF INSULIN: Primary | ICD-10-CM

## 2023-09-08 LAB
EXPIRATION DATE: NORMAL
HBA1C MFR BLD: 5.6 %
Lab: NORMAL

## 2023-09-08 RX ORDER — ATORVASTATIN CALCIUM 40 MG/1
40 TABLET, FILM COATED ORAL DAILY
Qty: 30 TABLET | Refills: 6 | Status: SHIPPED | OUTPATIENT
Start: 2023-09-08

## 2023-09-08 RX ORDER — AMLODIPINE BESYLATE 10 MG/1
10 TABLET ORAL DAILY
Qty: 30 TABLET | Refills: 6 | Status: SHIPPED | OUTPATIENT
Start: 2023-09-08

## 2023-09-08 RX ORDER — FLUOXETINE HYDROCHLORIDE 20 MG/1
20 CAPSULE ORAL DAILY
Qty: 30 CAPSULE | Refills: 6 | Status: SHIPPED | OUTPATIENT
Start: 2023-09-08

## 2023-09-08 RX ORDER — CARVEDILOL 6.25 MG/1
6.25 TABLET ORAL 2 TIMES DAILY
Qty: 60 TABLET | Refills: 6 | Status: SHIPPED | OUTPATIENT
Start: 2023-09-08

## 2023-09-08 RX ORDER — LEVOTHYROXINE SODIUM 0.07 MG/1
75 TABLET ORAL DAILY
Qty: 30 TABLET | Refills: 6 | Status: SHIPPED | OUTPATIENT
Start: 2023-09-08

## 2023-09-08 RX ORDER — LOSARTAN POTASSIUM 25 MG/1
25 TABLET ORAL DAILY
Qty: 30 TABLET | Refills: 6 | Status: SHIPPED | OUTPATIENT
Start: 2023-09-08

## 2023-09-08 NOTE — PROGRESS NOTES
Subjective   Freida Martinez is a 53 y.o. female.     Chief Complaint   Patient presents with    Diabetes     3 month f/u    Hypertension     3 month f/u     Hyperlipidemia     3 month f/u    Hypothyroidism     3 month f/u        PCP: Annia Rico APRN    History of Present Illness     She is here for chronic condition follow-up. She has type 2 diabetes, hypertension, hyperlipidemia, and hypothyroidism.     She states she walks at work. She advises she has been on a sugar binge for the last several days. She states she is craves sugar intermittently. She reports she becomes weak and light headed when her blood sugar is low.     She reports she no longer sees dermatology. She reports itching in her hands.     She is taking atorvastatin and levothyroxine.     She reports her mood is well controlled and no problems with depression. She would like to stay on medications.     The following portions of the patient's history were reviewed and updated as appropriate: allergies, current medications, past family history, past medical history, past social history, past surgical history and problem list.      Review of Systems   Constitutional:  Negative for fatigue, fever and unexpected weight loss.   Eyes:  Negative for blurred vision, double vision and visual disturbance.   Respiratory:  Negative for cough, shortness of breath and wheezing.    Cardiovascular:  Negative for chest pain, palpitations and leg swelling.   Gastrointestinal:  Negative for abdominal pain, constipation, diarrhea, nausea and vomiting.   Genitourinary:  Negative for difficulty urinating, frequency and urgency.   Musculoskeletal:  Negative for arthralgias and myalgias.   Skin:  Negative for color change and rash.        Itching hands     Neurological:  Negative for dizziness, weakness and headache.   Hematological:  Negative for adenopathy. Does not bruise/bleed easily.         Outpatient Medications Marked as Taking for the 9/8/23 encounter  (Office Visit) with Annia Rico APRN   Medication Sig Dispense Refill    albuterol sulfate HFA (ProAir HFA) 108 (90 Base) MCG/ACT inhaler Inhale 2 puffs Every 6 (Six) Hours As Needed for Wheezing or Shortness of Air. 18 g 6    amLODIPine (NORVASC) 10 MG tablet Take 1 tablet by mouth Daily. 30 tablet 6    aspirin 81 MG EC tablet Take 1 tablet by mouth Daily.      atorvastatin (LIPITOR) 40 MG tablet Take 1 tablet by mouth Daily. 30 tablet 6    carvedilol (COREG) 6.25 MG tablet Take 1 tablet by mouth 2 (Two) Times a Day. 60 tablet 6    dapagliflozin Propanediol 10 MG tablet Take 10 mg by mouth Daily. 30 tablet 6    FLUoxetine (PROzac) 20 MG capsule Take 1 capsule by mouth Daily. 30 capsule 6    levothyroxine (SYNTHROID, LEVOTHROID) 75 MCG tablet Take 1 tablet by mouth Daily. 30 tablet 6    losartan (COZAAR) 25 MG tablet Take 1 tablet by mouth Daily. 30 tablet 6    SITagliptin (JANUVIA) 100 MG tablet Take 1 tablet by mouth Daily. 30 tablet 6    [DISCONTINUED] amLODIPine (NORVASC) 10 MG tablet Take 1 tablet by mouth Daily. 30 tablet 6    [DISCONTINUED] atorvastatin (LIPITOR) 40 MG tablet Take 1 tablet by mouth Daily. 30 tablet 6    [DISCONTINUED] carvedilol (COREG) 6.25 MG tablet Take 1 tablet by mouth 2 (Two) Times a Day. 60 tablet 6    [DISCONTINUED] dapagliflozin Propanediol 10 MG tablet Take 10 mg by mouth Daily. 30 tablet 6    [DISCONTINUED] FLUoxetine (PROzac) 20 MG capsule Take 1 capsule by mouth Daily. 30 capsule 6    [DISCONTINUED] levothyroxine (SYNTHROID, LEVOTHROID) 75 MCG tablet TAKE ONE TABLET BY MOUTH DAILY 30 tablet 0    [DISCONTINUED] losartan (COZAAR) 25 MG tablet Take 1 tablet by mouth Daily. 30 tablet 6    [DISCONTINUED] SITagliptin (JANUVIA) 100 MG tablet Take 1 tablet by mouth Daily. 30 tablet 6     Allergies   Allergen Reactions    Anaprox [Naproxen] Other (See Comments)     CHRONIC KIDNEY DISEASE    Celebrex [Celecoxib] Other (See Comments)     CHRONIC KIDNEY DISEASE    Flector [Diclofenac  "Epolamine] Other (See Comments)     CHRONIC KIDNEY DISEASE    Motrin [Ibuprofen] Other (See Comments)     CHRONIC KIDNEY DISEASE    Voltaren [Diclofenac Sodium] Other (See Comments)     CHRONIC KIDNEY DISEASE    Lisinopril Swelling           Objective   Physical Exam  Constitutional:       Appearance: Normal appearance. She is well-developed. She is obese.   HENT:      Head: Normocephalic and atraumatic.   Eyes:      General: No scleral icterus.     Conjunctiva/sclera: Conjunctivae normal.   Cardiovascular:      Rate and Rhythm: Normal rate and regular rhythm.      Heart sounds: Normal heart sounds.   Pulmonary:      Effort: Pulmonary effort is normal. No respiratory distress.      Breath sounds: Normal breath sounds.   Abdominal:      General: Bowel sounds are normal.      Palpations: Abdomen is soft.      Tenderness: There is no abdominal tenderness.   Musculoskeletal:         General: Normal range of motion.      Cervical back: Normal range of motion.      Right lower leg: No edema.      Left lower leg: No edema.   Skin:     General: Skin is warm and dry.   Neurological:      General: No focal deficit present.      Mental Status: She is alert and oriented to person, place, and time.   Psychiatric:         Attention and Perception: Attention normal.         Mood and Affect: Mood and affect normal.         Behavior: Behavior normal. Behavior is cooperative.         Thought Content: Thought content normal.         Cognition and Memory: Cognition normal.         Judgment: Judgment normal.       Vitals:    09/08/23 1052   BP: 130/78   BP Location: Right arm   Patient Position: Sitting   Cuff Size: Adult   Pulse: 74   Resp: 18   Temp: 97.1 °F (36.2 °C)   TempSrc: Infrared   SpO2: 96%   Weight: 102 kg (225 lb 3.2 oz)   Height: 163.2 cm (64.25\")     Body mass index is 38.36 kg/m².  Wt Readings from Last 3 Encounters:   09/08/23 102 kg (225 lb 3.2 oz)   03/22/23 103 kg (226 lb)   12/21/22 107 kg (236 lb) "             Assessment & Plan   Diagnoses and all orders for this visit:    1. Controlled type 2 diabetes mellitus without complication, without long-term current use of insulin (Primary)  -     POC Glycosylated Hemoglobin (Hb A1C)  -     dapagliflozin Propanediol 10 MG tablet; Take 10 mg by mouth Daily.  Dispense: 30 tablet; Refill: 6  -     SITagliptin (JANUVIA) 100 MG tablet; Take 1 tablet by mouth Daily.  Dispense: 30 tablet; Refill: 6  -     losartan (COZAAR) 25 MG tablet; Take 1 tablet by mouth Daily.  Dispense: 30 tablet; Refill: 6    2. Mild episode of recurrent major depressive disorder  -     FLUoxetine (PROzac) 20 MG capsule; Take 1 capsule by mouth Daily.  Dispense: 30 capsule; Refill: 6    3. Mixed hyperlipidemia  -     atorvastatin (LIPITOR) 40 MG tablet; Take 1 tablet by mouth Daily.  Dispense: 30 tablet; Refill: 6    4. Essential hypertension  -     losartan (COZAAR) 25 MG tablet; Take 1 tablet by mouth Daily.  Dispense: 30 tablet; Refill: 6  -     amLODIPine (NORVASC) 10 MG tablet; Take 1 tablet by mouth Daily.  Dispense: 30 tablet; Refill: 6  -     carvedilol (COREG) 6.25 MG tablet; Take 1 tablet by mouth 2 (Two) Times a Day.  Dispense: 60 tablet; Refill: 6    5. Acquired hypothyroidism  -     levothyroxine (SYNTHROID, LEVOTHROID) 75 MCG tablet; Take 1 tablet by mouth Daily.  Dispense: 30 tablet; Refill: 6      Diabetes  - Well controlled. Continue current medications.     Hyperlipidemia  - Stable. Recheck lipids at her next follow-up.    Hypertension  - Well controlled. Continue current medications.    Hyperthyroidism  - Stable. Last TSH looked good. Continue levothyroxine at current dose.   TSH Rfx On Abnormal To Free T4 (08/31/2023 10:38)     Class 2 Severe Obesity (BMI >=35 and <=39.9). Obesity-related health conditions include the following: hypertension, diabetes mellitus, and dyslipidemias. Obesity is improving with lifestyle modifications. BMI is is above average; BMI management plan is  completed. We discussed low calorie, low carb based diet program, portion control, and increasing exercise.      Return in about 4 months (around 1/8/2024) for chronic condition follow up.    I discussed my findings,recommendations, and plan of care was with the patient. They verbalized understanding and agreement.  Patient was encouraged to keep me informed of any acute changes, lack of improvement, or any new concerning symptoms.     Transcribed from ambient dictation for LUCIA Manzano by Grant Mckeon.  09/08/23   12:15 EDT    Patient or patient representative verbalized consent to the visit recording.  I have personally performed the services described in this document as transcribed by the above individual, and it is both accurate and complete.  LUCIA Manzano  9/17/2023  08:24 EDT

## 2023-11-02 ENCOUNTER — TELEPHONE (OUTPATIENT)
Dept: INTERNAL MEDICINE | Facility: CLINIC | Age: 53
End: 2023-11-02
Payer: COMMERCIAL

## 2023-11-02 NOTE — TELEPHONE ENCOUNTER
Caller: Freida Martinez    Relationship: Self    Best call back number: [phone]    What is the best time to reach you: ANYTIME     Who are you requesting to speak with (clinical staff, provider,  specific staff member): CLINICAL STAFF    What was the call regarding: PATIENT STATES THAT SHE IS OUT OF THE [facility] AND THE PHARMACY IS NEEDING A PRIOR AUTHORIZATION.     Is it okay if the provider responds through SHIFTt: YES

## 2023-11-06 ENCOUNTER — PRIOR AUTHORIZATION (OUTPATIENT)
Dept: INTERNAL MEDICINE | Facility: CLINIC | Age: 53
End: 2023-11-06
Payer: COMMERCIAL

## 2023-11-06 NOTE — TELEPHONE ENCOUNTER
Key: [id number]      PA submitted via cover [employer].     Approvedtoday  The request has been approved. The authorization is effective from 11/06/2023 to 11/05/2024, as long as the member is enrolled in their current health plan. The request was approved as submitted.

## 2023-12-23 DIAGNOSIS — J45.20 MILD INTERMITTENT ASTHMA WITHOUT COMPLICATION: ICD-10-CM

## 2023-12-26 RX ORDER — ALBUTEROL SULFATE 90 UG/1
2 AEROSOL, METERED RESPIRATORY (INHALATION) EVERY 6 HOURS PRN
Qty: 18 G | Refills: 6 | Status: SHIPPED | OUTPATIENT
Start: 2023-12-26

## 2024-01-10 ENCOUNTER — OFFICE VISIT (OUTPATIENT)
Dept: INTERNAL MEDICINE | Facility: CLINIC | Age: 54
End: 2024-01-10
Payer: COMMERCIAL

## 2024-01-10 VITALS
OXYGEN SATURATION: 95 % | SYSTOLIC BLOOD PRESSURE: 128 MMHG | RESPIRATION RATE: 18 BRPM | BODY MASS INDEX: 42.37 KG/M2 | WEIGHT: 248.2 LBS | TEMPERATURE: 97.3 F | HEIGHT: 64 IN | DIASTOLIC BLOOD PRESSURE: 76 MMHG | HEART RATE: 76 BPM

## 2024-01-10 DIAGNOSIS — N18.32 STAGE 3B CHRONIC KIDNEY DISEASE: ICD-10-CM

## 2024-01-10 DIAGNOSIS — I10 ESSENTIAL HYPERTENSION: ICD-10-CM

## 2024-01-10 DIAGNOSIS — E03.9 ACQUIRED HYPOTHYROIDISM: ICD-10-CM

## 2024-01-10 DIAGNOSIS — E78.2 MIXED HYPERLIPIDEMIA: ICD-10-CM

## 2024-01-10 DIAGNOSIS — Z23 NEEDS FLU SHOT: ICD-10-CM

## 2024-01-10 DIAGNOSIS — Z23 NEED FOR COVID-19 VACCINE: ICD-10-CM

## 2024-01-10 DIAGNOSIS — E11.9 CONTROLLED TYPE 2 DIABETES MELLITUS WITHOUT COMPLICATION, WITHOUT LONG-TERM CURRENT USE OF INSULIN: Primary | ICD-10-CM

## 2024-01-10 LAB
EXPIRATION DATE: ABNORMAL
HBA1C MFR BLD: 5.9 % (ref 4.5–5.7)
Lab: ABNORMAL

## 2024-01-10 PROCEDURE — 91320 SARSCV2 VAC 30MCG TRS-SUC IM: CPT | Performed by: NURSE PRACTITIONER

## 2024-01-10 PROCEDURE — 83036 HEMOGLOBIN GLYCOSYLATED A1C: CPT | Performed by: NURSE PRACTITIONER

## 2024-01-10 PROCEDURE — 1160F RVW MEDS BY RX/DR IN RCRD: CPT | Performed by: NURSE PRACTITIONER

## 2024-01-10 PROCEDURE — 90686 IIV4 VACC NO PRSV 0.5 ML IM: CPT | Performed by: NURSE PRACTITIONER

## 2024-01-10 PROCEDURE — 90480 ADMN SARSCOV2 VAC 1/ONLY CMP: CPT | Performed by: NURSE PRACTITIONER

## 2024-01-10 PROCEDURE — 1159F MED LIST DOCD IN RCRD: CPT | Performed by: NURSE PRACTITIONER

## 2024-01-10 PROCEDURE — 3074F SYST BP LT 130 MM HG: CPT | Performed by: NURSE PRACTITIONER

## 2024-01-10 PROCEDURE — 90471 IMMUNIZATION ADMIN: CPT | Performed by: NURSE PRACTITIONER

## 2024-01-10 PROCEDURE — 3044F HG A1C LEVEL LT 7.0%: CPT | Performed by: NURSE PRACTITIONER

## 2024-01-10 PROCEDURE — 99214 OFFICE O/P EST MOD 30 MIN: CPT | Performed by: NURSE PRACTITIONER

## 2024-01-10 PROCEDURE — 3078F DIAST BP <80 MM HG: CPT | Performed by: NURSE PRACTITIONER

## 2024-01-10 RX ORDER — LOSARTAN POTASSIUM 25 MG/1
25 TABLET ORAL DAILY
Qty: 90 TABLET | Refills: 1 | Status: SHIPPED | OUTPATIENT
Start: 2024-01-10

## 2024-01-10 RX ORDER — CARVEDILOL 6.25 MG/1
6.25 TABLET ORAL 2 TIMES DAILY
Qty: 180 TABLET | Refills: 1 | Status: SHIPPED | OUTPATIENT
Start: 2024-01-10

## 2024-01-10 RX ORDER — FLUOXETINE 10 MG/1
10 TABLET, FILM COATED ORAL DAILY
Qty: 90 TABLET | Refills: 1 | Status: SHIPPED | OUTPATIENT
Start: 2024-01-10

## 2024-01-10 RX ORDER — AMLODIPINE BESYLATE 10 MG/1
10 TABLET ORAL DAILY
Qty: 90 TABLET | Refills: 1 | Status: SHIPPED | OUTPATIENT
Start: 2024-01-10

## 2024-01-10 RX ORDER — LEVOTHYROXINE SODIUM 0.07 MG/1
75 TABLET ORAL DAILY
Qty: 90 TABLET | Refills: 1 | Status: SHIPPED | OUTPATIENT
Start: 2024-01-10

## 2024-01-10 RX ORDER — ATORVASTATIN CALCIUM 40 MG/1
40 TABLET, FILM COATED ORAL DAILY
Qty: 90 TABLET | Refills: 1 | Status: SHIPPED | OUTPATIENT
Start: 2024-01-10

## 2024-01-10 NOTE — PATIENT INSTRUCTIONS
Calorie Counting for Weight Loss  Calories are units of energy. Your body needs a certain number of calories from food to keep going throughout the day. When you eat or drink more calories than your body needs, your body stores the extra calories mostly as fat. When you eat or drink fewer calories than your body needs, your body burns fat to get the energy it needs.  Calorie counting means keeping track of how many calories you eat and drink each day. Calorie counting can be helpful if you need to lose weight. If you eat fewer calories than your body needs, you should lose weight. Ask your health care provider what a healthy weight is for you.  For calorie counting to work, you will need to eat the right number of calories each day to lose a healthy amount of weight per week. A dietitian can help you figure out how many calories you need in a day and will suggest ways to reach your calorie goal.  A healthy amount of weight to lose each week is usually 1-2 lb (0.5-0.9 kg). This usually means that your daily calorie intake should be reduced by 500-750 calories.  Eating 1,200-1,500 calories a day can help most women lose weight.  Eating 1,500-1,800 calories a day can help most men lose weight.  What do I need to know about calorie counting?  Work with your health care provider or dietitian to determine how many calories you should get each day. To meet your daily calorie goal, you will need to:  Find out how many calories are in each food that you would like to eat. Try to do this before you eat.  Decide how much of the food you plan to eat.  Keep a food log. Do this by writing down what you ate and how many calories it had.  To successfully lose weight, it is important to balance calorie counting with a healthy lifestyle that includes regular activity.  Where do I find calorie information?    The number of calories in a food can be found on a Nutrition Facts label. If a food does not have a Nutrition Facts label, try  to look up the calories online or ask your dietitian for help.  Remember that calories are listed per serving. If you choose to have more than one serving of a food, you will have to multiply the calories per serving by the number of servings you plan to eat. For example, the label on a package of bread might say that a serving size is 1 slice and that there are 90 calories in a serving. If you eat 1 slice, you will have eaten 90 calories. If you eat 2 slices, you will have eaten 180 calories.  How do I keep a food log?  After each time that you eat, record the following in your food log as soon as possible:  What you ate. Be sure to include toppings, sauces, and other extras on the food.  How much you ate. This can be measured in cups, ounces, or number of items.  How many calories were in each food and drink.  The total number of calories in the food you ate.  Keep your food log near you, such as in a pocket-sized notebook or on an suhas or website on your mobile phone. Some programs will calculate calories for you and show you how many calories you have left to meet your daily goal.  What are some portion-control tips?  Know how many calories are in a serving. This will help you know how many servings you can have of a certain food.  Use a measuring cup to measure serving sizes. You could also try weighing out portions on a kitchen scale. With time, you will be able to estimate serving sizes for some foods.  Take time to put servings of different foods on your favorite plates or in your favorite bowls and cups so you know what a serving looks like.  Try not to eat straight from a food's packaging, such as from a bag or box. Eating straight from the package makes it hard to see how much you are eating and can lead to overeating. Put the amount you would like to eat in a cup or on a plate to make sure you are eating the right portion.  Use smaller plates, glasses, and bowls for smaller portions and to prevent  overeating.  Try not to multitask. For example, avoid watching TV or using your computer while eating. If it is time to eat, sit down at a table and enjoy your food. This will help you recognize when you are full. It will also help you be more mindful of what and how much you are eating.  What are tips for following this plan?  Reading food labels  Check the calorie count compared with the serving size. The serving size may be smaller than what you are used to eating.  Check the source of the calories. Try to choose foods that are high in protein, fiber, and vitamins, and low in saturated fat, trans fat, and sodium.  Shopping  Read nutrition labels while you shop. This will help you make healthy decisions about which foods to buy.  Pay attention to nutrition labels for low-fat or fat-free foods. These foods sometimes have the same number of calories or more calories than the full-fat versions. They also often have added sugar, starch, or salt to make up for flavor that was removed with the fat.  Make a grocery list of lower-calorie foods and stick to it.  Cooking  Try to cook your favorite foods in a healthier way. For example, try baking instead of frying.  Use low-fat dairy products.  Meal planning  Use more fruits and vegetables. One-half of your plate should be fruits and vegetables.  Include lean proteins, such as chicken, turkey, and fish.  Lifestyle  Each week, aim to do one of the followin minutes of moderate exercise, such as walking.  75 minutes of vigorous exercise, such as running.  General information  Know how many calories are in the foods you eat most often. This will help you calculate calorie counts faster.  Find a way of tracking calories that works for you. Get creative. Try different apps or programs if writing down calories does not work for you.  What foods should I eat?    Eat nutritious foods. It is better to have a nutritious, high-calorie food, such as an avocado, than a food with  few nutrients, such as a bag of potato chips.  Use your calories on foods and drinks that will fill you up and will not leave you hungry soon after eating.  Examples of foods that fill you up are nuts and nut butters, vegetables, lean proteins, and high-fiber foods such as whole grains. High-fiber foods are foods with more than 5 g of fiber per serving.  Pay attention to calories in drinks. Low-calorie drinks include water and unsweetened drinks.  The items listed above may not be a complete list of foods and beverages you can eat. Contact a dietitian for more information.  What foods should I limit?  Limit foods or drinks that are not good sources of vitamins, minerals, or protein or that are high in unhealthy fats. These include:  Candy.  Other sweets.  Sodas, specialty coffee drinks, alcohol, and juice.  The items listed above may not be a complete list of foods and beverages you should avoid. Contact a dietitian for more information.  How do I count calories when eating out?  Pay attention to portions. Often, portions are much larger when eating out. Try these tips to keep portions smaller:  Consider sharing a meal instead of getting your own.  If you get your own meal, eat only half of it. Before you start eating, ask for a container and put half of your meal into it.  When available, consider ordering smaller portions from the menu instead of full portions.  Pay attention to your food and drink choices. Knowing the way food is cooked and what is included with the meal can help you eat fewer calories.  If calories are listed on the menu, choose the lower-calorie options.  Choose dishes that include vegetables, fruits, whole grains, low-fat dairy products, and lean proteins.  Choose items that are boiled, broiled, grilled, or steamed. Avoid items that are buttered, battered, fried, or served with cream sauce. Items labeled as crispy are usually fried, unless stated otherwise.  Choose water, low-fat milk,  unsweetened iced tea, or other drinks without added sugar. If you want an alcoholic beverage, choose a lower-calorie option, such as a glass of wine or light beer.  Ask for dressings, sauces, and syrups on the side. These are usually high in calories, so you should limit the amount you eat.  If you want a salad, choose a garden salad and ask for grilled meats. Avoid extra toppings such as connor, cheese, or fried items. Ask for the dressing on the side, or ask for olive oil and vinegar or lemon to use as dressing.  Estimate how many servings of a food you are given. Knowing serving sizes will help you be aware of how much food you are eating at restaurants.  Where to find more information  Centers for Disease Control and Prevention: www.cdc.gov  [place] Department of Agriculture: myplate.gov  Summary  Calorie counting means keeping track of how many calories you eat and drink each day. If you eat fewer calories than your body needs, you should lose weight.  A healthy amount of weight to lose per week is usually 1-2 lb (0.5-0.9 kg). This usually means reducing your daily calorie intake by 500-750 calories.  The number of calories in a food can be found on a Nutrition Facts label. If a food does not have a Nutrition Facts label, try to look up the calories online or ask your dietitian for help.  Use smaller plates, glasses, and bowls for smaller portions and to prevent overeating.  Use your calories on foods and drinks that will fill you up and not leave you hungry shortly after a meal.  This information is not intended to replace advice given to you by your health care provider. Make sure you discuss any questions you have with your health care provider.  Document Revised: 01/28/2021 Document Reviewed: 01/28/2021  [employer]vier Patient Education © 2023 [employer]  Exercising to Lose Weight  Getting regular exercise is important for everyone. It is especially important if you are overweight. Being overweight increases your  risk of heart disease, stroke, diabetes, high blood pressure, and several types of cancer. Exercising, and reducing the calories you consume, can help you lose weight and improve fitness and health.  Exercise can be moderate or vigorous intensity. To lose weight, most people need to do a certain amount of moderate or vigorous-intensity exercise each week.  How can exercise affect me?  You lose weight when you exercise enough to burn more calories than you eat. Exercise also reduces body fat and builds muscle. The more muscle you have, the more calories you burn. Exercise also:  Improves mood.  Reduces stress and tension.  Improves your overall fitness, flexibility, and endurance.  Increases bone strength.  Moderate-intensity exercise    Moderate-intensity exercise is any activity that gets you moving enough to burn at least three times more energy (calories) than if you were sitting.  Examples of moderate exercise include:  Walking a mile in 15 minutes.  Doing light yard work.  Biking at an easy pace.  Most people should get at least 150 minutes of moderate-intensity exercise a week to maintain their body weight.  Vigorous-intensity exercise  Vigorous-intensity exercise is any activity that gets you moving enough to burn at least six times more calories than if you were sitting. When you exercise at this intensity, you should be working hard enough that you are not able to carry on a conversation.  Examples of vigorous exercise include:  Running.  Playing a team sport, such as football, basketball, and soccer.  Jumping rope.  Most people should get at least 75 minutes a week of vigorous exercise to maintain their body weight.  What actions can I take to lose weight?  The amount of exercise you need to lose weight depends on:  Your age.  The type of exercise.  Any health conditions you have.  Your overall physical ability.  Talk to your health care provider about how much exercise you need and what types of  activities are safe for you.  Nutrition    Make changes to your diet as told by your health care provider or diet and nutrition specialist (dietitian). This may include:  Eating fewer calories.  Eating more protein.  Eating less unhealthy fats.  Eating a diet that includes fresh fruits and vegetables, whole grains, low-fat dairy products, and lean protein.  Avoiding foods with added fat, salt, and sugar.  Drink plenty of water while you exercise to prevent dehydration or heat stroke.  Activity  Choose an activity that you enjoy and set realistic goals. Your health care provider can help you make an exercise plan that works for you.  Exercise at a moderate or vigorous intensity most days of the week.  The intensity of exercise may vary from person to person. You can tell how intense a workout is for you by paying attention to your breathing and heartbeat. Most people will notice their breathing and heartbeat get faster with more intense exercise.  Do resistance training twice each week, such as:  Push-ups.  Sit-ups.  Lifting weights.  Using resistance bands.  Getting short amounts of exercise can be just as helpful as long, structured periods of exercise. If you have trouble finding time to exercise, try doing these things as part of your daily routine:  Get up, stretch, and walk around every 30 minutes throughout the day.  Go for a walk during your lunch break.  Park your car farther away from your destination.  If you take public transportation, get off one stop early and walk the rest of the way.  Make phone calls while standing up and walking around.  Take the stairs instead of elevators or escalators.  Wear comfortable clothes and shoes with good support.  Do not exercise so much that you hurt yourself, feel dizzy, or get very short of breath.  Where to find more information  [place] Department of Health and Human Services: www.hhs.gov  Centers for Disease Control and Prevention: www.cdc.gov  Contact a health care  provider:  Before starting a new exercise program.  If you have questions or concerns about your weight.  If you have a medical problem that keeps you from exercising.  Get help right away if:  You have any of the following while exercising:  Injury.  Dizziness.  Difficulty breathing or shortness of breath that does not go away when you stop exercising.  Chest pain.  Rapid heartbeat.  These symptoms may represent a serious problem that is an emergency. Do not wait to see if the symptoms will go away. Get medical help right away. Call your local emergency services (911 in the [place] Do not drive yourself to the hospital.  Summary  Getting regular exercise is especially important if you are overweight.  Being overweight increases your risk of heart disease, stroke, diabetes, high blood pressure, and several types of cancer.  Losing weight happens when you burn more calories than you eat.  Reducing the amount of calories you eat, and getting regular moderate or vigorous exercise each week, helps you lose weight.  This information is not intended to replace advice given to you by your health care provider. Make sure you discuss any questions you have with your health care provider.  Document Revised: 02/13/2022 Document Reviewed: 02/13/2022  [employer]vier Patient Education © 2023 [employer]

## 2024-01-10 NOTE — PROGRESS NOTES
Subjective   Freida Martinez is a 53 y.o. female.     Chief Complaint   Patient presents with    Diabetes    Hypertension    Hyperlipidemia    Hypothyroidism    Depression       PCP: Annia Rico APRN     She  is here for follow-up on chronic condition. She has type 2 diabetes, hypertension, hyperlipidemia, hypothyroidism, as well as depression. Her last hemoglobin A1c, drawn 09/08/2023, was stable at 5.6%. Her last TSH drawn on 08/31/2023 was stable at 3.1. Her last lipid panel drawn on 12/21/2022, was stable with an LDL of 54 and a slight elevation in triglycerides at 201. Her CMP drawn on 12/21/2022 was stable. Her creatine was elevated at 1.74. Her GFR was low at 34.9. She does have chronic kidney disease as well as stage 3. She follows with nephrology.    She reports she does not check her blood sugars. She states she has been eating Carmen bars and Juan J cups. She notes she drinks soda every day. She has gained weight. She reports she works Sunday, Monday, and Tuesday and walks around 2 buildings every 2 hours for less than 5 minutes. She states she plans to walk more when the weather warms up. She states she sits drinks water or soda all day long, which makes her urinate a lot. She notes she has not been wearing her CPAP machine and she gets up a lot to urinate.     She states she is taking her medications for her hypertension.    She notes she is taking Prozac for her depression. She would like to see how she does without the medication.    She reports she will make an appointment for her diabetic eye exam today.    The following portions of the patient's history were reviewed and updated as appropriate: allergies, current medications, past family history, past medical history, past social history, past surgical history and problem list.        Review of Systems   Constitutional:  Negative for fatigue, fever and unexpected weight loss.   Eyes:  Negative for blurred vision, double vision and  visual disturbance.   Respiratory:  Negative for cough, shortness of breath and wheezing.    Cardiovascular:  Negative for chest pain, palpitations and leg swelling.   Gastrointestinal:  Negative for abdominal pain, constipation, diarrhea, nausea and vomiting.   Endocrine: Negative for polydipsia, polyphagia and polyuria.   Genitourinary:  Negative for difficulty urinating, frequency and urgency.   Musculoskeletal:  Negative for arthralgias and myalgias.   Skin:  Negative for color change and rash.   Neurological:  Negative for dizziness, weakness and headache.   Hematological:  Negative for adenopathy. Does not bruise/bleed easily.           Outpatient Medications Marked as Taking for the 1/10/24 encounter (Office Visit) with Annia Rico APRN   Medication Sig Dispense Refill    amLODIPine (NORVASC) 10 MG tablet Take 1 tablet by mouth Daily. 90 tablet 1    aspirin 81 MG EC tablet Take 1 tablet by mouth Daily.      atorvastatin (LIPITOR) 40 MG tablet Take 1 tablet by mouth Daily. 90 tablet 1    carvedilol (COREG) 6.25 MG tablet Take 1 tablet by mouth 2 (Two) Times a Day. 180 tablet 1    dapagliflozin Propanediol 10 MG tablet Take 10 mg by mouth Daily. 90 tablet 1    diclofenac (VOLTAREN) 1 % gel gel Apply 4 g topically to the appropriate area as directed 4 (Four) Times a Day. Small amount to affected area 300 g 3    levothyroxine (SYNTHROID, LEVOTHROID) 75 MCG tablet Take 1 tablet by mouth Daily. 90 tablet 1    losartan (COZAAR) 25 MG tablet Take 1 tablet by mouth Daily. 90 tablet 1    SITagliptin (JANUVIA) 100 MG tablet Take 1 tablet by mouth Daily. 90 tablet 1    Ventolin  (90 Base) MCG/ACT inhaler INHALE TWO PUFFS BY MOUTH EVERY 6 HOURS AS NEEDED FOR WHEEZING OR SHORTNESS OF AIR 18 g 6    [DISCONTINUED] amLODIPine (NORVASC) 10 MG tablet Take 1 tablet by mouth Daily. 30 tablet 6    [DISCONTINUED] atorvastatin (LIPITOR) 40 MG tablet Take 1 tablet by mouth Daily. 30 tablet 6    [DISCONTINUED] carvedilol  (COREG) 6.25 MG tablet Take 1 tablet by mouth 2 (Two) Times a Day. 60 tablet 6    [DISCONTINUED] dapagliflozin Propanediol 10 MG tablet Take 10 mg by mouth Daily. 30 tablet 6    [DISCONTINUED] FLUoxetine (PROzac) 20 MG capsule Take 1 capsule by mouth Daily. 30 capsule 6    [DISCONTINUED] levothyroxine (SYNTHROID, LEVOTHROID) 75 MCG tablet Take 1 tablet by mouth Daily. 30 tablet 6    [DISCONTINUED] losartan (COZAAR) 25 MG tablet Take 1 tablet by mouth Daily. 30 tablet 6    [DISCONTINUED] SITagliptin (JANUVIA) 100 MG tablet Take 1 tablet by mouth Daily. 30 tablet 6     Allergies   Allergen Reactions    Anaprox [Naproxen] Other (See Comments)     CHRONIC KIDNEY DISEASE    Celebrex [Celecoxib] Other (See Comments)     CHRONIC KIDNEY DISEASE    Flector [Diclofenac Epolamine] Other (See Comments)     CHRONIC KIDNEY DISEASE    Motrin [Ibuprofen] Other (See Comments)     CHRONIC KIDNEY DISEASE    Voltaren [Diclofenac Sodium] Other (See Comments)     CHRONIC KIDNEY DISEASE    Lisinopril Swelling           Objective   Physical Exam  Constitutional:       Appearance: Normal appearance. She is well-developed. She is obese.   HENT:      Head: Normocephalic and atraumatic.   Eyes:      General: No scleral icterus.     Conjunctiva/sclera: Conjunctivae normal.   Cardiovascular:      Rate and Rhythm: Normal rate and regular rhythm.      Heart sounds: Normal heart sounds.   Pulmonary:      Effort: Pulmonary effort is normal. No respiratory distress.      Breath sounds: Normal breath sounds.   Abdominal:      General: Bowel sounds are normal.      Palpations: Abdomen is soft.      Tenderness: There is no abdominal tenderness.   Musculoskeletal:         General: Normal range of motion.      Cervical back: Normal range of motion.      Right lower leg: No edema.      Left lower leg: No edema.   Skin:     General: Skin is warm and dry.   Neurological:      General: No focal deficit present.      Mental Status: She is alert and oriented to  "person, place, and time.   Psychiatric:         Attention and Perception: Attention normal.         Mood and Affect: Mood and affect normal.         Behavior: Behavior normal. Behavior is cooperative.         Thought Content: Thought content normal.         Cognition and Memory: Cognition normal.         Judgment: Judgment normal.         Vitals:    01/10/24 1011   BP: 128/76   BP Location: Right arm   Patient Position: Sitting   Cuff Size: Adult   Pulse: 76   Resp: 18   Temp: 97.3 °F (36.3 °C)   TempSrc: Infrared   SpO2: 95%   Weight: 113 kg (248 lb 3.2 oz)   Height: 163.2 cm (64.25\")     Body mass index is 42.27 kg/m².  Wt Readings from Last 3 Encounters:   01/10/24 113 kg (248 lb 3.2 oz)   09/08/23 102 kg (225 lb 3.2 oz)   03/22/23 103 kg (226 lb)             Assessment & Plan   Diagnoses and all orders for this visit:    1. Controlled type 2 diabetes mellitus without complication, without long-term current use of insulin (Primary)  -     POC Glycosylated Hemoglobin (Hb A1C)  -     CBC (No Diff); Future  -     Comprehensive Metabolic Panel; Future  -     Lipid Panel; Future  -     Microalbumin / Creatinine Urine Ratio - Urine, Clean Catch; Future  -     dapagliflozin Propanediol 10 MG tablet; Take 10 mg by mouth Daily.  Dispense: 90 tablet; Refill: 1  -     SITagliptin (JANUVIA) 100 MG tablet; Take 1 tablet by mouth Daily.  Dispense: 90 tablet; Refill: 1  -     losartan (COZAAR) 25 MG tablet; Take 1 tablet by mouth Daily.  Dispense: 90 tablet; Refill: 1    2. Essential hypertension  -     CBC (No Diff); Future  -     Comprehensive Metabolic Panel; Future  -     Lipid Panel; Future  -     losartan (COZAAR) 25 MG tablet; Take 1 tablet by mouth Daily.  Dispense: 90 tablet; Refill: 1  -     carvedilol (COREG) 6.25 MG tablet; Take 1 tablet by mouth 2 (Two) Times a Day.  Dispense: 180 tablet; Refill: 1  -     amLODIPine (NORVASC) 10 MG tablet; Take 1 tablet by mouth Daily.  Dispense: 90 tablet; Refill: 1    3. Mixed " hyperlipidemia  -     CBC (No Diff); Future  -     Comprehensive Metabolic Panel; Future  -     Lipid Panel; Future  -     atorvastatin (LIPITOR) 40 MG tablet; Take 1 tablet by mouth Daily.  Dispense: 90 tablet; Refill: 1    4. Acquired hypothyroidism  -     CBC (No Diff); Future  -     Comprehensive Metabolic Panel; Future  -     Lipid Panel; Future  -     TSH Rfx On Abnormal To Free T4; Future  -     levothyroxine (SYNTHROID, LEVOTHROID) 75 MCG tablet; Take 1 tablet by mouth Daily.  Dispense: 90 tablet; Refill: 1    5. Stage 3b chronic kidney disease  -     CBC (No Diff); Future  -     Comprehensive Metabolic Panel; Future  -     Lipid Panel; Future    6. Need for COVID-19 vaccine  -     COVID-19 F23 (Pfizer) 12yrs+ (COMIRNATY)    7. Needs flu shot  -     Fluzone >6 Months (0423-2119)    Other orders  -     FLUoxetine (PROzac) 10 MG tablet; Take 1 tablet by mouth Daily.  Dispense: 90 tablet; Refill: 1      Diabetes  - Well controlled, but she is noncompliant with ADA diet. Encouraged her to improve dietary and exercise regimen. She is willing to decrease her soda consumption but not Carmen's or Juan J's consumption.    Hypertension  - Well controlled. She will continue the current regimen.    Hyperlipidemia  - Stable. She will continue statin but recheck lipids are due now.    Hypothyroidism, clinically euthyroid  - Her last TSH looked okay. She will continue levothyroxine at current dose. I will recheck TSH.    Chronic kidney disease  - She will avoid nephrotoxic agents. She will monitor renal function and maintain adequate hydration.    Preventative care  - Update influenza and COVID-19 vaccinations today.          Return in about 3 months (around 4/10/2024) for chronic condition follow up, and need to collect labs today.    I discussed my findings,recommendations, and plan of care was with the patient. They verbalized understanding and agreement.  Patient was encouraged to keep me informed of any acute changes,  lack of improvement, or any new concerning symptoms.     Transcribed from ambient dictation for LUCIA Manzano by Grant Mckeon.  01/10/24   12:53 EST    Patient or patient representative verbalized consent to the visit recording.  I have personally performed the services described in this document as transcribed by the above individual, and it is both accurate and complete.  LUCIA Manzano  1/15/2024  10:45 EST

## 2024-04-10 ENCOUNTER — LAB (OUTPATIENT)
Dept: INTERNAL MEDICINE | Facility: CLINIC | Age: 54
End: 2024-04-10
Payer: COMMERCIAL

## 2024-04-10 ENCOUNTER — OFFICE VISIT (OUTPATIENT)
Dept: INTERNAL MEDICINE | Facility: CLINIC | Age: 54
End: 2024-04-10
Payer: COMMERCIAL

## 2024-04-10 VITALS
RESPIRATION RATE: 18 BRPM | TEMPERATURE: 97.1 F | SYSTOLIC BLOOD PRESSURE: 130 MMHG | WEIGHT: 244.2 LBS | OXYGEN SATURATION: 99 % | DIASTOLIC BLOOD PRESSURE: 84 MMHG | BODY MASS INDEX: 41.69 KG/M2 | HEART RATE: 88 BPM | HEIGHT: 64 IN

## 2024-04-10 DIAGNOSIS — E03.9 ACQUIRED HYPOTHYROIDISM: ICD-10-CM

## 2024-04-10 DIAGNOSIS — E78.2 MIXED HYPERLIPIDEMIA: ICD-10-CM

## 2024-04-10 DIAGNOSIS — E11.9 CONTROLLED TYPE 2 DIABETES MELLITUS WITHOUT COMPLICATION, WITHOUT LONG-TERM CURRENT USE OF INSULIN: ICD-10-CM

## 2024-04-10 DIAGNOSIS — I10 ESSENTIAL HYPERTENSION: ICD-10-CM

## 2024-04-10 DIAGNOSIS — E11.9 CONTROLLED TYPE 2 DIABETES MELLITUS WITHOUT COMPLICATION, WITHOUT LONG-TERM CURRENT USE OF INSULIN: Primary | ICD-10-CM

## 2024-04-10 DIAGNOSIS — N18.32 STAGE 3B CHRONIC KIDNEY DISEASE: ICD-10-CM

## 2024-04-10 DIAGNOSIS — Z72.89 CURRENT EVERY DAY VAPING: ICD-10-CM

## 2024-04-10 LAB
ALBUMIN SERPL-MCNC: 4.3 G/DL (ref 3.5–5.2)
ALBUMIN UR-MCNC: 15.2 MG/DL
ALBUMIN/GLOB SERPL: 1.3 G/DL
ALP SERPL-CCNC: 84 U/L (ref 39–117)
ALT SERPL W P-5'-P-CCNC: 15 U/L (ref 1–33)
ANION GAP SERPL CALCULATED.3IONS-SCNC: 11 MMOL/L (ref 5–15)
AST SERPL-CCNC: 11 U/L (ref 1–32)
BILIRUB SERPL-MCNC: 0.8 MG/DL (ref 0–1.2)
BUN SERPL-MCNC: 16 MG/DL (ref 6–20)
BUN/CREAT SERPL: 8.4 (ref 7–25)
CALCIUM SPEC-SCNC: 10 MG/DL (ref 8.6–10.5)
CHLORIDE SERPL-SCNC: 108 MMOL/L (ref 98–107)
CHOLEST SERPL-MCNC: 108 MG/DL (ref 0–200)
CO2 SERPL-SCNC: 22 MMOL/L (ref 22–29)
CREAT SERPL-MCNC: 1.9 MG/DL (ref 0.57–1)
CREAT UR-MCNC: 202.1 MG/DL
DEPRECATED RDW RBC AUTO: 43.2 FL (ref 37–54)
EGFRCR SERPLBLD CKD-EPI 2021: 31.1 ML/MIN/1.73
ERYTHROCYTE [DISTWIDTH] IN BLOOD BY AUTOMATED COUNT: 14.7 % (ref 12.3–15.4)
EXPIRATION DATE: ABNORMAL
GLOBULIN UR ELPH-MCNC: 3.4 GM/DL
GLUCOSE SERPL-MCNC: 99 MG/DL (ref 65–99)
HBA1C MFR BLD: 6.1 % (ref 4.5–5.7)
HCT VFR BLD AUTO: 42.2 % (ref 34–46.6)
HDLC SERPL-MCNC: 45 MG/DL (ref 40–60)
HGB BLD-MCNC: 13.5 G/DL (ref 12–15.9)
LDLC SERPL CALC-MCNC: 40 MG/DL (ref 0–100)
LDLC/HDLC SERPL: 0.81 {RATIO}
Lab: ABNORMAL
MCH RBC QN AUTO: 25.8 PG (ref 26.6–33)
MCHC RBC AUTO-ENTMCNC: 32 G/DL (ref 31.5–35.7)
MCV RBC AUTO: 80.5 FL (ref 79–97)
MICROALBUMIN/CREAT UR: 75.2 MG/G (ref 0–29)
PLATELET # BLD AUTO: 277 10*3/MM3 (ref 140–450)
PMV BLD AUTO: 11.7 FL (ref 6–12)
POTASSIUM SERPL-SCNC: 4.1 MMOL/L (ref 3.5–5.2)
PROT SERPL-MCNC: 7.7 G/DL (ref 6–8.5)
RBC # BLD AUTO: 5.24 10*6/MM3 (ref 3.77–5.28)
SODIUM SERPL-SCNC: 141 MMOL/L (ref 136–145)
T4 FREE SERPL-MCNC: 1.3 NG/DL (ref 0.93–1.7)
TRIGL SERPL-MCNC: 132 MG/DL (ref 0–150)
TSH SERPL DL<=0.05 MIU/L-ACNC: 4.94 UIU/ML (ref 0.27–4.2)
VLDLC SERPL-MCNC: 23 MG/DL (ref 5–40)
WBC NRBC COR # BLD AUTO: 8.31 10*3/MM3 (ref 3.4–10.8)

## 2024-04-10 PROCEDURE — 84439 ASSAY OF FREE THYROXINE: CPT | Performed by: NURSE PRACTITIONER

## 2024-04-10 PROCEDURE — 80050 GENERAL HEALTH PANEL: CPT | Performed by: NURSE PRACTITIONER

## 2024-04-10 PROCEDURE — 82043 UR ALBUMIN QUANTITATIVE: CPT | Performed by: NURSE PRACTITIONER

## 2024-04-10 PROCEDURE — 36415 COLL VENOUS BLD VENIPUNCTURE: CPT | Performed by: NURSE PRACTITIONER

## 2024-04-10 PROCEDURE — 80061 LIPID PANEL: CPT | Performed by: NURSE PRACTITIONER

## 2024-04-10 PROCEDURE — 82570 ASSAY OF URINE CREATININE: CPT | Performed by: NURSE PRACTITIONER

## 2024-04-10 NOTE — PROGRESS NOTES
Subjective   Freida Martinez is a 54 y.o. female.     Chief Complaint   Patient presents with    Diabetes    Hyperlipidemia    Hypertension    Hypothyroidism    Depression       PCP: Annia Rico APRN       The patient is a 54-year-old female who is here today for chronic condition follow-up, diabetes, hyperlipidemia, hypertension, hypothyroidism, and depression. Her last hemoglobin A1c on 01/10/2025 was 5.9 percent. She just had her labs drawn earlier today. CBC, CMP, lipid, TSH, and a microalbumin. Results are not available yet.    The patient's depression has reverted to her previous state, however, she expresses a desire to maintain her current regimen of fluoxetine 10 mg.     The patient has experienced a weight loss of 4 pounds. She has made dietary modifications, including eliminating sugar, bread, potatoes, red meat, and pork from her diet. She incorporates plant-based foods and shakes into her diet.     She reports experiencing joint locking and muscle spasms, which she attributes to her potassium intake. She expresses a desire to increase her potassium dosage.     Her appetite has improved, and she takes apple cider gummies and Centrum multivitamin gummies. She occasionally experiences hypoglycemia, particularly when she forgets to take her morning medication.     She underwent an eye examination on 04/03/2024, which revealed small astigmatism but no retinopathy. She has not undergone a mammogram due to transportation issues.    The patient smokes marijuana daily.   The following portions of the patient's history were reviewed and updated as appropriate: allergies, current medications, past family history, past medical history, past social history, past surgical history and problem list.        Review of Systems   Constitutional:  Negative for fatigue, fever and unexpected weight loss.   Eyes:  Negative for blurred vision, double vision and visual disturbance.   Respiratory:  Negative for cough,  shortness of breath and wheezing.    Cardiovascular:  Negative for chest pain, palpitations and leg swelling.   Gastrointestinal:  Negative for abdominal pain, constipation, diarrhea, nausea and vomiting.   Genitourinary:  Negative for difficulty urinating, frequency and urgency.   Musculoskeletal:  Positive for arthralgias and myalgias.   Skin:  Negative for color change and rash.   Neurological:  Negative for dizziness, weakness and headache.   Hematological:  Negative for adenopathy. Does not bruise/bleed easily.   Psychiatric/Behavioral:  Positive for depressed mood.            Outpatient Medications Marked as Taking for the 4/10/24 encounter (Office Visit) with Annia Rico APRN   Medication Sig Dispense Refill    albuterol (PROVENTIL) (2.5 MG/3ML) 0.083% nebulizer solution       amLODIPine (NORVASC) 10 MG tablet Take 1 tablet by mouth Daily. 90 tablet 1    aspirin 81 MG EC tablet Take 1 tablet by mouth Daily.      atorvastatin (LIPITOR) 40 MG tablet Take 1 tablet by mouth Daily. 90 tablet 1    carvedilol (COREG) 6.25 MG tablet Take 1 tablet by mouth 2 (Two) Times a Day. 180 tablet 1    dapagliflozin Propanediol 10 MG tablet Take 10 mg by mouth Daily. 90 tablet 1    FLUoxetine (PROzac) 10 MG tablet Take 1 tablet by mouth Daily. 90 tablet 1    levothyroxine (SYNTHROID, LEVOTHROID) 75 MCG tablet Take 1 tablet by mouth Daily. 90 tablet 1    losartan (COZAAR) 25 MG tablet Take 1 tablet by mouth Daily. 90 tablet 1    SITagliptin (JANUVIA) 100 MG tablet Take 1 tablet by mouth Daily. 90 tablet 1    Ventolin  (90 Base) MCG/ACT inhaler INHALE TWO PUFFS BY MOUTH EVERY 6 HOURS AS NEEDED FOR WHEEZING OR SHORTNESS OF AIR 18 g 6     Allergies   Allergen Reactions    Anaprox [Naproxen] Other (See Comments)     CHRONIC KIDNEY DISEASE    Celebrex [Celecoxib] Other (See Comments)     CHRONIC KIDNEY DISEASE    Flector [Diclofenac Epolamine] Other (See Comments)     CHRONIC KIDNEY DISEASE    Motrin [Ibuprofen] Other  "(See Comments)     CHRONIC KIDNEY DISEASE    Voltaren [Diclofenac Sodium] Other (See Comments)     CHRONIC KIDNEY DISEASE    Lisinopril Swelling           Objective   Physical Exam  Constitutional:       Appearance: Normal appearance. She is well-developed.   HENT:      Head: Normocephalic and atraumatic.   Eyes:      General: No scleral icterus.     Conjunctiva/sclera: Conjunctivae normal.   Cardiovascular:      Rate and Rhythm: Normal rate and regular rhythm.      Heart sounds: Normal heart sounds.   Pulmonary:      Effort: Pulmonary effort is normal. No respiratory distress.      Breath sounds: Normal breath sounds.   Abdominal:      General: Bowel sounds are normal.      Palpations: Abdomen is soft.      Tenderness: There is no abdominal tenderness.   Musculoskeletal:         General: Normal range of motion.      Cervical back: Normal range of motion.      Right lower leg: No edema.      Left lower leg: No edema.   Skin:     General: Skin is warm and dry.   Neurological:      General: No focal deficit present.      Mental Status: She is alert and oriented to person, place, and time.   Psychiatric:         Attention and Perception: Attention normal.         Mood and Affect: Mood and affect normal.         Behavior: Behavior normal. Behavior is cooperative.         Thought Content: Thought content normal.         Cognition and Memory: Cognition normal.         Judgment: Judgment normal.         Vitals:    04/10/24 1109   BP: 130/84   BP Location: Left arm   Patient Position: Sitting   Cuff Size: Adult   Pulse: 88   Resp: 18   Temp: 97.1 °F (36.2 °C)   TempSrc: Infrared   SpO2: 99%   Weight: 111 kg (244 lb 3.2 oz)   Height: 163.2 cm (64.25\")     Body mass index is 41.59 kg/m².  Wt Readings from Last 3 Encounters:   04/10/24 111 kg (244 lb 3.2 oz)   01/10/24 113 kg (248 lb 3.2 oz)   09/08/23 102 kg (225 lb 3.2 oz)             Assessment & Plan   Diagnoses and all orders for this visit:    1. Controlled type 2 " diabetes mellitus without complication, without long-term current use of insulin (Primary)  -     POC Glycosylated Hemoglobin (Hb A1C)    2. Essential hypertension    3. Acquired hypothyroidism    4. Stage 3b chronic kidney disease    5. Mixed hyperlipidemia    6. Current every day vaping      1. Depression.  The patient will maintain her current regimen of fluoxetine 10 mg.    2. Diabetes.  The patient's A1c levels have shown significant improvement. The patient is advised to persist with her current dietary regimen.    Follow-up  The patient is scheduled for a follow-up visit in 3 months.          Return in about 3 months (around 7/10/2024) for chronic condition follow up.    I discussed my findings,recommendations, and plan of care was with the patient. They verbalized understanding and agreement.  Patient was encouraged to keep me informed of any acute changes, lack of improvement, or any new concerning symptoms.      Transcribed from ambient dictation for LUCIA Manzano by Lillie Arechiga.  04/10/24   14:09 EDT    Patient or patient representative verbalized consent to the visit recording.  I have personally performed the services described in this document as transcribed by the above individual, and it is both accurate and complete.

## 2024-04-16 ENCOUNTER — TELEPHONE (OUTPATIENT)
Dept: INTERNAL MEDICINE | Facility: CLINIC | Age: 54
End: 2024-04-16
Payer: COMMERCIAL

## 2024-04-16 NOTE — TELEPHONE ENCOUNTER
----- Message from LUCIA Garcia sent at 4/15/2024  6:05 PM EDT -----  Please let her know: Your labs are stable except the TSH is slightly high but reassuringly you have a normal T4. We could increase the thyroid medication(levothyroxine) for this or leave the dose the same. Please let me know which you prefer.  Otherwise,  continue your current treatment plan and/or medications.

## 2024-07-10 ENCOUNTER — OFFICE VISIT (OUTPATIENT)
Dept: INTERNAL MEDICINE | Facility: CLINIC | Age: 54
End: 2024-07-10
Payer: COMMERCIAL

## 2024-07-10 VITALS
DIASTOLIC BLOOD PRESSURE: 88 MMHG | SYSTOLIC BLOOD PRESSURE: 134 MMHG | RESPIRATION RATE: 16 BRPM | WEIGHT: 236.8 LBS | OXYGEN SATURATION: 98 % | BODY MASS INDEX: 40.43 KG/M2 | HEIGHT: 64 IN | HEART RATE: 80 BPM | TEMPERATURE: 98 F

## 2024-07-10 DIAGNOSIS — E03.9 ACQUIRED HYPOTHYROIDISM: ICD-10-CM

## 2024-07-10 DIAGNOSIS — R92.8 ABNORMAL MAMMOGRAM: ICD-10-CM

## 2024-07-10 DIAGNOSIS — E11.9 CONTROLLED TYPE 2 DIABETES MELLITUS WITHOUT COMPLICATION, WITHOUT LONG-TERM CURRENT USE OF INSULIN: Primary | ICD-10-CM

## 2024-07-10 DIAGNOSIS — E78.2 MIXED HYPERLIPIDEMIA: Chronic | ICD-10-CM

## 2024-07-10 DIAGNOSIS — R25.2 LEG CRAMPS: ICD-10-CM

## 2024-07-10 DIAGNOSIS — I10 ESSENTIAL HYPERTENSION: ICD-10-CM

## 2024-07-10 DIAGNOSIS — N18.32 STAGE 3B CHRONIC KIDNEY DISEASE: ICD-10-CM

## 2024-07-10 DIAGNOSIS — Z12.31 ENCOUNTER FOR SCREENING MAMMOGRAM FOR MALIGNANT NEOPLASM OF BREAST: ICD-10-CM

## 2024-07-10 DIAGNOSIS — F33.41 RECURRENT MAJOR DEPRESSIVE DISORDER, IN PARTIAL REMISSION: Chronic | ICD-10-CM

## 2024-07-10 DIAGNOSIS — J45.20 MILD INTERMITTENT ASTHMA WITHOUT COMPLICATION: ICD-10-CM

## 2024-07-10 LAB
EXPIRATION DATE: NORMAL
HBA1C MFR BLD: 5.6 % (ref 4.5–5.7)
Lab: NORMAL

## 2024-07-10 PROCEDURE — 99214 OFFICE O/P EST MOD 30 MIN: CPT | Performed by: NURSE PRACTITIONER

## 2024-07-10 PROCEDURE — 1159F MED LIST DOCD IN RCRD: CPT | Performed by: NURSE PRACTITIONER

## 2024-07-10 PROCEDURE — 3079F DIAST BP 80-89 MM HG: CPT | Performed by: NURSE PRACTITIONER

## 2024-07-10 PROCEDURE — 1160F RVW MEDS BY RX/DR IN RCRD: CPT | Performed by: NURSE PRACTITIONER

## 2024-07-10 PROCEDURE — 1126F AMNT PAIN NOTED NONE PRSNT: CPT | Performed by: NURSE PRACTITIONER

## 2024-07-10 PROCEDURE — 83036 HEMOGLOBIN GLYCOSYLATED A1C: CPT | Performed by: NURSE PRACTITIONER

## 2024-07-10 PROCEDURE — 3075F SYST BP GE 130 - 139MM HG: CPT | Performed by: NURSE PRACTITIONER

## 2024-07-10 PROCEDURE — 3044F HG A1C LEVEL LT 7.0%: CPT | Performed by: NURSE PRACTITIONER

## 2024-07-10 NOTE — PROGRESS NOTES
Subjective   Freida Martinez is a 54 y.o. female.     Chief Complaint   Patient presents with    Follow-up     3 month follow up on controlled type two diabetes.   Patient states all medication is going well, no questions or concerns.        PCP: Annia Rico APRN    History of Present Illness /Review of systems    History of Present Illness  The patient is a 54-year-old female who is here to follow up on chronic conditions. She has type 2 diabetes, hypertension, hyperlipidemia, hypothyroidism, stage 3B CKD, mild intermittent asthma.    The patient reports weight loss, which she attributes to her dietary habits. She has eliminated fast food from her diet, opting for chicken, fish, green vegetables, rice, ramen noodles, slimfast, oat milk, kale, and cucumber faulkner. She expresses a desire to eliminate unhealthy foods from her diet. She acknowledges the desire to continue smoking marijuana, which she reports as beneficial. She denies experiencing polydipsia, polyuria, or polyphagia.    The patient admits to not having taken her prescribed antihypertensive medication today. She does not monitor her blood pressure at home. She denies experiencing chest pain, dyspnea, palpitations, or pedal edema.    The patient expresses a desire for magnesium supplementation due to severe muscle spasms in her feet, which occur 2 to 3 times per month. She maintains hydration by consuming ample water.    The patient continues to take levothyroxine 75 mcg for hypothyroidism. Denies hypo/hyper symptoms    The patient denies the use of ibuprofen, Advil, or Aleve, opting for Tylenol instead if she needs pain medication    The patient's depression is currently stable. She denies suicidal or homicidal ideations.    Results      A1C Last 3 Results          1/10/2024    10:22 4/10/2024    11:48 7/10/2024    10:42   HGBA1C Last 3 Results   Hemoglobin A1C 5.9  6.1  5.6      Lab Results   Component Value Date    WBC 8.31 04/10/2024     HGB 13.5 04/10/2024    HCT 42.2 04/10/2024    MCV 80.5 04/10/2024     04/10/2024     Lab Results   Component Value Date    GLUCOSE 99 04/10/2024    BUN 16 04/10/2024    CREATININE 1.90 (H) 04/10/2024    EGFR 31.1 (L) 04/10/2024    BCR 8.4 04/10/2024    K 4.1 04/10/2024    CO2 22.0 04/10/2024    CALCIUM 10.0 04/10/2024    ALBUMIN 4.3 04/10/2024    BILITOT 0.8 04/10/2024    AST 11 04/10/2024    ALT 15 04/10/2024     Lab Results   Component Value Date    CHOL 108 04/10/2024    CHLPL 100 04/30/2015    TRIG 132 04/10/2024    HDL 45 04/10/2024    LDL 40 04/10/2024           The following portions of the patient's history were reviewed and updated as appropriate: allergies, current medications, past family history, past medical history, past social history, past surgical history and problem list.              Outpatient Medications Marked as Taking for the 7/10/24 encounter (Office Visit) with Annia Rico APRN   Medication Sig Dispense Refill    albuterol (PROVENTIL) (2.5 MG/3ML) 0.083% nebulizer solution       amLODIPine (NORVASC) 10 MG tablet Take 1 tablet by mouth Daily. 90 tablet 1    aspirin 81 MG EC tablet Take 1 tablet by mouth Daily.      atorvastatin (LIPITOR) 40 MG tablet Take 1 tablet by mouth Daily. 90 tablet 1    carvedilol (COREG) 6.25 MG tablet Take 1 tablet by mouth 2 (Two) Times a Day. 180 tablet 1    dapagliflozin Propanediol 10 MG tablet Take 10 mg by mouth Daily. 90 tablet 1    FLUoxetine (PROzac) 10 MG tablet Take 1 tablet by mouth Daily. 90 tablet 1    levothyroxine (SYNTHROID, LEVOTHROID) 75 MCG tablet Take 1 tablet by mouth Daily. 90 tablet 1    losartan (COZAAR) 25 MG tablet Take 1 tablet by mouth Daily. 90 tablet 1    SITagliptin (JANUVIA) 100 MG tablet Take 1 tablet by mouth Daily. 90 tablet 1    Ventolin  (90 Base) MCG/ACT inhaler INHALE TWO PUFFS BY MOUTH EVERY 6 HOURS AS NEEDED FOR WHEEZING OR SHORTNESS OF AIR 18 g 6     Allergies   Allergen Reactions    Anaprox [Naproxen]  "Other (See Comments)     CHRONIC KIDNEY DISEASE    Celebrex [Celecoxib] Other (See Comments)     CHRONIC KIDNEY DISEASE    Flector [Diclofenac Epolamine] Other (See Comments)     CHRONIC KIDNEY DISEASE    Motrin [Ibuprofen] Other (See Comments)     CHRONIC KIDNEY DISEASE    Voltaren [Diclofenac Sodium] Other (See Comments)     CHRONIC KIDNEY DISEASE    Lisinopril Swelling           Objective     Vitals:    07/10/24 1036   BP: 134/88   Pulse: 80   Resp: 16   Temp: 98 °F (36.7 °C)   TempSrc: Temporal   SpO2: 98%   Weight: 107 kg (236 lb 12.8 oz)   Height: 163.2 cm (64.25\")   PainSc: 0-No pain     Body mass index is 40.33 kg/m².  Wt Readings from Last 3 Encounters:   07/10/24 107 kg (236 lb 12.8 oz)   04/10/24 111 kg (244 lb 3.2 oz)   01/10/24 113 kg (248 lb 3.2 oz)     Physical Exam  Constitutional:       Appearance: Normal appearance. She is well-developed.   HENT:      Head: Normocephalic and atraumatic.   Eyes:      General: No scleral icterus.     Conjunctiva/sclera: Conjunctivae normal.   Cardiovascular:      Rate and Rhythm: Normal rate and regular rhythm.      Heart sounds: Normal heart sounds.   Pulmonary:      Effort: Pulmonary effort is normal. No respiratory distress.      Breath sounds: Normal breath sounds.   Abdominal:      General: Bowel sounds are normal.      Palpations: Abdomen is soft.      Tenderness: There is no abdominal tenderness.   Musculoskeletal:         General: Normal range of motion.      Cervical back: Normal range of motion.      Right lower leg: No edema.      Left lower leg: No edema.   Skin:     General: Skin is warm and dry.   Neurological:      General: No focal deficit present.      Mental Status: She is alert and oriented to person, place, and time.   Psychiatric:         Attention and Perception: Attention normal.         Mood and Affect: Mood and affect normal.         Behavior: Behavior normal. Behavior is cooperative.         Thought Content: Thought content normal.         " Cognition and Memory: Cognition normal.         Judgment: Judgment normal.                 Assessment & Plan   Diagnoses and all orders for this visit:    1. Controlled type 2 diabetes mellitus without complication, without long-term current use of insulin (Primary)  -     POC Glycosylated Hemoglobin (Hb A1C)    2. Essential hypertension    3. Mixed hyperlipidemia    4. Acquired hypothyroidism    5. Stage 3b chronic kidney disease    6. Mild intermittent asthma without complication    7. Recurrent major depressive disorder, in partial remission    8. Leg cramps  -     Magnesium; Future    9. Abnormal mammogram  -     Mammo Diagnostic Digital Tomosynthesis Bilateral With CAD; Future    10. Encounter for screening mammogram for malignant neoplasm of breast  -     Mammo Diagnostic Digital Tomosynthesis Bilateral With CAD; Future        Assessment & Plan  1. Type 2 diabetes.  The patient's diabetes is well-managed. Continuation of dietary modifications is advised.    2. Hypertension.  Her blood pressure is well-regulated.    3. Muscle spasms.  A magnesium level check will be conducted. Over-the-counter magnesium can be trial as needed.    4. Hypothyroidism.  Her TSH was slightly elevated, but her T4 levels were satisfactory. Continuation of levothyroxine 75 mcg is advised.    5. Chronic kidney disease.  Her kidney function remains stable. Monitoring is planned.    6. Depression.  Fluoxetine 10 mg is working well. Will continue      7. Preventative care.  Her last mammogram was in 2021 and was inconclusive, she never did the follow [facility]. . Her last Pap smear was in 2020. A diagnostic mammogram will be ordered.    Follow-up  A follow-up visit is scheduled for 3 months from now.            Return in about 3 months (around 10/10/2024) for Annual.    I discussed my findings,recommendations, and plan of care was with the patient. They verbalized understanding and agreement.  Patient was encouraged to keep me informed  of any acute changes, lack of improvement, or any new concerning symptoms.     Patient or patient representative verbalized consent for the use of Ambient Listening during the visit with  LUCIA Manzano for chart documentation. 7/10/2024  11:27 EDT

## 2024-07-24 ENCOUNTER — HOSPITAL ENCOUNTER (OUTPATIENT)
Facility: HOSPITAL | Age: 54
Discharge: HOME | End: 2024-07-24
Admitting: NURSE PRACTITIONER
Payer: COMMERCIAL

## 2024-07-24 DIAGNOSIS — Z12.31 ENCOUNTER FOR SCREENING MAMMOGRAM FOR MALIGNANT NEOPLASM OF BREAST: ICD-10-CM

## 2024-07-24 DIAGNOSIS — R92.8 ABNORMAL MAMMOGRAM: ICD-10-CM

## 2024-07-24 PROCEDURE — G0279 TOMOSYNTHESIS, MAMMO: HCPCS

## 2024-07-24 PROCEDURE — 77066 DX MAMMO INCL CAD BI: CPT

## 2024-07-24 PROCEDURE — 77066 DX MAMMO INCL CAD BI: CPT | Performed by: RADIOLOGY

## 2024-07-24 PROCEDURE — 77062 BREAST TOMOSYNTHESIS BI: CPT | Performed by: RADIOLOGY

## 2024-07-31 ENCOUNTER — TELEPHONE (OUTPATIENT)
Dept: INTERNAL MEDICINE | Facility: CLINIC | Age: 54
End: 2024-07-31
Payer: COMMERCIAL

## 2024-07-31 NOTE — TELEPHONE ENCOUNTER
Called pt number on file has calling restrictions unable to leave voicemail.    Hub to Relay      I have reviewed your mammogram results.  The mammogram did not detect any concerning abnormalities.  You should repeat a mammogram in 1 year.   Written by LUCIA Garcia on 7/28/2024  2:46 PM EDT

## 2024-08-01 ENCOUNTER — PATIENT MESSAGE (OUTPATIENT)
Dept: INTERNAL MEDICINE | Facility: CLINIC | Age: 54
End: 2024-08-01
Payer: COMMERCIAL

## 2024-08-05 NOTE — TELEPHONE ENCOUNTER
Attempted to reach patient, phone number has calling restrictions. Unable to leave a message. Mailed letter with results to address listed on file.

## 2024-09-17 DIAGNOSIS — E11.9 CONTROLLED TYPE 2 DIABETES MELLITUS WITHOUT COMPLICATION, WITHOUT LONG-TERM CURRENT USE OF INSULIN: ICD-10-CM

## 2024-09-17 DIAGNOSIS — E03.9 ACQUIRED HYPOTHYROIDISM: ICD-10-CM

## 2024-09-17 RX ORDER — LEVOTHYROXINE SODIUM 75 UG/1
75 TABLET ORAL DAILY
Qty: 90 TABLET | Refills: 0 | Status: SHIPPED | OUTPATIENT
Start: 2024-09-17

## 2024-09-17 RX ORDER — DAPAGLIFLOZIN 10 MG/1
1 TABLET, FILM COATED ORAL DAILY
Qty: 90 TABLET | Refills: 0 | Status: SHIPPED | OUTPATIENT
Start: 2024-09-17

## 2024-10-16 ENCOUNTER — OFFICE VISIT (OUTPATIENT)
Dept: INTERNAL MEDICINE | Facility: CLINIC | Age: 54
End: 2024-10-16
Payer: COMMERCIAL

## 2024-10-16 ENCOUNTER — LAB (OUTPATIENT)
Dept: INTERNAL MEDICINE | Facility: CLINIC | Age: 54
End: 2024-10-16
Payer: COMMERCIAL

## 2024-10-16 VITALS
BODY MASS INDEX: 38.25 KG/M2 | WEIGHT: 229.6 LBS | OXYGEN SATURATION: 100 % | SYSTOLIC BLOOD PRESSURE: 122 MMHG | TEMPERATURE: 97.5 F | HEIGHT: 65 IN | HEART RATE: 75 BPM | DIASTOLIC BLOOD PRESSURE: 76 MMHG | RESPIRATION RATE: 14 BRPM

## 2024-10-16 DIAGNOSIS — Z72.0 TOBACCO USE: ICD-10-CM

## 2024-10-16 DIAGNOSIS — J45.20 MILD INTERMITTENT ASTHMA WITHOUT COMPLICATION: ICD-10-CM

## 2024-10-16 DIAGNOSIS — E11.9 CONTROLLED TYPE 2 DIABETES MELLITUS WITHOUT COMPLICATION, WITHOUT LONG-TERM CURRENT USE OF INSULIN: ICD-10-CM

## 2024-10-16 DIAGNOSIS — F33.0 MILD EPISODE OF RECURRENT MAJOR DEPRESSIVE DISORDER: ICD-10-CM

## 2024-10-16 DIAGNOSIS — R25.2 LEG CRAMPS: ICD-10-CM

## 2024-10-16 DIAGNOSIS — E03.9 ACQUIRED HYPOTHYROIDISM: ICD-10-CM

## 2024-10-16 DIAGNOSIS — N18.32 STAGE 3B CHRONIC KIDNEY DISEASE: ICD-10-CM

## 2024-10-16 DIAGNOSIS — Z23 NEED FOR COVID-19 VACCINE: ICD-10-CM

## 2024-10-16 DIAGNOSIS — E78.2 MIXED HYPERLIPIDEMIA: ICD-10-CM

## 2024-10-16 DIAGNOSIS — Z23 NEEDS FLU SHOT: ICD-10-CM

## 2024-10-16 DIAGNOSIS — Z00.00 ANNUAL PHYSICAL EXAM: Primary | ICD-10-CM

## 2024-10-16 DIAGNOSIS — I10 ESSENTIAL HYPERTENSION: ICD-10-CM

## 2024-10-16 LAB
DEPRECATED RDW RBC AUTO: 40.7 FL (ref 37–54)
ERYTHROCYTE [DISTWIDTH] IN BLOOD BY AUTOMATED COUNT: 14 % (ref 12.3–15.4)
EXPIRATION DATE: NORMAL
HBA1C MFR BLD: 5.7 % (ref 4.5–5.7)
HCT VFR BLD AUTO: 41.5 % (ref 34–46.6)
HGB BLD-MCNC: 13.6 G/DL (ref 12–15.9)
Lab: NORMAL
MCH RBC QN AUTO: 26.9 PG (ref 26.6–33)
MCHC RBC AUTO-ENTMCNC: 32.8 G/DL (ref 31.5–35.7)
MCV RBC AUTO: 82.2 FL (ref 79–97)
PLATELET # BLD AUTO: 291 10*3/MM3 (ref 140–450)
PMV BLD AUTO: 12.3 FL (ref 6–12)
RBC # BLD AUTO: 5.05 10*6/MM3 (ref 3.77–5.28)
WBC NRBC COR # BLD AUTO: 8.3 10*3/MM3 (ref 3.4–10.8)

## 2024-10-16 PROCEDURE — 36415 COLL VENOUS BLD VENIPUNCTURE: CPT | Performed by: NURSE PRACTITIONER

## 2024-10-16 PROCEDURE — 1159F MED LIST DOCD IN RCRD: CPT | Performed by: NURSE PRACTITIONER

## 2024-10-16 PROCEDURE — 90656 IIV3 VACC NO PRSV 0.5 ML IM: CPT | Performed by: NURSE PRACTITIONER

## 2024-10-16 PROCEDURE — 83036 HEMOGLOBIN GLYCOSYLATED A1C: CPT | Performed by: NURSE PRACTITIONER

## 2024-10-16 PROCEDURE — 99396 PREV VISIT EST AGE 40-64: CPT | Performed by: NURSE PRACTITIONER

## 2024-10-16 PROCEDURE — 90471 IMMUNIZATION ADMIN: CPT | Performed by: NURSE PRACTITIONER

## 2024-10-16 PROCEDURE — 91320 SARSCV2 VAC 30MCG TRS-SUC IM: CPT | Performed by: NURSE PRACTITIONER

## 2024-10-16 PROCEDURE — 84439 ASSAY OF FREE THYROXINE: CPT | Performed by: NURSE PRACTITIONER

## 2024-10-16 PROCEDURE — 83735 ASSAY OF MAGNESIUM: CPT | Performed by: NURSE PRACTITIONER

## 2024-10-16 PROCEDURE — 80061 LIPID PANEL: CPT | Performed by: NURSE PRACTITIONER

## 2024-10-16 PROCEDURE — 90480 ADMN SARSCOV2 VAC 1/ONLY CMP: CPT | Performed by: NURSE PRACTITIONER

## 2024-10-16 PROCEDURE — 3074F SYST BP LT 130 MM HG: CPT | Performed by: NURSE PRACTITIONER

## 2024-10-16 PROCEDURE — 80050 GENERAL HEALTH PANEL: CPT | Performed by: NURSE PRACTITIONER

## 2024-10-16 PROCEDURE — 1160F RVW MEDS BY RX/DR IN RCRD: CPT | Performed by: NURSE PRACTITIONER

## 2024-10-16 PROCEDURE — 3044F HG A1C LEVEL LT 7.0%: CPT | Performed by: NURSE PRACTITIONER

## 2024-10-16 PROCEDURE — 3078F DIAST BP <80 MM HG: CPT | Performed by: NURSE PRACTITIONER

## 2024-10-16 PROCEDURE — 1126F AMNT PAIN NOTED NONE PRSNT: CPT | Performed by: NURSE PRACTITIONER

## 2024-10-16 RX ORDER — CARVEDILOL 6.25 MG/1
6.25 TABLET ORAL 2 TIMES DAILY
Qty: 180 TABLET | Refills: 1 | Status: SHIPPED | OUTPATIENT
Start: 2024-10-16

## 2024-10-16 RX ORDER — AMLODIPINE BESYLATE 10 MG/1
10 TABLET ORAL DAILY
Qty: 90 TABLET | Refills: 1 | Status: SHIPPED | OUTPATIENT
Start: 2024-10-16

## 2024-10-16 RX ORDER — LEVOTHYROXINE SODIUM 75 UG/1
75 TABLET ORAL DAILY
Qty: 90 TABLET | Refills: 1 | Status: SHIPPED | OUTPATIENT
Start: 2024-10-16

## 2024-10-16 RX ORDER — FLUOXETINE 10 MG/1
10 CAPSULE ORAL DAILY
Qty: 90 CAPSULE | Refills: 1 | Status: SHIPPED | OUTPATIENT
Start: 2024-10-16

## 2024-10-16 RX ORDER — FLUOXETINE 10 MG/1
10 CAPSULE ORAL DAILY
COMMUNITY
Start: 2024-09-09 | End: 2024-10-16 | Stop reason: SDUPTHER

## 2024-10-16 RX ORDER — LOSARTAN POTASSIUM 25 MG/1
25 TABLET ORAL DAILY
Qty: 90 TABLET | Refills: 1 | Status: SHIPPED | OUTPATIENT
Start: 2024-10-16

## 2024-10-16 RX ORDER — ATORVASTATIN CALCIUM 40 MG/1
40 TABLET, FILM COATED ORAL DAILY
Qty: 90 TABLET | Refills: 1 | Status: SHIPPED | OUTPATIENT
Start: 2024-10-16

## 2024-10-16 RX ORDER — DAPAGLIFLOZIN 10 MG/1
1 TABLET, FILM COATED ORAL DAILY
Qty: 90 TABLET | Refills: 1 | Status: SHIPPED | OUTPATIENT
Start: 2024-10-16

## 2024-10-16 NOTE — PROGRESS NOTES
Patient Care Team:  Annia Rico APRN as PCP - General (Nurse Practitioner)  Emmett Longoria MD as Consulting Physician (Nephrology)  Dameon Gruber OD (Optometry)  Patricio Wright MD as Consulting Physician (Pulmonary Disease)  Nilson Kirkland MD as Consulting Physician (Nephrology)  Melani Roberts APRN as Nurse Practitioner (Dermatology)     Chief Complaint   Patient presents with    Annual Exam               Patient is a 54 y.o. female who presents for her yearly physical exam.     HARVEY Guan is a 54-year-old female who is here for her annual examination.  She has diabetes, hypothyroidism, hypertension, hyperlipidemia, asthma, CKD stage IIIb, and tobacco use      Exercise: Jump rope. , Walking  She reports that she has stopped taking all of her medications.  She was just doing a an experiment to see how she would do without her medications.  She did recently start back on them.  She reports that overall she is feeling good she has been trying to lose weight she is watching her diet using Slim fast eating more chicken and fish    Declines Pap smear  Due for COVID and flu vaccines and would like to update those today    Health maintenance/lifestyle:  Health Maintenance   Topic Date Due    ANNUAL PHYSICAL  05/26/2021    Hepatitis B (2 of 3 - 19+ 3-dose series) 06/02/2021    DIABETIC FOOT EXAM  12/08/2022    PAP SMEAR  10/15/2025 (Originally 5/26/2023)    BMI FOLLOWUP  01/10/2025    DIABETIC EYE EXAM  04/03/2025    URINE MICROALBUMIN  04/10/2025    HEMOGLOBIN A1C  04/16/2025    MAMMOGRAM  07/24/2025    LIPID PANEL  10/16/2025    TDAP/TD VACCINES (3 - Td or Tdap) 08/03/2027    COLORECTAL CANCER SCREENING  06/20/2029    Pneumococcal Vaccine 0-64 (3 of 3 - PPSV23 or PCV20) 02/13/2035    HEPATITIS C SCREENING  Completed    COVID-19 Vaccine  Completed    INFLUENZA VACCINE  Completed    HEPATITIS A VACCINE ADULT  Completed    ZOSTER VACCINE  Completed     Immunization History   Administered  Date(s) Administered    COVID-19 (MODERNA) 1st,2nd,3rd Dose Monovalent 02/27/2021, 03/27/2021    COVID-19 (PFIZER) 12YRS+ (COMIRNATY) 01/10/2024, 10/16/2024    COVID-19 (PFIZER) BIVALENT 12+YRS 12/21/2022    Flu Vaccine Quad PF >36MO 03/27/2018, 04/04/2019    Fluzone  >6mos 02/23/2016, 10/16/2024    Fluzone (or Fluarix & Flulaval for VFC) >6mos 09/29/2020, 12/08/2021, 12/21/2022, 01/10/2024    Hepatitis A 04/04/2019, 09/29/2020    Hepatitis B 05/05/2021    Influenza TIV (IM) 01/07/2011    Influenza, Unspecified 10/25/2019, 08/17/2020    Pneumococcal Conjugate 13-Valent (PCV13) 05/26/2020    Pneumococcal Polysaccharide (PPSV23) 04/04/2019    Shingrix 08/17/2020, 12/01/2020, 02/06/2021, 02/12/2021    Tdap 01/07/2011, 08/03/2017    flucelvax quad pfs =>4 YRS 10/25/2019     Cancer-related family history includes Breast cancer in her maternal grandmother; Cancer in her maternal aunt and maternal grandmother. There is no history of Ovarian cancer.   reports never being sexually active.  Social History     Tobacco Use   Smoking Status Former    Current packs/day: 0.00    Average packs/day: 1 pack/day for 6.0 years (6.0 ttl pk-yrs)    Types: Cigarettes    Start date: 2/15/2015    Quit date: 1/31/2021    Years since quitting: 3.7    Passive exposure: Past   Smokeless Tobacco Never     Social History     Substance and Sexual Activity   Alcohol Use No             Review of Systems   Constitutional:  Negative for appetite change, diaphoresis, fatigue, fever, unexpected weight gain and unexpected weight loss.   Eyes:  Negative for blurred vision, double vision, pain and visual disturbance.   Respiratory:  Negative for cough, chest tightness, shortness of breath and wheezing.    Cardiovascular:  Negative for chest pain, palpitations and leg swelling.   Gastrointestinal:  Negative for abdominal distention, abdominal pain, constipation, diarrhea, nausea and vomiting.   Endocrine: Negative for polydipsia, polyphagia and polyuria.    Genitourinary:  Negative for difficulty urinating, frequency and urgency.   Musculoskeletal:  Negative for arthralgias and myalgias.   Skin:  Negative for color change and rash.   Neurological:  Negative for dizziness, facial asymmetry, weakness, light-headedness, numbness, headache and confusion.   Hematological:  Negative for adenopathy. Does not bruise/bleed easily.   Psychiatric/Behavioral:  Negative for sleep disturbance.          History  Social History     Socioeconomic History    Marital status: Single   Tobacco Use    Smoking status: Former     Current packs/day: 0.00     Average packs/day: 1 pack/day for 6.0 years (6.0 ttl pk-yrs)     Types: Cigarettes     Start date: 2/15/2015     Quit date: 1/31/2021     Years since quitting: 3.7     Passive exposure: Past    Smokeless tobacco: Never   Vaping Use    Vaping status: Every Day    Substances: THC    Devices: Disposable, Pre-filled pod   Substance and Sexual Activity    Alcohol use: No    Drug use: Yes     Types: Marijuana    Sexual activity: Never     Past Medical History:   Diagnosis Date    Abdominal pain 4/12/2019    JACKIE (acute kidney injury) 4/12/2019    Arthritis     Asthma     Breast injury     Diabetes mellitus     Disease of thyroid gland     Epigastric pain 4/12/2019    Added automatically from request for surgery [id number]    Hypertension     Kidney infection     Mental disorder     Non-compliance 7/24/2019    Sleep apnea     Volume depletion 4/14/2019      Past Surgical History:   Procedure Laterality Date    ENDOSCOPY N/A 4/15/2019    Procedure: ESOPHAGOGASTRODUODENOSCOPY;  Surgeon: Jude Clinton MD;  Location: [facility];  Service: Gastroenterology    TUBAL ABDOMINAL LIGATION      VAGINAL DELIVERY      x4    WISDOM TOOTH EXTRACTION        Allergies   Allergen Reactions    Anaprox [Naproxen] Other (See Comments)     CHRONIC KIDNEY DISEASE    Celebrex [Celecoxib] Other (See Comments)     CHRONIC KIDNEY DISEASE    Flector [Diclofenac  Epolamine] Other (See Comments)     CHRONIC KIDNEY DISEASE    Motrin [Ibuprofen] Other (See Comments)     CHRONIC KIDNEY DISEASE    Voltaren [Diclofenac Sodium] Other (See Comments)     CHRONIC KIDNEY DISEASE    Lisinopril Swelling      Family History   Problem Relation Age of Onset    Hypertension Mother     Diabetes Father     Kidney disease Father     Heart disease Father     Hypertension Father     Sleep apnea Father     Cancer Maternal Grandmother         BREAST    Breast cancer Maternal Grandmother     Cancer Maternal Aunt     Arthritis Paternal Aunt     Kidney disease Paternal Grandfather     Kidney disease Paternal Uncle     Ovarian cancer Neg Hx        Current Outpatient Medications:     amLODIPine (NORVASC) 10 MG tablet, Take 1 tablet by mouth Daily., Disp: 90 tablet, Rfl: 1    aspirin 81 MG EC tablet, Take 1 tablet by mouth Daily., Disp: , Rfl:     atorvastatin (LIPITOR) 40 MG tablet, Take 1 tablet by mouth Daily., Disp: 90 tablet, Rfl: 1    carvedilol (COREG) 6.25 MG tablet, Take 1 tablet by mouth 2 (Two) Times a Day., Disp: 180 tablet, Rfl: 1    dapagliflozin Propanediol (Farxiga) 10 MG tablet, Take 10 mg by mouth Daily., Disp: 90 tablet, Rfl: 1    FLUoxetine (PROzac) 10 MG capsule, Take 1 capsule by mouth Daily., Disp: 90 capsule, Rfl: 1    levothyroxine (SYNTHROID, LEVOTHROID) 75 MCG tablet, Take 1 tablet by mouth Daily., Disp: 90 tablet, Rfl: 1    losartan (COZAAR) 25 MG tablet, Take 1 tablet by mouth Daily., Disp: 90 tablet, Rfl: 1    SITagliptin (JANUVIA) 100 MG tablet, Take 1 tablet by mouth Daily., Disp: 90 tablet, Rfl: 1    Ventolin  (90 Base) MCG/ACT inhaler, INHALE TWO PUFFS BY MOUTH EVERY 6 HOURS AS NEEDED FOR WHEEZING OR SHORTNESS OF AIR, Disp: 18 g, Rfl: 6    albuterol (PROVENTIL) (2.5 MG/3ML) 0.083% nebulizer solution, , Disp: , Rfl:     Cosentyx Sensoready, 300 MG, 150 MG/ML solution auto-injector, , Disp: , Rfl:     diclofenac (VOLTAREN) 1 % gel gel, Apply 4 g topically to the  "appropriate area as directed 4 (Four) Times a Day. Small amount to affected area (Patient not taking: Reported on 10/16/2024), Disp: 300 g, Rfl: 3    glucose blood test strip, Used to test blood sugar for Diabetes E11.9 twice a day. Pt has One Touch Verio Flex meter (Patient not taking: Reported on 10/16/2024), Disp: 100 each, Rfl: 12    glucose monitor monitoring kit, 1 each Daily. (Patient not taking: Reported on 10/16/2024), Disp: 1 each, Rfl: 0    Lancets misc, 1 Device Daily. (Patient not taking: Reported on 10/16/2024), Disp: 100 each, Rfl: 11                /76   Pulse 75   Temp 97.5 °F (36.4 °C) (Infrared)   Resp 14   Ht 164.3 cm (64.69\")   Wt 104 kg (229 lb 9.6 oz)   LMP 01/26/2022   SpO2 100%   BMI 38.58 kg/m²       Physical Exam  Vitals and nursing note reviewed.   Constitutional:       General: She is not in acute distress.     Appearance: Normal appearance. She is well-developed. She is not ill-appearing or diaphoretic.   HENT:      Head: Normocephalic and atraumatic.   Eyes:      General: No scleral icterus.     Conjunctiva/sclera: Conjunctivae normal.   Neck:      Vascular: No JVD.   Cardiovascular:      Rate and Rhythm: Normal rate and regular rhythm.      Chest Wall: PMI is not displaced. No thrill.      Heart sounds: Normal heart sounds. No murmur heard.  Pulmonary:      Effort: Pulmonary effort is normal. No accessory muscle usage or respiratory distress.      Breath sounds: Normal breath sounds.   Chest:      Chest wall: No tenderness.   Abdominal:      General: Bowel sounds are normal. There is no distension.      Palpations: Abdomen is soft.      Tenderness: There is no abdominal tenderness.   Musculoskeletal:         General: Normal range of motion.      Cervical back: Normal range of motion.      Right lower leg: No edema.      Left lower leg: No edema.   Skin:     General: Skin is warm and dry.      Coloration: Skin is not ashen, jaundiced, mottled or pale.      Findings: No " erythema or rash.   Neurological:      General: No focal deficit present.      Mental Status: She is alert and oriented to person, place, and time.      Coordination: Coordination normal.   Psychiatric:         Attention and Perception: Attention normal.         Mood and Affect: Mood and affect normal.         Behavior: Behavior normal. Behavior is cooperative.         Thought Content: Thought content normal.         Cognition and Memory: Cognition normal.         Judgment: Judgment normal.                   Diagnoses and all orders for this visit:    1. Annual physical exam (Primary)    2. Controlled type 2 diabetes mellitus without complication, without long-term current use of insulin  -     POC Glycosylated Hemoglobin (Hb A1C)  -     CBC (No Diff); Future  -     Comprehensive Metabolic Panel; Future  -     Lipid Panel; Future  -     TSH Rfx On Abnormal To Free T4; Future  -     dapagliflozin Propanediol (Farxiga) 10 MG tablet; Take 10 mg by mouth Daily.  Dispense: 90 tablet; Refill: 1  -     losartan (COZAAR) 25 MG tablet; Take 1 tablet by mouth Daily.  Dispense: 90 tablet; Refill: 1  -     SITagliptin (JANUVIA) 100 MG tablet; Take 1 tablet by mouth Daily.  Dispense: 90 tablet; Refill: 1    3. Tobacco use  -     CBC (No Diff); Future  -     Comprehensive Metabolic Panel; Future  -     Lipid Panel; Future  -     TSH Rfx On Abnormal To Free T4; Future    4. Acquired hypothyroidism  -     levothyroxine (SYNTHROID, LEVOTHROID) 75 MCG tablet; Take 1 tablet by mouth Daily.  Dispense: 90 tablet; Refill: 1    5. Mild episode of recurrent major depressive disorder    6. Essential hypertension  -     amLODIPine (NORVASC) 10 MG tablet; Take 1 tablet by mouth Daily.  Dispense: 90 tablet; Refill: 1  -     carvedilol (COREG) 6.25 MG tablet; Take 1 tablet by mouth 2 (Two) Times a Day.  Dispense: 180 tablet; Refill: 1  -     losartan (COZAAR) 25 MG tablet; Take 1 tablet by mouth Daily.  Dispense: 90 tablet; Refill: 1    7.  Mixed hyperlipidemia  -     atorvastatin (LIPITOR) 40 MG tablet; Take 1 tablet by mouth Daily.  Dispense: 90 tablet; Refill: 1    8. Stage 3b chronic kidney disease    9. Mild intermittent asthma without complication    10. Needs flu shot  -     Fluzone >6mos ([id number])    11. Need for COVID-19 vaccine  -     COVID-19 (Pfizer) 12yrs+ (COMIRNATY)    Other orders  -     Cancel: COVID-19 (Pfizer) 12yrs+ (COMIRNATY)  -     FLUoxetine (PROzac) 10 MG capsule; Take 1 capsule by mouth Daily.  Dispense: 90 capsule; Refill: 1      Patient is doing fairly well overall.  We discussed restarting all of her medications.  She reassures me that she just restarted these and plans to continue taking them.  She has no acute complaints and appears well in clinic today update flu and COVID vaccinations, avoid nephrotoxic agents, ensure adequate hydration, will check labs but need to consider that she has been without medicines for some time so may hold off on any medication adjustments related to labs.  Encouraged tobacco cessation.  Not ready to quit.      Labs  ordered as above if indicated- will notify of results and treat accordingly. If patient has not received results within one week via mychart or letter, they will notify my office  Immunizations and screenings: preventative/screenings are up-to-date/addressed as noted in the HPI.  Counseling: The patient is advised to lose weight, restart all medications, follow ADA diet,      Follow up: Return in about 3 months (around 1/16/2025) for chronic condition follow up, and need to collect labs today.  Plan of care discussed with pt. They verbalized understanding and agreement.     Electronically signed by : LUCIA Manzano   10/29/2024   12:49 EDT

## 2024-10-17 LAB
ALBUMIN SERPL-MCNC: 4.1 G/DL (ref 3.5–5.2)
ALBUMIN/GLOB SERPL: 1.1 G/DL
ALP SERPL-CCNC: 94 U/L (ref 39–117)
ALT SERPL W P-5'-P-CCNC: 16 U/L (ref 1–33)
ANION GAP SERPL CALCULATED.3IONS-SCNC: 11.7 MMOL/L (ref 5–15)
AST SERPL-CCNC: 19 U/L (ref 1–32)
BILIRUB SERPL-MCNC: 0.6 MG/DL (ref 0–1.2)
BUN SERPL-MCNC: 17 MG/DL (ref 6–20)
BUN/CREAT SERPL: 10.2 (ref 7–25)
CALCIUM SPEC-SCNC: 10.2 MG/DL (ref 8.6–10.5)
CHLORIDE SERPL-SCNC: 105 MMOL/L (ref 98–107)
CHOLEST SERPL-MCNC: 189 MG/DL (ref 0–200)
CO2 SERPL-SCNC: 22.3 MMOL/L (ref 22–29)
CREAT SERPL-MCNC: 1.66 MG/DL (ref 0.57–1)
EGFRCR SERPLBLD CKD-EPI 2021: 36.5 ML/MIN/1.73
GLOBULIN UR ELPH-MCNC: 3.7 GM/DL
GLUCOSE SERPL-MCNC: 77 MG/DL (ref 65–99)
HDLC SERPL-MCNC: 44 MG/DL (ref 40–60)
LDLC SERPL CALC-MCNC: 115 MG/DL (ref 0–100)
LDLC/HDLC SERPL: 2.52 {RATIO}
MAGNESIUM SERPL-MCNC: 2.5 MG/DL (ref 1.6–2.6)
POTASSIUM SERPL-SCNC: 4.5 MMOL/L (ref 3.5–5.2)
PROT SERPL-MCNC: 7.8 G/DL (ref 6–8.5)
SODIUM SERPL-SCNC: 139 MMOL/L (ref 136–145)
T4 FREE SERPL-MCNC: 1.06 NG/DL (ref 0.92–1.68)
TRIGL SERPL-MCNC: 171 MG/DL (ref 0–150)
TSH SERPL DL<=0.05 MIU/L-ACNC: 15.2 UIU/ML (ref 0.27–4.2)
VLDLC SERPL-MCNC: 30 MG/DL (ref 5–40)

## 2024-10-18 ENCOUNTER — PRIOR AUTHORIZATION (OUTPATIENT)
Dept: INTERNAL MEDICINE | Facility: CLINIC | Age: 54
End: 2024-10-18
Payer: COMMERCIAL

## 2024-10-18 NOTE — TELEPHONE ENCOUNTER
Received PA request from pharmacy for Dapagliflozin Propanediol 10 mg. According to [employer] brand is covered.     Called and spoke with [facility] to change to brand name Farxiga.

## 2024-12-07 DIAGNOSIS — I10 ESSENTIAL HYPERTENSION: ICD-10-CM

## 2024-12-07 DIAGNOSIS — E11.9 CONTROLLED TYPE 2 DIABETES MELLITUS WITHOUT COMPLICATION, WITHOUT LONG-TERM CURRENT USE OF INSULIN: ICD-10-CM

## 2024-12-07 DIAGNOSIS — E78.2 MIXED HYPERLIPIDEMIA: ICD-10-CM

## 2024-12-09 RX ORDER — ATORVASTATIN CALCIUM 40 MG/1
40 TABLET, FILM COATED ORAL DAILY
Qty: 90 TABLET | Refills: 1 | OUTPATIENT
Start: 2024-12-09

## 2024-12-09 RX ORDER — CARVEDILOL 6.25 MG/1
6.25 TABLET ORAL 2 TIMES DAILY
Qty: 180 TABLET | Refills: 1 | OUTPATIENT
Start: 2024-12-09

## 2024-12-09 RX ORDER — FLUOXETINE 10 MG/1
10 CAPSULE ORAL DAILY
Qty: 90 CAPSULE | Refills: 1 | OUTPATIENT
Start: 2024-12-09

## 2024-12-09 RX ORDER — AMLODIPINE BESYLATE 10 MG/1
10 TABLET ORAL DAILY
Qty: 90 TABLET | Refills: 1 | OUTPATIENT
Start: 2024-12-09

## 2024-12-09 RX ORDER — LOSARTAN POTASSIUM 25 MG/1
25 TABLET ORAL DAILY
Qty: 90 TABLET | Refills: 1 | OUTPATIENT
Start: 2024-12-09

## 2025-01-17 ENCOUNTER — OFFICE VISIT (OUTPATIENT)
Dept: INTERNAL MEDICINE | Facility: CLINIC | Age: 55
End: 2025-01-17
Payer: COMMERCIAL

## 2025-01-17 VITALS
TEMPERATURE: 98.9 F | WEIGHT: 232.4 LBS | RESPIRATION RATE: 14 BRPM | HEART RATE: 76 BPM | SYSTOLIC BLOOD PRESSURE: 104 MMHG | BODY MASS INDEX: 38.72 KG/M2 | OXYGEN SATURATION: 99 % | HEIGHT: 65 IN | DIASTOLIC BLOOD PRESSURE: 76 MMHG

## 2025-01-17 DIAGNOSIS — I10 ESSENTIAL HYPERTENSION: ICD-10-CM

## 2025-01-17 DIAGNOSIS — M25.562 CHRONIC PAIN OF BOTH KNEES: ICD-10-CM

## 2025-01-17 DIAGNOSIS — J45.20 MILD INTERMITTENT ASTHMA WITHOUT COMPLICATION: ICD-10-CM

## 2025-01-17 DIAGNOSIS — N18.32 STAGE 3B CHRONIC KIDNEY DISEASE: ICD-10-CM

## 2025-01-17 DIAGNOSIS — F33.0 MILD EPISODE OF RECURRENT MAJOR DEPRESSIVE DISORDER: ICD-10-CM

## 2025-01-17 DIAGNOSIS — E78.2 MIXED HYPERLIPIDEMIA: ICD-10-CM

## 2025-01-17 DIAGNOSIS — E03.9 ACQUIRED HYPOTHYROIDISM: ICD-10-CM

## 2025-01-17 DIAGNOSIS — M25.561 CHRONIC PAIN OF BOTH KNEES: ICD-10-CM

## 2025-01-17 DIAGNOSIS — E11.9 CONTROLLED TYPE 2 DIABETES MELLITUS WITHOUT COMPLICATION, WITHOUT LONG-TERM CURRENT USE OF INSULIN: Primary | ICD-10-CM

## 2025-01-17 DIAGNOSIS — G89.29 CHRONIC PAIN OF BOTH KNEES: ICD-10-CM

## 2025-01-17 LAB
EXPIRATION DATE: ABNORMAL
HBA1C MFR BLD: 5.8 % (ref 4.5–5.7)
Lab: ABNORMAL

## 2025-01-19 NOTE — PROGRESS NOTES
Subjective   Freida Martinez is a 54 y.o. female.     Chief Complaint   Patient presents with    Diabetes    Hypertension    Hyperlipidemia    Hypothyroidism    Depression    Knee Pain     Patient would like a refill on Diclofenac gel        PCP: Annia Rico APRN        History of Present Illness  The patient is a 54-year-old female here for a follow-up on chronic conditions, including diabetes, hypertension, hyperlipidemia, hypothyroidism, depression, and chronic knee pain.    She has been under the care of Dr. Campbell for her knee pain and has been using diclofenac gel, which she finds effective for various joints and muscles. She reports that she is in need of a refill for this medication and would like it to be filled today. She has used it sparingly on her back and primarily on her knees. She has not used it recently due to concerns about expiration.    She has been adhering to her prescribed medication regimen, although she has experienced some confusion with the timing of refills. She prefers to have all her medications refilled simultaneously. She has been making efforts to maintain consistency in her medication intake. She has been managing her depression well with fluoxetine 10 mg daily and expresses a desire to reduce the dosage. She reports no feelings of sadness and describes her mood as stable. She has been using marijuana, which she finds beneficial for her mood and physical activity. She has ceased smoking and now consumes marijuana once daily.    She does not monitor her blood glucose levels at home. She has been making dietary modifications, including reducing her sugar and bread intake, and incorporating chicken and fish into her diet. She has also purchased an indoor grill for meal preparation. She is currently on a weight loss journey and has attempted a 15-day detox, which she discontinued on the fourth day due to adverse effects. She reports occasional palpitations but is not  concerned about them. She reports no chest pain, shortness of breath, or gastrointestinal issues.    SOCIAL HISTORY  The patient admits to smoking marijuana.    ALLERGIES  The patient is allergic to IBUPROFEN, ADVIL, and ALEVE.    MEDICATIONS  Current: fluoxetine, diclofenac gel, Januvia    Results  Laboratory Studies  A1c was 5.7 last time and is 5.8 today. Creatinine was 1.6, GFR was 36.    Lab Results   Component Value Date    WBC 8.30 10/16/2024    HGB 13.6 10/16/2024    HCT 41.5 10/16/2024    MCV 82.2 10/16/2024     10/16/2024     Lab Results   Component Value Date    GLUCOSE 77 10/16/2024    BUN 17 10/16/2024    CREATININE 1.66 (H) 10/16/2024    EGFR 36.5 (L) 10/16/2024    BCR 10.2 10/16/2024    K 4.5 10/16/2024    CO2 22.3 10/16/2024    CALCIUM 10.2 10/16/2024    ALBUMIN 4.1 10/16/2024    BILITOT 0.6 10/16/2024    AST 19 10/16/2024    ALT 16 10/16/2024     Lab Results   Component Value Date    CHOL 189 10/16/2024    CHLPL 100 04/30/2015    TRIG 171 (H) 10/16/2024    HDL 44 10/16/2024     (H) 10/16/2024     Lab Results   Component Value Date    TSH 15.200 (H) 10/16/2024     A1C Last 3 Results          7/10/2024    10:42 10/16/2024    10:48 1/17/2025    10:18   HGBA1C Last 3 Results   Hemoglobin A1C 5.6  5.7  5.8        The following portions of the patient's history were reviewed and updated as appropriate: allergies, current medications, past family history, past medical history, past social history, past surgical history and problem list.              Outpatient Medications Marked as Taking for the 1/17/25 encounter (Office Visit) with Annia Rico APRN   Medication Sig Dispense Refill    amLODIPine (NORVASC) 10 MG tablet Take 1 tablet by mouth Daily. 90 tablet 1    aspirin 81 MG EC tablet Take 1 tablet by mouth Daily.      atorvastatin (LIPITOR) 40 MG tablet Take 1 tablet by mouth Daily. 90 tablet 1    carvedilol (COREG) 6.25 MG tablet Take 1 tablet by mouth 2 (Two) Times a Day. 180  "tablet 1    dapagliflozin Propanediol (Farxiga) 10 MG tablet Take 10 mg by mouth Daily. 90 tablet 1    FLUoxetine (PROzac) 10 MG capsule Take 1 capsule by mouth Daily. 90 capsule 1    levothyroxine (SYNTHROID, LEVOTHROID) 75 MCG tablet Take 1 tablet by mouth Daily. 90 tablet 1    losartan (COZAAR) 25 MG tablet Take 1 tablet by mouth Daily. 90 tablet 1    SITagliptin (JANUVIA) 100 MG tablet Take 1 tablet by mouth Daily. 90 tablet 1    Ventolin  (90 Base) MCG/ACT inhaler INHALE TWO PUFFS BY MOUTH EVERY 6 HOURS AS NEEDED FOR WHEEZING OR SHORTNESS OF AIR 18 g 6     Allergies   Allergen Reactions    Anaprox [Naproxen] Other (See Comments)     CHRONIC KIDNEY DISEASE    Celebrex [Celecoxib] Other (See Comments)     CHRONIC KIDNEY DISEASE    Flector [Diclofenac Epolamine] Other (See Comments)     CHRONIC KIDNEY DISEASE    Motrin [Ibuprofen] Other (See Comments)     CHRONIC KIDNEY DISEASE    Voltaren [Diclofenac Sodium] Other (See Comments)     CHRONIC KIDNEY DISEASE    Lisinopril Swelling           Objective     Vitals:    01/17/25 1002   BP: 104/76   BP Location: Right arm   Patient Position: Sitting   Cuff Size: Adult   Pulse: 76   Resp: 14   Temp: 98.9 °F (37.2 °C)   TempSrc: Infrared   SpO2: 99%   Weight: 105 kg (232 lb 6.4 oz)   Height: 164.3 cm (64.69\")     Body mass index is 39.04 kg/m².  Wt Readings from Last 3 Encounters:   01/17/25 105 kg (232 lb 6.4 oz)   10/16/24 104 kg (229 lb 9.6 oz)   07/10/24 107 kg (236 lb 12.8 oz)       Physical Exam  Constitutional:       Appearance: Normal appearance. She is well-developed.   HENT:      Head: Normocephalic and atraumatic.   Eyes:      General: No scleral icterus.     Conjunctiva/sclera: Conjunctivae normal.   Cardiovascular:      Rate and Rhythm: Normal rate and regular rhythm.      Heart sounds: Normal heart sounds.   Pulmonary:      Effort: Pulmonary effort is normal. No respiratory distress.      Breath sounds: Normal breath sounds.   Abdominal:      General: " Bowel sounds are normal.      Palpations: Abdomen is soft.      Tenderness: There is no abdominal tenderness.   Musculoskeletal:         General: Normal range of motion.      Cervical back: Normal range of motion.      Right lower leg: No edema.      Left lower leg: No edema.   Skin:     General: Skin is warm and dry.   Neurological:      General: No focal deficit present.      Mental Status: She is alert and oriented to person, place, and time.   Psychiatric:         Attention and Perception: Attention normal.         Mood and Affect: Mood and affect normal.         Behavior: Behavior normal. Behavior is cooperative.         Thought Content: Thought content normal.         Cognition and Memory: Cognition normal.         Judgment: Judgment normal.         Physical Exam  Lungs were auscultated.    Vital Signs  Blood pressure is normal.              Assessment & Plan   Diagnoses and all orders for this visit:    1. Controlled type 2 diabetes mellitus without complication, without long-term current use of insulin (Primary)  -     POC Glycosylated Hemoglobin (Hb A1C)    2. Essential hypertension    3. Mixed hyperlipidemia    4. Acquired hypothyroidism    5. Mild episode of recurrent major depressive disorder    6. Mild intermittent asthma without complication    7. Stage 3b chronic kidney disease    8. Chronic pain of both knees  -     Diclofenac Sodium (VOLTAREN) 1 % gel gel; Apply 4 g topically to the appropriate area as directed 4 (Four) Times a Day As Needed (knee pain).  Dispense: 300 g; Refill: 3        Assessment & Plan  1. Chronic knee pain.  A prescription for diclofenac gel 300 g with refills will be provided. She is advised to use the gel sparingly and only on her knees to minimize potential kidney impact.    2. Depression.  She will continue her current regimen of fluoxetine 10 mg once a day. The potential risks of using marijuana for mood stabilization were discussed, and she is advised to continue with her  prescribed medication.    3. Diabetes Mellitus.  Her A1c level is currently 5.8. She will continue her current medication regimen, including Januvia. She is advised to maintain a balanced diet and regular exercise routine. If her A1c rises above 6, a switch to Ozempic may be considered.    4. Hypertension.  Her blood pressure is well-controlled. She will continue her current antihypertensive medications.    5. Hyperlipidemia.  She will continue her current lipid-lowering therapy.    6. Hypothyroidism.  Her thyroid function tests were stable on her current dose during the last visit. No changes to her medication are necessary at this time.    7. Chronic kidney disease stage 3b.  Her creatinine level was 1.6, and her GFR was 36 during the last visit, indicating stable chronic kidney disease. She is advised to avoid nephrotoxic medications such as ibuprofen, Advil, and Aleve. She should also stay hydrated and use diuretics with caution if needed.    Class 2 Severe Obesity (BMI >=35 and <=39.9). Obesity-related health conditions include the following: hypertension and diabetes mellitus. Obesity is unchanged. BMI is is above average; BMI management plan is completed. We discussed portion control and increasing exercise.      Return in about 3 months (around 4/17/2025) for chronic condition follow up.    I discussed my findings,recommendations, and plan of care was with the patient. They verbalized understanding and agreement.  Patient was encouraged to keep me informed of any acute changes, lack of improvement, or any new concerning symptoms.     Patient or patient representative verbalized consent for the use of Ambient Listening during the visit with  LUCIA Manzano for chart documentation. 1/19/2025  15:18 EST

## 2025-01-20 DIAGNOSIS — J45.20 MILD INTERMITTENT ASTHMA WITHOUT COMPLICATION: ICD-10-CM

## 2025-01-21 RX ORDER — ALBUTEROL SULFATE 90 UG/1
AEROSOL, METERED RESPIRATORY (INHALATION)
Qty: 18 G | Refills: 6 | Status: SHIPPED | OUTPATIENT
Start: 2025-01-21

## 2025-04-18 ENCOUNTER — OFFICE VISIT (OUTPATIENT)
Dept: INTERNAL MEDICINE | Facility: CLINIC | Age: 55
End: 2025-04-18
Payer: COMMERCIAL

## 2025-04-18 ENCOUNTER — LAB (OUTPATIENT)
Dept: INTERNAL MEDICINE | Facility: CLINIC | Age: 55
End: 2025-04-18
Payer: COMMERCIAL

## 2025-04-18 VITALS
SYSTOLIC BLOOD PRESSURE: 116 MMHG | WEIGHT: 204.6 LBS | RESPIRATION RATE: 16 BRPM | HEIGHT: 65 IN | BODY MASS INDEX: 34.09 KG/M2 | DIASTOLIC BLOOD PRESSURE: 72 MMHG | OXYGEN SATURATION: 97 % | TEMPERATURE: 97.8 F | HEART RATE: 72 BPM

## 2025-04-18 DIAGNOSIS — G89.29 CHRONIC PAIN OF BOTH KNEES: ICD-10-CM

## 2025-04-18 DIAGNOSIS — E11.9 CONTROLLED TYPE 2 DIABETES MELLITUS WITHOUT COMPLICATION, WITHOUT LONG-TERM CURRENT USE OF INSULIN: ICD-10-CM

## 2025-04-18 DIAGNOSIS — E03.9 ACQUIRED HYPOTHYROIDISM: ICD-10-CM

## 2025-04-18 DIAGNOSIS — E78.2 MIXED HYPERLIPIDEMIA: ICD-10-CM

## 2025-04-18 DIAGNOSIS — M25.562 CHRONIC PAIN OF BOTH KNEES: ICD-10-CM

## 2025-04-18 DIAGNOSIS — I10 ESSENTIAL HYPERTENSION: ICD-10-CM

## 2025-04-18 DIAGNOSIS — M25.561 CHRONIC PAIN OF BOTH KNEES: ICD-10-CM

## 2025-04-18 LAB
ALBUMIN SERPL-MCNC: 4 G/DL (ref 3.5–5.2)
ALBUMIN/GLOB SERPL: 1.1 G/DL
ALP SERPL-CCNC: 85 U/L (ref 39–117)
ALT SERPL W P-5'-P-CCNC: 9 U/L (ref 1–33)
ANION GAP SERPL CALCULATED.3IONS-SCNC: 13.8 MMOL/L (ref 5–15)
AST SERPL-CCNC: 14 U/L (ref 1–32)
BILIRUB SERPL-MCNC: 0.5 MG/DL (ref 0–1.2)
BUN SERPL-MCNC: 27 MG/DL (ref 6–20)
BUN/CREAT SERPL: 14.4 (ref 7–25)
CALCIUM SPEC-SCNC: 9.9 MG/DL (ref 8.6–10.5)
CHLORIDE SERPL-SCNC: 108 MMOL/L (ref 98–107)
CHOLEST SERPL-MCNC: 178 MG/DL (ref 0–200)
CO2 SERPL-SCNC: 21.2 MMOL/L (ref 22–29)
CREAT SERPL-MCNC: 1.88 MG/DL (ref 0.57–1)
DEPRECATED RDW RBC AUTO: 45.5 FL (ref 37–54)
EGFRCR SERPLBLD CKD-EPI 2021: 31.3 ML/MIN/1.73
ERYTHROCYTE [DISTWIDTH] IN BLOOD BY AUTOMATED COUNT: 14.9 % (ref 12.3–15.4)
GLOBULIN UR ELPH-MCNC: 3.6 GM/DL
GLUCOSE SERPL-MCNC: 96 MG/DL (ref 65–99)
HCT VFR BLD AUTO: 40.1 % (ref 34–46.6)
HDLC SERPL-MCNC: 40 MG/DL (ref 40–60)
HGB BLD-MCNC: 13.1 G/DL (ref 12–15.9)
LDLC SERPL CALC-MCNC: 114 MG/DL (ref 0–100)
LDLC/HDLC SERPL: 2.78 {RATIO}
MCH RBC QN AUTO: 27.7 PG (ref 26.6–33)
MCHC RBC AUTO-ENTMCNC: 32.7 G/DL (ref 31.5–35.7)
MCV RBC AUTO: 84.8 FL (ref 79–97)
PLATELET # BLD AUTO: 279 10*3/MM3 (ref 140–450)
PMV BLD AUTO: 11.4 FL (ref 6–12)
POTASSIUM SERPL-SCNC: 4.3 MMOL/L (ref 3.5–5.2)
PROT SERPL-MCNC: 7.6 G/DL (ref 6–8.5)
RBC # BLD AUTO: 4.73 10*6/MM3 (ref 3.77–5.28)
SODIUM SERPL-SCNC: 143 MMOL/L (ref 136–145)
TRIGL SERPL-MCNC: 135 MG/DL (ref 0–150)
TSH SERPL DL<=0.05 MIU/L-ACNC: 4.17 UIU/ML (ref 0.27–4.2)
VLDLC SERPL-MCNC: 24 MG/DL (ref 5–40)
WBC NRBC COR # BLD AUTO: 7.16 10*3/MM3 (ref 3.4–10.8)

## 2025-04-18 PROCEDURE — 82043 UR ALBUMIN QUANTITATIVE: CPT | Performed by: NURSE PRACTITIONER

## 2025-04-18 PROCEDURE — 36415 COLL VENOUS BLD VENIPUNCTURE: CPT | Performed by: NURSE PRACTITIONER

## 2025-04-18 PROCEDURE — 82570 ASSAY OF URINE CREATININE: CPT | Performed by: NURSE PRACTITIONER

## 2025-04-18 PROCEDURE — 80061 LIPID PANEL: CPT | Performed by: NURSE PRACTITIONER

## 2025-04-18 PROCEDURE — 80050 GENERAL HEALTH PANEL: CPT | Performed by: NURSE PRACTITIONER

## 2025-04-18 PROCEDURE — 83036 HEMOGLOBIN GLYCOSYLATED A1C: CPT | Performed by: NURSE PRACTITIONER

## 2025-04-18 RX ORDER — AMLODIPINE BESYLATE 10 MG/1
10 TABLET ORAL DAILY
Qty: 90 TABLET | Refills: 1 | Status: CANCELLED | OUTPATIENT
Start: 2025-04-18

## 2025-04-18 RX ORDER — LEVOTHYROXINE SODIUM 75 UG/1
75 TABLET ORAL DAILY
Qty: 90 TABLET | Refills: 1 | Status: SHIPPED | OUTPATIENT
Start: 2025-04-18

## 2025-04-18 RX ORDER — CARVEDILOL 6.25 MG/1
6.25 TABLET ORAL 2 TIMES DAILY
Qty: 180 TABLET | Refills: 1 | Status: SHIPPED | OUTPATIENT
Start: 2025-04-18

## 2025-04-18 RX ORDER — DAPAGLIFLOZIN 10 MG/1
1 TABLET, FILM COATED ORAL DAILY
Qty: 90 TABLET | Refills: 1 | Status: SHIPPED | OUTPATIENT
Start: 2025-04-18

## 2025-04-18 RX ORDER — LOSARTAN POTASSIUM 25 MG/1
25 TABLET ORAL DAILY
Qty: 90 TABLET | Refills: 1 | Status: SHIPPED | OUTPATIENT
Start: 2025-04-18

## 2025-04-18 RX ORDER — ATORVASTATIN CALCIUM 40 MG/1
40 TABLET, FILM COATED ORAL DAILY
Qty: 90 TABLET | Refills: 1 | Status: SHIPPED | OUTPATIENT
Start: 2025-04-18

## 2025-04-18 RX ORDER — FLUOXETINE 10 MG/1
10 CAPSULE ORAL DAILY
Qty: 90 CAPSULE | Refills: 1 | Status: SHIPPED | OUTPATIENT
Start: 2025-04-18

## 2025-04-18 NOTE — LETTER
[facility]  Vaccine Consent Form    Patient Name:  Freida Martinez  Patient :  1970     Vaccine(s) Ordered    Pneumococcal Conjugate Vaccine 20-Valent (PCV20)        Screening Checklist  The following questions should be completed prior to vaccination. If you answer “yes” to any question, it does not necessarily mean you should not be vaccinated. It just means we may need to clarify or ask more questions. If a question is unclear, please ask your healthcare provider to explain it.    Yes No   Any fever or moderate to severe illness today (mild illness and/or antibiotic treatment are not contraindications)?     Do you have a history of a serious reaction to any previous vaccinations, such as anaphylaxis, encephalopathy within 7 days, Guillain-[place] syndrome within 6 weeks, seizure?     Have you received any live vaccine(s) (e.g MMR, JULIANNA) or any other vaccines in the last month (to ensure duplicate doses aren't given)?     Do you have an anaphylactic allergy to latex (DTaP, DTaP-IPV, Hep A, Hep B, MenB, RV, Td, Tdap), baker’s yeast (Hep B, HPV), polysorbates (RSV, nirsevimab, PCV 20, Rotavirrus, Tdap, Shingrix), or gelatin (JULIANNA, MMR)?     Do you have an anaphylactic allergy to neomycin (Rabies, JULIANNA, MMR, IPV, Hep A), polymyxin B (IPV), or streptomycin (IPV)?      Any cancer, leukemia, AIDS, or other immune system disorder? (JULIANNA, MMR, RV)     Do you have a parent, brother, or sister with an immune system problem (if immune competence of vaccine recipient clinically verified, can proceed)? (MMR, JULIANNA)     Any recent steroid treatments for >2 weeks, chemotherapy, or radiation treatment? (JULIANNA, MMR)     Have you received antibody-containing blood transfusions or IVIG in the past 11 months (recommended interval is dependent on product)? (MMR, JULIANNA)     Have you taken antiviral drugs (acyclovir, famciclovir, valacyclovir for JULIANNA) in the last 24 or 48 hours, respectively?      Are you pregnant or planning to  "become pregnant within 1 month? (JULIANNA, MMR, HPV, IPV, MenB, Abrexvy; For Hep B- refer to Engerix-B; For RSV - Abrysvo is indicated for 32-36 weeks of pregnancy from September to January)     For infants, have you ever been told your child has had intussusception or a medical emergency involving obstruction of the intestine (Rotavirus)? If not for an infant, can skip this question.         *Ordering Physicians/APC should be consulted if \"yes\" is checked by the patient or guardian above.  I have received, read, and understand the Vaccine Information Statement (VIS) for each vaccine ordered.  I have considered my or my child's health status as well as the health status of my close contacts.  I have taken the opportunity to discuss my vaccine questions with my or my child's health care provider.   I have requested that the ordered vaccine(s) be given to me or my child.  I understand the benefits and risks of the vaccines.  I understand that I should remain in the clinic for 15 minutes after receiving the vaccine(s).  _________________________________________________________  Signature of Patient or Parent/Legal Guardian ____________________  Date     "

## 2025-04-18 NOTE — PROGRESS NOTES
Subjective   Freida Martinez is a 55 y.o. female.     Chief Complaint   Patient presents with    Diabetes    Hypertension    Hyperlipidemia    Hypothyroidism    Depression       PCP: Annia Rico APRN       History of Present Illness  The patient is a 55-year-old female here for follow-up on diabetes, hypertension, hyperlipidemia, hypothyroidism, and depression.    She reports adhering to a regimen of Healthy SlimFast, walking, and exercise, which has resulted in a weight loss of 28 pounds since January. Sugar intake and consumption of chocolate have been reduced. However, she has not been taking any of her medications for the past 2 months, including her thyroid medication.    The most recent labs from 10/2024 showed elevated TSH with normal T4, and elevated cholesterol with an LDL of 115. Her last hemoglobin A1c on 01/17/2025 was 5.8%.    Blood pressure management includes carvedilol and losartan. Levothyroxine 75 mcg is prescribed for hypothyroidism. She uses Voltaren gel and Celebrex.  Patient denies any headaches, dizziness, visual disturbances, palpitations, chest pain, dyspnea, TIA or CVA symptoms, leg pain/claudication symptoms, and edema.     Results  Labs   - TSH: 10/20/2024, Elevated   - T4: 10/20/2024, Normal   - Cholesterol: 10/20/2024, Elevated with LDL of 115 mg/dL   - Hemoglobin A1c: 01/17/2025, 5.8%    Lab Results   Component Value Date    WBC 8.30 10/16/2024    HGB 13.6 10/16/2024    HCT 41.5 10/16/2024    MCV 82.2 10/16/2024     10/16/2024     Lab Results   Component Value Date    GLUCOSE 77 10/16/2024    BUN 17 10/16/2024    CREATININE 1.66 (H) 10/16/2024    EGFR 36.5 (L) 10/16/2024    BCR 10.2 10/16/2024    K 4.5 10/16/2024    CO2 22.3 10/16/2024    CALCIUM 10.2 10/16/2024    ALBUMIN 4.1 10/16/2024    BILITOT 0.6 10/16/2024    AST 19 10/16/2024    ALT 16 10/16/2024     Lab Results   Component Value Date    CHOL 189 10/16/2024    CHLPL 100 04/30/2015    TRIG 171 (H) 10/16/2024     HDL 44 10/16/2024     (H) 10/16/2024     Lab Results   Component Value Date    TSH 15.200 (H) 10/16/2024     A1C Last 3 Results          7/10/2024    10:42 10/16/2024    10:48 1/17/2025    10:18   HGBA1C Last 3 Results   Hemoglobin A1C 5.6  5.7  5.8        The following portions of the patient's history were reviewed and updated as appropriate: allergies, current medications, past family history, past medical history, past social history, past surgical history and problem list.              Outpatient Medications Marked as Taking for the 4/18/25 encounter (Office Visit) with Annia Rico APRN   Medication Sig Dispense Refill    aspirin 81 MG EC tablet Take 1 tablet by mouth Daily.      atorvastatin (LIPITOR) 40 MG tablet Take 1 tablet by mouth Daily. 90 tablet 1    carvedilol (COREG) 6.25 MG tablet Take 1 tablet by mouth 2 (Two) Times a Day. 180 tablet 1    dapagliflozin Propanediol (Farxiga) 10 MG tablet Take 10 mg by mouth Daily. 90 tablet 1    Diclofenac Sodium (VOLTAREN) 1 % gel gel Apply 4 g topically to the appropriate area as directed 4 (Four) Times a Day As Needed (knee pain). 300 g 3    FLUoxetine (PROzac) 10 MG capsule Take 1 capsule by mouth Daily. 90 capsule 1    levothyroxine (SYNTHROID, LEVOTHROID) 75 MCG tablet Take 1 tablet by mouth Daily. 90 tablet 1    losartan (COZAAR) 25 MG tablet Take 1 tablet by mouth Daily. 90 tablet 1    SITagliptin (JANUVIA) 100 MG tablet Take 1 tablet by mouth Daily. 90 tablet 1    Ventolin  (90 Base) MCG/ACT inhaler INHALE TWO PUFFS BY MOUTH EVERY 6 HOURS AS NEEDED FOR SHORTNESS OF AIR  OR WHEEZING 18 g 6    [DISCONTINUED] amLODIPine (NORVASC) 10 MG tablet Take 1 tablet by mouth Daily. 90 tablet 1    [DISCONTINUED] atorvastatin (LIPITOR) 40 MG tablet Take 1 tablet by mouth Daily. 90 tablet 1    [DISCONTINUED] carvedilol (COREG) 6.25 MG tablet Take 1 tablet by mouth 2 (Two) Times a Day. 180 tablet 1    [DISCONTINUED] dapagliflozin Propanediol (Farxiga)  "10 MG tablet Take 10 mg by mouth Daily. 90 tablet 1    [DISCONTINUED] Diclofenac Sodium (VOLTAREN) 1 % gel gel Apply 4 g topically to the appropriate area as directed 4 (Four) Times a Day As Needed (knee pain). 300 g 3    [DISCONTINUED] FLUoxetine (PROzac) 10 MG capsule Take 1 capsule by mouth Daily. 90 capsule 1    [DISCONTINUED] levothyroxine (SYNTHROID, LEVOTHROID) 75 MCG tablet Take 1 tablet by mouth Daily. 90 tablet 1    [DISCONTINUED] losartan (COZAAR) 25 MG tablet Take 1 tablet by mouth Daily. 90 tablet 1    [DISCONTINUED] SITagliptin (JANUVIA) 100 MG tablet Take 1 tablet by mouth Daily. 90 tablet 1     Allergies   Allergen Reactions    Anaprox [Naproxen] Other (See Comments)     CHRONIC KIDNEY DISEASE    Celebrex [Celecoxib] Other (See Comments)     CHRONIC KIDNEY DISEASE    Flector [Diclofenac Epolamine] Other (See Comments)     CHRONIC KIDNEY DISEASE    Motrin [Ibuprofen] Other (See Comments)     CHRONIC KIDNEY DISEASE    Voltaren [Diclofenac Sodium] Other (See Comments)     CHRONIC KIDNEY DISEASE    Lisinopril Swelling           Objective     Vitals:    04/18/25 1005   BP: 116/72   BP Location: Left arm   Patient Position: Sitting   Cuff Size: Adult   Pulse: 72   Resp: 16   Temp: 97.8 °F (36.6 °C)   TempSrc: Infrared   SpO2: 97%   Weight: 92.8 kg (204 lb 9.6 oz)   Height: 164.3 cm (64.69\")     Body mass index is 34.37 kg/m².  Wt Readings from Last 3 Encounters:   04/18/25 92.8 kg (204 lb 9.6 oz)   01/17/25 105 kg (232 lb 6.4 oz)   10/16/24 104 kg (229 lb 9.6 oz)       Physical Exam  Vitals and nursing note reviewed.   Constitutional:       General: She is not in acute distress.     Appearance: Normal appearance. She is well-developed. She is obese. She is not ill-appearing or diaphoretic.   HENT:      Head: Normocephalic and atraumatic.   Eyes:      General: No scleral icterus.     Conjunctiva/sclera: Conjunctivae normal.   Neck:      Vascular: No JVD.   Cardiovascular:      Rate and Rhythm: Normal rate " and regular rhythm.      Chest Wall: PMI is not displaced. No thrill.      Heart sounds: Normal heart sounds. No murmur heard.  Pulmonary:      Effort: Pulmonary effort is normal. No accessory muscle usage or respiratory distress.      Breath sounds: Normal breath sounds.   Chest:      Chest wall: No tenderness.   Abdominal:      General: Bowel sounds are normal. There is no distension.      Palpations: Abdomen is soft.      Tenderness: There is no abdominal tenderness.   Musculoskeletal:         General: Normal range of motion.      Cervical back: Normal range of motion.      Right lower leg: No edema.      Left lower leg: No edema.   Skin:     General: Skin is warm and dry.      Coloration: Skin is not ashen, jaundiced, mottled or pale.      Findings: No erythema.   Neurological:      General: No focal deficit present.      Mental Status: She is alert and oriented to person, place, and time.   Psychiatric:         Attention and Perception: Attention normal.         Mood and Affect: Mood and affect normal.         Behavior: Behavior normal. Behavior is cooperative.         Thought Content: Thought content normal.         Cognition and Memory: Cognition normal.         Judgment: Judgment normal.             Assessment & Plan   Diagnoses and all orders for this visit:    1. Essential hypertension  -     carvedilol (COREG) 6.25 MG tablet; Take 1 tablet by mouth 2 (Two) Times a Day.  Dispense: 180 tablet; Refill: 1  -     losartan (COZAAR) 25 MG tablet; Take 1 tablet by mouth Daily.  Dispense: 90 tablet; Refill: 1  -     TSH Rfx On Abnormal To Free T4; Future  -     Hemoglobin A1c; Future  -     CBC (No Diff); Future  -     Comprehensive Metabolic Panel; Future  -     Lipid Panel; Future  -     Microalbumin / Creatinine Urine Ratio - Urine, Clean Catch; Future    2. Mixed hyperlipidemia  -     atorvastatin (LIPITOR) 40 MG tablet; Take 1 tablet by mouth Daily.  Dispense: 90 tablet; Refill: 1  -     TSH Rfx On Abnormal  To Free T4; Future  -     Hemoglobin A1c; Future  -     CBC (No Diff); Future  -     Comprehensive Metabolic Panel; Future  -     Lipid Panel; Future  -     Microalbumin / Creatinine Urine Ratio - Urine, Clean Catch; Future    3. Controlled type 2 diabetes mellitus without complication, without long-term current use of insulin  -     dapagliflozin Propanediol (Farxiga) 10 MG tablet; Take 10 mg by mouth Daily.  Dispense: 90 tablet; Refill: 1  -     losartan (COZAAR) 25 MG tablet; Take 1 tablet by mouth Daily.  Dispense: 90 tablet; Refill: 1  -     SITagliptin (JANUVIA) 100 MG tablet; Take 1 tablet by mouth Daily.  Dispense: 90 tablet; Refill: 1  -     TSH Rfx On Abnormal To Free T4; Future  -     Hemoglobin A1c; Future  -     CBC (No Diff); Future  -     Comprehensive Metabolic Panel; Future  -     Lipid Panel; Future  -     Microalbumin / Creatinine Urine Ratio - Urine, Clean Catch; Future    4. Acquired hypothyroidism  -     levothyroxine (SYNTHROID, LEVOTHROID) 75 MCG tablet; Take 1 tablet by mouth Daily.  Dispense: 90 tablet; Refill: 1  -     TSH Rfx On Abnormal To Free T4; Future  -     Hemoglobin A1c; Future  -     CBC (No Diff); Future  -     Comprehensive Metabolic Panel; Future  -     Lipid Panel; Future  -     Microalbumin / Creatinine Urine Ratio - Urine, Clean Catch; Future    5. Chronic pain of both knees  -     Diclofenac Sodium (VOLTAREN) 1 % gel gel; Apply 4 g topically to the appropriate area as directed 4 (Four) Times a Day As Needed (knee pain).  Dispense: 300 g; Refill: 3  -     TSH Rfx On Abnormal To Free T4; Future  -     Hemoglobin A1c; Future  -     CBC (No Diff); Future  -     Comprehensive Metabolic Panel; Future  -     Lipid Panel; Future  -     Microalbumin / Creatinine Urine Ratio - Urine, Clean Catch; Future    Other orders  -     FLUoxetine (PROzac) 10 MG capsule; Take 1 capsule by mouth Daily.  Dispense: 90 capsule; Refill: 1  -     Pneumococcal Conjugate Vaccine 20-Valent  (PCV20)        Assessment & Plan  1. Hypertension.  - Blood pressure readings have been within the normal range without her  antihypertensive medication.  - Amlodipine will be discontinued   - Carvedilol and losartan will be continued as they are necessary for other health conditions.  - Potential side effects of these medications have been discussed.    2. Hypothyroidism.  - Has not been taking thyroid medication for the past 2 months.  - Labs will be conducted today to assess thyroid function.  - If results indicate abnormal levels, levothyroxine 75 mcg regimen will be resumed.  - If levels are normal, medication will not be restarted.    3. Diabetes Mellitus.  - Last hemoglobin A1c on 01/17/2025 was 5.8%.  - Advised to maintain a diet low in carbohydrates and sugar to manage diabetes effectively.  - Encouraged to monitor blood sugar levels regularly and adjust diet accordingly.  - Diabetic medications will be refilled.    4. Hyperlipidemia.  - Cholesterol levels were elevated with an LDL of 115 on 10/20/2024.  - Advised to restart with current lipid-lowering therapy and dietary modifications to manage cholesterol levels.  - Labs will be repeated today to assess current lipid profile.    5. Depression.  - Has not been taking depression medication for the past 2 months.  - she plans to resume medication regimen.  - Potential benefits and side effects of the medication have been discussed.    6. Health Maintenance.  - Due for a pneumonia vaccine next month but will receive it today due to upcoming travel plans.  - A urine sample will be collected today for microalbumin testing as part of annual kidney health assessment.  - Colon cancer screening is up to date, with the last screening done in 2019.            Return in about 3 months (around 7/18/2025) for chronic condition follow up, and need to collect labs today.    I discussed my findings,recommendations, and plan of care was with the patient. They verbalized  understanding and agreement.  Patient was encouraged to keep me informed of any acute changes, lack of improvement, or any new concerning symptoms.     Patient or patient representative verbalized consent for the use of Ambient Listening during the visit with  LUCIA Manzano for chart documentation. 4/18/2025  13:34 EDT

## 2025-04-19 LAB
ALBUMIN UR-MCNC: 5.7 MG/DL
CREAT UR-MCNC: 175.3 MG/DL
HBA1C MFR BLD: 5.6 % (ref 4.8–5.6)
MICROALBUMIN/CREAT UR: 32.5 MG/G (ref 0–29)

## 2025-04-20 ENCOUNTER — RESULTS FOLLOW-UP (OUTPATIENT)
Dept: INTERNAL MEDICINE | Facility: CLINIC | Age: 55
End: 2025-04-20
Payer: COMMERCIAL

## 2025-05-28 ENCOUNTER — PRIOR AUTHORIZATION (OUTPATIENT)
Dept: INTERNAL MEDICINE | Facility: CLINIC | Age: 55
End: 2025-05-28
Payer: COMMERCIAL

## 2025-05-28 NOTE — TELEPHONE ENCOUNTER
PA submitted on [employer]     Key: [id number]    The request has been approved. The authorization is effective from 05/28/2025 to 05/28/2026, as long as the member is enrolled in their current health plan. A written notification letter will follow with additional details.    Patient and pharmacy notified.

## 2025-07-18 ENCOUNTER — OFFICE VISIT (OUTPATIENT)
Dept: INTERNAL MEDICINE | Facility: CLINIC | Age: 55
End: 2025-07-18
Payer: COMMERCIAL

## 2025-07-18 VITALS
RESPIRATION RATE: 16 BRPM | OXYGEN SATURATION: 98 % | HEART RATE: 76 BPM | SYSTOLIC BLOOD PRESSURE: 106 MMHG | TEMPERATURE: 97.3 F | HEIGHT: 65 IN | WEIGHT: 192.8 LBS | DIASTOLIC BLOOD PRESSURE: 78 MMHG | BODY MASS INDEX: 32.12 KG/M2

## 2025-07-18 DIAGNOSIS — I10 ESSENTIAL HYPERTENSION: ICD-10-CM

## 2025-07-18 DIAGNOSIS — E11.65 UNCONTROLLED TYPE 2 DIABETES MELLITUS WITH HYPERGLYCEMIA: ICD-10-CM

## 2025-07-18 DIAGNOSIS — E11.9 CONTROLLED TYPE 2 DIABETES MELLITUS WITHOUT COMPLICATION, WITHOUT LONG-TERM CURRENT USE OF INSULIN: ICD-10-CM

## 2025-07-18 DIAGNOSIS — R11.0 NAUSEA: ICD-10-CM

## 2025-07-18 DIAGNOSIS — E03.9 ACQUIRED HYPOTHYROIDISM: ICD-10-CM

## 2025-07-18 DIAGNOSIS — E78.2 MIXED HYPERLIPIDEMIA: ICD-10-CM

## 2025-07-18 DIAGNOSIS — F33.0 MILD EPISODE OF RECURRENT MAJOR DEPRESSIVE DISORDER: ICD-10-CM

## 2025-07-18 DIAGNOSIS — E66.09 CLASS 1 OBESITY DUE TO EXCESS CALORIES WITH SERIOUS COMORBIDITY AND BODY MASS INDEX (BMI) OF 32.0 TO 32.9 IN ADULT: ICD-10-CM

## 2025-07-18 DIAGNOSIS — J45.20 MILD INTERMITTENT ASTHMA WITHOUT COMPLICATION: ICD-10-CM

## 2025-07-18 DIAGNOSIS — E66.811 CLASS 1 OBESITY DUE TO EXCESS CALORIES WITH SERIOUS COMORBIDITY AND BODY MASS INDEX (BMI) OF 32.0 TO 32.9 IN ADULT: ICD-10-CM

## 2025-07-18 DIAGNOSIS — Z12.31 BREAST CANCER SCREENING BY MAMMOGRAM: Primary | ICD-10-CM

## 2025-07-18 LAB
EXPIRATION DATE: NORMAL
HBA1C MFR BLD: 5.2 % (ref 4.5–5.7)
Lab: NORMAL

## 2025-07-18 RX ORDER — AVOBENZONE, HOMOSALATE, OCTISALATE, OCTOCRYLENE 30; 40; 45; 26 MG/ML; MG/ML; MG/ML; MG/ML
1 CREAM TOPICAL DAILY
Qty: 100 EACH | Refills: 11 | Status: SHIPPED | OUTPATIENT
Start: 2025-07-18

## 2025-07-18 RX ORDER — BLOOD-GLUCOSE METER
1 KIT MISCELLANEOUS DAILY
Qty: 1 EACH | Refills: 0 | Status: SHIPPED | OUTPATIENT
Start: 2025-07-18

## 2025-07-18 RX ORDER — ONDANSETRON 4 MG/1
4 TABLET, ORALLY DISINTEGRATING ORAL EVERY 8 HOURS PRN
Qty: 30 TABLET | Refills: 0 | Status: SHIPPED | OUTPATIENT
Start: 2025-07-18

## 2025-07-18 NOTE — PROGRESS NOTES
Subjective   Freida Martinez is a 55 y.o. female.     Chief Complaint   Patient presents with    Diabetes    Hypertension    Hyperlipidemia    Hypothyroidism       PCP: Annia Rico APRN    History of Present Illness     History of Present Illness  The patient is a 55-year-old female here for follow-up on chronic conditions including diabetes, hypertension, hypothyroidism, and hyperlipidemia.    She has been experiencing nausea since returning from a cruise on 06/25/2025. The cause of her nausea is uncertain, but she suspects it may be due to motion sickness, exposure to heat, or consumption of international food and liquor during the cruise. She describes a sensation of needing to vomit every 30 minutes, but actual vomiting does not occur. She also reports an inability to burp. She is not experiencing any pain or bloating. She has not taken any medication for her symptoms due to concerns about her kidney disease. She has not vomited in over 20 years.    She has lost 12 pounds since her last visit, which she attributes to a reduced appetite and increased physical activity. She has been avoiding soda for the past 2 weeks and has been consuming collagen supplements. Her diet primarily consists of chicken and fish, with occasional hamburgers. She is considering taking iron pills and is currently taking prenatal vitamins and fish oil tablets.    She is requesting a refill of her lancets and test strips for blood sugar monitoring. She is also seeking advice on the ideal blood sugar levels she should aim for.    She is requesting a refill of her blood pressure medication. She has a blood pressure cuff at home and is also monitoring her oxygen levels.    She is currently taking fluoxetine 10 mg for depression but feels that it is not effective. She is requesting an increase in the dosage. She has been experiencing suicidal thoughts for the past few weeks but does not have any current plans to act on them. She  has a history of a suicide attempt involving an overdose of Advil.    Social History:  Diet: Primarily chicken and fish, occasional hamburgers  Alcohol: Consumed international liquor during the cruise  Coffee/Tea/Caffeine-containing Drinks: Avoiding soda for the past 2 weeks    Results  Labs   - A1c: 5.6   - A1c: 07/18/2025, 5.2    Lab Results   Component Value Date    WBC 7.16 04/18/2025    HGB 13.1 04/18/2025    HCT 40.1 04/18/2025    MCV 84.8 04/18/2025     04/18/2025     Lab Results   Component Value Date    GLUCOSE 96 04/18/2025    BUN 27 (H) 04/18/2025    CREATININE 1.88 (H) 04/18/2025    EGFR 31.3 (L) 04/18/2025    BCR 14.4 04/18/2025    K 4.3 04/18/2025    CO2 21.2 (L) 04/18/2025    CALCIUM 9.9 04/18/2025    ALBUMIN 4.0 04/18/2025    BILITOT 0.5 04/18/2025    AST 14 04/18/2025    ALT 9 04/18/2025     Lab Results   Component Value Date    CHOL 178 04/18/2025    CHLPL 100 04/30/2015    TRIG 135 04/18/2025    HDL 40 04/18/2025     (H) 04/18/2025     Lab Results   Component Value Date    TSH 4.170 04/18/2025     A1C Last 3 Results          1/17/2025    10:18 4/18/2025    11:01 7/18/2025    11:07   HGBA1C Last 3 Results   Hemoglobin A1C 5.8  5.60  5.2        The following portions of the patient's history were reviewed and updated as appropriate: allergies, current medications, past family history, past medical history, past social history, past surgical history and problem list.              Outpatient Medications Marked as Taking for the 7/18/25 encounter (Office Visit) with Annia Rico APRN   Medication Sig Dispense Refill    aspirin 81 MG EC tablet Take 1 tablet by mouth Daily.      atorvastatin (LIPITOR) 40 MG tablet Take 1 tablet by mouth Daily. 90 tablet 1    carvedilol (COREG) 6.25 MG tablet Take 1 tablet by mouth 2 (Two) Times a Day. 180 tablet 1    dapagliflozin Propanediol (Farxiga) 10 MG tablet Take 10 mg by mouth Daily. 90 tablet 1    Diclofenac Sodium (VOLTAREN) 1 % gel gel Apply  "4 g topically to the appropriate area as directed 4 (Four) Times a Day As Needed (knee pain). 300 g 3    FLUoxetine (PROzac) 20 MG capsule Take 1 capsule by mouth Daily. 90 capsule 1    glucose blood test strip Used to test blood sugar for Diabetes E11.9 twice a day. Pt has One Touch Verio Flex meter 100 each 12    glucose monitor monitoring kit Use 1 each Daily. 1 each 0    Lancets misc Use 1 Device Daily. 100 each 11    levothyroxine (SYNTHROID, LEVOTHROID) 75 MCG tablet Take 1 tablet by mouth Daily. 90 tablet 1    losartan (COZAAR) 25 MG tablet Take 1 tablet by mouth Daily. 90 tablet 1    SITagliptin (JANUVIA) 100 MG tablet Take 1 tablet by mouth Daily. 90 tablet 1    Ventolin  (90 Base) MCG/ACT inhaler INHALE TWO PUFFS BY MOUTH EVERY 6 HOURS AS NEEDED FOR SHORTNESS OF AIR  OR WHEEZING 18 g 6     Allergies   Allergen Reactions    Anaprox [Naproxen] Other (See Comments)     CHRONIC KIDNEY DISEASE    Celebrex [Celecoxib] Other (See Comments)     CHRONIC KIDNEY DISEASE    Flector [Diclofenac Epolamine] Other (See Comments)     CHRONIC KIDNEY DISEASE    Motrin [Ibuprofen] Other (See Comments)     CHRONIC KIDNEY DISEASE    Voltaren [Diclofenac Sodium] Other (See Comments)     CHRONIC KIDNEY DISEASE    Lisinopril Swelling           Objective     Vitals:    07/18/25 1018   BP: 106/78   BP Location: Right arm   Patient Position: Sitting   Cuff Size: Adult   Pulse: 76   Resp: 16   Temp: 97.3 °F (36.3 °C)   TempSrc: Infrared   SpO2: 98%   Weight: 87.5 kg (192 lb 12.8 oz)   Height: 164.3 cm (64.69\")     Body mass index is 32.4 kg/m².  Wt Readings from Last 3 Encounters:   07/18/25 87.5 kg (192 lb 12.8 oz)   04/18/25 92.8 kg (204 lb 9.6 oz)   01/17/25 105 kg (232 lb 6.4 oz)       Physical Exam  Vitals and nursing note reviewed.   Constitutional:       General: She is not in acute distress.     Appearance: Normal appearance. She is well-developed. She is obese. She is not ill-appearing or diaphoretic.   HENT:      " Head: Normocephalic and atraumatic.   Eyes:      General: No scleral icterus.     Conjunctiva/sclera: Conjunctivae normal.   Neck:      Vascular: No JVD.   Cardiovascular:      Rate and Rhythm: Normal rate and regular rhythm.      Chest Wall: PMI is not displaced. No thrill.      Heart sounds: Normal heart sounds. No murmur heard.  Pulmonary:      Effort: Pulmonary effort is normal. No accessory muscle usage or respiratory distress.      Breath sounds: Normal breath sounds.   Chest:      Chest wall: No tenderness.   Abdominal:      General: Bowel sounds are normal. There is no distension.      Palpations: Abdomen is soft.      Tenderness: There is no abdominal tenderness.   Musculoskeletal:         General: Normal range of motion.      Cervical back: Normal range of motion.      Right lower leg: No edema.      Left lower leg: No edema.   Skin:     General: Skin is warm and dry.      Coloration: Skin is not ashen, jaundiced, mottled or pale.      Findings: No erythema.   Neurological:      General: No focal deficit present.      Mental Status: She is alert and oriented to person, place, and time.   Psychiatric:         Attention and Perception: Attention and perception normal.         Mood and Affect: Mood and affect normal.         Speech: Speech normal.         Behavior: Behavior normal. Behavior is cooperative.         Thought Content: Thought content normal. Thought content is not paranoid. Thought content does not include homicidal or suicidal ideation.         Cognition and Memory: Cognition and memory normal.         Judgment: Judgment normal.         Physical Exam  Cardiovascular: Blood pressure reading is 106/78.              Assessment & Plan   Diagnoses and all orders for this visit:    1. Breast cancer screening by mammogram (Primary)  -     Cancel: Mammo Screening Digital Tomosynthesis Bilateral With CAD; Future    2. Essential hypertension    3. Mixed hyperlipidemia    4. Controlled type 2 diabetes  mellitus without complication, without long-term current use of insulin  -     POC Glycosylated Hemoglobin (Hb A1C)    5. Acquired hypothyroidism    6. Mild intermittent asthma without complication    7. Nausea  -     ondansetron ODT (ZOFRAN-ODT) 4 MG disintegrating tablet; Place 1 tablet on the tongue Every 8 (Eight) Hours As Needed for Nausea or Vomiting.  Dispense: 30 tablet; Refill: 0    8. Class 1 obesity due to excess calories with serious comorbidity and body mass index (BMI) of 32.0 to 32.9 in adult  -     naltrexone-bupropion ER (CONTRAVE) 8-90 MG tablet; Wk 1: 1 tab daily, Wk 2: 1 tab twice a day, Wk 3: 2 tabs in AM, 1 tab in PM, Wk 4: 2 tabs twice a day, Maintenance dose: 2 tabs twice daily.  Dispense: 120 tablet; Refill: 0    9. Uncontrolled type 2 diabetes mellitus with hyperglycemia  -     glucose monitor monitoring kit; Use 1 each Daily.  Dispense: 1 each; Refill: 0  -     Lancets misc; Use 1 Device Daily.  Dispense: 100 each; Refill: 11    10. Mild episode of recurrent major depressive disorder  -     Ambulatory Referral to Behavioral Health    Other orders  -     Cancel: Hepatitis B Vaccine Adult IM (ENGERIX/RECOMBIVAX)  -     glucose blood test strip; Used to test blood sugar for Diabetes E11.9 twice a day. Pt has One Touch Verio Flex meter  Dispense: 100 each; Refill: 12  -     FLUoxetine (PROzac) 20 MG capsule; Take 1 capsule by mouth Daily.  Dispense: 90 capsule; Refill: 1      Assessment & Plan  1. Nausea.  - Persistent nausea since returning from a cruise on 06/25/2025.  - Differential diagnoses include motion sickness, reaction to international food or liquor, and heat exposure.  - Zofran will be prescribed to manage her nausea.  - If symptoms persist, she should contact the office for further evaluation.    2. Weight management.  - Lost 12 pounds since her last visit.  - Advised to continue current weight loss efforts, including dietary modifications and exercise.  - Encouraged to try  collagen supplements if desired.  - Prescription for Contrave will be sent to see if it is covered by her insurance.    3. Diabetes mellitus.  - Diabetes is well-controlled with an A1c of 5.2, down from 5.6 previously.  - Advised to maintain current regimen and continue monitoring blood sugar levels.  - Ideal fasting blood sugar levels should be between , and postprandial levels should be between 100-140.  - Prescription for lancets and test strips will be provided.    4. Hypertension.  - Blood pressure is well-controlled at 106/78.  - Advised to continue current antihypertensive regimen and monitor blood pressure at home.  - Aim for readings below 130/80.    5. Depression.  - Reports worsening depression with recent suicidal thoughts but no current thoughts, intent or plan.  - Fluoxetine dosage will be increased to 20 mg.  - Referral to a counselorfor video visits will be made.  - Advised to seek immediate medical attention if experiencing suicidal thoughts again.    6. Health Maintenance.  - Had an eye exam in January 2025 at [facility].  - Request for these records will be sent.            Return in about 3 months (around 10/18/2025) for chronic condition follow up.    I discussed my findings,recommendations, and plan of care was with the patient. They verbalized understanding and agreement.  Patient was encouraged to keep me informed of any acute changes, lack of improvement, or any new concerning symptoms.     Patient or patient representative verbalized consent for the use of Ambient Listening during the visit with  LUCIA Manzano for chart documentation. 7/18/2025  11:40 EDT

## 2025-07-25 ENCOUNTER — TELEPHONE (OUTPATIENT)
Dept: INTERNAL MEDICINE | Facility: CLINIC | Age: 55
End: 2025-07-25
Payer: COMMERCIAL

## 2025-07-25 ENCOUNTER — PRIOR AUTHORIZATION (OUTPATIENT)
Dept: INTERNAL MEDICINE | Facility: CLINIC | Age: 55
End: 2025-07-25
Payer: COMMERCIAL

## 2025-07-25 DIAGNOSIS — E11.9 CONTROLLED TYPE 2 DIABETES MELLITUS WITHOUT COMPLICATION, WITHOUT LONG-TERM CURRENT USE OF INSULIN: Primary | ICD-10-CM

## 2025-07-25 NOTE — TELEPHONE ENCOUNTER
Received a PA request for patients glucose monitoring kit and strips. Called pharmacy and patient's insurance prefers [employer]. Can this be sent in or you like to proceed with PA?

## 2025-07-28 RX ORDER — LANCETS 28 GAUGE
EACH MISCELLANEOUS
Qty: 100 EACH | Refills: 12 | Status: SHIPPED | OUTPATIENT
Start: 2025-07-28 | End: 2025-07-28 | Stop reason: SDUPTHER

## 2025-07-28 RX ORDER — BLOOD SUGAR DIAGNOSTIC
STRIP MISCELLANEOUS
Qty: 100 EACH | Refills: 12 | Status: SHIPPED | OUTPATIENT
Start: 2025-07-28

## 2025-07-28 RX ORDER — LANCETS 28 GAUGE
EACH MISCELLANEOUS
Qty: 100 EACH | Refills: 12 | Status: SHIPPED | OUTPATIENT
Start: 2025-07-28

## 2025-07-28 RX ORDER — BLOOD SUGAR DIAGNOSTIC
STRIP MISCELLANEOUS
Qty: 100 EACH | Refills: 12 | Status: SHIPPED | OUTPATIENT
Start: 2025-07-28 | End: 2025-07-28 | Stop reason: SDUPTHER

## (undated) DEVICE — Device: Brand: DEFENDO AIR/WATER/SUCTION AND BIOPSY VALVE

## (undated) DEVICE — SINGLE-USE BIOPSY FORCEPS: Brand: RADIAL JAW 4

## (undated) DEVICE — THE BITE BLOCK MAXI, LATEX FREE STRAP IS USED TO PROTECT THE ENDOSCOPE INSERTION TUBE FROM BEING BITTEN BY THE PATIENT.